# Patient Record
Sex: MALE | Race: WHITE | Employment: OTHER | URBAN - METROPOLITAN AREA
[De-identification: names, ages, dates, MRNs, and addresses within clinical notes are randomized per-mention and may not be internally consistent; named-entity substitution may affect disease eponyms.]

---

## 2018-09-11 ENCOUNTER — HOSPITAL ENCOUNTER (OUTPATIENT)
Dept: CARDIAC CATH/INVASIVE PROCEDURES | Age: 73
Discharge: HOME | End: 2018-09-11
Attending: SPECIALIST | Admitting: SPECIALIST
Payer: MEDICARE

## 2018-09-11 VITALS
HEIGHT: 73 IN | RESPIRATION RATE: 19 BRPM | DIASTOLIC BLOOD PRESSURE: 78 MMHG | BODY MASS INDEX: 28.81 KG/M2 | SYSTOLIC BLOOD PRESSURE: 165 MMHG | WEIGHT: 217.37 LBS | OXYGEN SATURATION: 96 % | TEMPERATURE: 98.6 F | HEART RATE: 85 BPM

## 2018-09-11 LAB
ACT BLD: 142 SECS (ref 79–138)
GLUCOSE BLD STRIP.AUTO-MCNC: 143 MG/DL (ref 65–100)
GLUCOSE BLD STRIP.AUTO-MCNC: 184 MG/DL (ref 65–100)
SERVICE CMNT-IMP: ABNORMAL
SERVICE CMNT-IMP: ABNORMAL

## 2018-09-11 PROCEDURE — C1887 CATHETER, GUIDING: HCPCS

## 2018-09-11 PROCEDURE — 74011250637 HC RX REV CODE- 250/637

## 2018-09-11 PROCEDURE — 77030029065 HC DRSG HEMO QCLOT ZMED -B

## 2018-09-11 PROCEDURE — 85347 COAGULATION TIME ACTIVATED: CPT

## 2018-09-11 PROCEDURE — 77030018836 HC SOL IRR NACL ICUM -A

## 2018-09-11 PROCEDURE — C1769 GUIDE WIRE: HCPCS

## 2018-09-11 PROCEDURE — 74011636320 HC RX REV CODE- 636/320: Performed by: SPECIALIST

## 2018-09-11 PROCEDURE — 82962 GLUCOSE BLOOD TEST: CPT

## 2018-09-11 PROCEDURE — 77030008543 HC TBNG MON PRSS MRTM -A

## 2018-09-11 PROCEDURE — C1894 INTRO/SHEATH, NON-LASER: HCPCS

## 2018-09-11 PROCEDURE — 99153 MOD SED SAME PHYS/QHP EA: CPT

## 2018-09-11 PROCEDURE — 74011250636 HC RX REV CODE- 250/636

## 2018-09-11 PROCEDURE — 74011250636 HC RX REV CODE- 250/636: Performed by: SPECIALIST

## 2018-09-11 PROCEDURE — 77030004532 HC CATH ANGI DX IMP BSC -A

## 2018-09-11 RX ORDER — DIPHENHYDRAMINE HYDROCHLORIDE 50 MG/ML
25 INJECTION, SOLUTION INTRAMUSCULAR; INTRAVENOUS
Status: DISCONTINUED | OUTPATIENT
Start: 2018-09-11 | End: 2018-09-11 | Stop reason: HOSPADM

## 2018-09-11 RX ORDER — HYDROCODONE BITARTRATE AND ACETAMINOPHEN 7.5; 325 MG/1; MG/1
TABLET ORAL AS NEEDED
COMMUNITY

## 2018-09-11 RX ORDER — HEPARIN SODIUM 200 [USP'U]/100ML
500 INJECTION, SOLUTION INTRAVENOUS
Status: CANCELLED | OUTPATIENT
Start: 2018-09-11

## 2018-09-11 RX ORDER — LISINOPRIL AND HYDROCHLOROTHIAZIDE 12.5; 2 MG/1; MG/1
2 TABLET ORAL DAILY
COMMUNITY

## 2018-09-11 RX ORDER — HEPARIN SODIUM 1000 [USP'U]/ML
5000 INJECTION, SOLUTION INTRAVENOUS; SUBCUTANEOUS ONCE
Status: COMPLETED | OUTPATIENT
Start: 2018-09-11 | End: 2018-09-11

## 2018-09-11 RX ORDER — MIDAZOLAM HYDROCHLORIDE 1 MG/ML
.5-1 INJECTION, SOLUTION INTRAMUSCULAR; INTRAVENOUS
Status: DISCONTINUED | OUTPATIENT
Start: 2018-09-11 | End: 2018-09-11 | Stop reason: HOSPADM

## 2018-09-11 RX ORDER — MIDAZOLAM HYDROCHLORIDE 1 MG/ML
.5-1 INJECTION, SOLUTION INTRAMUSCULAR; INTRAVENOUS
Status: CANCELLED | OUTPATIENT
Start: 2018-09-11

## 2018-09-11 RX ORDER — SODIUM CHLORIDE 0.9 % (FLUSH) 0.9 %
5-10 SYRINGE (ML) INJECTION AS NEEDED
Status: DISCONTINUED | OUTPATIENT
Start: 2018-09-11 | End: 2018-09-11 | Stop reason: HOSPADM

## 2018-09-11 RX ORDER — GABAPENTIN 300 MG/1
300 CAPSULE ORAL 3 TIMES DAILY
COMMUNITY

## 2018-09-11 RX ORDER — LIDOCAINE HYDROCHLORIDE 10 MG/ML
50 INJECTION INFILTRATION; PERINEURAL
Status: CANCELLED | OUTPATIENT
Start: 2018-09-11

## 2018-09-11 RX ORDER — ASPIRIN 325 MG
325 TABLET ORAL DAILY
Status: DISCONTINUED | OUTPATIENT
Start: 2018-09-12 | End: 2018-09-11 | Stop reason: HOSPADM

## 2018-09-11 RX ORDER — HEPARIN SODIUM 1000 [USP'U]/ML
INJECTION, SOLUTION INTRAVENOUS; SUBCUTANEOUS
Status: COMPLETED
Start: 2018-09-11 | End: 2018-09-11

## 2018-09-11 RX ORDER — LIDOCAINE HYDROCHLORIDE 10 MG/ML
20 INJECTION INFILTRATION; PERINEURAL
Status: DISCONTINUED | OUTPATIENT
Start: 2018-09-11 | End: 2018-09-11 | Stop reason: HOSPADM

## 2018-09-11 RX ORDER — ADENOSINE 3 MG/ML
140 INJECTION, SOLUTION INTRAVENOUS ONCE
Status: COMPLETED | OUTPATIENT
Start: 2018-09-11 | End: 2018-09-11

## 2018-09-11 RX ORDER — SODIUM CHLORIDE 9 MG/ML
125 INJECTION, SOLUTION INTRAVENOUS CONTINUOUS
Status: DISPENSED | OUTPATIENT
Start: 2018-09-11 | End: 2018-09-11

## 2018-09-11 RX ORDER — HYDROCORTISONE SODIUM SUCCINATE 100 MG/2ML
100 INJECTION, POWDER, FOR SOLUTION INTRAMUSCULAR; INTRAVENOUS
Status: DISCONTINUED | OUTPATIENT
Start: 2018-09-11 | End: 2018-09-11 | Stop reason: HOSPADM

## 2018-09-11 RX ORDER — HEPARIN SODIUM 200 [USP'U]/100ML
500 INJECTION, SOLUTION INTRAVENOUS ONCE
Status: COMPLETED | OUTPATIENT
Start: 2018-09-11 | End: 2018-09-11

## 2018-09-11 RX ORDER — FENTANYL CITRATE 50 UG/ML
12.5-2 INJECTION, SOLUTION INTRAMUSCULAR; INTRAVENOUS
Status: CANCELLED | OUTPATIENT
Start: 2018-09-11

## 2018-09-11 RX ORDER — FENTANYL CITRATE 50 UG/ML
25-200 INJECTION, SOLUTION INTRAMUSCULAR; INTRAVENOUS
Status: DISCONTINUED | OUTPATIENT
Start: 2018-09-11 | End: 2018-09-11 | Stop reason: HOSPADM

## 2018-09-11 RX ORDER — ASPIRIN 325 MG
TABLET ORAL
Status: COMPLETED
Start: 2018-09-11 | End: 2018-09-11

## 2018-09-11 RX ORDER — SODIUM CHLORIDE 0.9 % (FLUSH) 0.9 %
5-10 SYRINGE (ML) INJECTION EVERY 8 HOURS
Status: DISCONTINUED | OUTPATIENT
Start: 2018-09-11 | End: 2018-09-11 | Stop reason: HOSPADM

## 2018-09-11 RX ADMIN — Medication 325 MG: at 12:12

## 2018-09-11 RX ADMIN — MIDAZOLAM 2 MG: 1 INJECTION INTRAMUSCULAR; INTRAVENOUS at 13:34

## 2018-09-11 RX ADMIN — ASPIRIN 325 MG: 325 TABLET, COATED ORAL at 12:12

## 2018-09-11 RX ADMIN — HEPARIN SODIUM 3000 UNITS: 1000 INJECTION, SOLUTION INTRAVENOUS; SUBCUTANEOUS at 13:51

## 2018-09-11 RX ADMIN — MIDAZOLAM 1 MG: 1 INJECTION INTRAMUSCULAR; INTRAVENOUS at 14:04

## 2018-09-11 RX ADMIN — HEPARIN SODIUM 1000 UNITS: 200 INJECTION, SOLUTION INTRAVENOUS at 13:33

## 2018-09-11 RX ADMIN — LIDOCAINE HYDROCHLORIDE 20 ML: 10 INJECTION, SOLUTION INFILTRATION; PERINEURAL at 13:33

## 2018-09-11 RX ADMIN — MIDAZOLAM 1 MG: 1 INJECTION INTRAMUSCULAR; INTRAVENOUS at 13:37

## 2018-09-11 RX ADMIN — FENTANYL CITRATE 50 MCG: 50 INJECTION, SOLUTION INTRAMUSCULAR; INTRAVENOUS at 13:34

## 2018-09-11 RX ADMIN — FENTANYL CITRATE 25 MCG: 50 INJECTION, SOLUTION INTRAMUSCULAR; INTRAVENOUS at 13:38

## 2018-09-11 RX ADMIN — IOPAMIDOL 215 ML: 755 INJECTION, SOLUTION INTRAVENOUS at 14:07

## 2018-09-11 RX ADMIN — ADENOSINE 13.8 MG/MIN: 3 INJECTION, SOLUTION INTRAVENOUS at 13:51

## 2018-09-11 RX ADMIN — FENTANYL CITRATE 25 MCG: 50 INJECTION, SOLUTION INTRAMUSCULAR; INTRAVENOUS at 13:43

## 2018-09-11 RX ADMIN — SODIUM CHLORIDE 75 ML/HR: 900 INJECTION, SOLUTION INTRAVENOUS at 12:08

## 2018-09-11 NOTE — IP AVS SNAPSHOT
[address] 1007 [facility] 
[phone] Patient: Roshan Anaya MRN: [id number] :1945 About your hospitalization You were admitted on:  2018 You last received care in the:  [facility] PACU You were discharged on:  2018 Why you were hospitalized Your primary diagnosis was:  Not on File Follow-up Information Follow up With Details Comments Contact Info Tita Cid MD On 2018 10:20am [address] 1007 [facility] 
[phone] Provider Unknown   Patient not available to ask Discharge Orders None A check bertha indicates which time of day the medication should be taken. My Medications CONTINUE taking these medications Instructions Each Dose to Equal  
 Morning Noon Evening Bedtime  
 aspirin delayed-release 81 mg tablet Your last dose was: Your next dose is: Take 81 mg by mouth daily. 81 mg  
    
   
   
   
  
 cetirizine 10 mg tablet Commonly known as:  ZYRTEC Your last dose was: Your next dose is: Take 10 mg by mouth daily. 10 mg  
    
   
   
   
  
 gabapentin 300 mg capsule Commonly known as:  NEURONTIN Your last dose was: Your next dose is: Take 300 mg by mouth two (2) times a day. One tablet am two pm  
 300 mg  
    
   
   
   
  
 glipiZIDE 5 mg tablet Commonly known as:  Pricila Betters Your last dose was: Your next dose is: Take 5 mg by mouth two (2) times a day. 5 mg HYDROcodone-acetaminophen 7.5-325 mg per tablet Commonly known as:  March Castles Your last dose was: Your next dose is: Take  by mouth. JANUVIA 100 mg tablet Generic drug:  SITagliptin Your last dose was: Your next dose is: Take 100 mg by mouth daily. 100 mg  
    
   
   
   
  
 lisinopril-hydroCHLOROthiazide 20-12.5 mg per tablet Commonly known as:  Floyce Plenty Your last dose was: Your next dose is: Take 2 Tabs by mouth daily. 2 Tab  
    
   
   
   
  
 metFORMIN 1,000 mg tablet Commonly known as:  GLUCOPHAGE Your last dose was: Your next dose is: Take 1,000 mg by mouth two (2) times daily (with meals). 1000 mg  
    
   
   
   
  
 simvastatin 40 mg tablet Commonly known as:  ZOCOR Your last dose was: Your next dose is: Take 20 mg by mouth nightly. 20 mg Opioid Education Prescription Opioids: What You Need to Know: 
 
Prescription opioids can be used to help relieve moderate-to-severe pain and are often prescribed following a surgery or injury, or for certain health conditions. These medications can be an important part of treatment but also come with serious risks. Opioids are strong pain medicines. Examples include hydrocodone, oxycodone, fentanyl, and morphine. Heroin is an example of an illegal opioid. It is important to work with your health care provider to make sure you are getting the safest, most effective care. WHAT ARE THE RISKS AND SIDE EFFECTS OF OPIOID USE? Prescription opioids carry serious risks of addiction and overdose, especially with prolonged use. An opioid overdose, often marked by slow breathing, can cause sudden death. The use of prescription opioids can have a number of side effects as well, even when taken as directed. · Tolerance-meaning you might need to take more of a medication for the same pain relief · Physical dependence-meaning you have symptoms of withdrawal when the medication is stopped. Withdrawal symptoms can include nausea, sweating, chills, diarrhea, stomach cramps, and muscle aches.   Withdrawal can last up to several weeks, depending on which drug you took and how long you took it. · Increased sensitivity to pain · Constipation · Nausea, vomiting, and dry mouth · Sleepiness and dizziness · Confusion · Depression · Low levels of testosterone that can result in lower sex drive, energy, and strength · Itching and sweating RISKS ARE GREATER WITH:      
· History of drug misuse, substance use disorder, or overdose · Mental health conditions (such as depression or anxiety) · Sleep apnea · Older age (72 years or older) · Pregnancy Avoid alcohol while taking prescription opioids. Also, unless specifically advised by your health care provider, medications to avoid include: · Benzodiazepines (such as Xanax or Valium) · Muscle relaxants (such as Soma or Flexeril) · Hypnotics (such as Ambien or Lunesta) · Other prescription opioids KNOW YOUR OPTIONS Talk to your health care provider about ways to manage your pain that don't involve prescription opioids. Some of these options may actually work better and have fewer risks and side effects. Options may include: 
· Pain relievers such as acetaminophen, ibuprofen, and naproxen · Some medications that are also used for depression or seizures · Physical therapy and exercise · Counseling to help patients learn how to cope better with triggers of pain and stress. · Application of heat or cold compress · Massage therapy · Relaxation techniques Be Informed Make sure you know the name of your medication, how much and how often to take it, and its potential risks & side effects. IF YOU ARE PRESCRIBED OPIOIDS FOR PAIN: 
· Never take opioids in greater amounts or more often than prescribed. Remember the goal is not to be pain-free but to manage your pain at a tolerable level. · Follow up with your primary care provider to: · Work together to create a plan on how to manage your pain. · Talk about ways to help manage your pain that don't involve prescription opioids. · Talk about any and all concerns and side effects. · Help prevent misuse and abuse. · Never sell or share prescription opioids · Help prevent misuse and abuse. · Store prescription opioids in a secure place and out of reach of others (this may include visitors, children, friends, and family). · Safely dispose of unused/unwanted prescription opioids: Find your community drug take-back program or your pharmacy mail-back program, or flush them down the toilet, following guidance from the Food and Drug Administration (www.fda.gov/Drugs/ResourcesForYou). · Visit www.cdc.gov/drugoverdose to learn about the risks of opioid abuse and overdose. · If you believe you may be struggling with addiction, tell your health care provider and ask for guidance or call [facility] at 0-307-768-JRKB. Discharge Instructions Discharge Instructions:  
                 Cardiac Catheterization/Angiography Discharge Instructions *Check the puncture site frequently for swelling or bleeding. If you see any bleeding, lie down and apply pressure over the area with a clean town or washcloth. Call 911 immediately and continue to hold pressure until their arrival. Notify your doctor for any redness, swelling, drainage or oozing from the puncture site. Notify your doctor for any fever or chills. *If the leg  with the puncture becomes cold, numb or painful, call Dr Glenn Christensen at  [phone] *Activity should be limited for the next 48 hours. Climb stairs as little as possible and avoid any stooping, bending or strenuous activity for 48 hours. No heavy lifting (anything over 10 pounds) for three days. *Do not drive for 24 hours. *You may resume your usual diet.  Drink more fluids than usual. 
 
 *Have a responsible person drive you home and stay with you for at least 24 hours after your heart catheterization/angiography. *You may remove the bandage from your Right Groin in 24 hours. You may shower in 24 hours. No tub baths, hot tubs or swimming for one week. Do not place any lotions, creams, powders, ointments over the puncture site for one week. You may place a clean band-aid over the puncture site each day for 5 days. Change this daily. Medications:  
 
Do NOT restart metformin (glucophage) until Thursday, September 13th, 2018. Activity: As tolerated except do not lift over 10 pounds for 5 days. Diet:  
 
American Heart Association. Follow-up:  
 
Follow up with Arun Fuentes MD on September 19th, 2018 at 10:20am. 
Snehal ToulonAlpesh Pretty [address] 
[phone] Reminder: 
Do NOT restart metformin (glucophage) until Thursday, September 13th, 2018. If you smoke, STOP! Introducing [facility]! Michelle Ramesh introduces [employer] patient portal. Now you can access parts of your medical record, email your doctor's office, and request medication refills online. 1. In your internet browser, go to [email or web address] 2. Click on the First Time User? Click Here link in the Sign In box. You will see the New Member Sign Up page. 3. Enter your [employer] Access Code exactly as it appears below. You will not need to use this code after youve completed the sign-up process. If you do not sign up before the expiration date, you must request a new code. · [employer] Access Code: [email or web address] Expires: 11/27/2018  3:43 PM 
 
4. Enter the last four digits of your Social Security Number (xxxx) and Date of Birth (mm/dd/yyyy) as indicated and click Submit. You will be taken to the next sign-up page. 5. Create a [employer] ID. This will be your [employer] login ID and cannot be changed, so think of one that is secure and easy to remember. 6. Create a [employer] password. You can change your password at any time. 7. Enter your Password Reset Question and Answer. This can be used at a later time if you forget your password. 8. Enter your e-mail address. You will receive e-mail notification when new information is available in [address] 9. Click Sign Up. You can now view and download portions of your medical record. 10. Click the Download Summary menu link to download a portable copy of your medical information. If you have questions, please visit the Frequently Asked Questions section of the [employer] website. Remember, [employer] is NOT to be used for urgent needs. For medical emergencies, dial 911. Now available from your iPhone and Android! Introducing Faraz Boykin As a [employer] patient, I wanted to make you aware of our electronic visit tool called Faraz Boykin. [employer] 24/7 allows you to connect within minutes with a medical provider 24 hours a day, seven days a week via a mobile device or tablet or logging into a secure website from your computer. You can access Faraz Boykin from anywhere in the [place]. A virtual visit might be right for you when you have a simple condition and feel like you just dont want to get out of bed, or cant get away from work for an appointment, when your regular [employer] provider is not available (evenings, weekends or holidays), or when youre out of town and need minor care. Electronic visits cost only $49 and if the [employer] 24/7 provider determines a prescription is needed to treat your condition, one can be electronically transmitted to a nearby pharmacy*. Please take a moment to enroll today if you have not already done so. The enrollment process is free and takes just a few minutes. To enroll, please download the [employer] 24/7 susana to your tablet or phone, or visit [email or web address] to enroll on your computer. And, as an [address] [place] [address] patient with a [employer] account, the results of your visits will be scanned into your electronic medical record and your primary care provider will be able to view the scanned results. We urge you to continue to see your regular [facility] provider for your ongoing medical care. And while your primary care provider may not be the one available when you seek a Faraz Boykin virtual visit, the peace of mind you get from getting a real diagnosis real time can be priceless. For more information on Faraz Crandallfin, view our Frequently Asked Questions (FAQs) at [email or web address]. Sincerely, 
 
Bryan Cortez MD 
Chief Medical Officer [facility] Cony Swanson *:  certain medications cannot be prescribed via Faraz Haparvinfin Providers Seen During Your Hospitalization Provider Specialty Primary office phone Arun Fuentes MD Cardiology [phone] Your Primary Care Physician (PCP) Primary Care Physician Office Phone Office Fax UNKNOWN, PROVIDER ** None ** ** None ** You are allergic to the following No active allergies Recent Documentation Height Weight BMI Smoking Status 1.854 m 98.6 kg 28.68 kg/m2 Former Smoker Emergency Contacts Name Discharge Info Relation Home Work Mobile Teddy Landon DISCHARGE CAREGIVER [3] Other Relative [6] [phone] Patient Belongings The following personal items are in your possession at time of discharge: 
     Visual Aid: Glasses Please provide this summary of care documentation to your next provider. Signatures-by signing, you are acknowledging that this After Visit Summary has been reviewed with you and you have received a copy. Patient Signature:  ____________________________________________________________ Date:  ____________________________________________________________  
  
Miriam Hero Provider Signature:  ____________________________________________________________ Date:  ____________________________________________________________

## 2018-09-11 NOTE — H&P
Date of Surgery Update:  Davis August was seen and examined. Positive stress echo for ischemia in LAD distribution.     Signed By: Nirmal Gallego MD     September 11, 2018 12:53 PM

## 2018-09-11 NOTE — IP AVS SNAPSHOT
[address] 
[phone] Patient: Mark Officer MRN: [id number] :1945 A check bertha indicates which time of day the medication should be taken. My Medications CONTINUE taking these medications Instructions Each Dose to Equal  
 Morning Noon Evening Bedtime  
 aspirin delayed-release 81 mg tablet Your last dose was: Your next dose is: Take 81 mg by mouth daily. 81 mg  
    
   
   
   
  
 cetirizine 10 mg tablet Commonly known as:  ZYRTEC Your last dose was: Your next dose is: Take 10 mg by mouth daily. 10 mg  
    
   
   
   
  
 gabapentin 300 mg capsule Commonly known as:  NEURONTIN Your last dose was: Your next dose is: Take 300 mg by mouth two (2) times a day. One tablet am two pm  
 300 mg  
    
   
   
   
  
 glipiZIDE 5 mg tablet Commonly known as:  Maxcine Carroll Your last dose was: Your next dose is: Take 5 mg by mouth two (2) times a day. 5 mg HYDROcodone-acetaminophen 7.5-325 mg per tablet Commonly known as:  Katie Parisian Your last dose was: Your next dose is: Take  by mouth. JANUVIA 100 mg tablet Generic drug:  SITagliptin Your last dose was: Your next dose is: Take 100 mg by mouth daily. 100 mg  
    
   
   
   
  
 lisinopril-hydroCHLOROthiazide 20-12.5 mg per tablet Commonly known as:  Grace Schilder Your last dose was: Your next dose is: Take 2 Tabs by mouth daily. 2 Tab  
    
   
   
   
  
 metFORMIN 1,000 mg tablet Commonly known as:  GLUCOPHAGE Your last dose was: Your next dose is: Take 1,000 mg by mouth two (2) times daily (with meals).   
 1000 mg  
    
   
   
   
  
 simvastatin 40 mg tablet Commonly known as:  ZOCOR Your last dose was: Your next dose is: Take 20 mg by mouth nightly.   
 20 mg

## 2018-09-11 NOTE — DISCHARGE INSTRUCTIONS
Discharge Instructions:                    Cardiac Catheterization/Angiography Discharge Instructions    *Check the puncture site frequently for swelling or bleeding. If you see any bleeding, lie down and apply pressure over the area with a clean town or washcloth. Call 911 immediately and continue to hold pressure until their arrival. Notify your doctor for any redness, swelling, drainage or oozing from the puncture site. Notify your doctor for any fever or chills. *If the leg  with the puncture becomes cold, numb or painful, call Dr Abel Villagomez at  [phone]    *Activity should be limited for the next 48 hours. Climb stairs as little as possible and avoid any stooping, bending or strenuous activity for 48 hours. No heavy lifting (anything over 10 pounds) for three days. *Do not drive for 24 hours. *You may resume your usual diet. Drink more fluids than usual.    *Have a responsible person drive you home and stay with you for at least 24 hours after your heart catheterization/angiography. *You may remove the bandage from your Right Groin in 24 hours. You may shower in 24 hours. No tub baths, hot tubs or swimming for one week. Do not place any lotions, creams, powders, ointments over the puncture site for one week. You may place a clean band-aid over the puncture site each day for 5 days. Change this daily. Medications:     Do NOT restart metformin (glucophage) until Thursday, September 13th, 2018. Activity:     As tolerated except do not lift over 10 pounds for 5 days. Diet:     American Heart Association. Follow-up:     Follow up with Vivek Stone MD on September 19th, 2018 at 10:20am.  [address] [place] [address] 33  [phone]    Reminder:  Do NOT restart metformin (glucophage) until Thursday, September 13th, 2018. If you smoke, STOP!

## 2018-09-11 NOTE — PROGRESS NOTES
11:42 AM Received patient from waiting area. Armband and allergies verbally confirmed with patient. Procedure explained and consents signed. 5-B [facility] Dr. Darcie Logan in to see strong radial and ulnar pulse in right wrist  1315 TRANSFER - OUT REPORT:    Verbal report given to Myesha(name) robbie Ferrera  being transferred to cath lab(unit) for routine progression of care       Report consisted of patients Situation, Background, Assessment and   Recommendations(SBAR). Information from the following report(s) Procedure Summary was reviewed with the receiving nurse. Lines:   Peripheral IV 09/11/18 Right Antecubital (Active)   Site Assessment Clean, dry, & intact 9/11/2018 12:00 PM        Opportunity for questions and clarification was provided. Patient transported with:   Registered Nurse      Emanuel 35 REPORT:    Verbal report received from Myesha(name) on Kianna Ferrera  being received from cath lab(unit) for routine progression of care      Report consisted of patients Situation, Background, Assessment and   Recommendations(SBAR). Information from the following report(s) Procedure Summary was reviewed with the receiving nurse. Opportunity for questions and clarification was provided. Assessment completed upon patients arrival to unit and care assumed.        1430 act 142 accucheck 143 right groin sheath site clean dry in tact preparing to pull sheath   1500 hemostasis obtained sand bag to right groin doppler pulses  1520 right groin site soft dry in tact sand bag in place  1550 update given to daughter   1700 sitting up 45 degrees right groin site soft dry in tact, eating lunch  1740 ambulated to bathroom and back to chair reviewed discharge instructions with pt and daughter both verbalized understanding

## 2018-09-11 NOTE — PROCEDURES
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 FFR D1 = 0.85. RI patent. LCx LIs. RCA p30, m40, d30. Normal LVF (EF 60%). No AVG/MR.  RFA manual    Based on these findings, Mr. Catalino Pruitt is a Low Risk patient from a cardiac perspective who will be undergoing a High Risk procedure (lower extremity vascular surgery). I recommend proceeding ahead with the procedure without further cardiac testing at this time. We would be happy to see Mr. Catalino Pruitt during the marj-procedural period if so desired.     F/U with me 9/19 @ 10:20am.

## 2019-07-02 ENCOUNTER — HOSPITAL ENCOUNTER (OUTPATIENT)
Dept: MRI IMAGING | Age: 74
Discharge: HOME | End: 2019-07-02
Attending: PHYSICAL MEDICINE & REHABILITATION
Payer: MEDICARE

## 2019-07-02 ENCOUNTER — HOSPITAL ENCOUNTER (OUTPATIENT)
Dept: GENERAL RADIOLOGY | Age: 74
Discharge: HOME | End: 2019-07-02
Attending: PHYSICAL MEDICINE & REHABILITATION
Payer: MEDICARE

## 2019-07-02 DIAGNOSIS — M51.26 LUMBAR DISC HERNIATION: ICD-10-CM

## 2019-07-02 DIAGNOSIS — T15.90XA FOREIGN BODY IN EYE: ICD-10-CM

## 2019-07-02 PROCEDURE — 72148 MRI LUMBAR SPINE W/O DYE: CPT

## 2019-07-02 PROCEDURE — 70030 X-RAY EYE FOR FOREIGN BODY: CPT

## 2019-10-15 ENCOUNTER — OFFICE VISIT (OUTPATIENT)
Dept: NEUROSURGERY | Age: 74
End: 2019-10-15

## 2019-10-15 VITALS
DIASTOLIC BLOOD PRESSURE: 88 MMHG | BODY MASS INDEX: 29.16 KG/M2 | TEMPERATURE: 98.3 F | OXYGEN SATURATION: 98 % | HEIGHT: 73 IN | RESPIRATION RATE: 17 BRPM | HEART RATE: 88 BPM | WEIGHT: 220 LBS | SYSTOLIC BLOOD PRESSURE: 140 MMHG

## 2019-10-15 DIAGNOSIS — I10 HYPERTENSION, UNSPECIFIED TYPE: ICD-10-CM

## 2019-10-15 DIAGNOSIS — G95.19 NEUROGENIC CLAUDICATION (HCC): ICD-10-CM

## 2019-10-15 DIAGNOSIS — E11.42 TYPE 2 DIABETES MELLITUS WITH DIABETIC POLYNEUROPATHY, WITHOUT LONG-TERM CURRENT USE OF INSULIN (HCC): Primary | ICD-10-CM

## 2019-10-15 RX ORDER — GLIMEPIRIDE 2 MG/1
2 TABLET ORAL 3 TIMES DAILY
Refills: 0 | COMMUNITY
Start: 2019-09-24 | End: 2020-10-12

## 2019-10-15 RX ORDER — CLOPIDOGREL BISULFATE 75 MG/1
75 TABLET ORAL DAILY
COMMUNITY

## 2019-10-15 NOTE — PATIENT INSTRUCTIONS
Your low back pain and bilateral lower extremity pain could be caused by neurogenic claudication from a vascular cause including a rare vascular malformation, dural arteriovenous fistula. Narrowing of your aorta or branch vessels could also be causing your condition. CTA of the chest, abdomen and pelvis and MRI of the thoracic spine were ordered to investigate further. A spinal angiogram may be indicated in the future (procedure). Call 911 or seek emergency medical attention if your legs or arms become weak or if you loose control of bowel or bladder function. These symptoms are an emergency. See Dr. Ferne Fleischer back in 2-4 weeks to go over test results. A Healthy Lifestyle: Care Instructions  Your Care Instructions    A healthy lifestyle can help you feel good, stay at a healthy weight, and have plenty of energy for both work and play. A healthy lifestyle is something you can share with your whole family. A healthy lifestyle also can lower your risk for serious health problems, such as high blood pressure, heart disease, and diabetes. You can follow a few steps listed below to improve your health and the health of your family. Follow-up care is a key part of your treatment and safety. Be sure to make and go to all appointments, and call your doctor if you are having problems. It's also a good idea to know your test results and keep a list of the medicines you take. How can you care for yourself at home? · Do not eat too much sugar, fat, or fast foods. You can still have dessert and treats now and then. The goal is moderation. · Start small to improve your eating habits. Pay attention to portion sizes, drink less juice and soda pop, and eat more fruits and vegetables. ? Eat a healthy amount of food. A 3-ounce serving of meat, for example, is about the size of a deck of cards. Fill the rest of your plate with vegetables and whole grains.   ? Limit the amount of soda and sports drinks you have every day. Drink more water when you are thirsty. ? Eat at least 5 servings of fruits and vegetables every day. It may seem like a lot, but it is not hard to reach this goal. A serving or helping is 1 piece of fruit, 1 cup of vegetables, or 2 cups of leafy, raw vegetables. Have an apple or some carrot sticks as an afternoon snack instead of a candy bar. Try to have fruits and/or vegetables at every meal.  · Make exercise part of your daily routine. You may want to start with simple activities, such as walking, bicycling, or slow swimming. Try to be active 30 to 60 minutes every day. You do not need to do all 30 to 60 minutes all at once. For example, you can exercise 3 times a day for 10 or 20 minutes. Moderate exercise is safe for most people, but it is always a good idea to talk to your doctor before starting an exercise program.  · Keep moving. Janeen Listen the lawn, work in the garden, or [employer]. Take the stairs instead of the elevator at work. · If you smoke, quit. People who smoke have an increased risk for heart attack, stroke, cancer, and other lung illnesses. Quitting is hard, but there are ways to boost your chance of quitting tobacco for good. ? Use nicotine gum, patches, or lozenges. ? Ask your doctor about stop-smoking programs and medicines. ? Keep trying. In addition to reducing your risk of diseases in the future, you will notice some benefits soon after you stop using tobacco. If you have shortness of breath or asthma symptoms, they will likely get better within a few weeks after you quit. · Limit how much alcohol you drink. Moderate amounts of alcohol (up to 2 drinks a day for men, 1 drink a day for women) are okay. But drinking too much can lead to liver problems, high blood pressure, and other health problems. Family health  If you have a family, there are many things you can do together to improve your health. · Eat meals together as a family as often as possible. · Eat healthy foods. This includes fruits, vegetables, lean meats and dairy, and whole grains. · Include your family in your fitness plan. Most people think of activities such as jogging or tennis as the way to fitness, but there are many ways you and your family can be more active. Anything that makes you breathe hard and gets your heart pumping is exercise. Here are some tips:  ? Walk to do errands or to take your child to school or the bus.  ? Go for a family bike ride after dinner instead of watching TV. Where can you learn more? Go to http://sandyMonthlysjenise.info/. Enter T497 in the search box to learn more about \"A Healthy Lifestyle: Care Instructions. \"  Current as of: May 28, 2019  Content Version: 12.2  © 3329-1543 [employer]. Care instructions adapted under license by [employer] (which disclaims liability or warranty for this information). If you have questions about a medical condition or this instruction, always ask your healthcare professional. [address] any warranty or liability for your use of this information.

## 2019-10-17 NOTE — PROGRESS NOTES
Neurointerventional Surgery  Ambulatory Progress Note      Patient: Carrie Wilson MRN: [id number]  SSN: [id number]    YOB: 1945  Age: 69 y.o. Sex: male      History of Present Illness:      Nette Banks is a 68 yo right handed male referred to neurovascular clinic today for \"vascular neurogenic claudication and evaluation of the Artery of [place]\". This patient underwent vascular procedures at [facility] on December 4, 2019. The patient is the source of this information but an outside H&P lists:    10/2018 [facility]: hawk/DCP r pop, pta r sfa, trent anais stent 10 mm and IVUS (r [id number] 10x37 ANAIS stents, DCB/Hawk right pop  12/4/19  Left fem/profunda TEA patchplasty: bovine patch     Diagnostics:   9/25/17:   PVR with exercise 0.89/0/94->0.61/0.61  7/30/2018  SHELLY: 0.92/0.80    MRI and CT myelogram reports (and images) were the only studies provided by the patient today. His current symptoms include progressively worsening, reproducible pain in the central low back, bilateral buttocks and lower extremities that worsen with walking and improve at rest.  This severely limits his mobility. He describes severe pain with multiple stops walking from the parking lot to my office today. He denies bowel or bladder incontinence. He initially noticed these symptoms 3 years ago but there has been significant worsening over the past 12 months. His vascular surgeon said that SHELLY's are \"not normal but OK\" and does not believe current symptoms are arising from PVD (Dr. Aneta Lopez). Patient complains of chronic burning pain in both feet (chronic neuropathy).      PMH reported (see additional list below):  CAD (positive stress echo 9/11/18, negative cath on Nov 1, 2018 ([address] [place] [address]), history of paroxysmal AFIB (short interval warfarin discontinued), PVD (stents visible on myelogram in bilateral common iliac arteries and the patient says that his right popliteal was stented as above), diabetes type 2, diabetic nepropathy, hypertension, hyperlipidemia. PSH:  As above, bladder surgery    SocHx:  Retired, quit smoking 12 years ago (1/2 pack per day before), at least 4 beers per day    Allergies:  Glipizide (photosensitivity)      Patient Active Problem List   Diagnosis Code    GI BLEEDING - UNSPEC RECTAL BLEEDING K62.5    Diverticulum - small intestine     Pyelonephritis, unspecified N12    SIRS (systemic inflammatory response syndrome) ([facility]) R65.10    DM (diabetes mellitus) ([facility] [address] E11.9    Unspecified constipation K59.00    HTN (hypertension) I10    Other and unspecified hyperlipidemia E78.5    UTI (urinary tract infection) N39.0    Neurogenic claudication M48.062        Review of Systems    A comprehensive ROS was performed and was negative except for as per HPI. Objective:     Current Outpatient Medications   Medication Sig Dispense Refill    clopidogrel (PLAVIX) 75 mg tab Take 75 mg by mouth daily.  lisinopril-hydroCHLOROthiazide (PRINZIDE, ZESTORETIC) 20-12.5 mg per tablet Take 2 Tabs by mouth daily.  gabapentin (NEURONTIN) 300 mg capsule Take 300 mg by mouth two (2) times a day. One tablet am two pm      sitaGLIPtin (JANUVIA) 100 mg tablet Take 100 mg by mouth daily.  simvastatin (ZOCOR) 40 mg tablet Take 20 mg by mouth nightly.  metFORMIN (GLUCOPHAGE) 1,000 mg tablet Take 1,000 mg by mouth two (2) times daily (with meals).  aspirin delayed-release 81 mg tablet Take 81 mg by mouth daily.  glimepiride (AMARYL) 2 mg tablet Take 2 mg by mouth three (3) times daily. 0    HYDROcodone-acetaminophen (NORCO) 7.5-325 mg per tablet Take  by mouth.  glipiZIDE (GLUCOTROL) 5 mg tablet Take 5 mg by mouth two (2) times a day.  cetirizine (ZYRTEC) 10 mg tablet Take 10 mg by mouth daily.           Visit Vitals  /88 (BP 1 Location: Left arm)   Pulse 88   Temp 98.3 °F (36.8 °C)   Resp 17   Ht 6' 1\" (1.854 m)   Wt 220 lb (99.8 kg) SpO2 98%   BMI 29.03 kg/m²       No Known Allergies    Current Outpatient Medications   Medication Sig    clopidogrel (PLAVIX) 75 mg tab Take 75 mg by mouth daily.  lisinopril-hydroCHLOROthiazide (PRINZIDE, ZESTORETIC) 20-12.5 mg per tablet Take 2 Tabs by mouth daily.  gabapentin (NEURONTIN) 300 mg capsule Take 300 mg by mouth two (2) times a day. One tablet am two pm    sitaGLIPtin (JANUVIA) 100 mg tablet Take 100 mg by mouth daily.  simvastatin (ZOCOR) 40 mg tablet Take 20 mg by mouth nightly.  metFORMIN (GLUCOPHAGE) 1,000 mg tablet Take 1,000 mg by mouth two (2) times daily (with meals).  aspirin delayed-release 81 mg tablet Take 81 mg by mouth daily.  glimepiride (AMARYL) 2 mg tablet Take 2 mg by mouth three (3) times daily.  HYDROcodone-acetaminophen (NORCO) 7.5-325 mg per tablet Take  by mouth.  glipiZIDE (GLUCOTROL) 5 mg tablet Take 5 mg by mouth two (2) times a day.  cetirizine (ZYRTEC) 10 mg tablet Take 10 mg by mouth daily. No current facility-administered medications for this visit. Physical Exam:  General: NAD  Lungs: clear to auscultation bilaterally  Heart: regular rate and rhythm, S1, S2 normal, no murmur, click, rub or gallop  Extremities: extremities normal, atraumatic, no cyanosis mild ankle edema (puffy), feet warm and pink. Slightly delayed cap refill  Pulses: PT 1- faint but palpable bilaterally, DP not palpable    Neurologic Exam:  Mental Status:  Alert and oriented x 4. Appropriate affect, mood and behavior. Language:    Normal fluency, repetition, comprehension and naming. Cranial Nerves:   Pupils equal, round and reactive to light. Visual fields full to confrontation. Extraocular movements intact. Facial sensation intact V1 - V3. Full facial strength, no asymmetry. Hearing intact bilaterally. No dysarthria. Tongue protrudes to midline, palate elevates symmetrically. Shoulder shrug 5/5 bilaterally.     Motor: No pronator drift. Bulk and tone normal.      5/5 power in all extremities proximally and distally. Toes downgoing. No involuntary movements. Sensation:    Decreased sensation below knees (peripheral neuropathy). Coordination & Gait: Slow wide gait with no assist, tandem severely impaired, FTN normal.       Labs:  Lab Results   Component Value Date/Time    Sodium 132 (L) 08/28/2013 05:57 AM    Potassium 4.1 08/28/2013 05:57 AM    Chloride 98 08/28/2013 05:57 AM    CO2 24 08/28/2013 05:57 AM    Anion gap 10 08/28/2013 05:57 AM    Glucose 139 (H) 08/28/2013 05:57 AM    BUN 12 08/28/2013 05:57 AM    Creatinine 0.93 08/28/2013 05:57 AM    BUN/Creatinine ratio 13 08/28/2013 05:57 AM    GFR est AA >60 08/28/2013 05:57 AM    GFR est non-AA >60 08/28/2013 05:57 AM    Calcium 8.1 (L) 08/28/2013 05:57 AM     Lab Results   Component Value Date/Time    WBC 16.8 (H) 08/28/2013 05:57 AM    HGB 12.3 08/28/2013 05:57 AM    HCT 35.6 (L) 08/28/2013 05:57 AM    PLATELET 285 02/84/2258 05:57 AM    MCV 99.7 (H) 08/28/2013 05:57 AM     Lab Results   Component Value Date/Time    MCH 34.5 (H) 08/28/2013 05:57 AM    MCHC 34.6 08/28/2013 05:57 AM    BASOPHILS 0 08/27/2013 06:00 AM    ABS. LYMPHOCYTES 1.0 08/27/2013 06:00 AM    ABS. MONOCYTES 2.8 (H) 08/27/2013 06:00 AM    ABS. EOSINOPHILS 0.0 08/27/2013 06:00 AM    ABS. BASOPHILS 0.0 08/27/2013 06:00 AM       IMAGING:      I reviewed the MRI lumbar and CT myelogram imaging independently and I agree with the official reports. No cauda equina impingement or severe lumbar stenosis. Mri Lumb Spine Wo Cont    Result Date: 7/2/2019  IMPRESSION: Mild multilevel degenerative change. There is no evidence of central canal stenosis. Minimal/mild foraminal stenoses L4-5 L5-S1. Xr Orbit Bi Foreign Body    Result Date: 7/2/2019  Findings/impression: 2 views of the orbits. No evidence of a metallic foreign body in the orbits. Dental hardware is typically safe for MRI. Assessment/Plan:       ICD-10-CM ICD-9-CM    1. Type 2 diabetes mellitus with diabetic polyneuropathy, without long-term current use of insulin (HCC) E11.42 250.60      357.2    2. Neurogenic claudication M48.062 724.03 CTA CHEST ABD PELV W CONT      MRI [facility] SPINE W WO CONT   3. Hypertension, unspecified type I10 401.9         - ? Lower extremity PVD and associated claudication vs spinal cord vascular compromise (I.e. Faux alajouanine syndrome/AV fistula or aortic disease, possibly ASA or artery of adamkiewics). -  CTA dissection protocol to exclude aortic dissection, stenosis or aneurysm and to evaluate the extent of aortic athero. Assess for suspicious vascular findings on the pial surface of the cord   - Thoracic spinal MRI without and with contrast to assess for AV fistula/cord edema/mass above the level of the conus. -  We discussed the possible need for catheter spinal angiography but I will need results of the above studies to make that determination   -  Followup appointment when studies are completed   -  Patient instructed to seek emergent medical attention for sudden onset lower extremity weakness or bowel/bladder incontinence    Thank you for the referral of this patient. Follow-up Disposition:    I have discussed the diagnosis with the patient and the intended plan as seen in the above orders. Patient is in agreement. The patient has received an after-visit summary and questions were answered concerning future plans. I have discussed medication side effects and warnings with the patient as well.       Charlie Beltran MD

## 2019-10-23 ENCOUNTER — HOSPITAL ENCOUNTER (OUTPATIENT)
Dept: CT IMAGING | Age: 74
Discharge: HOME | End: 2019-10-23
Attending: RADIOLOGY
Payer: MEDICARE

## 2019-10-23 DIAGNOSIS — G95.19 NEUROGENIC CLAUDICATION (HCC): ICD-10-CM

## 2019-10-23 LAB — CREAT BLD-MCNC: 0.8 MG/DL (ref 0.6–1.3)

## 2019-10-23 PROCEDURE — 74174 CTA ABD&PLVS W/CONTRAST: CPT

## 2019-10-23 PROCEDURE — 74011636320 HC RX REV CODE- 636/320: Performed by: RADIOLOGY

## 2019-10-23 PROCEDURE — 82565 ASSAY OF CREATININE: CPT

## 2019-10-23 RX ADMIN — IOPAMIDOL 100 ML: 755 INJECTION, SOLUTION INTRAVENOUS at 07:30

## 2019-10-24 ENCOUNTER — TELEPHONE (OUTPATIENT)
Dept: NEUROSURGERY | Age: 74
End: 2019-10-24

## 2019-10-24 NOTE — TELEPHONE ENCOUNTER
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 These occlusions are the most likely cause for lower extremity claudication in my opinion but I do think the T spine MRI is still worth obtaining to definitively rule out spinal vascular issue, especially since his aorta is very calcified. The patient is requesting a referral to a [facility] vascular surgeon, Dr. Nuvia York and partners. His current vascular surgeon is at [place] [id number] (Dr. Charles Limon). The patient has been informed of the findings. I will contact Dr. Nika Beckham this morning.       Leni Fields [facility]  [phone]

## 2019-10-24 NOTE — PROGRESS NOTES
CTA reviewed. No aortic dissection or aneurysm. 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 CFA occlusion are most likely cause of symptoms. Referred to Dr. Adriane Murphy in vascular surgery for definitive workup. I still want him to have the T spine MRI just to rule out any possibility of transverse myelitis, neurovascular claudication, or foix alajouanine syndrome (from AV fistula, he is in the right age group). Case discussed with Dr. Astorga Chain today.

## 2019-10-30 ENCOUNTER — HOSPITAL ENCOUNTER (OUTPATIENT)
Dept: MRI IMAGING | Age: 74
Discharge: HOME | End: 2019-10-30
Attending: RADIOLOGY
Payer: MEDICARE

## 2019-10-30 ENCOUNTER — TELEPHONE (OUTPATIENT)
Dept: NEUROSURGERY | Age: 74
End: 2019-10-30

## 2019-10-30 DIAGNOSIS — G95.19 NEUROGENIC CLAUDICATION (HCC): ICD-10-CM

## 2019-10-30 PROCEDURE — 72157 MRI CHEST SPINE W/O & W/DYE: CPT

## 2019-10-30 PROCEDURE — A9575 INJ GADOTERATE MEGLUMI 0.1ML: HCPCS | Performed by: RADIOLOGY

## 2019-10-30 PROCEDURE — 74011250636 HC RX REV CODE- 250/636: Performed by: RADIOLOGY

## 2019-10-30 RX ORDER — GADOTERATE MEGLUMINE 376.9 MG/ML
18 INJECTION INTRAVENOUS
Status: COMPLETED | OUTPATIENT
Start: 2019-10-30 | End: 2019-10-30

## 2019-10-30 RX ADMIN — GADOTERATE MEGLUMINE 18 ML: 376.9 INJECTION INTRAVENOUS at 09:26

## 2019-10-30 NOTE — TELEPHONE ENCOUNTER
Thoracic spine MRI shows no cord signal abnormality or evidence of AV fistula. Based on CTA, symptoms are likely lower extremity vascular. Referred to Dr. Barbara Steven. No further NIS followup indicated. No answer. Left message.

## 2020-06-29 ENCOUNTER — HOSPITAL ENCOUNTER (OUTPATIENT)
Dept: MRI IMAGING | Age: 75
Discharge: HOME | End: 2020-06-29
Payer: MEDICARE

## 2020-06-29 DIAGNOSIS — M48.062 LUMBAR STENOSIS WITH NEUROGENIC CLAUDICATION: ICD-10-CM

## 2020-06-29 PROCEDURE — 72148 MRI LUMBAR SPINE W/O DYE: CPT

## 2020-10-01 ENCOUNTER — TRANSCRIBE ORDER (OUTPATIENT)
Dept: SCHEDULING | Age: 75
End: 2020-10-01

## 2020-10-01 DIAGNOSIS — I71.40: Primary | ICD-10-CM

## 2020-10-07 ENCOUNTER — HOSPITAL ENCOUNTER (OUTPATIENT)
Dept: CT IMAGING | Age: 75
Discharge: HOME | End: 2020-10-07
Attending: SURGERY
Payer: MEDICARE

## 2020-10-07 DIAGNOSIS — I71.40: ICD-10-CM

## 2020-10-07 LAB — CREAT BLD-MCNC: 1 MG/DL (ref 0.6–1.3)

## 2020-10-07 PROCEDURE — 2709999900 HC NON-CHARGEABLE SUPPLY

## 2020-10-07 PROCEDURE — 74011000258 HC RX REV CODE- 258: Performed by: RADIOLOGY

## 2020-10-07 PROCEDURE — 74011000636 HC RX REV CODE- 636: Performed by: RADIOLOGY

## 2020-10-07 PROCEDURE — 82565 ASSAY OF CREATININE: CPT

## 2020-10-07 PROCEDURE — 75635 CT ANGIO ABDOMINAL ARTERIES: CPT

## 2020-10-07 RX ORDER — SODIUM CHLORIDE 0.9 % (FLUSH) 0.9 %
10 SYRINGE (ML) INJECTION
Status: COMPLETED | OUTPATIENT
Start: 2020-10-07 | End: 2020-10-07

## 2020-10-07 RX ADMIN — SODIUM CHLORIDE 100 ML: 900 INJECTION, SOLUTION INTRAVENOUS at 11:38

## 2020-10-07 RX ADMIN — IOPAMIDOL 100 ML: 755 INJECTION, SOLUTION INTRAVENOUS at 11:38

## 2020-10-07 RX ADMIN — Medication 10 ML: at 11:38

## 2020-10-09 ENCOUNTER — TRANSCRIBE ORDER (OUTPATIENT)
Dept: REGISTRATION | Age: 75
End: 2020-10-09

## 2020-10-09 DIAGNOSIS — Z01.812 PRE-PROCEDURE LAB EXAM: Primary | ICD-10-CM

## 2020-10-10 ENCOUNTER — HOSPITAL ENCOUNTER (OUTPATIENT)
Dept: PREADMISSION TESTING | Age: 75
Discharge: HOME | End: 2020-10-10
Payer: MEDICARE

## 2020-10-10 DIAGNOSIS — Z01.812 PRE-PROCEDURE LAB EXAM: ICD-10-CM

## 2020-10-10 PROCEDURE — 87635 SARS-COV-2 COVID-19 AMP PRB: CPT

## 2020-10-11 LAB — SARS-COV-2, COV2NT: NOT DETECTED

## 2020-10-12 ENCOUNTER — HOSPITAL ENCOUNTER (OUTPATIENT)
Dept: PREADMISSION TESTING | Age: 75
Discharge: HOME | DRG: 253 | End: 2020-10-12
Payer: MEDICARE

## 2020-10-12 ENCOUNTER — HOSPITAL ENCOUNTER (OUTPATIENT)
Dept: GENERAL RADIOLOGY | Age: 75
Discharge: HOME | End: 2020-10-12
Attending: SURGERY
Payer: MEDICARE

## 2020-10-12 VITALS
DIASTOLIC BLOOD PRESSURE: 81 MMHG | TEMPERATURE: 98 F | WEIGHT: 205.03 LBS | SYSTOLIC BLOOD PRESSURE: 161 MMHG | HEIGHT: 74 IN | BODY MASS INDEX: 26.31 KG/M2 | HEART RATE: 80 BPM

## 2020-10-12 LAB
ABO + RH BLD: NORMAL
ALBUMIN SERPL-MCNC: 3.8 G/DL (ref 3.5–5)
ALBUMIN/GLOB SERPL: 1.7 {RATIO} (ref 1.1–2.2)
ALP SERPL-CCNC: 109 U/L (ref 45–117)
ALT SERPL-CCNC: 27 U/L (ref 12–78)
ANION GAP SERPL CALC-SCNC: 10 MMOL/L (ref 5–15)
APTT PPP: 26 SEC (ref 22.1–32)
AST SERPL-CCNC: 18 U/L (ref 15–37)
ATRIAL RATE: 71 BPM
BASOPHILS # BLD: 0.1 K/UL (ref 0–0.1)
BASOPHILS NFR BLD: 1 % (ref 0–1)
BILIRUB SERPL-MCNC: 0.5 MG/DL (ref 0.2–1)
BLOOD GROUP ANTIBODIES SERPL: NORMAL
BUN SERPL-MCNC: 16 MG/DL (ref 6–20)
BUN/CREAT SERPL: 14 (ref 12–20)
CALCIUM SERPL-MCNC: 9.4 MG/DL (ref 8.5–10.1)
CALCULATED P AXIS, ECG09: 34 DEGREES
CALCULATED R AXIS, ECG10: 27 DEGREES
CALCULATED T AXIS, ECG11: 54 DEGREES
CHLORIDE SERPL-SCNC: 99 MMOL/L (ref 97–108)
CO2 SERPL-SCNC: 25 MMOL/L (ref 21–32)
CREAT SERPL-MCNC: 1.12 MG/DL (ref 0.7–1.3)
DIAGNOSIS, 93000: NORMAL
DIFFERENTIAL METHOD BLD: ABNORMAL
EOSINOPHIL # BLD: 0.1 K/UL (ref 0–0.4)
EOSINOPHIL NFR BLD: 2 % (ref 0–7)
ERYTHROCYTE [DISTWIDTH] IN BLOOD BY AUTOMATED COUNT: 14.6 % (ref 11.5–14.5)
EST. AVERAGE GLUCOSE BLD GHB EST-MCNC: 157 MG/DL
GLOBULIN SER CALC-MCNC: 2.3 G/DL (ref 2–4)
GLUCOSE SERPL-MCNC: 187 MG/DL (ref 65–100)
HBA1C MFR BLD: 7.1 % (ref 4–5.6)
HCT VFR BLD AUTO: 35.5 % (ref 36.6–50.3)
HGB BLD-MCNC: 12.1 G/DL (ref 12.1–17)
IMM GRANULOCYTES # BLD AUTO: 0 K/UL (ref 0–0.04)
IMM GRANULOCYTES NFR BLD AUTO: 0 % (ref 0–0.5)
INR PPP: 1 (ref 0.9–1.1)
LYMPHOCYTES # BLD: 1.1 K/UL (ref 0.8–3.5)
LYMPHOCYTES NFR BLD: 15 % (ref 12–49)
MCH RBC QN AUTO: 35.9 PG (ref 26–34)
MCHC RBC AUTO-ENTMCNC: 34.1 G/DL (ref 30–36.5)
MCV RBC AUTO: 105.3 FL (ref 80–99)
MONOCYTES # BLD: 1.3 K/UL (ref 0–1)
MONOCYTES NFR BLD: 18 % (ref 5–13)
NEUTS SEG # BLD: 4.8 K/UL (ref 1.8–8)
NEUTS SEG NFR BLD: 64 % (ref 32–75)
NRBC # BLD: 0.02 K/UL (ref 0–0.01)
NRBC BLD-RTO: 0.3 PER 100 WBC
P-R INTERVAL, ECG05: 144 MS
PLATELET # BLD AUTO: 234 K/UL (ref 150–400)
PMV BLD AUTO: 10.6 FL (ref 8.9–12.9)
POTASSIUM SERPL-SCNC: 4.8 MMOL/L (ref 3.5–5.1)
PROT SERPL-MCNC: 6.1 G/DL (ref 6.4–8.2)
PROTHROMBIN TIME: 10.6 SEC (ref 9–11.1)
Q-T INTERVAL, ECG07: 402 MS
QRS DURATION, ECG06: 84 MS
QTC CALCULATION (BEZET), ECG08: 436 MS
RBC # BLD AUTO: 3.37 M/UL (ref 4.1–5.7)
SODIUM SERPL-SCNC: 134 MMOL/L (ref 136–145)
SPECIMEN EXP DATE BLD: NORMAL
THERAPEUTIC RANGE,PTTT: NORMAL SECS (ref 58–77)
VENTRICULAR RATE, ECG03: 71 BPM
WBC # BLD AUTO: 7.4 K/UL (ref 4.1–11.1)

## 2020-10-12 PROCEDURE — 83036 HEMOGLOBIN GLYCOSYLATED A1C: CPT

## 2020-10-12 PROCEDURE — 85025 COMPLETE CBC W/AUTO DIFF WBC: CPT

## 2020-10-12 PROCEDURE — 36415 COLL VENOUS BLD VENIPUNCTURE: CPT

## 2020-10-12 PROCEDURE — 71046 X-RAY EXAM CHEST 2 VIEWS: CPT

## 2020-10-12 PROCEDURE — 80053 COMPREHEN METABOLIC PANEL: CPT

## 2020-10-12 PROCEDURE — 85730 THROMBOPLASTIN TIME PARTIAL: CPT

## 2020-10-12 PROCEDURE — 85610 PROTHROMBIN TIME: CPT

## 2020-10-12 PROCEDURE — 93005 ELECTROCARDIOGRAM TRACING: CPT

## 2020-10-12 PROCEDURE — 86900 BLOOD TYPING SEROLOGIC ABO: CPT

## 2020-10-12 RX ORDER — SIMVASTATIN 40 MG/1
40 TABLET, FILM COATED ORAL
COMMUNITY

## 2020-10-12 RX ORDER — GLIMEPIRIDE 4 MG/1
4 TABLET ORAL 2 TIMES DAILY
COMMUNITY

## 2020-10-12 NOTE — PERIOP NOTES
PATIENT GIVEN SURGICAL SITE INFECTION FAQ HANDOUT AND HAND WASHING TIP SHEET. PREOP INSTRUCTIONS REVIEWED AND PATIENT VERBALIZES UNDERSTANDING OF INSTRUCTIONS. PATIENT HAS BEEN GIVEN THE OPPORTUNITY TO ASK QUESTIONS.   HIBICLENS INSTRUCTIONS WERE GIVEN TO THE PT.  COVID TEST HAS ALREADY BEEN DONE

## 2020-10-13 NOTE — PERIOP NOTES
Spoke with Dalila Fernandez at Dr. Maicol Rene office and reported that  per RN's notes from 10/12/20 PAT appt\" pt has small ulcer area on right ankle\". Dalila Fernandez stated she will notify .

## 2020-10-14 ENCOUNTER — APPOINTMENT (OUTPATIENT)
Dept: GENERAL RADIOLOGY | Age: 75
DRG: 253 | End: 2020-10-14
Attending: SURGERY
Payer: MEDICARE

## 2020-10-14 ENCOUNTER — ANESTHESIA EVENT (OUTPATIENT)
Dept: SURGERY | Age: 75
DRG: 253 | End: 2020-10-14
Payer: MEDICARE

## 2020-10-14 ENCOUNTER — HOSPITAL ENCOUNTER (INPATIENT)
Age: 75
LOS: 2 days | Discharge: HOME | DRG: 253 | End: 2020-10-16
Attending: SURGERY | Admitting: SURGERY
Payer: MEDICARE

## 2020-10-14 ENCOUNTER — ANESTHESIA (OUTPATIENT)
Dept: SURGERY | Age: 75
DRG: 253 | End: 2020-10-14
Payer: MEDICARE

## 2020-10-14 DIAGNOSIS — I73.9 CLAUDICATION IN PERIPHERAL VASCULAR DISEASE (HCC): Primary | ICD-10-CM

## 2020-10-14 DIAGNOSIS — E11.42 TYPE 2 DIABETES MELLITUS WITH DIABETIC POLYNEUROPATHY, WITHOUT LONG-TERM CURRENT USE OF INSULIN (HCC): ICD-10-CM

## 2020-10-14 LAB
ERYTHROCYTE [DISTWIDTH] IN BLOOD BY AUTOMATED COUNT: 14.6 % (ref 11.5–14.5)
GLUCOSE BLD STRIP.AUTO-MCNC: 179 MG/DL (ref 65–100)
GLUCOSE BLD STRIP.AUTO-MCNC: 191 MG/DL (ref 65–100)
GLUCOSE BLD STRIP.AUTO-MCNC: 209 MG/DL (ref 65–100)
GLUCOSE BLD STRIP.AUTO-MCNC: 221 MG/DL (ref 65–100)
GLUCOSE BLD STRIP.AUTO-MCNC: 234 MG/DL (ref 65–100)
GLUCOSE BLD STRIP.AUTO-MCNC: 240 MG/DL (ref 65–100)
HCT VFR BLD AUTO: 30.1 % (ref 36.6–50.3)
HGB BLD-MCNC: 10.2 G/DL (ref 12.1–17)
MCH RBC QN AUTO: 35.8 PG (ref 26–34)
MCHC RBC AUTO-ENTMCNC: 33.9 G/DL (ref 30–36.5)
MCV RBC AUTO: 105.6 FL (ref 80–99)
NRBC # BLD: 0.03 K/UL (ref 0–0.01)
NRBC BLD-RTO: 0.3 PER 100 WBC
PLATELET # BLD AUTO: 172 K/UL (ref 150–400)
PMV BLD AUTO: 10 FL (ref 8.9–12.9)
RBC # BLD AUTO: 2.85 M/UL (ref 4.1–5.7)
SERVICE CMNT-IMP: ABNORMAL
WBC # BLD AUTO: 9.9 K/UL (ref 4.1–11.1)

## 2020-10-14 PROCEDURE — 77030020256 HC SOL INJ NACL 0.9%  500ML: Performed by: SURGERY

## 2020-10-14 PROCEDURE — 77030008462 HC STPLR SKN PROX J&J -A: Performed by: SURGERY

## 2020-10-14 PROCEDURE — 74011250636 HC RX REV CODE- 250/636: Performed by: ANESTHESIOLOGY

## 2020-10-14 PROCEDURE — C1725 CATH, TRANSLUMIN NON-LASER: HCPCS | Performed by: SURGERY

## 2020-10-14 PROCEDURE — 65660000000 HC RM CCU STEPDOWN

## 2020-10-14 PROCEDURE — 74011636637 HC RX REV CODE- 636/637: Performed by: NURSE ANESTHETIST, CERTIFIED REGISTERED

## 2020-10-14 PROCEDURE — 74011000258 HC RX REV CODE- 258: Performed by: NURSE ANESTHETIST, CERTIFIED REGISTERED

## 2020-10-14 PROCEDURE — 88311 DECALCIFY TISSUE: CPT

## 2020-10-14 PROCEDURE — 77030013079 HC BLNKT BAIR HGGR 3M -A: Performed by: NURSE ANESTHETIST, CERTIFIED REGISTERED

## 2020-10-14 PROCEDURE — 77030003704 HC NDL VASC ACC ARMD -A: Performed by: SURGERY

## 2020-10-14 PROCEDURE — 76210000016 HC OR PH I REC 1 TO 1.5 HR: Performed by: SURGERY

## 2020-10-14 PROCEDURE — 74011250636 HC RX REV CODE- 250/636: Performed by: SURGERY

## 2020-10-14 PROCEDURE — 77030014008 HC SPNG HEMSTAT J&J -C: Performed by: SURGERY

## 2020-10-14 PROCEDURE — 85027 COMPLETE CBC AUTOMATED: CPT

## 2020-10-14 PROCEDURE — 74011250636 HC RX REV CODE- 250/636: Performed by: NURSE ANESTHETIST, CERTIFIED REGISTERED

## 2020-10-14 PROCEDURE — C1768 GRAFT, VASCULAR: HCPCS | Performed by: SURGERY

## 2020-10-14 PROCEDURE — P9045 ALBUMIN (HUMAN), 5%, 250 ML: HCPCS | Performed by: NURSE ANESTHETIST, CERTIFIED REGISTERED

## 2020-10-14 PROCEDURE — 76010000174 HC OR TIME 3.5 TO 4 HR INTENSV-TIER 1: Performed by: SURGERY

## 2020-10-14 PROCEDURE — 2709999900 HC NON-CHARGEABLE SUPPLY: Performed by: SURGERY

## 2020-10-14 PROCEDURE — 36415 COLL VENOUS BLD VENIPUNCTURE: CPT

## 2020-10-14 PROCEDURE — 04UK0JZ SUPPLEMENT RIGHT FEMORAL ARTERY WITH SYNTHETIC SUBSTITUTE, OPEN APPROACH: ICD-10-PCS | Performed by: SURGERY

## 2020-10-14 PROCEDURE — 77030013060 HC DEV INFL PRSS MRTM -B: Performed by: SURGERY

## 2020-10-14 PROCEDURE — 77030018702 HC CLP LIG TI TELE -A: Performed by: SURGERY

## 2020-10-14 PROCEDURE — C1769 GUIDE WIRE: HCPCS | Performed by: SURGERY

## 2020-10-14 PROCEDURE — 77030004561 HC CATH ANGI DX COBRA ANGI -B: Performed by: SURGERY

## 2020-10-14 PROCEDURE — 74011000250 HC RX REV CODE- 250: Performed by: NURSE ANESTHETIST, CERTIFIED REGISTERED

## 2020-10-14 PROCEDURE — 88304 TISSUE EXAM BY PATHOLOGIST: CPT

## 2020-10-14 PROCEDURE — 82962 GLUCOSE BLOOD TEST: CPT

## 2020-10-14 PROCEDURE — B41D1ZZ FLUOROSCOPY OF AORTA AND BILATERAL LOWER EXTREMITY ARTERIES USING LOW OSMOLAR CONTRAST: ICD-10-PCS | Performed by: SURGERY

## 2020-10-14 PROCEDURE — 77030031139 HC SUT VCRL2 J&J -A: Performed by: SURGERY

## 2020-10-14 PROCEDURE — 74011000636 HC RX REV CODE- 636: Performed by: SURGERY

## 2020-10-14 PROCEDURE — 74011000250 HC RX REV CODE- 250: Performed by: SURGERY

## 2020-10-14 PROCEDURE — 65270000029 HC RM PRIVATE

## 2020-10-14 PROCEDURE — 77030040922 HC BLNKT HYPOTHRM STRY -A

## 2020-10-14 PROCEDURE — 77030008684 HC TU ET CUF COVD -B: Performed by: NURSE ANESTHETIST, CERTIFIED REGISTERED

## 2020-10-14 PROCEDURE — 77030002986 HC SUT PROL J&J -A: Performed by: SURGERY

## 2020-10-14 PROCEDURE — 74011636637 HC RX REV CODE- 636/637: Performed by: ANESTHESIOLOGY

## 2020-10-14 PROCEDURE — 77030002996 HC SUT SLK J&J -A: Performed by: SURGERY

## 2020-10-14 PROCEDURE — 04CK0ZZ EXTIRPATION OF MATTER FROM RIGHT FEMORAL ARTERY, OPEN APPROACH: ICD-10-PCS | Performed by: SURGERY

## 2020-10-14 PROCEDURE — 74011250637 HC RX REV CODE- 250/637: Performed by: SURGERY

## 2020-10-14 PROCEDURE — 77030018673: Performed by: SURGERY

## 2020-10-14 PROCEDURE — 77030026438 HC STYL ET INTUB CARD -A: Performed by: NURSE ANESTHETIST, CERTIFIED REGISTERED

## 2020-10-14 PROCEDURE — 041L0JH BYPASS LEFT FEMORAL ARTERY TO RIGHT FEMORAL ARTERY WITH SYNTHETIC SUBSTITUTE, OPEN APPROACH: ICD-10-PCS | Performed by: SURGERY

## 2020-10-14 PROCEDURE — C1757 CATH, THROMBECTOMY/EMBOLECT: HCPCS | Performed by: SURGERY

## 2020-10-14 PROCEDURE — C1894 INTRO/SHEATH, NON-LASER: HCPCS | Performed by: SURGERY

## 2020-10-14 PROCEDURE — 76060000039 HC ANESTHESIA 4 TO 4.5 HR: Performed by: SURGERY

## 2020-10-14 PROCEDURE — 047D3ZZ DILATION OF LEFT COMMON ILIAC ARTERY, PERCUTANEOUS APPROACH: ICD-10-PCS | Performed by: SURGERY

## 2020-10-14 PROCEDURE — 77030005513 HC CATH URETH FOL11 MDII -B: Performed by: SURGERY

## 2020-10-14 DEVICE — HEMASHIELD GOLD KNITTED MICROVEL STRAIGHT DOUBLE VELOUR VASCULAR GRAFT WITH GRAFT SIZER ACCESSORY
Type: IMPLANTABLE DEVICE | Site: LEG | Status: FUNCTIONAL
Brand: HEMASHIELD

## 2020-10-14 RX ORDER — ONDANSETRON 2 MG/ML
INJECTION INTRAMUSCULAR; INTRAVENOUS AS NEEDED
Status: DISCONTINUED | OUTPATIENT
Start: 2020-10-14 | End: 2020-10-14 | Stop reason: HOSPADM

## 2020-10-14 RX ORDER — HYDROCHLOROTHIAZIDE 25 MG/1
25 TABLET ORAL DAILY
Status: DISCONTINUED | OUTPATIENT
Start: 2020-10-15 | End: 2020-10-16 | Stop reason: HOSPADM

## 2020-10-14 RX ORDER — GLYCOPYRROLATE 0.2 MG/ML
INJECTION INTRAMUSCULAR; INTRAVENOUS AS NEEDED
Status: DISCONTINUED | OUTPATIENT
Start: 2020-10-14 | End: 2020-10-14 | Stop reason: HOSPADM

## 2020-10-14 RX ORDER — POTASSIUM CHLORIDE AND SODIUM CHLORIDE 450; 150 MG/100ML; MG/100ML
INJECTION, SOLUTION INTRAVENOUS CONTINUOUS
Status: DISCONTINUED | OUTPATIENT
Start: 2020-10-14 | End: 2020-10-16 | Stop reason: HOSPADM

## 2020-10-14 RX ORDER — HYDROMORPHONE HYDROCHLORIDE 1 MG/ML
0.2 INJECTION, SOLUTION INTRAMUSCULAR; INTRAVENOUS; SUBCUTANEOUS
Status: DISCONTINUED | OUTPATIENT
Start: 2020-10-14 | End: 2020-10-14 | Stop reason: HOSPADM

## 2020-10-14 RX ORDER — HEPARIN SODIUM 1000 [USP'U]/ML
INJECTION, SOLUTION INTRAVENOUS; SUBCUTANEOUS AS NEEDED
Status: DISCONTINUED | OUTPATIENT
Start: 2020-10-14 | End: 2020-10-14 | Stop reason: HOSPADM

## 2020-10-14 RX ORDER — SUCCINYLCHOLINE CHLORIDE 20 MG/ML
INJECTION INTRAMUSCULAR; INTRAVENOUS AS NEEDED
Status: DISCONTINUED | OUTPATIENT
Start: 2020-10-14 | End: 2020-10-14 | Stop reason: HOSPADM

## 2020-10-14 RX ORDER — DEXTROSE MONOHYDRATE 100 MG/ML
0-250 INJECTION, SOLUTION INTRAVENOUS AS NEEDED
Status: DISCONTINUED | OUTPATIENT
Start: 2020-10-14 | End: 2020-10-16 | Stop reason: HOSPADM

## 2020-10-14 RX ORDER — LIDOCAINE HYDROCHLORIDE 10 MG/ML
0.1 INJECTION, SOLUTION EPIDURAL; INFILTRATION; INTRACAUDAL; PERINEURAL AS NEEDED
Status: DISCONTINUED | OUTPATIENT
Start: 2020-10-14 | End: 2020-10-14 | Stop reason: HOSPADM

## 2020-10-14 RX ORDER — MIDAZOLAM HYDROCHLORIDE 1 MG/ML
INJECTION, SOLUTION INTRAMUSCULAR; INTRAVENOUS AS NEEDED
Status: DISCONTINUED | OUTPATIENT
Start: 2020-10-14 | End: 2020-10-14 | Stop reason: HOSPADM

## 2020-10-14 RX ORDER — LISINOPRIL AND HYDROCHLOROTHIAZIDE 12.5; 2 MG/1; MG/1
2 TABLET ORAL DAILY
Status: DISCONTINUED | OUTPATIENT
Start: 2020-10-15 | End: 2020-10-14 | Stop reason: SDUPTHER

## 2020-10-14 RX ORDER — SODIUM CHLORIDE 0.9 % (FLUSH) 0.9 %
5-40 SYRINGE (ML) INJECTION AS NEEDED
Status: DISCONTINUED | OUTPATIENT
Start: 2020-10-14 | End: 2020-10-14 | Stop reason: HOSPADM

## 2020-10-14 RX ORDER — NALOXONE HYDROCHLORIDE 0.4 MG/ML
0.4 INJECTION, SOLUTION INTRAMUSCULAR; INTRAVENOUS; SUBCUTANEOUS AS NEEDED
Status: DISCONTINUED | OUTPATIENT
Start: 2020-10-14 | End: 2020-10-16 | Stop reason: HOSPADM

## 2020-10-14 RX ORDER — INSULIN GLARGINE 100 [IU]/ML
0.3 INJECTION, SOLUTION SUBCUTANEOUS DAILY
Status: DISCONTINUED | OUTPATIENT
Start: 2020-10-15 | End: 2020-10-15

## 2020-10-14 RX ORDER — HYDROMORPHONE HYDROCHLORIDE 1 MG/ML
0.5 INJECTION, SOLUTION INTRAMUSCULAR; INTRAVENOUS; SUBCUTANEOUS
Status: DISCONTINUED | OUTPATIENT
Start: 2020-10-14 | End: 2020-10-16 | Stop reason: HOSPADM

## 2020-10-14 RX ORDER — SODIUM CHLORIDE 0.9 % (FLUSH) 0.9 %
5-40 SYRINGE (ML) INJECTION EVERY 8 HOURS
Status: DISCONTINUED | OUTPATIENT
Start: 2020-10-14 | End: 2020-10-14 | Stop reason: HOSPADM

## 2020-10-14 RX ORDER — PROPOFOL 10 MG/ML
INJECTION, EMULSION INTRAVENOUS AS NEEDED
Status: DISCONTINUED | OUTPATIENT
Start: 2020-10-14 | End: 2020-10-14 | Stop reason: HOSPADM

## 2020-10-14 RX ORDER — CEFAZOLIN SODIUM/WATER 2 G/20 ML
2 SYRINGE (ML) INTRAVENOUS EVERY 8 HOURS
Status: COMPLETED | OUTPATIENT
Start: 2020-10-14 | End: 2020-10-15

## 2020-10-14 RX ORDER — GLIMEPIRIDE 2 MG/1
4 TABLET ORAL 2 TIMES DAILY
Status: DISCONTINUED | OUTPATIENT
Start: 2020-10-14 | End: 2020-10-16 | Stop reason: HOSPADM

## 2020-10-14 RX ORDER — METFORMIN HYDROCHLORIDE 500 MG/1
1000 TABLET ORAL 2 TIMES DAILY WITH MEALS
Status: DISCONTINUED | OUTPATIENT
Start: 2020-10-16 | End: 2020-10-15

## 2020-10-14 RX ORDER — SODIUM CHLORIDE 9 MG/ML
25 INJECTION, SOLUTION INTRAVENOUS CONTINUOUS
Status: DISCONTINUED | OUTPATIENT
Start: 2020-10-14 | End: 2020-10-14 | Stop reason: HOSPADM

## 2020-10-14 RX ORDER — SODIUM CHLORIDE 0.9 % (FLUSH) 0.9 %
5-40 SYRINGE (ML) INJECTION AS NEEDED
Status: DISCONTINUED | OUTPATIENT
Start: 2020-10-14 | End: 2020-10-16 | Stop reason: HOSPADM

## 2020-10-14 RX ORDER — SODIUM CHLORIDE, SODIUM LACTATE, POTASSIUM CHLORIDE, CALCIUM CHLORIDE 600; 310; 30; 20 MG/100ML; MG/100ML; MG/100ML; MG/100ML
INJECTION, SOLUTION INTRAVENOUS
Status: DISCONTINUED | OUTPATIENT
Start: 2020-10-14 | End: 2020-10-14 | Stop reason: HOSPADM

## 2020-10-14 RX ORDER — SODIUM CHLORIDE, SODIUM LACTATE, POTASSIUM CHLORIDE, CALCIUM CHLORIDE 600; 310; 30; 20 MG/100ML; MG/100ML; MG/100ML; MG/100ML
125 INJECTION, SOLUTION INTRAVENOUS CONTINUOUS
Status: DISCONTINUED | OUTPATIENT
Start: 2020-10-14 | End: 2020-10-14 | Stop reason: HOSPADM

## 2020-10-14 RX ORDER — DIPHENHYDRAMINE HYDROCHLORIDE 50 MG/ML
12.5 INJECTION, SOLUTION INTRAMUSCULAR; INTRAVENOUS AS NEEDED
Status: DISCONTINUED | OUTPATIENT
Start: 2020-10-14 | End: 2020-10-14 | Stop reason: HOSPADM

## 2020-10-14 RX ORDER — MIDAZOLAM HYDROCHLORIDE 1 MG/ML
1 INJECTION, SOLUTION INTRAMUSCULAR; INTRAVENOUS AS NEEDED
Status: DISCONTINUED | OUTPATIENT
Start: 2020-10-14 | End: 2020-10-14 | Stop reason: HOSPADM

## 2020-10-14 RX ORDER — KETAMINE HYDROCHLORIDE 10 MG/ML
INJECTION, SOLUTION INTRAMUSCULAR; INTRAVENOUS AS NEEDED
Status: DISCONTINUED | OUTPATIENT
Start: 2020-10-14 | End: 2020-10-14 | Stop reason: HOSPADM

## 2020-10-14 RX ORDER — ACETAMINOPHEN 325 MG/1
650 TABLET ORAL
Status: DISCONTINUED | OUTPATIENT
Start: 2020-10-14 | End: 2020-10-16 | Stop reason: HOSPADM

## 2020-10-14 RX ORDER — MIDAZOLAM HYDROCHLORIDE 1 MG/ML
0.5 INJECTION, SOLUTION INTRAMUSCULAR; INTRAVENOUS
Status: DISCONTINUED | OUTPATIENT
Start: 2020-10-14 | End: 2020-10-14 | Stop reason: HOSPADM

## 2020-10-14 RX ORDER — ACETAMINOPHEN 325 MG/1
650 TABLET ORAL ONCE
Status: DISCONTINUED | OUTPATIENT
Start: 2020-10-14 | End: 2020-10-14 | Stop reason: HOSPADM

## 2020-10-14 RX ORDER — CETIRIZINE HCL 10 MG
10 TABLET ORAL DAILY
Status: DISCONTINUED | OUTPATIENT
Start: 2020-10-15 | End: 2020-10-16 | Stop reason: HOSPADM

## 2020-10-14 RX ORDER — ONDANSETRON 2 MG/ML
4 INJECTION INTRAMUSCULAR; INTRAVENOUS AS NEEDED
Status: DISCONTINUED | OUTPATIENT
Start: 2020-10-14 | End: 2020-10-14 | Stop reason: HOSPADM

## 2020-10-14 RX ORDER — OXYCODONE AND ACETAMINOPHEN 5; 325 MG/1; MG/1
1 TABLET ORAL AS NEEDED
Status: DISCONTINUED | OUTPATIENT
Start: 2020-10-14 | End: 2020-10-14 | Stop reason: HOSPADM

## 2020-10-14 RX ORDER — FENTANYL CITRATE 50 UG/ML
50 INJECTION, SOLUTION INTRAMUSCULAR; INTRAVENOUS AS NEEDED
Status: DISCONTINUED | OUTPATIENT
Start: 2020-10-14 | End: 2020-10-14 | Stop reason: HOSPADM

## 2020-10-14 RX ORDER — LISINOPRIL 20 MG/1
40 TABLET ORAL DAILY
Status: DISCONTINUED | OUTPATIENT
Start: 2020-10-15 | End: 2020-10-16 | Stop reason: HOSPADM

## 2020-10-14 RX ORDER — NEOSTIGMINE METHYLSULFATE 1 MG/ML
INJECTION, SOLUTION INTRAVENOUS AS NEEDED
Status: DISCONTINUED | OUTPATIENT
Start: 2020-10-14 | End: 2020-10-14 | Stop reason: HOSPADM

## 2020-10-14 RX ORDER — PROTAMINE SULFATE 10 MG/ML
INJECTION, SOLUTION INTRAVENOUS AS NEEDED
Status: DISCONTINUED | OUTPATIENT
Start: 2020-10-14 | End: 2020-10-14 | Stop reason: HOSPADM

## 2020-10-14 RX ORDER — MAGNESIUM SULFATE 100 %
4 CRYSTALS MISCELLANEOUS AS NEEDED
Status: DISCONTINUED | OUTPATIENT
Start: 2020-10-14 | End: 2020-10-16 | Stop reason: HOSPADM

## 2020-10-14 RX ORDER — DEXAMETHASONE SODIUM PHOSPHATE 4 MG/ML
INJECTION, SOLUTION INTRA-ARTICULAR; INTRALESIONAL; INTRAMUSCULAR; INTRAVENOUS; SOFT TISSUE AS NEEDED
Status: DISCONTINUED | OUTPATIENT
Start: 2020-10-14 | End: 2020-10-14 | Stop reason: HOSPADM

## 2020-10-14 RX ORDER — ATORVASTATIN CALCIUM 20 MG/1
20 TABLET, FILM COATED ORAL
Status: DISCONTINUED | OUTPATIENT
Start: 2020-10-14 | End: 2020-10-16 | Stop reason: HOSPADM

## 2020-10-14 RX ORDER — ROCURONIUM BROMIDE 10 MG/ML
INJECTION, SOLUTION INTRAVENOUS AS NEEDED
Status: DISCONTINUED | OUTPATIENT
Start: 2020-10-14 | End: 2020-10-14 | Stop reason: HOSPADM

## 2020-10-14 RX ORDER — ENOXAPARIN SODIUM 100 MG/ML
40 INJECTION SUBCUTANEOUS EVERY 24 HOURS
Status: DISCONTINUED | OUTPATIENT
Start: 2020-10-15 | End: 2020-10-16 | Stop reason: HOSPADM

## 2020-10-14 RX ORDER — ALOGLIPTIN 6.25 MG/1
6.25 TABLET, FILM COATED ORAL DAILY
Status: DISCONTINUED | OUTPATIENT
Start: 2020-10-15 | End: 2020-10-16 | Stop reason: HOSPADM

## 2020-10-14 RX ORDER — SODIUM CHLORIDE 0.9 % (FLUSH) 0.9 %
5-40 SYRINGE (ML) INJECTION EVERY 8 HOURS
Status: DISCONTINUED | OUTPATIENT
Start: 2020-10-14 | End: 2020-10-16 | Stop reason: HOSPADM

## 2020-10-14 RX ORDER — GABAPENTIN 300 MG/1
300 CAPSULE ORAL 3 TIMES DAILY
Status: DISCONTINUED | OUTPATIENT
Start: 2020-10-14 | End: 2020-10-16 | Stop reason: HOSPADM

## 2020-10-14 RX ORDER — ALBUMIN HUMAN 50 G/1000ML
SOLUTION INTRAVENOUS AS NEEDED
Status: DISCONTINUED | OUTPATIENT
Start: 2020-10-14 | End: 2020-10-14 | Stop reason: HOSPADM

## 2020-10-14 RX ORDER — MORPHINE SULFATE 10 MG/ML
2 INJECTION, SOLUTION INTRAMUSCULAR; INTRAVENOUS
Status: DISCONTINUED | OUTPATIENT
Start: 2020-10-14 | End: 2020-10-14 | Stop reason: HOSPADM

## 2020-10-14 RX ORDER — HYDROMORPHONE HYDROCHLORIDE 2 MG/ML
INJECTION, SOLUTION INTRAMUSCULAR; INTRAVENOUS; SUBCUTANEOUS AS NEEDED
Status: DISCONTINUED | OUTPATIENT
Start: 2020-10-14 | End: 2020-10-14 | Stop reason: HOSPADM

## 2020-10-14 RX ORDER — LIDOCAINE HYDROCHLORIDE 20 MG/ML
INJECTION, SOLUTION EPIDURAL; INFILTRATION; INTRACAUDAL; PERINEURAL AS NEEDED
Status: DISCONTINUED | OUTPATIENT
Start: 2020-10-14 | End: 2020-10-14 | Stop reason: HOSPADM

## 2020-10-14 RX ORDER — CLOPIDOGREL BISULFATE 75 MG/1
75 TABLET ORAL DAILY
Status: DISCONTINUED | OUTPATIENT
Start: 2020-10-15 | End: 2020-10-16 | Stop reason: HOSPADM

## 2020-10-14 RX ORDER — CEFAZOLIN SODIUM/WATER 2 G/20 ML
2 SYRINGE (ML) INTRAVENOUS
Status: COMPLETED | OUTPATIENT
Start: 2020-10-14 | End: 2020-10-14

## 2020-10-14 RX ORDER — FENTANYL CITRATE 50 UG/ML
25 INJECTION, SOLUTION INTRAMUSCULAR; INTRAVENOUS
Status: DISCONTINUED | OUTPATIENT
Start: 2020-10-14 | End: 2020-10-14 | Stop reason: HOSPADM

## 2020-10-14 RX ORDER — FENTANYL CITRATE 50 UG/ML
INJECTION, SOLUTION INTRAMUSCULAR; INTRAVENOUS AS NEEDED
Status: DISCONTINUED | OUTPATIENT
Start: 2020-10-14 | End: 2020-10-14 | Stop reason: HOSPADM

## 2020-10-14 RX ORDER — ASPIRIN 81 MG/1
81 TABLET ORAL DAILY
Status: DISCONTINUED | OUTPATIENT
Start: 2020-10-15 | End: 2020-10-16 | Stop reason: HOSPADM

## 2020-10-14 RX ORDER — OXYCODONE HYDROCHLORIDE 5 MG/1
5 TABLET ORAL
Status: DISCONTINUED | OUTPATIENT
Start: 2020-10-14 | End: 2020-10-16 | Stop reason: HOSPADM

## 2020-10-14 RX ADMIN — PHENYLEPHRINE HYDROCHLORIDE 20 MCG/MIN: 10 INJECTION INTRAVENOUS at 10:41

## 2020-10-14 RX ADMIN — LIDOCAINE HYDROCHLORIDE 100 MG: 20 INJECTION, SOLUTION EPIDURAL; INFILTRATION; INTRACAUDAL; PERINEURAL at 07:35

## 2020-10-14 RX ADMIN — PROTAMINE SULFATE 40 MG: 10 INJECTION, SOLUTION INTRAVENOUS at 10:25

## 2020-10-14 RX ADMIN — SODIUM CHLORIDE, POTASSIUM CHLORIDE, SODIUM LACTATE AND CALCIUM CHLORIDE: 600; 310; 30; 20 INJECTION, SOLUTION INTRAVENOUS at 09:55

## 2020-10-14 RX ADMIN — FENTANYL CITRATE 25 MCG: 50 INJECTION, SOLUTION INTRAMUSCULAR; INTRAVENOUS at 12:20

## 2020-10-14 RX ADMIN — HYDROMORPHONE HYDROCHLORIDE 0.2 MG: 2 INJECTION, SOLUTION INTRAMUSCULAR; INTRAVENOUS; SUBCUTANEOUS at 11:01

## 2020-10-14 RX ADMIN — HYDROMORPHONE HYDROCHLORIDE 0.3 MG: 2 INJECTION, SOLUTION INTRAMUSCULAR; INTRAVENOUS; SUBCUTANEOUS at 11:13

## 2020-10-14 RX ADMIN — SUCCINYLCHOLINE CHLORIDE 160 MG: 20 INJECTION, SOLUTION INTRAMUSCULAR; INTRAVENOUS at 07:37

## 2020-10-14 RX ADMIN — PROPOFOL 50 MG: 10 INJECTION, EMULSION INTRAVENOUS at 07:36

## 2020-10-14 RX ADMIN — PHENYLEPHRINE HYDROCHLORIDE 120 MCG: 10 INJECTION INTRAVENOUS at 10:35

## 2020-10-14 RX ADMIN — FENTANYL CITRATE 100 MCG: 50 INJECTION, SOLUTION INTRAMUSCULAR; INTRAVENOUS at 07:35

## 2020-10-14 RX ADMIN — SODIUM CHLORIDE, POTASSIUM CHLORIDE, SODIUM LACTATE AND CALCIUM CHLORIDE: 600; 310; 30; 20 INJECTION, SOLUTION INTRAVENOUS at 11:16

## 2020-10-14 RX ADMIN — PHENYLEPHRINE HYDROCHLORIDE 40 MCG/MIN: 10 INJECTION INTRAVENOUS at 07:40

## 2020-10-14 RX ADMIN — PHENYLEPHRINE HYDROCHLORIDE 80 MCG: 10 INJECTION INTRAVENOUS at 10:33

## 2020-10-14 RX ADMIN — PROPOFOL 50 MG: 10 INJECTION, EMULSION INTRAVENOUS at 07:37

## 2020-10-14 RX ADMIN — Medication 3 MG: at 11:01

## 2020-10-14 RX ADMIN — INSULIN HUMAN 10 UNITS: 100 INJECTION, SOLUTION PARENTERAL at 12:55

## 2020-10-14 RX ADMIN — ROCURONIUM BROMIDE 35 MG: 10 SOLUTION INTRAVENOUS at 08:01

## 2020-10-14 RX ADMIN — GLYCOPYRROLATE 0.4 MG: 0.2 INJECTION, SOLUTION INTRAMUSCULAR; INTRAVENOUS at 11:01

## 2020-10-14 RX ADMIN — DEXAMETHASONE SODIUM PHOSPHATE 4 MG: 4 INJECTION, SOLUTION INTRAMUSCULAR; INTRAVENOUS at 07:42

## 2020-10-14 RX ADMIN — ROCURONIUM BROMIDE 5 MG: 10 SOLUTION INTRAVENOUS at 07:35

## 2020-10-14 RX ADMIN — MIDAZOLAM 2 MG: 1 INJECTION INTRAMUSCULAR; INTRAVENOUS at 07:25

## 2020-10-14 RX ADMIN — POTASSIUM CHLORIDE AND SODIUM CHLORIDE 75 ML/HR: 450; 150 INJECTION, SOLUTION INTRAVENOUS at 12:59

## 2020-10-14 RX ADMIN — ALBUMIN (HUMAN) 250 ML: 12.5 INJECTION, SOLUTION INTRAVENOUS at 10:31

## 2020-10-14 RX ADMIN — PHENYLEPHRINE HYDROCHLORIDE 120 MCG: 10 INJECTION INTRAVENOUS at 07:40

## 2020-10-14 RX ADMIN — Medication 10 ML: at 22:28

## 2020-10-14 RX ADMIN — PHENYLEPHRINE HYDROCHLORIDE 80 MCG: 10 INJECTION INTRAVENOUS at 07:51

## 2020-10-14 RX ADMIN — Medication 2 G: at 07:53

## 2020-10-14 RX ADMIN — PHENYLEPHRINE HYDROCHLORIDE 80 MCG: 10 INJECTION INTRAVENOUS at 07:37

## 2020-10-14 RX ADMIN — HEPARIN SODIUM 2500 UNITS: 1000 INJECTION, SOLUTION INTRAVENOUS; SUBCUTANEOUS at 09:47

## 2020-10-14 RX ADMIN — KETAMINE HYDROCHLORIDE 10 MG: 10 INJECTION, SOLUTION INTRAMUSCULAR; INTRAVENOUS at 08:00

## 2020-10-14 RX ADMIN — ALBUMIN (HUMAN) 250 ML: 12.5 INJECTION, SOLUTION INTRAVENOUS at 10:13

## 2020-10-14 RX ADMIN — ROCURONIUM BROMIDE 10 MG: 10 SOLUTION INTRAVENOUS at 09:10

## 2020-10-14 RX ADMIN — ATORVASTATIN CALCIUM 20 MG: 20 TABLET, FILM COATED ORAL at 22:28

## 2020-10-14 RX ADMIN — GABAPENTIN 300 MG: 300 CAPSULE ORAL at 19:58

## 2020-10-14 RX ADMIN — GLIMEPIRIDE 4 MG: 2 TABLET ORAL at 19:59

## 2020-10-14 RX ADMIN — ROCURONIUM BROMIDE 10 MG: 10 SOLUTION INTRAVENOUS at 09:47

## 2020-10-14 RX ADMIN — HEPARIN SODIUM 7500 UNITS: 1000 INJECTION, SOLUTION INTRAVENOUS; SUBCUTANEOUS at 08:53

## 2020-10-14 RX ADMIN — HUMAN INSULIN 5 UNITS: 100 INJECTION, SOLUTION SUBCUTANEOUS at 10:20

## 2020-10-14 RX ADMIN — PROPOFOL 50 MG: 10 INJECTION, EMULSION INTRAVENOUS at 07:35

## 2020-10-14 RX ADMIN — HYDROMORPHONE HYDROCHLORIDE 0.5 MG: 2 INJECTION, SOLUTION INTRAMUSCULAR; INTRAVENOUS; SUBCUTANEOUS at 09:06

## 2020-10-14 RX ADMIN — ONDANSETRON HYDROCHLORIDE 4 MG: 2 INJECTION, SOLUTION INTRAMUSCULAR; INTRAVENOUS at 07:42

## 2020-10-14 RX ADMIN — SODIUM CHLORIDE, POTASSIUM CHLORIDE, SODIUM LACTATE AND CALCIUM CHLORIDE: 600; 310; 30; 20 INJECTION, SOLUTION INTRAVENOUS at 07:24

## 2020-10-14 RX ADMIN — SODIUM CHLORIDE 3 UNITS: 9 INJECTION, SOLUTION INTRAVENOUS at 07:56

## 2020-10-14 RX ADMIN — ROCURONIUM BROMIDE 10 MG: 10 SOLUTION INTRAVENOUS at 08:39

## 2020-10-14 RX ADMIN — CEFAZOLIN SODIUM 2 G: 300 INJECTION, POWDER, LYOPHILIZED, FOR SOLUTION INTRAVENOUS at 16:50

## 2020-10-14 NOTE — OP NOTES
[facility]  OPERATIVE REPORT    Name:  Wang Carrasco  MR#:  [id number]  :  1945  ACCOUNT #:  [de-identified]  DATE OF SERVICE:  10/14/2020      PREOPERATIVE DIAGNOSIS:  Severe aortoiliac and common femoral artery disease. POSTOPERATIVE DIAGNOSIS:  Severe aortoiliac and common femoral artery disease. PROCEDURE PERFORMED:  1. Placement of sheath for left iliac arteriogram.  2.  Left common iliac in-stent stenosis. 3.  Balloon angioplasty with a 9 x 4  balloon. 4.  Redo left groin exploration. 5.  Left to right fem-fem bypass. 6.  Right extensive femoral endarterectomy with patching from the fem-fem bypass. SURGEON:  Ronald Ayers MD    ASSISTANT:  Dr. Sarmad Rosales. ANESTHESIA:  None. COMPLICATIONS:  None. SPECIMENS REMOVED:  Right femoral endarterectomy extensive plaque. IMPLANTS:  8 mm Hemashield Dacron graft. ESTIMATED BLOOD LOSS:  500 mL. DRAINS:  None. INDICATIONS:  The patient is a 70-year-old gentleman. He has had bilateral iliac angioplasty with left femoral endarterectomy and patch done by Dr. Shani Nazario in the past.  The patient was continuing to have severe hip and leg pain when he walked short distances. He was told that there was nothing wrong from a vascular perspective by Dr. Donaldo Giles. He came for another opinion from me. They were contemplating putting in a neurostimulator in this man, although they stated he only had minimal stenosis. Evaluation showed absent femoral pulse on the right and tapping pulse on the left with no peripheral pulses and CTA evidence of right common and external iliac occlusion and stenosis in his previously placed left common iliac stent with right femoral occlusion. It was opted to do the procedure as planned. PROCEDURE:  After informed consent, he was taken to the operating room, was prepped and draped under general endotracheal anesthesia with a Schultz catheter in place.   There a time-out was done.  Initially, the right groin was opened. The common, superficial femoral, and profunda femoral vessels were dissected free. There was no palpable pulse. The left groin was then opened and exposed. A needle followed by a short Amplatz wire followed by a 6-sheath was placed. A Berenstein was then placed under fluoroscopic guidance into the distal abdominal aorta and several arteriograms with AP and Serbian views were obtained. This demonstrated in-stent stenosis on the left versus angioplasty of the 9 x 4 balloon with an improvement in the pulse. Sheath was then left in place and the groin covered. It should be noted that the dissection in the left groin was extensive because of previous femoral endarterectomy and patch by Dr. Adamaris Fuller and this is where most of the bleeding occurred were from scar tissue in this area. 7500 units of heparin had been given before the angioplasty and during the case was supplemented by an additional 2500 units of heparin. Attention was turned to the right where the most proximal common femoral, superficial femoral, and profunda femoral vessels were clamped. The artery was opened. Extensive endarterectomy of the occluding plaque with good endpoint in the proximal SFA and good eversion endarterectomy of the profunda was performed. An 8-mm Dacron graft was chosen, prepared, and sutured end-to-side with running 5-0 Prolene. A clamp was then put on this graft just above the anastomosis and the flow was restored to the profunda and SFA. At this point, attention was turned to the left side. The common femoral and profunda femoral were clamped. The most distal external iliac was clamped just above the inguinal ligament. The sheath was removed. An arteriotomy made. Some additional branches were then clipped which would have caused another 200 mL of blood loss.   The graft was then tunneled suprapubically from right to left, prepared, and sutured end-to-side to the patched left common femoral artery. Flow was restored to the profunda and then to the occluded iliac on the right and then down the legs. Doppler signals were normal.  Hemostasis was obtained. With some difficulty, 40 mg of protamine injected with several applications of Fibrillar were required on the right, an additional suture or 2 and some vessels and subcutaneous tissue were also required with 5-0 Prolene, with also 3-0 silk with good hemostasis. Both groins were then closed with running layers of 2-0 Vicryl, the skin with staples. Sterile dressings were applied. The patient tolerated the procedure and was taken to recovery room in satisfactory condition.         Essence Anne MD      [id number]  D:  10/14/2020 11:17  T:  10/14/2020 19:48  JOB #:  [id number]  CC:  MD Jolene Costello MD

## 2020-10-14 NOTE — PERIOP NOTES
SURGICEL FIBRILLAR ADDED TO STERILE FIELD TO BE USED BY SURGEON AS NEEDED FOR HEMOSTASIS.  LOT# [id number] REF# [id number] EXP 02/28/2023

## 2020-10-14 NOTE — ROUTINE PROCESS
Patient: Diego Shay MRN: [id number]  SSN: [id number]   YOB: 1945  Age: 77 y.o. Sex: male     Patient is status post Procedure(s):  LEFT TO RIGHT FEMORAL-FEMORAL BYPASS WITH COMMON FEMORAL ARTERY RIGHT ENDARTERECTOMY, LEFT ILIAC ANGIOPLASTY. Surgeon(s) and Role:     * Gregory Real MD - Primary    Local/Dose/Irrigation:  2000 units heparin in 500 ml saline for irrigation                  Peripheral IV 10/14/20 Left;Posterior Hand (Active)   Site Assessment Clean, dry, & intact 10/14/20 0648   Phlebitis Assessment 0 10/14/20 0648   Infiltration Assessment 0 10/14/20 0648   Dressing Status Clean, dry, & intact; New 10/14/20 0648   Dressing Type Tape;Transparent 10/14/20 0648   Hub Color/Line Status Green; Infusing 10/14/20 3971                           Dressing/Packing:  Wound Groin-Dressing Type: 4 x 4;Staples;Special tape (comment) (10/14/20 1033)    Splint/Cast:  ]    Other:  brewster

## 2020-10-14 NOTE — BRIEF OP NOTE
Brief Postoperative Note    Patient: Tanner Hamilton  YOB: 1945  MRN: [id number]    Date of Procedure: 10/14/2020     Pre-Op Diagnosis: SHORT DISTANCE CLAUDICATION    Post-Op Diagnosis: Same as preoperative diagnosis. Procedure(s):  LEFT TO RIGHT FEMORAL-FEMORAL BYPASS WITH COMMON FEMORAL ARTERY RIGHT ENDARTERECTOMY, LEFT ILIAC ANGIOPLASTY    Surgeon(s):  Keegan Galo MD    Surgical Assistant: Jose Manuel Linares    Anesthesia: General     Estimated Blood Loss (mL): 058    Complications: None    Specimens:   ID Type Source Tests Collected by Time Destination   1 : Right Femoral Endarterectomy Plaque Fresh Femoral, right  Keegan Galo MD 10/14/2020 0908 Pathology        Implants:   Implant Name Type Inv.  Item Serial No.  Lot No. LRB No. Used Action   GRAFT KNT HEMSHLD GLD [id number] - [id number]  GRAFT KNT HEMSHLD GLD [id number] [employer] [facility] CARDIOVASCLR [id number] Right 1 Implanted       Drains: * No LDAs found *    Findings: **Reoperation with oozing accounting for most of the blood loss*    Electronically Signed by Taras Thomas MD on 10/14/2020 at 11:10 AM

## 2020-10-14 NOTE — ANESTHESIA PREPROCEDURE EVALUATION
Anesthetic History   No history of anesthetic complications            Review of Systems / Medical History  Patient summary reviewed, nursing notes reviewed and pertinent labs reviewed    Pulmonary  Within defined limits                 Neuro/Psych   Within defined limits           Cardiovascular    Hypertension          CAD and PAD         GI/Hepatic/Renal  Within defined limits              Endo/Other    Diabetes         Other Findings              Physical Exam    Airway  Mallampati: II  TM Distance: 4 - 6 cm  Neck ROM: normal range of motion   Mouth opening: Normal     Cardiovascular    Rhythm: regular  Rate: normal         Dental  No notable dental hx       Pulmonary  Breath sounds clear to auscultation               Abdominal         Other Findings            Anesthetic Plan    ASA: 3  Anesthesia type: general            Anesthetic plan and risks discussed with: Patient

## 2020-10-14 NOTE — ANESTHESIA POSTPROCEDURE EVALUATION
Post-Anesthesia Evaluation and Assessment    Patient: Raiza Murphy MRN: [id number]  SSN: [id number]    YOB: 1945  Age: 77 y.o. Sex: male      I have evaluated the patient and they are stable and ready for discharge from the PACU. Cardiovascular Function/Vital Signs  Visit Vitals  /62   Pulse 90   Temp 37 °C (98.6 °F)   Resp 18   Wt 93 kg (205 lb 0.4 oz)   SpO2 97%   BMI 26.32 kg/m²       Patient is status post General anesthesia for Procedure(s):  LEFT TO RIGHT FEMORAL-FEMORAL BYPASS WITH COMMON FEMORAL ARTERY RIGHT ENDARTERECTOMY, LEFT ILIAC ANGIOPLASTY. Nausea/Vomiting: None    Postoperative hydration reviewed and adequate. Pain:  Pain Scale 1: (P) Numeric (0 - 10) (10/14/20 1129)  Pain Intensity 1: (P) 0 (10/14/20 1125)   Managed    Neurological Status:   Neuro (WDL): Within Defined Limits (10/14/20 0634)   At baseline    Mental Status, Level of Consciousness: Alert and  oriented to person, place, and time    Pulmonary Status:   O2 Device: Nasal cannula (10/14/20 7646)   Adequate oxygenation and airway patent    Complications related to anesthesia: None    Post-anesthesia assessment completed. No concerns    Signed By: Hansel Hodge MD     October 14, 2020              Procedure(s):  LEFT TO RIGHT FEMORAL-FEMORAL BYPASS WITH COMMON FEMORAL ARTERY RIGHT ENDARTERECTOMY, LEFT ILIAC ANGIOPLASTY. general    <BSHSIANPOST>    INITIAL Post-op Vital signs:   Vitals Value Taken Time   /55 10/14/2020 11:45 AM   Temp 37 °C (98.6 °F) 10/14/2020 11:26 AM   Pulse 88 10/14/2020 11:55 AM   Resp 17 10/14/2020 11:55 AM   SpO2 99 % 10/14/2020 11:55 AM   Vitals shown include unvalidated device data.

## 2020-10-15 LAB
ANION GAP SERPL CALC-SCNC: 7 MMOL/L (ref 5–15)
BUN SERPL-MCNC: 11 MG/DL (ref 6–20)
BUN/CREAT SERPL: 10 (ref 12–20)
CALCIUM SERPL-MCNC: 8.4 MG/DL (ref 8.5–10.1)
CHLORIDE SERPL-SCNC: 103 MMOL/L (ref 97–108)
CO2 SERPL-SCNC: 28 MMOL/L (ref 21–32)
CREAT SERPL-MCNC: 1.12 MG/DL (ref 0.7–1.3)
ERYTHROCYTE [DISTWIDTH] IN BLOOD BY AUTOMATED COUNT: 14.6 % (ref 11.5–14.5)
GLUCOSE BLD STRIP.AUTO-MCNC: 225 MG/DL (ref 65–100)
GLUCOSE BLD STRIP.AUTO-MCNC: 259 MG/DL (ref 65–100)
GLUCOSE BLD STRIP.AUTO-MCNC: 263 MG/DL (ref 65–100)
GLUCOSE SERPL-MCNC: 189 MG/DL (ref 65–100)
HCT VFR BLD AUTO: 26.9 % (ref 36.6–50.3)
HGB BLD-MCNC: 9.1 G/DL (ref 12.1–17)
MCH RBC QN AUTO: 35.8 PG (ref 26–34)
MCHC RBC AUTO-ENTMCNC: 33.8 G/DL (ref 30–36.5)
MCV RBC AUTO: 105.9 FL (ref 80–99)
NRBC # BLD: 0.04 K/UL (ref 0–0.01)
NRBC BLD-RTO: 0.3 PER 100 WBC
PLATELET # BLD AUTO: 176 K/UL (ref 150–400)
PMV BLD AUTO: 10 FL (ref 8.9–12.9)
POTASSIUM SERPL-SCNC: 4.4 MMOL/L (ref 3.5–5.1)
RBC # BLD AUTO: 2.54 M/UL (ref 4.1–5.7)
SERVICE CMNT-IMP: ABNORMAL
SODIUM SERPL-SCNC: 138 MMOL/L (ref 136–145)
WBC # BLD AUTO: 11.7 K/UL (ref 4.1–11.1)

## 2020-10-15 PROCEDURE — 99221 1ST HOSP IP/OBS SF/LOW 40: CPT | Performed by: CLINICAL NURSE SPECIALIST

## 2020-10-15 PROCEDURE — 74011000250 HC RX REV CODE- 250: Performed by: SURGERY

## 2020-10-15 PROCEDURE — 74011250637 HC RX REV CODE- 250/637: Performed by: SURGERY

## 2020-10-15 PROCEDURE — 82962 GLUCOSE BLOOD TEST: CPT

## 2020-10-15 PROCEDURE — 80048 BASIC METABOLIC PNL TOTAL CA: CPT

## 2020-10-15 PROCEDURE — 97116 GAIT TRAINING THERAPY: CPT

## 2020-10-15 PROCEDURE — 65270000029 HC RM PRIVATE

## 2020-10-15 PROCEDURE — 85027 COMPLETE CBC AUTOMATED: CPT

## 2020-10-15 PROCEDURE — 36415 COLL VENOUS BLD VENIPUNCTURE: CPT

## 2020-10-15 PROCEDURE — 97161 PT EVAL LOW COMPLEX 20 MIN: CPT

## 2020-10-15 PROCEDURE — 74011250636 HC RX REV CODE- 250/636: Performed by: SURGERY

## 2020-10-15 RX ORDER — METFORMIN HYDROCHLORIDE 500 MG/1
1000 TABLET ORAL 2 TIMES DAILY WITH MEALS
Status: DISCONTINUED | OUTPATIENT
Start: 2020-10-16 | End: 2020-10-16 | Stop reason: HOSPADM

## 2020-10-15 RX ADMIN — GABAPENTIN 300 MG: 300 CAPSULE ORAL at 19:01

## 2020-10-15 RX ADMIN — GABAPENTIN 300 MG: 300 CAPSULE ORAL at 06:28

## 2020-10-15 RX ADMIN — Medication 10 ML: at 06:00

## 2020-10-15 RX ADMIN — GLIMEPIRIDE 4 MG: 2 TABLET ORAL at 09:54

## 2020-10-15 RX ADMIN — POTASSIUM CHLORIDE AND SODIUM CHLORIDE: 450; 150 INJECTION, SOLUTION INTRAVENOUS at 16:13

## 2020-10-15 RX ADMIN — GLIMEPIRIDE 4 MG: 2 TABLET ORAL at 19:01

## 2020-10-15 RX ADMIN — OXYCODONE 5 MG: 5 TABLET ORAL at 22:24

## 2020-10-15 RX ADMIN — HYDROCHLOROTHIAZIDE 25 MG: 25 TABLET ORAL at 09:53

## 2020-10-15 RX ADMIN — CETIRIZINE HYDROCHLORIDE 10 MG: 10 TABLET, FILM COATED ORAL at 09:53

## 2020-10-15 RX ADMIN — OXYCODONE 5 MG: 5 TABLET ORAL at 07:58

## 2020-10-15 RX ADMIN — POTASSIUM CHLORIDE AND SODIUM CHLORIDE: 450; 150 INJECTION, SOLUTION INTRAVENOUS at 02:30

## 2020-10-15 RX ADMIN — ATORVASTATIN CALCIUM 20 MG: 20 TABLET, FILM COATED ORAL at 22:24

## 2020-10-15 RX ADMIN — CEFAZOLIN SODIUM 2 G: 300 INJECTION, POWDER, LYOPHILIZED, FOR SOLUTION INTRAVENOUS at 00:29

## 2020-10-15 RX ADMIN — ENOXAPARIN SODIUM 40 MG: 40 INJECTION SUBCUTANEOUS at 09:54

## 2020-10-15 RX ADMIN — LISINOPRIL 40 MG: 20 TABLET ORAL at 09:53

## 2020-10-15 RX ADMIN — CLOPIDOGREL BISULFATE 75 MG: 75 TABLET ORAL at 09:54

## 2020-10-15 RX ADMIN — Medication 10 ML: at 22:00

## 2020-10-15 RX ADMIN — OXYCODONE 5 MG: 5 TABLET ORAL at 00:29

## 2020-10-15 RX ADMIN — GABAPENTIN 300 MG: 300 CAPSULE ORAL at 13:45

## 2020-10-15 RX ADMIN — OXYCODONE 5 MG: 5 TABLET ORAL at 12:09

## 2020-10-15 RX ADMIN — ALOGLIPTIN 6.25 MG: 6.25 TABLET, FILM COATED ORAL at 10:33

## 2020-10-15 RX ADMIN — ASPIRIN 81 MG: 81 TABLET, COATED ORAL at 09:54

## 2020-10-15 RX ADMIN — OXYCODONE 5 MG: 5 TABLET ORAL at 16:13

## 2020-10-15 NOTE — PROGRESS NOTES
TRANSFER - IN REPORT:    Verbal report received from Evon(name) on Raiza Murphy  being received from PACU(unit) for routine post - op      Report consisted of patients Situation, Background, Assessment and   Recommendations(SBAR). Information from the following report(s) SBAR and OR Summary was reviewed with the receiving nurse. Opportunity for questions and clarification was provided. Assessment completed upon patients arrival to unit and care assumed.

## 2020-10-15 NOTE — PROGRESS NOTES
Bedside and Verbal shift change report given to [employer], RN (oncoming nurse) by Mary Cooper RN (offgoing nurse). Report included the following information SBAR, Kardex, Intake/Output and MAR.

## 2020-10-15 NOTE — PROGRESS NOTES
Vascular  VSS  POD#1  Good urine output  Feet hot  Normal BMP, hgb down to 9.1 from acute blood loss anemia  Wounds clean and dressed  Plan: d/c brewster, ambulate,change dressings  Home in am if all is well

## 2020-10-15 NOTE — PROGRESS NOTES
9:20 AM: Patient's brewster removed at this time per MD order. Patient aware of needing to void in 6 hours. Currently up ambulating with PT.     9:43 AM: Per Diabetes Management, holding morning dose of lantus. Changed patients right and left groin dressing per MD order. 2:40 PM: Patient has voided about 600ml of clear yellow urine into urinal. Currently up in chair for the second time today and has ambulated around unit for a second time. Pain well controlled with PRN roxicodone. 7:19 PM: Patient has been up in chair twice today for a few hours at a time. Pain well controlled on PRN roxicodone. Left groin dressing showing minimal shadowing, RN marked area to monitor. Patient voiding adequately, clear yellow urine.

## 2020-10-15 NOTE — CONSULTS
[facility]  CLINICAL NURSE SPECIALIST CONSULT  PROGRAM FOR DIABETES HEALTH    INITIAL NOTE    Presentation   Greg Calero is a 77 y.o. male who presented to the outpatient vascular office for follow up for PAD. He had been experiencing severe exertional leg pain and restless leg pain. HX: HTN, CAD, PAD s/p bilateral iliac angioplasty and right CFA atherectomy, Diabetes, chronic back pain, hypercholesteremia     DX: Short distance claudication     TX: Left to right femoral-femoral bypass with common femoral artery right endartectomy, left iliac angioplasty    Current clinical course has been uncomplicated. Diabetes: Patient has known Type two diabetes, treated with Metformin, Glimepiride and Januvia PTA. A1C 7.1% (up from 6.5% at last PCP visit)     Consulted by Provider for advanced diabetes nursing assessment and care, specifically related to   [x] Inpatient management strategy    Diabetes-related medical history  Acute complications: None  Neurological complications  Peripheral neuropathy  Microvascular disease: None  Macrovascular disease  Peripheral vascular disease and Foot wounds      Diabetes medication history  Drug class Currently in use Discontinued Never used   Biguanide Metformin 1000 mg daily     DDP-4 inhibitor  Januvia 100 mg daily     Sulfonylurea Glimepiride 2 mg TID      Thiazolidinedione      GLP-1 RA      SGLT-2 inhibitors      Basal insulin      Fixed Dose  Combinations      Bolus insulin        Subjective   My numbers have been going up over the last few months. I have been in so much pain.     Lives with ex-wife  Diabetes managed by PCP  Conscious of carbohydrate intake: limits to no more than 150 CHO/day  Patient reports the following home diabetes self-care practices:  Eating pattern  [x] Breakfast Two scrambled eggs, sausage, toast  [x] Lunch  [place] or salad  [x] Dinner  Chicken, pasta, fish sandwich   [x] Bedtime None  [x] Beverages Unsweet tea, water, coffee, \"a few beers\" at night  Physical activity pattern  [x] Aerobic exercise Enjoys golf but activity has dropped off this year with knee replacement (Late January), Back pain and leg pain  Monitoring pattern  [x] Breakfast Once daily. Last three months fasting glucose 150-160  Taking medications pattern  [x] Consistent administration  [x] Affordable        Objective   Physical exam  General Alert, oriented and in no acute distress/ill-appearing. Conversant and cooperative. Vital Signs   Visit Vitals  BP (!) 157/73 (BP 1 Location: Right arm, BP Patient Position: At rest)   Pulse 74   Temp 98.4 °F (36.9 °C)   Resp 16   Wt 93 kg (205 lb 0.4 oz)   SpO2 97%   BMI 26.32 kg/m²     Skin  Warm and dry. Acanthosis noted along neckline. Heart   Regular rate and rhythm. No murmurs, rubs or gallops  Lungs  Clear to auscultation without rales or rhonchi  Extremities No foot wounds    Diabetic foot exam:    Left Foot     Visual Exam: normal    Pulse DP: 1+ (weak)   Filament test: reduced sensation    Vibratory sensation: diminished  Right Foot   Visual Exam: ulcer- Small healing ulcer on outer ankle. Started January 2020   Pulse DP: 1+ (weak)   Filament test: reduced sensation    Vibratory sensation: diminished DP & PT pulses +2.      Laboratory  Lab Results   Component Value Date/Time    Hemoglobin A1c 7.1 (H) 10/12/2020 12:15 PM     No results found for: LDL, LDLC, DLDLP  Lab Results   Component Value Date/Time    Creatinine (POC) 1.0 10/07/2020 11:12 AM    Creatinine 1.12 10/15/2020 02:33 AM     Lab Results   Component Value Date/Time    Sodium 138 10/15/2020 02:33 AM    Potassium 4.4 10/15/2020 02:33 AM    Chloride 103 10/15/2020 02:33 AM    CO2 28 10/15/2020 02:33 AM    Anion gap 7 10/15/2020 02:33 AM    Glucose 189 (H) 10/15/2020 02:33 AM    BUN 11 10/15/2020 02:33 AM    Creatinine 1.12 10/15/2020 02:33 AM    BUN/Creatinine ratio 10 (L) 10/15/2020 02:33 AM    GFR est AA >60 10/15/2020 02:33 AM    GFR est non-AA >60 10/15/2020 02:33 AM    Calcium 8.4 (L) 10/15/2020 02:33 AM    Bilirubin, total 0.5 10/12/2020 12:15 PM    Alk. phosphatase 109 10/12/2020 12:15 PM    Protein, total 6.1 (L) 10/12/2020 12:15 PM    Albumin 3.8 10/12/2020 12:15 PM    Globulin 2.3 10/12/2020 12:15 PM    A-G Ratio 1.7 10/12/2020 12:15 PM    ALT (SGPT) 27 10/12/2020 12:15 PM     Lab Results   Component Value Date/Time    ALT (SGPT) 27 10/12/2020 12:15 PM       Factors affecting BG pattern  Factor Dose Comments   Nutrition:  Carb-controlled meals   60 grams/meal    Drugs:  Steroids   Dexamethasone 4 mg X1 on OR      Pain Oxycodone PRN      Blood glucose pattern        Assessment and Plan   Nursing Diagnosis Risk for unstable blood glucose pattern   Nursing Intervention Domain 5258 Decision-making Support   Nursing Interventions Examined current inpatient diabetes control   Explored factors facilitating and impeding inpatient management  Identified self-management practices impeding diabetes control  Explored corrective strategies with patient and responsible inpatient provider   Informed patient of rational for insulin strategy while hospitalized     Evaluation   Mal Araiza is a pleasant 77year old gentleman with controlled diabetes on metformin, Januvia and Glimepiride admitted for surgical management of PAD, now s/p left to right femoral-femoral bypass with common femoral artery right endartectomy, left iliac angioplasty. He did have transient elevation in glucose yesterday afternoon after dexamethasone given once in the OR. His diet has been advanced, tolerating PO very well and fasting glucose 189. His antihyperglycemic agents have been started and I expect that this is a reasonable strategy given his low A1C 7.1%. Inpatient glucose goal 100-180. Recommendations   Recommend:  1. Agree with Alogliptin 6.25 mg daily, Glimepiride 4 mg daily    2. Can restart metformin: 1000 mg BID    3. D/C Lantus    4.  If glucose sustained over 200, can add correctional insulin at normal sensitivity (start at a glucose of 200)    5. POC glucose ACHS    Billing Code(s)   I personally reviewed chart, notes, data and current medications in the medical record, and examined the patient at bedside before making care recommendations. Thank you for including us in their care. I spent 55 minutes in direct patient care today for this patient.   Time includes chart review, face to face with patient and collaboration with interdisciplinary care team.       Venice Holstein, [facility]  Program for Diabetes Health  Access via [employer]

## 2020-10-15 NOTE — PROGRESS NOTES
Problem: Mobility Impaired (Adult and Pediatric)  Goal: *Acute Goals and Plan of Care (Insert Text)  Description: FUNCTIONAL STATUS PRIOR TO ADMISSION: Patient was independent and active without use of DME. Used cane for balance due to pain. HOME SUPPORT PRIOR TO ADMISSION: The patient lived with family and did not need assist with mobility or ADLs. Physical Therapy Goals  Initiated 10/15/2020  1. Patient will transfer from bed to chair and chair to bed with modified independence using the least restrictive device within 7 day(s). 2.  Patient will perform sit to stand with modified independence within 7 day(s). 3.  Patient will ambulate with modified independence for 200 feet with the least restrictive device within 7 day(s). 4.  Patient will ascend/descend 8 stairs with 1 handrail(s) with modified independence within 7 day(s). Outcome: Progressing Towards Goal   PHYSICAL THERAPY EVALUATION  Patient: Tammy Molina (72 y.o. male)  Date: 10/15/2020  Primary Diagnosis: Claudication in peripheral vascular disease ([facility] [address] [I73.9]  Procedure(s) (LRB):  LEFT TO RIGHT FEMORAL-FEMORAL BYPASS WITH COMMON FEMORAL ARTERY RIGHT ENDARTERECTOMY, LEFT ILIAC ANGIOPLASTY (Bilateral) 1 Day Post-Op   Precautions:   Fall      ASSESSMENT  Based on the objective data described below, the patient presents with decreased mobility compared to baseline after bilateral revascularization. He was able to ambulate with a RW for added support and good overall pain control. He did have considerable increase in pain initially upon sitting, but this improved with standing. Expect that he will continue to progress well with his mobility and be able to return home with no follow up PT needs once medically ready. We will continue to follow to ensure continued progression of activity. Recommend nursing assist with mobility and ambulation as he is able to tolerate, as well.     Current Level of Function Impacting Discharge (mobility/balance): standby assist for ambulation with RW    Functional Outcome Measure: The patient scored Total: 80/100 on the Barthel Index which is indicative of minimally impaired ability to care for basic self needs/dependency on others. Other factors to consider for discharge: none     Patient will benefit from skilled therapy intervention to address the above noted impairments. PLAN :  Recommendations and Planned Interventions: gait training and patient and family training/education      Frequency/Duration: Patient will be followed by physical therapy:  3 times a week to address goals. Recommendation for discharge: (in order for the patient to meet his/her long term goals)  No skilled physical therapy/ follow up rehabilitation needs identified at this time. This discharge recommendation:  Has not yet been discussed the attending provider and/or case management    IF patient discharges home will need the following DME: none         SUBJECTIVE:   Patient stated It feels good to be out of bed and off my butt.     OBJECTIVE DATA SUMMARY:   HISTORY:    Past Medical History:   Diagnosis Date    Arthritis     CAD (coronary artery disease)     Cancer ([facility] [address]     hx bladder ca, skin CA    Chronic pain     Diabetes ([facility] [address]     Diverticulum - small intestine 10/4/2010    GI BLEEDING - UNSPEC RECTAL BLEEDING 10/4/2010    Hx of bladder cancer     Hypertension     Joint pain     Leg swelling     Memory disorder     Muscle pain     Muscle weakness     Neuropathy     Snoring      Past Surgical History:   Procedure Laterality Date    HX GI      COLONOSCOPY    HX HEART CATHETERIZATION  2018    HX KNEE REPLACEMENT Right 01/2020    HX UROLOGICAL      laser treatment bladder CA    VASCULAR SURGERY PROCEDURE UNLIST  2018    REPLACED VEIN L LEG C STENTS    VASCULAR SURGERY PROCEDURE UNLIST  2019    DR. TAVARES STENTS       Personal factors and/or comorbidities impacting plan of care: revascularization bilateral LEs and otherwise as noted in PMHx         EXAMINATION/PRESENTATION/DECISION MAKING:   Critical Behavior:  Neurologic State: Alert           Hearing:     Skin:  dressings in place per nursing  Edema: none noted  Range Of Motion:  AROM: Within functional limits                       Strength:    Strength: Generally decreased, functional                    Tone & Sensation:   Tone: Normal              Sensation: Intact               Coordination:  Coordination: Within functional limits  Vision:      Functional Mobility:  Bed Mobility:     Supine to Sit: Modified independent; Additional time  Sit to Supine: (up in chair after)     Transfers:  Sit to Stand: Stand-by assistance; Additional time; Adaptive equipment  Stand to Sit: Stand-by assistance; Adaptive equipment; Additional time        Bed to Chair: Stand-by assistance; Adaptive equipment; Additional time              Balance:   Sitting: Intact; Without support  Standing: Intact; With support  Ambulation/Gait Training:  Distance (ft): 200 Feet (ft)  Assistive Device: Gait belt;Walker, rolling  Ambulation - Level of Assistance: Stand-by assistance; Additional time; Adaptive equipment        Gait Abnormalities: Decreased step clearance              Speed/Shell: Pace decreased (<100 feet/min)  Step Length: Left shortened;Right shortened        Interventions: Safety awareness training; Tactile cues; Verbal cues; Visual/Demos            Stairs: Therapeutic Exercises:       Functional Measure:  Barthel Index:    Bathin  Bladder: 10  Bowels: 10  Groomin  Dressin  Feeding: 10  Mobility: 10  Stairs: 5(inferred)  Toilet Use: 10  Transfer (Bed to Chair and Back): 10  Total: 80/100       The Barthel ADL Index: Guidelines  1. The index should be used as a record of what a patient does, not as a record of what a patient could do. 2. The main aim is to establish degree of independence from any help, physical or verbal, however minor and for whatever reason.   3. The need for supervision renders the patient not independent. 4. A patient's performance should be established using the best available evidence. Asking the patient, friends/relatives and nurses are the usual sources, but direct observation and common sense are also important. However direct testing is not needed. 5. Usually the patient's performance over the preceding 24-48 hours is important, but occasionally longer periods will be relevant. 6. Middle categories imply that the patient supplies over 50 per cent of the effort. 7. Use of aids to be independent is allowed. Milagros Hammond., Barthel, D.W. (7757). Functional evaluation: the Barthel Index. [address] [facility] (14)2. Jace Gallego brittany YAAKOV Godfrey, Emily Myles., Guido Kenny, Michelle, [address] (1999). Measuring the change indisability after inpatient rehabilitation; comparison of the responsiveness of the Barthel Index and Functional [place] Measure. Journal of Neurology, Neurosurgery, and Psychiatry, 66(4), 124-356. Jose F Bedolla, N.J.A, TERE Ponce, & Trey Briggs MDWAYNE. (2004.) Assessment of post-stroke quality of life in cost-effectiveness studies: The usefulness of the Barthel Index and the EuroQoL-5D.  Quality of Life Research, 15, 298-19        Physical Therapy Evaluation Charge Determination   History Examination Presentation Decision-Making   MEDIUM  Complexity : 1-2 comorbidities / personal factors will impact the outcome/ POC  MEDIUM Complexity : 3 Standardized tests and measures addressing body structure, function, activity limitation and / or participation in recreation  LOW Complexity : Stable, uncomplicated  LOW Complexity : FOTO score of       Based on the above components, the patient evaluation is determined to be of the following complexity level: LOW     Pain Rating:  Some pain with sitting, but improved with standing and with reclining in the chair to decrease hip flexion against the incision    Activity Tolerance: Good  Please refer to the flowsheet for vital signs taken during this treatment. After treatment patient left in no apparent distress:   Sitting in chair and Call bell within reach    COMMUNICATION/EDUCATION:   The patients plan of care was discussed with: Registered nurse. Fall prevention education was provided and the patient/caregiver indicated understanding., Patient/family have participated as able in goal setting and plan of care. , and Patient/family agree to work toward stated goals and plan of care.     Thank you for this referral.  Mili Yoon, PT   Time Calculation: 25 mins

## 2020-10-15 NOTE — PROGRESS NOTES
Bedside and Verbal shift change report given to Tenisha Ahn RN (oncoming nurse) by CODI Zazueta RN (offgoing nurse). Report included the following information SBAR, Kardex, Intake/Output, MAR and Recent Results.

## 2020-10-15 NOTE — PROGRESS NOTES
CM attempted to meet with patient to complete initial evaluation, but he was asleep. Will follow up.     Heather Lewis, [address]  Social Work Student

## 2020-10-15 NOTE — PROGRESS NOTES
Transition of Care Plan   RUR- 10% Low Risks    DISPOSITION: Patient presents from home and disposition plan is TBD pending care recommendations and likely expected to be going home with family assistance once medically stable   Transport: Family, brother Logan Miller [phone], is expected to transport at discharge. [address] Follow up: PCP/Specialist(s)     Reason for Admission:  Claudication in peripheral vascular disease                   RUR Score:   10%                  Plan for utilizing home health:  TBD pending care recommendations        PCP: First and Last name: Nery Deleon MD    Name of Practice: [facility]    Are you a current patient: Yes/No: Yes   Approximate date of last visit: 8/4/2020   Can you participate in a virtual visit with your PCP: Yes                    Current Advanced Directive/Advance Care Plan: Patient does not have ACP documents and declined services to complete documents. Transition of Care Plan: The disposition plan is TBD pending care recommendations. Reviewed chart for transitions of care,and discussed in rounds. CM met with patient at bedside to explain role and offer support. Patient is alert and oriented x4, and confirmed demographics. Patient lives in a one story home with his ex-wife. The home has 3 steps to get inside. He is independent and used a cane and walker following his knee replacements. Patient stated that he would be using his cane and walker following discharge because of the discomfort from surgery. His preferred pharmacy is [facility] [address]. Patient's brother, Logan Miller, is expected to transport patient if discharged home. Care Management Interventions  PCP Verified by CM: Jim Hameed MD)  Last Visit to PCP: 08/04/20  Palliative Care Criteria Met (RRAT>21 & CHF Dx)?: No  Mode of Transport at Discharge:  Other (see comment)(MarcieerTeddy)  Transition of Care Consult (CM Consult): Discharge Planning  MyChart Signup: No  Discharge Durable Medical Equipment: No  Health Maintenance Reviewed: Yes  Physical Therapy Consult: No  Occupational Therapy Consult: No  Speech Therapy Consult: No  Current Support Network: Own Home, Other(Lives with ex-wife)  Confirm Follow Up Transport: Family(Brother, Neeta Peterson)  The Patient and/or Patient Representative was Provided with a Choice of Provider and Agrees with the Discharge Plan?: Yes  Freedom of Choice List was Provided with Basic Dialogue that Supports the Patient's Individualized Plan of Care/Goals, Treatment Preferences and Shares the Quality Data Associated with the Providers?: Yes  The [employer] Information Provided?: No  Discharge Location  Discharge Placement: [address], [address] Work Student

## 2020-10-16 VITALS
DIASTOLIC BLOOD PRESSURE: 66 MMHG | TEMPERATURE: 97.8 F | OXYGEN SATURATION: 96 % | RESPIRATION RATE: 16 BRPM | WEIGHT: 205.03 LBS | SYSTOLIC BLOOD PRESSURE: 142 MMHG | HEART RATE: 97 BPM | BODY MASS INDEX: 26.32 KG/M2

## 2020-10-16 LAB
GLUCOSE BLD STRIP.AUTO-MCNC: 197 MG/DL (ref 65–100)
SERVICE CMNT-IMP: ABNORMAL

## 2020-10-16 PROCEDURE — 82962 GLUCOSE BLOOD TEST: CPT

## 2020-10-16 PROCEDURE — 99231 SBSQ HOSP IP/OBS SF/LOW 25: CPT | Performed by: CLINICAL NURSE SPECIALIST

## 2020-10-16 PROCEDURE — 74011250637 HC RX REV CODE- 250/637: Performed by: SURGERY

## 2020-10-16 PROCEDURE — 74011250636 HC RX REV CODE- 250/636: Performed by: SURGERY

## 2020-10-16 RX ORDER — OXYCODONE HYDROCHLORIDE 5 MG/1
5 TABLET ORAL
Qty: 20 TAB | Refills: 0 | Status: SHIPPED | OUTPATIENT
Start: 2020-10-16 | End: 2020-10-21

## 2020-10-16 RX ADMIN — HYDROCHLOROTHIAZIDE 25 MG: 25 TABLET ORAL at 08:39

## 2020-10-16 RX ADMIN — ALOGLIPTIN 6.25 MG: 6.25 TABLET, FILM COATED ORAL at 08:39

## 2020-10-16 RX ADMIN — POTASSIUM CHLORIDE AND SODIUM CHLORIDE: 450; 150 INJECTION, SOLUTION INTRAVENOUS at 04:54

## 2020-10-16 RX ADMIN — CLOPIDOGREL BISULFATE 75 MG: 75 TABLET ORAL at 08:39

## 2020-10-16 RX ADMIN — ASPIRIN 81 MG: 81 TABLET, COATED ORAL at 08:39

## 2020-10-16 RX ADMIN — LISINOPRIL 40 MG: 20 TABLET ORAL at 08:40

## 2020-10-16 RX ADMIN — OXYCODONE 5 MG: 5 TABLET ORAL at 08:39

## 2020-10-16 RX ADMIN — ENOXAPARIN SODIUM 40 MG: 40 INJECTION SUBCUTANEOUS at 08:40

## 2020-10-16 RX ADMIN — CETIRIZINE HYDROCHLORIDE 10 MG: 10 TABLET, FILM COATED ORAL at 08:39

## 2020-10-16 RX ADMIN — GLIMEPIRIDE 4 MG: 2 TABLET ORAL at 08:39

## 2020-10-16 RX ADMIN — OXYCODONE 5 MG: 5 TABLET ORAL at 04:32

## 2020-10-16 RX ADMIN — GABAPENTIN 300 MG: 300 CAPSULE ORAL at 06:53

## 2020-10-16 NOTE — PROGRESS NOTES
Bedside shift change report given to [employer] RN (oncoming nurse) by Sally Bravo RN (offgoing nurse). Report included the following information SBAR, Kardex, Intake/Output, MAR and Recent Results.

## 2020-10-16 NOTE — PROGRESS NOTES
Vascular  VSS  Afebrile  Hot feet  Wounds clean  hgb stable at 9.1  Home with instructions and oxycodone  Return to office in 10 days  May shower and get wounds wet in 3 days

## 2020-10-16 NOTE — PROGRESS NOTES
Met with Patient in room. Patient is dressed and ready for home discharge. Not using a device for mobility at this time. Patient does not have any questions or concerns regarding mobility at home at this time. Family will be with him. No further PT needs identified at this time. Thanks!     Fatoumata Elizabeth PT, DPT  Geriatric Clinical Specialist

## 2020-10-16 NOTE — DISCHARGE INSTRUCTIONS
Patient Education        Femoral Endarterectomy: What to Expect at [address] will have some pain from the cut (incision) the doctor made. This usually gets better after a couple of days. Your doctor will give you pain medicine for this. Your leg may be swollen at first. This may last 2 to 3 months. You will have stitches or staples in the incision. If you have stitches, they may dissolve on their own. Or your doctor may take them out 7 to 14 days after your surgery. After surgery, blood may flow better throughout your leg, which can decrease leg pain, numbness, and cramping. You may be able to walk longer distances without leg pain. This care sheet gives you a general idea about how long it will take for you to recover. But each person recovers at a different pace. Follow the steps below to get better as quickly as possible. How can you care for yourself at home? Activity    · Rest when you feel tired. Getting enough sleep will help you recover.     · Try to walk every day or as often as your doctor tells you. Start by walking a little more than you did the day before. Bit by bit, increase the amount you walk. Walking boosts blood flow and helps prevent pneumonia and constipation.     · Avoid strenuous activities, such as bicycle riding, jogging, weight lifting, or aerobic exercise, until your doctor says it is okay.     · Ask your doctor when you can drive again.     · You will probably need to take off 1 to 4 weeks from work. It depends on the type of work you do and how you feel.     · You may shower as usual. Do not take a bath for the first 2 weeks, or until your doctor tells you it is okay. Diet    · You can eat your normal diet. If your stomach is upset, try bland, low-fat foods like plain rice, broiled chicken, toast, and yogurt.     · Drink plenty of fluids (unless your doctor tells you not to).     · You may notice that your bowel movements are not regular right after your surgery. This is common. You may want to take a fiber supplement every day. If you have not had a bowel movement after a couple of days, ask your doctor about taking a mild laxative. Medicines    · Your doctor will tell you if and when you can restart your medicines. He or she will also give you instructions about taking any new medicines.     · If you take aspirin or some other blood thinner, ask your doctor if and when to start taking it again. Make sure that you understand exactly what your doctor wants you to do.     · Be safe with medicines. Take your medicines exactly as prescribed. Call your doctor if you think you are having a problem with your medicine.     · Take pain medicines exactly as directed. ? If the doctor gave you a prescription medicine for pain, take it as prescribed. ? If you are not taking a prescription pain medicine, ask your doctor if you can take an over-the-counter medicine.     · If you think your pain medicine is making you sick to your stomach:  ? Take your medicine after meals (unless your doctor has told you not to). ? Ask your doctor for a different pain medicine.     · If your doctor prescribed antibiotics, take them as directed. Do not stop taking them just because you feel better. You need to take the full course of antibiotics.     · Your doctor may prescribe a blood thinner when you go home. This helps prevent blood clots. Be sure you get instructions about how to take your medicine safely. Blood thinners can cause serious bleeding problems. Incision care    · If you have strips of tape on the cut (incision) the doctor made, leave the tape on for a week or until it falls off. Or follow your doctor's instructions for removing the tape.     · Wash the area daily with warm, soapy water, and pat it dry. Don't use hydrogen peroxide or alcohol, which can slow healing. You may cover the area with a gauze bandage if it weeps or rubs against clothing.  Change the bandage every day.     · Keep the area clean and dry. Follow-up care is a key part of your treatment and safety. Be sure to make and go to all appointments, and call your doctor if you are having problems. It's also a good idea to know your test results and keep a list of the medicines you take. When should you call for help? Call 911 anytime you think you may need emergency care. For example, call if:    · You passed out (lost consciousness).     · You have trouble breathing. Call your doctor now or seek immediate medical care if:    · You have severe pain in your leg, or it becomes cold, pale, blue, tingly, or numb.     · You have pain that does not get better after you take pain medicine.     · You have loose stitches, or your incision comes open.     · You are bleeding a lot from the incision.     · You have signs of infection, such as:  ? Increased pain, swelling, warmth, or redness. ? Red streaks leading from the incision. ? Pus draining from the incision. ? A fever.     · You are sick to your stomach or cannot keep fluids down. Watch closely for any changes in your health, and be sure to contact your doctor if:    · You do not get better as expected. Where can you learn more? Go to http://www.gray.com/  Enter P886 in the search box to learn more about \"Femoral Endarterectomy: What to Expect at Home. \"  Current as of: December 16, 2019               Content Version: 12.6  © 9894-0477 [employer]. Care instructions adapted under license by [employer] (which disclaims liability or warranty for this information). If you have questions about a medical condition or this instruction, always ask your healthcare professional. [place] [address] any warranty or liability for your use of this information.

## 2020-10-16 NOTE — ROUTINE PROCESS
Hospital follow-up PCP transitional care appointment has been scheduled with Dr. Pancho Chi for Oct 21 @ 11:15AM.  Pending patient discharge.   Matt Sands, Care Management Specialist.

## 2020-10-16 NOTE — DIABETES MGMT
MADYSON LEAL  CLINICAL NURSE SPECIALIST   DISCHARGE RECOMMENDATIONS    Presentation   Raiza Murphy is a 77 y.o. male who presented to the outpatient vascular office for follow up for PAD. He had been experiencing severe exertional leg pain and restless leg pain. He was diagnosed with short distance claudication and is now s/p left to right femoral-femoral bypass with common femoral artery right endartectomy, left iliac angioplasty. Preparing for discharge. Subjective   I am going to take it easy this weekend.   Fasting glucose 197. Glucose in the last 24 hours 197-263  On alogliptin and glimepiride  Metformin held x48 hrs after contrast   Objective   Physical exam    General Alert, oriented and in no acute distress/ill-appearing. Conversant and cooperative. Vital Signs   Visit Vitals  BP (!) 142/66 (BP 1 Location: Right arm, BP Patient Position: Supine; At rest)   Pulse 97   Temp 97.8 °F (36.6 °C)   Resp 16   Wt 93 kg (205 lb 0.4 oz)   SpO2 96%   BMI 26.32 kg/m²     Skin  Warm and dry  Heart   Regular rate and rhythm. No murmurs, rubs or gallops  Lungs  Clear to auscultation without rales or rhonchi  Extremities No foot wounds    Blood glucose pattern      Assessment and Plan   Nursing Diagnosis Readiness for enhanced self-care   Readiness for enhanced health management   Nursing Intervention Domain Self-care  Health Management   Nursing Interventions Discussed diabetes self-management practices that are improving and not improving long-term diabetes control  Identified specific measures that the patient will commit to address discharge until seen by primary care provider  Offered group diabetes education through the [facility] - Phone [phone] to schedule appointment     Evaluation   Patient is prepared for discharge. Discharge Recommendations   Until seen by primary care provider,    1.  Return to pre-hospitalization diabetes medication regimen, specifically Metformin 1000 mg BID (first dose tonight), Januvia 100 mg daily (first dose tomorrow), Glimepiride 4 mg BID (first dose tonight)    2. Continue glucose monitoring:  Patient to obtain a blood glucose reading four times daily. First thing in the morning prior to eating and drinking anything then before lunch, dinner and bedtime. Create a log and present to PCP for interpretation.       Billing Code(s)     [x] [id number] IP subsequent hospital care - 15 minutes    MAGED Verduzco  Access through [facility]

## 2020-10-16 NOTE — PROGRESS NOTES
9:39 AM: Patient complaining of some pain, gave PRN pain medication. Currently resting quietly in bed awaiting discharge papers. 10:20 AM  I have reviewed discharge instructions with the patient. The patient verbalized understanding. Discharge medications reviewed with patient and appropriate educational materials and side effects teaching were provided. Opportunities for question given. Patient dressed and ready to go, awaiting brothers arrival for transport home.

## 2020-10-16 NOTE — DISCHARGE SUMMARY
[facility] [address] SUMMARY    Name:  Liz Morales  MR#:  [id number]  :  1945  ACCOUNT #:  [de-identified]  ADMIT DATE:  10/14/2020  DISCHARGE DATE:    DATE OF DISCHARGE:  10/16/2020    ADMISSION DIAGNOSIS:  Severe aortoiliac disease and common femoral artery disease with very short distance claudication. PROCEDURES:  1. Left iliac angioplasty with fluoroscopy. 2.  Extensive right femoral endarterectomy. 3.  Left-to-right femorofemoral bypass with 8 mm Dacron. HOSPITAL COURSE:  A 50-year-old gentleman who has had significant pain in his pelvis, hips and thighs, cannot walk any distance whatsoever and is being considered for a neurostimulator. It was opted to do some noninvasive assessments and CTA which showed multiple obstructing lesions. He was taken to the operating room on the day of admission and underwent femoral sheath placement after exposure of his common femoral arteries bilaterally, left common iliac in-stent stenosis angioplasty with fluoroscopy and then left to right fem-fem bypass with extensive femoral endarterectomy. Postoperatively, the patient had warm feet. His wounds were clean. His Schultz was discontinued the first morning. His hemoglobin had dropped down to 9.1 from acute blood loss anemia. He was ambulated with physical therapy. He was able to void once his Schultz was discontinued. The following day on the , he was feeling wonderful and ready to go home. He was given instructions regarding activity and hygiene. He will restart his medications, and we gave him oxycodone 5 mg #20 one every 4 hours as needed for pain. We will see him in the office in 10 days.       Briseida Ledesma MD      [id number]  D:  10/16/2020 9:08  T:  10/16/2020 10:14  JOB #:  [id number]  CC:  MD Alonso Hernandez MD

## 2020-10-19 ENCOUNTER — PATIENT OUTREACH (OUTPATIENT)
Dept: CASE MANAGEMENT | Age: 75
End: 2020-10-19

## 2020-10-19 NOTE — PROGRESS NOTES
Patient was admitted to [facility] on 10/14 and discharged on 10/16 for PVD. Patient was contacted within 1 business days of discharge. Top Discharge Challenges to be reviewed by the provider   Additional needs identified to be addressed with provider yes    ADMISSION DIAGNOSIS:  Severe aortoiliac disease and common femoral artery disease with very short distance claudication. PROCEDURES:  1. Left iliac angioplasty with fluoroscopy. 2.  Extensive right femoral endarterectomy. 3.  Left-to-right femorofemoral bypass with 8 mm Dacron. Current Advanced Directive/Advance Care Plan: Patient does not have ACP documents and declined services to complete documents      Component  Value  Flag  Ref Range  Units  Status    WBC  11.7  High    4.1 - 11.1  K/uL  Final    RBC  2.54  Low    4.10 - 5.70  M/uL  Final    HGB  9.1  Low    12.1 - 17.0  g/dL  Final    HCT  26.9  Low    36.6 - 50.3  %  Final    MCV  105.9  High    80.0 - 99.0  FL  Final    MCH  35.8  High    26.0 - 34.0  PG  Final      Discussed COVID-19 related testing which was available at this time. Test results were negative. Patient informed of results, if available? yes , performed preoperatively on 10/9. Method of communication with provider : chart routing       Advance Care Planning:   Does patient have an Advance Directive:  currently not on file; patient declined education    Inpatient Readmission Risk score: 24  Was this a readmission? no     Patients top risk factors for readmission: medical condition  Interventions to address risk factors: ensured patient aware of follow up appt and will attend, education and review of discharge instructions    Care Transition Nurse (CTN) contacted the patient by telephone to perform post hospital discharge assessment. Verified name and  with patient as identifiers. Provided introduction to self, and explanation of the CTN role.      CTN reviewed discharge instructions, medical action plan and red flags with patient who verbalized understanding. Patient given an opportunity to ask questions and does not have any further questions or concerns at this time. The patient agrees to contact the PCP office for questions related to their healthcare. Medication reconciliation was performed with patient, who verbalizes understanding of administration of home medications. Advised obtaining a 90-day supply of all daily and as-needed medications. Referral to Pharm D needed: no     Home Health/Outpatient orders at discharge: none  Covid Risk Education    Patient has following risk factors of: diabetes. Education provided regarding infection prevention, and signs and symptoms of COVID-19 and when to seek medical attention with patient who verbalized understanding. Discussed exposure protocols and quarantine From CDC: Are you at higher risk for severe illness?  and given an opportunity for questions and concerns. The patient agrees to contact the COVID-19 hotline [phone] or PCP office for questions related to COVID-19. For more information on steps you can take to protect yourself, see CDC's How to Protect Yourself     Patient/family/caregiver given information for [employer] and agrees to enroll no  Patient's preferred e-mail: declines  Patient's preferred phone number: declines    Discussed follow-up appointments. If no appointment was previously scheduled, appointment scheduling offered: yes  [facility] follow up appointment(s): No future appointments. [facility] follow up appointment(s): Dr Shira Jennings 10/21 at 11:15, Dr eRnetta Neumann, pending---pt will call  Plan for follow-up call in 7-10 days based on severity of symptoms and risk factors. CTN provided contact information for future needs. Goals Addressed                 This Visit's Progress     Prevent complications post hospitalization.         10/19/20  CTN spoke to patient today to review discharge instructions/red flags to prevent readmission. Appts:    PCP Dr Neris Wells is aware of appt on 10/21 at 11:15 and will attend    Surgeon Dr Brittany Coyle was not aware that he needed to make follow up in 10 days. CTN offered to do this for patient however he declined, stating that he will call as soon as he hung up the phone. ---Patient reports he is doing great since discharge. Denies having any unusual neuro changes to extremity, states his feet are warm, no color abnormalities. Patient reports staples intact, incision areas have no s/sx infection or drainage. Patient reports he has been ambulating without difficulty in the home. Patient reports he took 2 of his pain pills over the weekend but has not had to take any more. ---CTN reviewed discharge instructions given to patient, he has no questions at this time and verbalizes understanding.     CTN will follow up with patient in 7-10 days---jose

## 2020-11-11 ENCOUNTER — PATIENT OUTREACH (OUTPATIENT)
Dept: CASE MANAGEMENT | Age: 75
End: 2020-11-11

## 2020-11-11 NOTE — PROGRESS NOTES
Goals      Prevent complications post hospitalization. 10/19/20  CTN spoke to patient today to review discharge instructions/red flags to prevent readmission. Appts:    PCP Dr Alden Zavala is aware of appt on 10/21 at 11:15 and will attend    Surgeon Dr Corinne Raider was not aware that he needed to make follow up in 10 days. CTN offered to do this for patient however he declined, stating that he will call as soon as he hung up the phone. ---Patient reports he is doing great since discharge. Denies having any unusual neuro changes to extremity, states his feet are warm, no color abnormalities. Patient reports staples intact, incision areas have no s/sx infection or drainage. Patient reports he has been ambulating without difficulty in the home. Patient reports he took 2 of his pain pills over the weekend but has not had to take any more. ---CTN reviewed discharge instructions given to patient, he has no questions at this time and verbalizes understanding. CTN will follow up with patient in 7-10 days---jose    11/11/20  CTN unable to reach patient on phone, LM on  requesting return call.  CTN will follow up with patient next week if no return call received---jose

## 2022-03-19 PROBLEM — R29.818 NEUROGENIC CLAUDICATION: Status: ACTIVE | Noted: 2019-10-15

## 2022-03-19 PROBLEM — I73.9 CLAUDICATION IN PERIPHERAL VASCULAR DISEASE (HCC): Status: ACTIVE | Noted: 2020-10-14

## 2022-09-06 ENCOUNTER — ANESTHESIA (OUTPATIENT)
Dept: ENDOSCOPY | Age: 77
End: 2022-09-06
Payer: MEDICARE

## 2022-09-06 ENCOUNTER — HOSPITAL ENCOUNTER (OUTPATIENT)
Age: 77
Setting detail: OUTPATIENT SURGERY
Discharge: HOME | End: 2022-09-06
Attending: SPECIALIST | Admitting: SPECIALIST
Payer: MEDICARE

## 2022-09-06 ENCOUNTER — ANESTHESIA EVENT (OUTPATIENT)
Dept: ENDOSCOPY | Age: 77
End: 2022-09-06
Payer: MEDICARE

## 2022-09-06 VITALS
SYSTOLIC BLOOD PRESSURE: 106 MMHG | BODY MASS INDEX: 27.08 KG/M2 | TEMPERATURE: 97.6 F | HEIGHT: 74 IN | RESPIRATION RATE: 17 BRPM | OXYGEN SATURATION: 98 % | DIASTOLIC BLOOD PRESSURE: 81 MMHG | WEIGHT: 210.98 LBS | HEART RATE: 65 BPM

## 2022-09-06 LAB
GLUCOSE BLD STRIP.AUTO-MCNC: 111 MG/DL (ref 65–117)
SERVICE CMNT-IMP: NORMAL

## 2022-09-06 PROCEDURE — 76060000031 HC ANESTHESIA FIRST 0.5 HR: Performed by: SPECIALIST

## 2022-09-06 PROCEDURE — 74011250636 HC RX REV CODE- 250/636: Performed by: NURSE ANESTHETIST, CERTIFIED REGISTERED

## 2022-09-06 PROCEDURE — 74011250637 HC RX REV CODE- 250/637: Performed by: SPECIALIST

## 2022-09-06 PROCEDURE — 82962 GLUCOSE BLOOD TEST: CPT

## 2022-09-06 PROCEDURE — 76040000019: Performed by: SPECIALIST

## 2022-09-06 PROCEDURE — 2709999900 HC NON-CHARGEABLE SUPPLY: Performed by: SPECIALIST

## 2022-09-06 RX ORDER — PROPOFOL 10 MG/ML
INJECTION, EMULSION INTRAVENOUS AS NEEDED
Status: DISCONTINUED | OUTPATIENT
Start: 2022-09-06 | End: 2022-09-06 | Stop reason: HOSPADM

## 2022-09-06 RX ORDER — MIDAZOLAM HYDROCHLORIDE 1 MG/ML
.25-5 INJECTION, SOLUTION INTRAMUSCULAR; INTRAVENOUS AS NEEDED
Status: DISCONTINUED | OUTPATIENT
Start: 2022-09-06 | End: 2022-09-06 | Stop reason: HOSPADM

## 2022-09-06 RX ORDER — FLUMAZENIL 0.1 MG/ML
0.2 INJECTION INTRAVENOUS
Status: DISCONTINUED | OUTPATIENT
Start: 2022-09-06 | End: 2022-09-06 | Stop reason: HOSPADM

## 2022-09-06 RX ORDER — SODIUM CHLORIDE 9 MG/ML
INJECTION, SOLUTION INTRAVENOUS
Status: DISCONTINUED | OUTPATIENT
Start: 2022-09-06 | End: 2022-09-06 | Stop reason: HOSPADM

## 2022-09-06 RX ORDER — NALOXONE HYDROCHLORIDE 0.4 MG/ML
0.4 INJECTION, SOLUTION INTRAMUSCULAR; INTRAVENOUS; SUBCUTANEOUS
Status: DISCONTINUED | OUTPATIENT
Start: 2022-09-06 | End: 2022-09-06 | Stop reason: HOSPADM

## 2022-09-06 RX ORDER — SODIUM CHLORIDE 9 MG/ML
50 INJECTION, SOLUTION INTRAVENOUS CONTINUOUS
Status: DISCONTINUED | OUTPATIENT
Start: 2022-09-06 | End: 2022-09-06 | Stop reason: HOSPADM

## 2022-09-06 RX ORDER — FENTANYL CITRATE 50 UG/ML
25 INJECTION, SOLUTION INTRAMUSCULAR; INTRAVENOUS AS NEEDED
Status: DISCONTINUED | OUTPATIENT
Start: 2022-09-06 | End: 2022-09-06 | Stop reason: HOSPADM

## 2022-09-06 RX ORDER — DEXTROMETHORPHAN/PSEUDOEPHED 2.5-7.5/.8
1.2 DROPS ORAL
Status: DISCONTINUED | OUTPATIENT
Start: 2022-09-06 | End: 2022-09-06 | Stop reason: HOSPADM

## 2022-09-06 RX ADMIN — PROPOFOL 20 MG: 10 INJECTION, EMULSION INTRAVENOUS at 10:23

## 2022-09-06 RX ADMIN — SODIUM CHLORIDE: 900 INJECTION, SOLUTION INTRAVENOUS at 10:09

## 2022-09-06 RX ADMIN — SIMETHICONE 80 MG: 20 SUSPENSION/ DROPS ORAL at 10:23

## 2022-09-06 RX ADMIN — PROPOFOL 90 MG: 10 INJECTION, EMULSION INTRAVENOUS at 10:16

## 2022-09-06 RX ADMIN — PROPOFOL 20 MG: 10 INJECTION, EMULSION INTRAVENOUS at 10:18

## 2022-09-06 RX ADMIN — PROPOFOL 20 MG: 10 INJECTION, EMULSION INTRAVENOUS at 10:25

## 2022-09-06 RX ADMIN — PROPOFOL 20 MG: 10 INJECTION, EMULSION INTRAVENOUS at 10:27

## 2022-09-06 RX ADMIN — PROPOFOL 20 MG: 10 INJECTION, EMULSION INTRAVENOUS at 10:20

## 2022-09-06 NOTE — ANESTHESIA PREPROCEDURE EVALUATION
Relevant Problems   CARDIOVASCULAR   (+) HTN (hypertension)      ENDOCRINE   (+) DM (diabetes mellitus) (HCC)       Anesthetic History   No history of anesthetic complications            Review of Systems / Medical History  Patient summary reviewed and pertinent labs reviewed    Pulmonary  Within defined limits                 Neuro/Psych   Within defined limits           Cardiovascular    Hypertension          PAD and hyperlipidemia    Exercise tolerance: >4 METS     GI/Hepatic/Renal  Within defined limits              Endo/Other    Diabetes    Arthritis and cancer (h/o bladder cancer)     Other Findings              Physical Exam    Airway  Mallampati: II  TM Distance: 4 - 6 cm  Neck ROM: normal range of motion   Mouth opening: Normal     Cardiovascular    Rhythm: regular  Rate: normal         Dental    Dentition: Full upper dentures and Full lower dentures     Pulmonary  Breath sounds clear to auscultation               Abdominal         Other Findings            Anesthetic Plan    ASA: 3  Anesthesia type: MAC          Induction: Intravenous  Anesthetic plan and risks discussed with: Patient

## 2022-09-06 NOTE — PROCEDURES
[facility] Hospital[facility] Ned Larose MD  [phone]      2022    Colonoscopy Procedure Note  Asher Barkley  :  1945  [facility] Medical Record Number: [id number]    Indications:     Personal history of colon polyps (screening only)  PCP:  Meek Mondragon MD  Anesthesia/Sedation: Conscious Sedation/Moderate Sedation/GETA, see notes  Endoscopist:  Dr. Kemar Chicas  Complications:  None  Estimated Blood Loss:  None    Permit:  The indications, risks, benefits and alternatives were reviewed with the patient or their decision maker who was provided an opportunity to ask questions and all questions were answered. The specific risks of colonoscopy with conscious sedation were reviewed, including but not limited to anesthetic complication, bleeding, adverse drug reaction, missed lesion, infection, IV site reactions, and intestinal perforation which would lead to the need for surgical repair. Alternatives to colonoscopy including radiographic imaging, observation without testing, or laboratory testing were reviewed including the limitations of those alternatives. After considering the options and having all their questions answered, the patient or their decision maker provided both verbal and written consent to proceed. Procedure in Detail:  After obtaining informed consent, positioning of the patient in the left lateral decubitus position, and conduction of a pre-procedure pause or \"time out\" the endoscope was introduced into the anus and advanced to the cecum, which was identified by the ileocecal valve and appendiceal orifice. The quality of the colonic preparation was adequate. A careful inspection was made as the colonoscope was withdrawn, findings and interventions are described below. Findings:    There is diverticulosis in the sigmoid colon ascending and transverse without complications such as bleeding, inflammatory change, or luminal narrowing. Specimens:    none    Complications:   None; patient tolerated the procedure well. Impression:  Otherwise normal colonoscopy to the cecum    Recommendations:     - Follow up with primary care physician. Thank you for entrusting me with this patient's care. Please do not hesitate to contact me with any questions or if I can be of assistance with any of your other patients' GI needs. Signed By: Deb Leblanc MD                        September 6, 2022      Surgical assistant none. Implants none unless specified.

## 2022-09-06 NOTE — DISCHARGE INSTRUCTIONS
[facility] EVE Cortez MD  [phone]      September 6, 2022    Dede Breath  YOB: 1945    COLONOSCOPY DISCHARGE INSTRUCTIONS    If there is redness at IV site you should apply warm compress to area. If redness or soreness persist contact Dr. Elin Cortez' or your primary care doctor. There may be a slight amount of blood passed from the rectum. Gaseous discomfort may develop, but walking, belching will help relieve this. You may not operate a vehicle for 12 hours  You may not operate machinery or dangerous appliances for rest of today  You may not drink alcoholic beverages for 12 hours  Avoid making any critical decisions for 24 hours    DIET:  You may resume your normal diet, but some patients find that heavy or large meals may lead to indigestion or vomiting. I suggest a light meal as first food intake. MEDICATIONS:  The use of some over-the-counter pain medication may lead to bleeding after colon biopsies or polyp removal.  Tylenol (also called acetaminophen) is safe to take even if you have had colonoscopy with polyp removal.  Based on the procedure you had today you may safely take aspirin or aspirin-like products for the next ten (10) days. Remember that Tylenol (also called acetaminophen) is safe to take after colonoscopy even if you have had biopsies or polyps removed. ACTIVITY:  You may resume your normal household activities, but it is recommended that you spend the remainder of the day resting -  avoid any strenuous activity. CALL DR. Loretta Lopez' OFFICE IF:  Increasing pain, nausea, vomiting  Abdominal distension (swelling)  Significant new or increased bleeding (oral or rectal)  Fever/Chills  Chest pain/shortness of breath                       Additional instructions:   Restart aspirin and plavix today. We found no polyps and no colon cancer. Great news.   You would only need to do colonoscopy again if you were to develop problems such as bleeding or pain. It was an honor to be your doctor today. Please let me or my office staff know if you have any feedback about today's procedure. Nishant Cruz MD    Colonoscopy saves lives, and can prevent colon cancer. Everyone aged 48 or older needs colonoscopy.   Tell your family and friends: get the test!

## 2022-09-06 NOTE — PROGRESS NOTES
Endoscopy discharge instructions have been reviewed and given to patient. The patient verbalized understanding and acceptance of instructions. Dr. Elysia Shultz discussed with brother procedure findings and next steps.

## 2022-09-06 NOTE — H&P
Telemedicine Visit  Patient Name: Joseph Swanson  Account #: [id number]  Gender: Male   (age): 1945 (77)  Provider:    Miguel Erickson PA-C     Staff:    Zayra Baca MA     Date: 2022 11:00 AM  Audio and/or Visual:  Audio only Technology used for visit  Unable to use video due to technical issue  Referring Physician:    Albina Armenta. Lynette Felix, [address],  [address] [facility], [address] [place] [address]  [phone] (phone)  [phone] (fax)    Adal Ruggiero MD  [address], [facility], [address]  [phone] (phone)  [phone] (fax)    Almaz Miranda MD  [address] [address]  [phone] (phone)  [phone] (fax)     Chief Complaint:    colon consult, pt on blood thinner     History of Present Illness:  Patient arranged for f/u OV, he is overdue for repeat surveillance colonoscopy given personal hx of colon polyps. He is taking Plavix. He has some issues with peripheral vascular disease. May have had a bypass in his leg? Dr. Fito Dejesus is his cardiologist.  No heart issues. He also reports that he had a stent placed in GI vasculature. He had a stent placed 3 months ago. He has claudication of BLE. BM are normal, appetite is good. No N/V. No melena or hematochezia. No reflux sx or dysphagia. Colonoscopy by Dr. Isac Hatfield 16:  A few scattered diverticula are noted, small internal hemorrhoids. No polyps, and no colon cancer. 5 year recall    Colonoscopy 2010: tubular adenoma     No issues with anesthesia, heart or lung issues. No family hx of colon, stomach or esophageal cancer. No other family hx of colon polyps.         Past Medical History  Medical Conditions:   Diabetes Mellitus  Surgical Procedures:   No Prior Procedures  Dx Studies:   Colonoscopy, 2016  Medications:   Adult Low Dose Aspirin 81 mg Take 1 tablet by mouth once a day  clopidogrel 75 mg Take 1 tablet by mouth once a day  gabapentin 300 mg Take 1 capsule by mouth once a day  Jardiance 25 mg Take 1 tablet by mouth once a day  lisinopril-hydrochlorothiazide 20-12.5 mg Take 1 tablet by mouth once a day  metformin 500 mg Take 1 tablet by mouth once a day  Novolin 70-30 FlexPen U-100 100 unit/mL (70-30) Inject 40 unit subcutaneously once a day  simvastatin 80 mg Take 1/2 tablet by mouth every night  Allergies:   Patient has no known allergies or drug allergies  Immunizations:   COVID Vaccine, 2/1/2022  Influenza vaccination (refused), 11/1/2021  pneumococcal polysaccharide PPV23, 2020  Social History  Alcohol:   Alcohol consumption: Daily. Tobacco:   Former smoker  Drugs:   None  Exercise:   Exercise 3 or more times a week. Caffeine:   Daily. Marital Status:    Single     Family History   No history of Colon Cancer, Colon Polyps, Esophogeal Cancer, GI Cancers, IBD (Crohn's or UC), Liver disease  Review of Systems:  Cardiovascular: Denies chest pain, irregular heart beat, palpitations, peripheral edema, syncope, Sweats. Constitutional: Denies fatigue, fever, loss of appetite, weight gain, weight loss. ENMT: Denies nose bleeds, sore throat, hearing loss. Endocrine: Denies excessive thirst, heat intolerance. Eyes: Denies loss of vision. Gastrointestinal: Denies abdominal pain, abdominal swelling, change in bowel habits, constipation, diarrhea, Bloating/gas, heartburn, jaundice, nausea, rectal bleeding, stomach cramps, vomiting, dysphagia, rectal pain, Stool incontinence, hematemesis. Genitourinary: Denies dark urine, dysuria, frequent urination, hematuria, incontinence. Hematologic/Lymphatic: Denies easy bruising, prolonged bleeding. Integumentary: Denies itching, rashes, sun sensitivity. Musculoskeletal: Denies arthritis, back pain, gout, joint pain, muscle weakness, stiffness. Neurological: Denies dizziness, fainting, frequent headaches, memory loss.   Psychiatric: Denies anxiety, depression, difficulty sleeping, hallucinations, nervousness, panic attacks, paranoia. Respiratory: Denies cough, dyspnea, wheezing. Vital Signs: reported by patient  Weight (lbs/oz) Height (ft/in) BMI  215 / 6 / 2 27.6  Physical Exam:  Constitutional:  Appearance: No distress, appears comfortable. Communication: Understands/receives spoken information. Lab Results:   No Electronic Results  Impressions:   Long term (current) use of antithrombotics/antiplatelets  Personal history of colonic polyps  Peripheral vascular disease  Assessment:   Patient in need of repeat colonoscopy given hx of polyps in '10, normal colonoscopy in '15 and now over due as he had family issues. He is on plavix and had maybe some mesenteric artery intervention? vascular stenting about 3 months ago. Will check with vascular surgery, Dr. Kerri Vann to see if he is alright with holding plavix and if not how long does it need to be continued uninterrupted. He also needs to see cardiology as he has not been back in 2 years prior to them clearing for procedure. WIll hold off on scheduling until we hear from above physicians. Plan:   Education handout on BMI Healthy Weight  Colonoscopy- request permission to hold Plavix from Dr. Kerri Vann, pt needs f/u with Dr. Sara Casiano for cardiac clearance as well. do not scheduling until we get clearance   Colonoscopy/Biopsy/Polypectomy: options, risks, benefits and complications of bleeding, tear, surgical repair (1:1000) & 1:100 post Polypectomy, and reaction of medication, aspiration, Pneumonia explained to patient, 5% chance of missing colon cancer and other lesions explained. All questions answered. COVID Procedure Disclaimer The patient was counseled at length about the risks of mounika Covid-19 in the marj-operative and post-operative states including the recovery window of their procedure.  The patient was made aware that mounika Covid-19 after a surgical procedure may worsen their prognosis for recovering from the virus and lend to a higher morbidity and or mortality risk. The patient was given the options of postponing their procedure. All of the risks, benefits, and alternatives were discussed. The patient does wish to proceed with the procedure. Risk & Medical Necessity:    Total time 30 mins personally spent by the physician and/or other qualified health care professional assessing and managing the patient on the date of the encounter doing the followin min on phone, 14 min trouble shooting technology, 5 min chart review. Duration of Video Call:  11 min  Notes:    Dr. Candis Hines  was available for consultation during this visit.        Mallika Villanueva PA-C    Electronically signed on 2022 11:43:51 AM by Mallika Villanueva PA-C  Sharyn Alcantar, MRN [id number],  1945 Telemedicine New Patient, Tuesday, May 24, 2022

## 2022-09-06 NOTE — INTERVAL H&P NOTE
Pre-Endoscopy H&P Update  Chief complaint/HPI/ROS:  The indication for the procedure, the patient's history and the patient's current medications are reviewed prior to the procedure and that data is reported on the H&P to which this document is attached. Any significant complaints with regard to organ systems will be noted, and if not mentioned then a review of systems is not contributory. Past Medical History:   Diagnosis Date    Arthritis     CAD (coronary artery disease)     Cancer ([facility] [address]     hx bladder ca, skin CA    Chronic pain     Diabetes ([facility] [address]     Diverticulum - small intestine 10/4/2010    GI BLEEDING - UNSPEC RECTAL BLEEDING 10/4/2010    Hx of bladder cancer     Hypertension     Joint pain     Leg swelling     Memory disorder     Muscle pain     Muscle weakness     Neuropathy     Snoring       Past Surgical History:   Procedure Laterality Date    HX GI      COLONOSCOPY    HX HEART CATHETERIZATION  2018    HX KNEE REPLACEMENT Right 2020    HX UROLOGICAL      laser treatment bladder CA    VASCULAR SURGERY PROCEDURE UNLIST  2018    REPLACED VEIN L LEG C STENTS    VASCULAR SURGERY PROCEDURE UNLIST  2019    DR. TAVARES STENTS     Social   Social History     Tobacco Use    Smoking status: Former     Packs/day: 0.50     Types: Cigarettes     Quit date:      Years since quitting: 15.6    Smokeless tobacco: Never   Substance Use Topics    Alcohol use: Yes     Alcohol/week: 35.0 standard drinks     Types: 35 Cans of beer per week     Comment: OCC. WINE  5 BEERS A DAY      Family History   Problem Relation Age of Onset    Cancer Mother         BONE    Cancer Sister         PANCREAS    Neuropathy Other     Other Father         BLACK LUNG    Cancer Brother     Other Brother          AA    Cancer Brother     No Known Problems Brother       Allergies   Allergen Reactions    Glipizide Other (comments)     SKIN BURNING      Prior to Admission Medications   Prescriptions Last Dose Informant Patient Reported? Taking? HYDROcodone-acetaminophen (NORCO) 7.5-325 mg per tablet 8/6/2022  Yes Yes   Sig: Take  by mouth as needed. SITagliptin (JANUVIA) 100 mg tablet Not Taking Self Yes No   Sig: Take 100 mg by mouth daily. Patient not taking: Reported on 9/6/2022   aspirin delayed-release 81 mg tablet 8/30/2022 Self Yes Yes   Sig: Take 81 mg by mouth daily. cetirizine (ZYRTEC) 10 mg tablet 9/5/2022 Self Yes Yes   Sig: Take 10 mg by mouth daily. clopidogrel (PLAVIX) 75 mg tab 8/30/2022  Yes Yes   Sig: Take 75 mg by mouth daily. empagliflozin (JARDIANCE PO) 9/5/2022  Yes Yes   Sig: Take  by mouth daily. gabapentin (NEURONTIN) 300 mg capsule 9/6/2022  Yes Yes   Sig: Take 300 mg by mouth three (3) times daily. One tablet am two pm    glimepiride (AMARYL) 4 mg tablet Not Taking  Yes No   Sig: Take 4 mg by mouth two (2) times a day. Patient not taking: Reported on 9/6/2022   lisinopril-hydroCHLOROthiazide (PRINZIDE, ZESTORETIC) 20-12.5 mg per tablet 9/6/2022  Yes Yes   Sig: Take 2 Tabs by mouth daily. metFORMIN (GLUCOPHAGE) 1,000 mg tablet 9/6/2022 Self Yes Yes   Sig: Take 1,000 mg by mouth two (2) times daily (with meals). oxyCODONE IR (ROXICODONE) 5 mg immediate release tablet   No No   Sig: Take 1 Tab by mouth every four (4) hours as needed for Pain for up to 5 days. Max Daily Amount: 30 mg. Indications: pain   simvastatin (ZOCOR) 40 mg tablet 9/5/2022  Yes Yes   Sig: Take 40 mg by mouth nightly. Facility-Administered Medications: None       PHYSICAL EXAM:  The patient is examined immediately prior to the procedure. Visit Vitals  BP (!) 148/57   Pulse 62   Temp 98.5 °F (36.9 °C)   Resp 20   Ht 6' 2\" (1.88 m)   Wt 95.7 kg (210 lb 15.7 oz)   SpO2 97%   BMI 27.09 kg/m²     Gen: Appears comfortable, no distress. Pulm: comfortable respirations with no abnormal audible breath sounds  HEART: well perfused, no abnormal audible heart sounds  GI: abdomen flat.     PLAN:  Informed consent discussion held, patient afforded an opportunity to ask questions and all questions answered. After being advised of the risks, benefits, and alternatives, the patient requested that we proceed and indicated so on a written consent form. Will proceed with procedure as planned.   Betsy Merino MD

## 2022-09-06 NOTE — PERIOP NOTES
1015  Timeout performed. Anesthesia staff at patient's bedside administering anesthesia and monitoring patients vital signs throughout procedure. See anesthesia note. Post procedure, report received from Lisseth CAMPOS. 1028  Endoscope was pre-cleaned at bedside immediately following procedure by endo Israel parker [facility], [address] [place] [address]  Patient tolerated procedure. Abdomen soft and patient arousable and voices no complaints. Patient transported to endoscopy recovery area. Report given to post procedure RNAscencion.

## 2022-09-07 NOTE — ANESTHESIA POSTPROCEDURE EVALUATION
Procedure(s):  COLONOSCOPY. MAC    Anesthesia Post Evaluation      Multimodal analgesia: multimodal analgesia used between 6 hours prior to anesthesia start to PACU discharge  Patient location during evaluation: bedside  Patient participation: complete - patient participated  Level of consciousness: awake  Pain management: adequate  Airway patency: patent  Anesthetic complications: no  Cardiovascular status: acceptable  Respiratory status: acceptable  Hydration status: acceptable        INITIAL Post-op Vital signs:   Vitals Value Taken Time   /81 09/06/22 1104   Temp 36.4 °C (97.6 °F) 09/06/22 1039   Pulse 64 09/06/22 1105   Resp 18 09/06/22 1105   SpO2 97 % 09/06/22 1106   Vitals shown include unvalidated device data.

## 2023-11-02 ENCOUNTER — HOSPITAL ENCOUNTER (OUTPATIENT)
Facility: HOSPITAL | Age: 78
Discharge: HOME | End: 2023-11-05

## 2023-11-02 VITALS
HEART RATE: 88 BPM | WEIGHT: 212 LBS | BODY MASS INDEX: 28.71 KG/M2 | RESPIRATION RATE: 16 BRPM | TEMPERATURE: 97.4 F | HEIGHT: 72 IN | DIASTOLIC BLOOD PRESSURE: 84 MMHG | SYSTOLIC BLOOD PRESSURE: 133 MMHG

## 2023-11-02 DIAGNOSIS — C61 PROSTATE CANCER (HCC): ICD-10-CM

## 2023-11-02 PROBLEM — C67.2 MALIGNANT NEOPLASM OF LATERAL WALL OF URINARY BLADDER (HCC): Status: ACTIVE | Noted: 2023-11-02

## 2023-11-02 ASSESSMENT — PAIN SCALES - GENERAL: PAINLEVEL_OUTOF10: 0

## 2023-11-02 NOTE — PROGRESS NOTES
appropriately. Imaging   Imaging reports were reviewed as detailed in his history above    Assessment & Plan   Mr. Star Chavis is a 69 y.o. 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 Mr. Janes Murray pertinent history, treatment details and imaging findings were discussed with him today. I reviewed the treatment paradigm for regional risky metastatic prostate cancer which includes ADT/2nd generation androgen blockade + external beam radiation therapy. I explained that radiation would entail ~5.5 weeks of daily, Monday through Friday radiation treatments directed at the pelvic nodes, prostate, and seminal vesicles with gross anahy boost. I will coordinate ADT with Dr. Stella Bello and have the patient return for CT simulation to begin the radiation planning process. Given his other health issues and low burden of anahy disease, I think it's reasonable to hold off on ARPI and use ADT alone. I discussed the logistics, benefits, and risks (side effect profile) of radiation therapy to the prostate/male pelvis which include but are not limited to fatigue, inflammation to the bladder/urethra causing urinary frequency, urgency, and/or burning, inflammation to the rectum/bowel causing diarrhea/loose stools, abdominal cramping/bloating, nausea/vomiting, anorexia, skin reactions (erythema, itching, dryness, possible desquamation), hair loss, urinary retention, reductions in blood count. Long-term effects include persistent skin changes, urinary symptoms, changes in sperm count, sexual dysfunction, persistent hair loss, delayed wound healing, rectal/bladder bleeding, scarring to the urinary/GI tract, lymphedema, fistula, and secondary malignancy. Based on this conversation the patient would like to move forward with radiation.  We will arrange for CT simulation at next

## 2023-11-10 ENCOUNTER — HOSPITAL ENCOUNTER (OUTPATIENT)
Facility: HOSPITAL | Age: 78
Discharge: HOME | End: 2023-11-13
Attending: STUDENT IN AN ORGANIZED HEALTH CARE EDUCATION/TRAINING PROGRAM

## 2023-11-28 ENCOUNTER — HOSPITAL ENCOUNTER (OUTPATIENT)
Facility: HOSPITAL | Age: 78
Discharge: HOME | End: 2023-12-01
Attending: STUDENT IN AN ORGANIZED HEALTH CARE EDUCATION/TRAINING PROGRAM

## 2023-11-28 LAB
RAD ONC ARIA COURSE FIRST TREATMENT DATE: NORMAL
RAD ONC ARIA COURSE ID: NORMAL
RAD ONC ARIA COURSE INTENT: NORMAL
RAD ONC ARIA COURSE LAST TREATMENT DATE: NORMAL
RAD ONC ARIA COURSE SESSION NUMBER: 1
RAD ONC ARIA COURSE START DATE: NORMAL
RAD ONC ARIA COURSE TREATMENT ELAPSED DAYS: 0
RAD ONC ARIA PLAN FRACTIONS TREATED TO DATE: 1
RAD ONC ARIA PLAN ID: NORMAL
RAD ONC ARIA PLAN PRESCRIBED DOSE PER FRACTION: 2.5 GY
RAD ONC ARIA PLAN PRIMARY REFERENCE POINT: NORMAL
RAD ONC ARIA PLAN TOTAL FRACTIONS PRESCRIBED: 28
RAD ONC ARIA PLAN TOTAL PRESCRIBED DOSE: 7000 CGY
RAD ONC ARIA REFERENCE POINT DOSAGE GIVEN TO DATE: 1.8 GY
RAD ONC ARIA REFERENCE POINT DOSAGE GIVEN TO DATE: 2.3 GY
RAD ONC ARIA REFERENCE POINT DOSAGE GIVEN TO DATE: 2.5 GY
RAD ONC ARIA REFERENCE POINT DOSAGE GIVEN TO DATE: 2.56 GY
RAD ONC ARIA REFERENCE POINT ID: NORMAL
RAD ONC ARIA REFERENCE POINT SESSION DOSAGE GIVEN: 1.8 GY
RAD ONC ARIA REFERENCE POINT SESSION DOSAGE GIVEN: 2.3 GY
RAD ONC ARIA REFERENCE POINT SESSION DOSAGE GIVEN: 2.5 GY
RAD ONC ARIA REFERENCE POINT SESSION DOSAGE GIVEN: 2.56 GY

## 2023-11-29 ENCOUNTER — HOSPITAL ENCOUNTER (OUTPATIENT)
Facility: HOSPITAL | Age: 78
Discharge: HOME | End: 2023-12-02
Attending: STUDENT IN AN ORGANIZED HEALTH CARE EDUCATION/TRAINING PROGRAM

## 2023-11-29 DIAGNOSIS — C61 PROSTATE CANCER (HCC): Primary | ICD-10-CM

## 2023-11-29 LAB
RAD ONC ARIA COURSE FIRST TREATMENT DATE: NORMAL
RAD ONC ARIA COURSE ID: NORMAL
RAD ONC ARIA COURSE INTENT: NORMAL
RAD ONC ARIA COURSE LAST TREATMENT DATE: NORMAL
RAD ONC ARIA COURSE SESSION NUMBER: 2
RAD ONC ARIA COURSE START DATE: NORMAL
RAD ONC ARIA COURSE TREATMENT ELAPSED DAYS: 1
RAD ONC ARIA PLAN FRACTIONS TREATED TO DATE: 2
RAD ONC ARIA PLAN ID: NORMAL
RAD ONC ARIA PLAN PRESCRIBED DOSE PER FRACTION: 2.5 GY
RAD ONC ARIA PLAN PRIMARY REFERENCE POINT: NORMAL
RAD ONC ARIA PLAN TOTAL FRACTIONS PRESCRIBED: 28
RAD ONC ARIA PLAN TOTAL PRESCRIBED DOSE: 7000 CGY
RAD ONC ARIA REFERENCE POINT DOSAGE GIVEN TO DATE: 3.6 GY
RAD ONC ARIA REFERENCE POINT DOSAGE GIVEN TO DATE: 4.6 GY
RAD ONC ARIA REFERENCE POINT DOSAGE GIVEN TO DATE: 5 GY
RAD ONC ARIA REFERENCE POINT DOSAGE GIVEN TO DATE: 5.12 GY
RAD ONC ARIA REFERENCE POINT ID: NORMAL
RAD ONC ARIA REFERENCE POINT SESSION DOSAGE GIVEN: 1.8 GY
RAD ONC ARIA REFERENCE POINT SESSION DOSAGE GIVEN: 2.3 GY
RAD ONC ARIA REFERENCE POINT SESSION DOSAGE GIVEN: 2.5 GY
RAD ONC ARIA REFERENCE POINT SESSION DOSAGE GIVEN: 2.56 GY

## 2023-11-29 ASSESSMENT — PATIENT HEALTH QUESTIONNAIRE - PHQ9
SUM OF ALL RESPONSES TO PHQ9 QUESTIONS 1 & 2: 0
SUM OF ALL RESPONSES TO PHQ QUESTIONS 1-9: 0
SUM OF ALL RESPONSES TO PHQ QUESTIONS 1-9: 0
2. FEELING DOWN, DEPRESSED OR HOPELESS: 0
SUM OF ALL RESPONSES TO PHQ QUESTIONS 1-9: 0
SUM OF ALL RESPONSES TO PHQ QUESTIONS 1-9: 0
1. LITTLE INTEREST OR PLEASURE IN DOING THINGS: 0

## 2023-11-29 ASSESSMENT — PAIN SCALES - GENERAL: PAINLEVEL_OUTOF10: 0

## 2023-11-29 NOTE — PROGRESS NOTES
clopidogrel (PLAVIX) 75 mg, Oral, DAILY    gabapentin (NEURONTIN) 300 mg, Oral, 3 TIMES DAILY    glimepiride (AMARYL) 4 mg, Oral, 2 TIMES DAILY    HYDROcodone-acetaminophen (NORCO) 7.5-325 MG per tablet PRN    lisinopril-hydroCHLOROthiazide (PRINZIDE;ZESTORETIC) 20-12.5 MG per tablet 2 tablets, Oral, DAILY    metFORMIN (GLUCOPHAGE) 1,000 mg, Oral, 2 TIMES DAILY WITH MEALS    oxyCODONE (ROXICODONE) 5 mg, EVERY 4 HOURS PRN    simvastatin (ZOCOR) 40 mg, Oral, NIGHTLY    SITagliptin (JANUVIA) 100 mg, Oral, DAILY        Physical Exam:   Vitals: There were no vitals taken for this visit. Pain Level: 0  Performance Status: ECOG 0, fully active, able to carry on all predisease activities without restrictions  Constitutional: Pleasant, sitting comfortably in no acute distress. Respiratory: Non-labored breathing  Neurologic: Alert and oriented. Psychiatric: Cooperative to commands and responds to questions appropriately. Assessment & Plan   - Continue radiation treatment as planned  - Treatment setup and plan reviewed. Port images/CBCT images reviewed. Appropriate laboratory work was reviewed. - Treatment side effects and toxicities reviewed with the patient, and appropriate management was advised as detailed above.      Willie Dennis MD  [facility]  [facility]  [address] [facility]  P: [address]  P: [phone]  PATIENT’S [address], [address]  P: [phone]

## 2023-11-30 ENCOUNTER — HOSPITAL ENCOUNTER (OUTPATIENT)
Facility: HOSPITAL | Age: 78
Discharge: HOME | End: 2023-12-03
Attending: STUDENT IN AN ORGANIZED HEALTH CARE EDUCATION/TRAINING PROGRAM

## 2023-11-30 LAB
RAD ONC ARIA COURSE FIRST TREATMENT DATE: NORMAL
RAD ONC ARIA COURSE ID: NORMAL
RAD ONC ARIA COURSE INTENT: NORMAL
RAD ONC ARIA COURSE LAST TREATMENT DATE: NORMAL
RAD ONC ARIA COURSE SESSION NUMBER: 3
RAD ONC ARIA COURSE START DATE: NORMAL
RAD ONC ARIA COURSE TREATMENT ELAPSED DAYS: 2
RAD ONC ARIA PLAN FRACTIONS TREATED TO DATE: 3
RAD ONC ARIA PLAN ID: NORMAL
RAD ONC ARIA PLAN PRESCRIBED DOSE PER FRACTION: 2.5 GY
RAD ONC ARIA PLAN PRIMARY REFERENCE POINT: NORMAL
RAD ONC ARIA PLAN TOTAL FRACTIONS PRESCRIBED: 28
RAD ONC ARIA PLAN TOTAL PRESCRIBED DOSE: 7000 CGY
RAD ONC ARIA REFERENCE POINT DOSAGE GIVEN TO DATE: 5.4 GY
RAD ONC ARIA REFERENCE POINT DOSAGE GIVEN TO DATE: 6.9 GY
RAD ONC ARIA REFERENCE POINT DOSAGE GIVEN TO DATE: 7.5 GY
RAD ONC ARIA REFERENCE POINT DOSAGE GIVEN TO DATE: 7.68 GY
RAD ONC ARIA REFERENCE POINT ID: NORMAL
RAD ONC ARIA REFERENCE POINT SESSION DOSAGE GIVEN: 1.8 GY
RAD ONC ARIA REFERENCE POINT SESSION DOSAGE GIVEN: 2.3 GY
RAD ONC ARIA REFERENCE POINT SESSION DOSAGE GIVEN: 2.5 GY
RAD ONC ARIA REFERENCE POINT SESSION DOSAGE GIVEN: 2.56 GY

## 2023-12-01 ENCOUNTER — HOSPITAL ENCOUNTER (OUTPATIENT)
Facility: HOSPITAL | Age: 78
Discharge: HOME | End: 2023-12-04
Attending: STUDENT IN AN ORGANIZED HEALTH CARE EDUCATION/TRAINING PROGRAM

## 2023-12-01 LAB
RAD ONC ARIA COURSE FIRST TREATMENT DATE: NORMAL
RAD ONC ARIA COURSE ID: NORMAL
RAD ONC ARIA COURSE INTENT: NORMAL
RAD ONC ARIA COURSE LAST TREATMENT DATE: NORMAL
RAD ONC ARIA COURSE SESSION NUMBER: 4
RAD ONC ARIA COURSE START DATE: NORMAL
RAD ONC ARIA COURSE TREATMENT ELAPSED DAYS: 3
RAD ONC ARIA PLAN FRACTIONS TREATED TO DATE: 4
RAD ONC ARIA PLAN ID: NORMAL
RAD ONC ARIA PLAN PRESCRIBED DOSE PER FRACTION: 2.5 GY
RAD ONC ARIA PLAN PRIMARY REFERENCE POINT: NORMAL
RAD ONC ARIA PLAN TOTAL FRACTIONS PRESCRIBED: 28
RAD ONC ARIA PLAN TOTAL PRESCRIBED DOSE: 7000 CGY
RAD ONC ARIA REFERENCE POINT DOSAGE GIVEN TO DATE: 10 GY
RAD ONC ARIA REFERENCE POINT DOSAGE GIVEN TO DATE: 10.24 GY
RAD ONC ARIA REFERENCE POINT DOSAGE GIVEN TO DATE: 7.2 GY
RAD ONC ARIA REFERENCE POINT DOSAGE GIVEN TO DATE: 9.2 GY
RAD ONC ARIA REFERENCE POINT ID: NORMAL
RAD ONC ARIA REFERENCE POINT SESSION DOSAGE GIVEN: 1.8 GY
RAD ONC ARIA REFERENCE POINT SESSION DOSAGE GIVEN: 2.3 GY
RAD ONC ARIA REFERENCE POINT SESSION DOSAGE GIVEN: 2.5 GY
RAD ONC ARIA REFERENCE POINT SESSION DOSAGE GIVEN: 2.56 GY

## 2023-12-04 ENCOUNTER — HOSPITAL ENCOUNTER (OUTPATIENT)
Facility: HOSPITAL | Age: 78
Discharge: HOME | End: 2023-12-07
Attending: STUDENT IN AN ORGANIZED HEALTH CARE EDUCATION/TRAINING PROGRAM

## 2023-12-04 LAB
RAD ONC ARIA COURSE FIRST TREATMENT DATE: NORMAL
RAD ONC ARIA COURSE ID: NORMAL
RAD ONC ARIA COURSE INTENT: NORMAL
RAD ONC ARIA COURSE LAST TREATMENT DATE: NORMAL
RAD ONC ARIA COURSE SESSION NUMBER: 5
RAD ONC ARIA COURSE START DATE: NORMAL
RAD ONC ARIA COURSE TREATMENT ELAPSED DAYS: 6
RAD ONC ARIA PLAN FRACTIONS TREATED TO DATE: 5
RAD ONC ARIA PLAN ID: NORMAL
RAD ONC ARIA PLAN PRESCRIBED DOSE PER FRACTION: 2.5 GY
RAD ONC ARIA PLAN PRIMARY REFERENCE POINT: NORMAL
RAD ONC ARIA PLAN TOTAL FRACTIONS PRESCRIBED: 28
RAD ONC ARIA PLAN TOTAL PRESCRIBED DOSE: 7000 CGY
RAD ONC ARIA REFERENCE POINT DOSAGE GIVEN TO DATE: 11.5 GY
RAD ONC ARIA REFERENCE POINT DOSAGE GIVEN TO DATE: 12.5 GY
RAD ONC ARIA REFERENCE POINT DOSAGE GIVEN TO DATE: 12.81 GY
RAD ONC ARIA REFERENCE POINT DOSAGE GIVEN TO DATE: 9 GY
RAD ONC ARIA REFERENCE POINT ID: NORMAL
RAD ONC ARIA REFERENCE POINT SESSION DOSAGE GIVEN: 1.8 GY
RAD ONC ARIA REFERENCE POINT SESSION DOSAGE GIVEN: 2.3 GY
RAD ONC ARIA REFERENCE POINT SESSION DOSAGE GIVEN: 2.5 GY
RAD ONC ARIA REFERENCE POINT SESSION DOSAGE GIVEN: 2.56 GY

## 2023-12-05 ENCOUNTER — HOSPITAL ENCOUNTER (OUTPATIENT)
Facility: HOSPITAL | Age: 78
Discharge: HOME | End: 2023-12-08
Attending: STUDENT IN AN ORGANIZED HEALTH CARE EDUCATION/TRAINING PROGRAM

## 2023-12-05 LAB
RAD ONC ARIA COURSE FIRST TREATMENT DATE: NORMAL
RAD ONC ARIA COURSE ID: NORMAL
RAD ONC ARIA COURSE INTENT: NORMAL
RAD ONC ARIA COURSE LAST TREATMENT DATE: NORMAL
RAD ONC ARIA COURSE SESSION NUMBER: 6
RAD ONC ARIA COURSE START DATE: NORMAL
RAD ONC ARIA COURSE TREATMENT ELAPSED DAYS: 7
RAD ONC ARIA PLAN FRACTIONS TREATED TO DATE: 6
RAD ONC ARIA PLAN ID: NORMAL
RAD ONC ARIA PLAN PRESCRIBED DOSE PER FRACTION: 2.5 GY
RAD ONC ARIA PLAN PRIMARY REFERENCE POINT: NORMAL
RAD ONC ARIA PLAN TOTAL FRACTIONS PRESCRIBED: 28
RAD ONC ARIA PLAN TOTAL PRESCRIBED DOSE: 7000 CGY
RAD ONC ARIA REFERENCE POINT DOSAGE GIVEN TO DATE: 10.8 GY
RAD ONC ARIA REFERENCE POINT DOSAGE GIVEN TO DATE: 13.8 GY
RAD ONC ARIA REFERENCE POINT DOSAGE GIVEN TO DATE: 15 GY
RAD ONC ARIA REFERENCE POINT DOSAGE GIVEN TO DATE: 15.37 GY
RAD ONC ARIA REFERENCE POINT ID: NORMAL
RAD ONC ARIA REFERENCE POINT SESSION DOSAGE GIVEN: 1.8 GY
RAD ONC ARIA REFERENCE POINT SESSION DOSAGE GIVEN: 2.3 GY
RAD ONC ARIA REFERENCE POINT SESSION DOSAGE GIVEN: 2.5 GY
RAD ONC ARIA REFERENCE POINT SESSION DOSAGE GIVEN: 2.56 GY

## 2023-12-06 ENCOUNTER — HOSPITAL ENCOUNTER (OUTPATIENT)
Facility: HOSPITAL | Age: 78
Discharge: HOME | End: 2023-12-09
Attending: STUDENT IN AN ORGANIZED HEALTH CARE EDUCATION/TRAINING PROGRAM

## 2023-12-06 DIAGNOSIS — C61 PROSTATE CANCER (HCC): Primary | ICD-10-CM

## 2023-12-06 LAB
RAD ONC ARIA COURSE FIRST TREATMENT DATE: NORMAL
RAD ONC ARIA COURSE ID: NORMAL
RAD ONC ARIA COURSE INTENT: NORMAL
RAD ONC ARIA COURSE LAST TREATMENT DATE: NORMAL
RAD ONC ARIA COURSE SESSION NUMBER: 7
RAD ONC ARIA COURSE START DATE: NORMAL
RAD ONC ARIA COURSE TREATMENT ELAPSED DAYS: 8
RAD ONC ARIA PLAN FRACTIONS TREATED TO DATE: 7
RAD ONC ARIA PLAN ID: NORMAL
RAD ONC ARIA PLAN PRESCRIBED DOSE PER FRACTION: 2.5 GY
RAD ONC ARIA PLAN PRIMARY REFERENCE POINT: NORMAL
RAD ONC ARIA PLAN TOTAL FRACTIONS PRESCRIBED: 28
RAD ONC ARIA PLAN TOTAL PRESCRIBED DOSE: 7000 CGY
RAD ONC ARIA REFERENCE POINT DOSAGE GIVEN TO DATE: 12.6 GY
RAD ONC ARIA REFERENCE POINT DOSAGE GIVEN TO DATE: 16.1 GY
RAD ONC ARIA REFERENCE POINT DOSAGE GIVEN TO DATE: 17.5 GY
RAD ONC ARIA REFERENCE POINT DOSAGE GIVEN TO DATE: 17.93 GY
RAD ONC ARIA REFERENCE POINT ID: NORMAL
RAD ONC ARIA REFERENCE POINT SESSION DOSAGE GIVEN: 1.8 GY
RAD ONC ARIA REFERENCE POINT SESSION DOSAGE GIVEN: 2.3 GY
RAD ONC ARIA REFERENCE POINT SESSION DOSAGE GIVEN: 2.5 GY
RAD ONC ARIA REFERENCE POINT SESSION DOSAGE GIVEN: 2.56 GY

## 2023-12-06 ASSESSMENT — PAIN SCALES - GENERAL: PAINLEVEL_OUTOF10: 0

## 2023-12-06 NOTE — PROGRESS NOTES
Cancer [place] at [facility]  [facility]    Radiation Oncology Weekly Progress Note  Encounter Date: 12/06/23     Meryl Escamilla  [id number]  1945     Diagnosis   1. 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 Oncologic History   11/01/2017: PSA 1.1  06/29/2023: PSA 7.9  07/11/2023: PSA 6.5  09/06/2023: PSA 9.1  10/03/2023: TRUS biopsy showed a 25.2cc gland with GS 5+4=9 disease in 4 cores, GS 4+5=9 disease in 4 cores, GS 3+4=7 disease in 1 cores, total 9/12 positive cores with up to 100% core involvement. 10/18/2023: PSMA PET showed focal PyL binding in the prostate at the right lateral PZ from base to apex as well as in a 3mm right perirectal lymph node. No PyL binding in the seminal vesicles, bones, or viscera. He was recommended a course of RT to the prostate, SV, and pelvic nodes with gross anahy boost and concurrent ADT (administered 11/9/23, planned 2y)    Interval History   Mr. Jeanne Abarca is a 69 y.o. male seen today for his weekly on-treatment evaluation    11/29/2023: at fraction 2, first OTV, doing well on treatment so far. Not having any issue with ADT. Reviewed treatment expectations and logistics. 12/06/2023: at fraction 6 (seen pre-tx, filling bladder). Noting urinary urgency and loose stools. Recommended azo for relief of urinary symptoms.     Treatment Details:   Treatment Site: Prostate, SV, and pelvic nodes with gross anahy boost  Treatment Details: IMRT/VMAT  Treatment Site Dose/Fx (cGy) #Fx Delivered Dose (cGy) Total Planned Dose (cGy) Start Date End Date   Prostate, SV  Pelvic Nodes 250  230  180 6/28 1500  7824  1564 7902  6458  1558 11/28/23 12/06/23     Concurrent Therapy: Concurrent ADT:  Response to treatment: N/A    Allergies and Medications     Allergies   Allergen Reactions    Glipizide Other (See

## 2023-12-07 ENCOUNTER — HOSPITAL ENCOUNTER (OUTPATIENT)
Facility: HOSPITAL | Age: 78
Discharge: HOME | End: 2023-12-10
Attending: STUDENT IN AN ORGANIZED HEALTH CARE EDUCATION/TRAINING PROGRAM

## 2023-12-07 LAB
RAD ONC ARIA COURSE FIRST TREATMENT DATE: NORMAL
RAD ONC ARIA COURSE ID: NORMAL
RAD ONC ARIA COURSE INTENT: NORMAL
RAD ONC ARIA COURSE LAST TREATMENT DATE: NORMAL
RAD ONC ARIA COURSE SESSION NUMBER: 8
RAD ONC ARIA COURSE START DATE: NORMAL
RAD ONC ARIA COURSE TREATMENT ELAPSED DAYS: 9
RAD ONC ARIA PLAN FRACTIONS TREATED TO DATE: 8
RAD ONC ARIA PLAN ID: NORMAL
RAD ONC ARIA PLAN PRESCRIBED DOSE PER FRACTION: 2.5 GY
RAD ONC ARIA PLAN PRIMARY REFERENCE POINT: NORMAL
RAD ONC ARIA PLAN TOTAL FRACTIONS PRESCRIBED: 28
RAD ONC ARIA PLAN TOTAL PRESCRIBED DOSE: 7000 CGY
RAD ONC ARIA REFERENCE POINT DOSAGE GIVEN TO DATE: 14.4 GY
RAD ONC ARIA REFERENCE POINT DOSAGE GIVEN TO DATE: 18.4 GY
RAD ONC ARIA REFERENCE POINT DOSAGE GIVEN TO DATE: 20 GY
RAD ONC ARIA REFERENCE POINT DOSAGE GIVEN TO DATE: 20.49 GY
RAD ONC ARIA REFERENCE POINT ID: NORMAL
RAD ONC ARIA REFERENCE POINT SESSION DOSAGE GIVEN: 1.8 GY
RAD ONC ARIA REFERENCE POINT SESSION DOSAGE GIVEN: 2.3 GY
RAD ONC ARIA REFERENCE POINT SESSION DOSAGE GIVEN: 2.5 GY
RAD ONC ARIA REFERENCE POINT SESSION DOSAGE GIVEN: 2.56 GY

## 2023-12-08 ENCOUNTER — HOSPITAL ENCOUNTER (OUTPATIENT)
Facility: HOSPITAL | Age: 78
Discharge: HOME | End: 2023-12-11
Attending: STUDENT IN AN ORGANIZED HEALTH CARE EDUCATION/TRAINING PROGRAM

## 2023-12-08 LAB
RAD ONC ARIA COURSE FIRST TREATMENT DATE: NORMAL
RAD ONC ARIA COURSE ID: NORMAL
RAD ONC ARIA COURSE INTENT: NORMAL
RAD ONC ARIA COURSE LAST TREATMENT DATE: NORMAL
RAD ONC ARIA COURSE SESSION NUMBER: 9
RAD ONC ARIA COURSE START DATE: NORMAL
RAD ONC ARIA COURSE TREATMENT ELAPSED DAYS: 10
RAD ONC ARIA PLAN FRACTIONS TREATED TO DATE: 9
RAD ONC ARIA PLAN ID: NORMAL
RAD ONC ARIA PLAN PRESCRIBED DOSE PER FRACTION: 2.5 GY
RAD ONC ARIA PLAN PRIMARY REFERENCE POINT: NORMAL
RAD ONC ARIA PLAN TOTAL FRACTIONS PRESCRIBED: 28
RAD ONC ARIA PLAN TOTAL PRESCRIBED DOSE: 7000 CGY
RAD ONC ARIA REFERENCE POINT DOSAGE GIVEN TO DATE: 16.2 GY
RAD ONC ARIA REFERENCE POINT DOSAGE GIVEN TO DATE: 20.7 GY
RAD ONC ARIA REFERENCE POINT DOSAGE GIVEN TO DATE: 22.5 GY
RAD ONC ARIA REFERENCE POINT DOSAGE GIVEN TO DATE: 23.05 GY
RAD ONC ARIA REFERENCE POINT ID: NORMAL
RAD ONC ARIA REFERENCE POINT SESSION DOSAGE GIVEN: 1.8 GY
RAD ONC ARIA REFERENCE POINT SESSION DOSAGE GIVEN: 2.3 GY
RAD ONC ARIA REFERENCE POINT SESSION DOSAGE GIVEN: 2.5 GY
RAD ONC ARIA REFERENCE POINT SESSION DOSAGE GIVEN: 2.56 GY

## 2023-12-11 ENCOUNTER — HOSPITAL ENCOUNTER (OUTPATIENT)
Facility: HOSPITAL | Age: 78
Discharge: HOME | End: 2023-12-14
Attending: STUDENT IN AN ORGANIZED HEALTH CARE EDUCATION/TRAINING PROGRAM

## 2023-12-11 LAB
RAD ONC ARIA COURSE FIRST TREATMENT DATE: NORMAL
RAD ONC ARIA COURSE ID: NORMAL
RAD ONC ARIA COURSE INTENT: NORMAL
RAD ONC ARIA COURSE LAST TREATMENT DATE: NORMAL
RAD ONC ARIA COURSE SESSION NUMBER: 10
RAD ONC ARIA COURSE START DATE: NORMAL
RAD ONC ARIA COURSE TREATMENT ELAPSED DAYS: 13
RAD ONC ARIA PLAN FRACTIONS TREATED TO DATE: 10
RAD ONC ARIA PLAN ID: NORMAL
RAD ONC ARIA PLAN PRESCRIBED DOSE PER FRACTION: 2.5 GY
RAD ONC ARIA PLAN PRIMARY REFERENCE POINT: NORMAL
RAD ONC ARIA PLAN TOTAL FRACTIONS PRESCRIBED: 28
RAD ONC ARIA PLAN TOTAL PRESCRIBED DOSE: 7000 CGY
RAD ONC ARIA REFERENCE POINT DOSAGE GIVEN TO DATE: 18 GY
RAD ONC ARIA REFERENCE POINT DOSAGE GIVEN TO DATE: 23 GY
RAD ONC ARIA REFERENCE POINT DOSAGE GIVEN TO DATE: 25 GY
RAD ONC ARIA REFERENCE POINT DOSAGE GIVEN TO DATE: 25.61 GY
RAD ONC ARIA REFERENCE POINT ID: NORMAL
RAD ONC ARIA REFERENCE POINT SESSION DOSAGE GIVEN: 1.8 GY
RAD ONC ARIA REFERENCE POINT SESSION DOSAGE GIVEN: 2.3 GY
RAD ONC ARIA REFERENCE POINT SESSION DOSAGE GIVEN: 2.5 GY
RAD ONC ARIA REFERENCE POINT SESSION DOSAGE GIVEN: 2.56 GY

## 2023-12-12 ENCOUNTER — HOSPITAL ENCOUNTER (OUTPATIENT)
Facility: HOSPITAL | Age: 78
Discharge: HOME | End: 2023-12-15
Attending: STUDENT IN AN ORGANIZED HEALTH CARE EDUCATION/TRAINING PROGRAM

## 2023-12-12 LAB
RAD ONC ARIA COURSE FIRST TREATMENT DATE: NORMAL
RAD ONC ARIA COURSE ID: NORMAL
RAD ONC ARIA COURSE INTENT: NORMAL
RAD ONC ARIA COURSE LAST TREATMENT DATE: NORMAL
RAD ONC ARIA COURSE SESSION NUMBER: 11
RAD ONC ARIA COURSE START DATE: NORMAL
RAD ONC ARIA COURSE TREATMENT ELAPSED DAYS: 14
RAD ONC ARIA PLAN FRACTIONS TREATED TO DATE: 1
RAD ONC ARIA PLAN ID: NORMAL
RAD ONC ARIA PLAN PRESCRIBED DOSE PER FRACTION: 2.5 GY
RAD ONC ARIA PLAN PRIMARY REFERENCE POINT: NORMAL
RAD ONC ARIA PLAN TOTAL FRACTIONS PRESCRIBED: 18
RAD ONC ARIA PLAN TOTAL PRESCRIBED DOSE: 4500 CGY
RAD ONC ARIA REFERENCE POINT DOSAGE GIVEN TO DATE: 1.8 GY
RAD ONC ARIA REFERENCE POINT DOSAGE GIVEN TO DATE: 2.3 GY
RAD ONC ARIA REFERENCE POINT DOSAGE GIVEN TO DATE: 2.5 GY
RAD ONC ARIA REFERENCE POINT DOSAGE GIVEN TO DATE: 2.56 GY
RAD ONC ARIA REFERENCE POINT ID: NORMAL
RAD ONC ARIA REFERENCE POINT SESSION DOSAGE GIVEN: 1.8 GY
RAD ONC ARIA REFERENCE POINT SESSION DOSAGE GIVEN: 2.3 GY
RAD ONC ARIA REFERENCE POINT SESSION DOSAGE GIVEN: 2.5 GY
RAD ONC ARIA REFERENCE POINT SESSION DOSAGE GIVEN: 2.56 GY

## 2023-12-13 ENCOUNTER — HOSPITAL ENCOUNTER (OUTPATIENT)
Facility: HOSPITAL | Age: 78
Discharge: HOME | End: 2023-12-16
Attending: STUDENT IN AN ORGANIZED HEALTH CARE EDUCATION/TRAINING PROGRAM

## 2023-12-13 DIAGNOSIS — C61 PROSTATE CANCER (HCC): Primary | ICD-10-CM

## 2023-12-13 LAB
RAD ONC ARIA COURSE FIRST TREATMENT DATE: NORMAL
RAD ONC ARIA COURSE ID: NORMAL
RAD ONC ARIA COURSE INTENT: NORMAL
RAD ONC ARIA COURSE LAST TREATMENT DATE: NORMAL
RAD ONC ARIA COURSE SESSION NUMBER: 12
RAD ONC ARIA COURSE START DATE: NORMAL
RAD ONC ARIA COURSE TREATMENT ELAPSED DAYS: 15
RAD ONC ARIA PLAN FRACTIONS TREATED TO DATE: 2
RAD ONC ARIA PLAN ID: NORMAL
RAD ONC ARIA PLAN PRESCRIBED DOSE PER FRACTION: 2.5 GY
RAD ONC ARIA PLAN PRIMARY REFERENCE POINT: NORMAL
RAD ONC ARIA PLAN TOTAL FRACTIONS PRESCRIBED: 18
RAD ONC ARIA PLAN TOTAL PRESCRIBED DOSE: 4500 CGY
RAD ONC ARIA REFERENCE POINT DOSAGE GIVEN TO DATE: 3.6 GY
RAD ONC ARIA REFERENCE POINT DOSAGE GIVEN TO DATE: 4.6 GY
RAD ONC ARIA REFERENCE POINT DOSAGE GIVEN TO DATE: 5 GY
RAD ONC ARIA REFERENCE POINT DOSAGE GIVEN TO DATE: 5.12 GY
RAD ONC ARIA REFERENCE POINT ID: NORMAL
RAD ONC ARIA REFERENCE POINT SESSION DOSAGE GIVEN: 1.8 GY
RAD ONC ARIA REFERENCE POINT SESSION DOSAGE GIVEN: 2.3 GY
RAD ONC ARIA REFERENCE POINT SESSION DOSAGE GIVEN: 2.5 GY
RAD ONC ARIA REFERENCE POINT SESSION DOSAGE GIVEN: 2.56 GY

## 2023-12-13 ASSESSMENT — PAIN SCALES - GENERAL: PAINLEVEL_OUTOF10: 0

## 2023-12-13 NOTE — PROGRESS NOTES
Cancer [place] at [facility]  [facility]    Radiation Oncology Weekly Progress Note  Encounter Date: 12/13/23     Tyrel Crawford  [id number]  1945     Diagnosis   1. C61, C77.5: [id number], iPSA 9.1, GG5 (+9/12) regional risk prostate cancer   2. 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 Oncologic History   11/01/2017: PSA 1.1  06/29/2023: PSA 7.9  07/11/2023: PSA 6.5  09/06/2023: PSA 9.1  10/03/2023: TRUS biopsy showed a 25.2cc gland with GS 5+4=9 disease in 4 cores, GS 4+5=9 disease in 4 cores, GS 3+4=7 disease in 1 cores, total 9/12 positive cores with up to 100% core involvement. 10/18/2023: PSMA PET showed focal PyL binding in the prostate at the right lateral PZ from base to apex as well as in a 3mm right perirectal lymph node. No PyL binding in the seminal vesicles, bones, or viscera. He was recommended a course of RT to the prostate, SV, and pelvic nodes with gross anahy boost and concurrent ADT (administered 11/9/23, planned 2y)    Interval History   Mr. Sumeet Hedrick is a 69 y.o. male seen today for his weekly on-treatment evaluation    11/29/2023: at fraction 2, first OTV, doing well on treatment so far. Not having any issue with ADT. Reviewed treatment expectations and logistics. 12/06/2023: at fraction 6 (seen pre-tx, filling bladder). Noting urinary urgency and loose stools. Recommended azo for relief of urinary symptoms. 12/13/2023: at fraction 12, required re-plan due to set up issues, complains of urinary urgency and looser stools. Hasn't started azo yet, recommended he try it. Will hold off on imodium for now.     Treatment Details:   Treatment Site: Prostate, SV, and pelvic nodes with gross anahy boost  Treatment Details: IMRT/VMAT  Treatment Site Dose/Fx (cGy) #Fx Delivered Dose (cGy) Total Planned Dose (cGy) Start Date End Date   Prostate, SV  Pelvic Nodes 250  230  180 12/28

## 2023-12-14 LAB
RAD ONC ARIA COURSE FIRST TREATMENT DATE: NORMAL
RAD ONC ARIA COURSE ID: NORMAL
RAD ONC ARIA COURSE INTENT: NORMAL
RAD ONC ARIA COURSE LAST TREATMENT DATE: NORMAL
RAD ONC ARIA COURSE SESSION NUMBER: 13
RAD ONC ARIA COURSE START DATE: NORMAL
RAD ONC ARIA COURSE TREATMENT ELAPSED DAYS: 16
RAD ONC ARIA PLAN FRACTIONS TREATED TO DATE: 3
RAD ONC ARIA PLAN ID: NORMAL
RAD ONC ARIA PLAN PRESCRIBED DOSE PER FRACTION: 2.5 GY
RAD ONC ARIA PLAN PRIMARY REFERENCE POINT: NORMAL
RAD ONC ARIA PLAN TOTAL FRACTIONS PRESCRIBED: 18
RAD ONC ARIA PLAN TOTAL PRESCRIBED DOSE: 4500 CGY
RAD ONC ARIA REFERENCE POINT DOSAGE GIVEN TO DATE: 5.4 GY
RAD ONC ARIA REFERENCE POINT DOSAGE GIVEN TO DATE: 6.9 GY
RAD ONC ARIA REFERENCE POINT DOSAGE GIVEN TO DATE: 7.5 GY
RAD ONC ARIA REFERENCE POINT DOSAGE GIVEN TO DATE: 7.68 GY
RAD ONC ARIA REFERENCE POINT ID: NORMAL
RAD ONC ARIA REFERENCE POINT SESSION DOSAGE GIVEN: 1.8 GY
RAD ONC ARIA REFERENCE POINT SESSION DOSAGE GIVEN: 2.3 GY
RAD ONC ARIA REFERENCE POINT SESSION DOSAGE GIVEN: 2.5 GY
RAD ONC ARIA REFERENCE POINT SESSION DOSAGE GIVEN: 2.56 GY

## 2023-12-15 LAB
RAD ONC ARIA COURSE FIRST TREATMENT DATE: NORMAL
RAD ONC ARIA COURSE ID: NORMAL
RAD ONC ARIA COURSE INTENT: NORMAL
RAD ONC ARIA COURSE LAST TREATMENT DATE: NORMAL
RAD ONC ARIA COURSE SESSION NUMBER: 14
RAD ONC ARIA COURSE START DATE: NORMAL
RAD ONC ARIA COURSE TREATMENT ELAPSED DAYS: 17
RAD ONC ARIA PLAN FRACTIONS TREATED TO DATE: 4
RAD ONC ARIA PLAN ID: NORMAL
RAD ONC ARIA PLAN PRESCRIBED DOSE PER FRACTION: 2.5 GY
RAD ONC ARIA PLAN PRIMARY REFERENCE POINT: NORMAL
RAD ONC ARIA PLAN TOTAL FRACTIONS PRESCRIBED: 18
RAD ONC ARIA PLAN TOTAL PRESCRIBED DOSE: 4500 CGY
RAD ONC ARIA REFERENCE POINT DOSAGE GIVEN TO DATE: 10 GY
RAD ONC ARIA REFERENCE POINT DOSAGE GIVEN TO DATE: 10.24 GY
RAD ONC ARIA REFERENCE POINT DOSAGE GIVEN TO DATE: 7.2 GY
RAD ONC ARIA REFERENCE POINT DOSAGE GIVEN TO DATE: 9.2 GY
RAD ONC ARIA REFERENCE POINT ID: NORMAL
RAD ONC ARIA REFERENCE POINT SESSION DOSAGE GIVEN: 1.8 GY
RAD ONC ARIA REFERENCE POINT SESSION DOSAGE GIVEN: 2.3 GY
RAD ONC ARIA REFERENCE POINT SESSION DOSAGE GIVEN: 2.5 GY
RAD ONC ARIA REFERENCE POINT SESSION DOSAGE GIVEN: 2.56 GY

## 2023-12-21 ENCOUNTER — HOSPITAL ENCOUNTER (OUTPATIENT)
Facility: HOSPITAL | Age: 78
Discharge: HOME | End: 2023-12-24
Attending: STUDENT IN AN ORGANIZED HEALTH CARE EDUCATION/TRAINING PROGRAM

## 2023-12-22 ENCOUNTER — HOSPITAL ENCOUNTER (OUTPATIENT)
Facility: HOSPITAL | Age: 78
Discharge: HOME | End: 2023-12-25
Attending: STUDENT IN AN ORGANIZED HEALTH CARE EDUCATION/TRAINING PROGRAM

## 2023-12-26 ENCOUNTER — HOSPITAL ENCOUNTER (OUTPATIENT)
Facility: HOSPITAL | Age: 78
Discharge: HOME | End: 2023-12-29
Attending: STUDENT IN AN ORGANIZED HEALTH CARE EDUCATION/TRAINING PROGRAM

## 2023-12-26 DIAGNOSIS — C61 PROSTATE CANCER (HCC): Primary | ICD-10-CM

## 2023-12-26 LAB
RAD ONC ARIA COURSE FIRST TREATMENT DATE: NORMAL
RAD ONC ARIA COURSE ID: NORMAL
RAD ONC ARIA COURSE INTENT: NORMAL
RAD ONC ARIA COURSE LAST TREATMENT DATE: NORMAL
RAD ONC ARIA COURSE SESSION NUMBER: 19
RAD ONC ARIA COURSE START DATE: NORMAL
RAD ONC ARIA COURSE TREATMENT ELAPSED DAYS: 28
RAD ONC ARIA PLAN FRACTIONS TREATED TO DATE: 9
RAD ONC ARIA PLAN ID: NORMAL
RAD ONC ARIA PLAN PRESCRIBED DOSE PER FRACTION: 2.5 GY
RAD ONC ARIA PLAN PRIMARY REFERENCE POINT: NORMAL
RAD ONC ARIA PLAN TOTAL FRACTIONS PRESCRIBED: 18
RAD ONC ARIA PLAN TOTAL PRESCRIBED DOSE: 4500 CGY
RAD ONC ARIA REFERENCE POINT DOSAGE GIVEN TO DATE: 16.2 GY
RAD ONC ARIA REFERENCE POINT DOSAGE GIVEN TO DATE: 20.7 GY
RAD ONC ARIA REFERENCE POINT DOSAGE GIVEN TO DATE: 22.5 GY
RAD ONC ARIA REFERENCE POINT DOSAGE GIVEN TO DATE: 23.05 GY
RAD ONC ARIA REFERENCE POINT ID: NORMAL
RAD ONC ARIA REFERENCE POINT SESSION DOSAGE GIVEN: 1.8 GY
RAD ONC ARIA REFERENCE POINT SESSION DOSAGE GIVEN: 2.3 GY
RAD ONC ARIA REFERENCE POINT SESSION DOSAGE GIVEN: 2.5 GY
RAD ONC ARIA REFERENCE POINT SESSION DOSAGE GIVEN: 2.56 GY

## 2023-12-26 NOTE — PROGRESS NOTES
Cancer [place] at [facility]  [facility]    Radiation Oncology Weekly Progress Note  Encounter Date: 12/26/23     Chelo Syvlester  [id number]  1945     Diagnosis   1. C61, C77.5: [id number], iPSA 9.1, GG5 (+9/12) regional risk prostate cancer   2. 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 Oncologic History   11/01/2017: PSA 1.1  06/29/2023: PSA 7.9  07/11/2023: PSA 6.5  09/06/2023: PSA 9.1  10/03/2023: TRUS biopsy showed a 25.2cc gland with GS 5+4=9 disease in 4 cores, GS 4+5=9 disease in 4 cores, GS 3+4=7 disease in 1 cores, total 9/12 positive cores with up to 100% core involvement. 10/18/2023: PSMA PET showed focal PyL binding in the prostate at the right lateral PZ from base to apex as well as in a 3mm right perirectal lymph node. No PyL binding in the seminal vesicles, bones, or viscera. He was recommended a course of RT to the prostate, SV, and pelvic nodes with gross anahy boost and concurrent ADT (administered 11/9/23, planned 2y)    Interval History   Mr. Katerin Townsend is a 69 y.o. male seen today for his weekly on-treatment evaluation    11/29/2023: at fraction 2, first OTV, doing well on treatment so far. Not having any issue with ADT. Reviewed treatment expectations and logistics. 12/06/2023: at fraction 6 (seen pre-tx, filling bladder). Noting urinary urgency and loose stools. Recommended azo for relief of urinary symptoms. 12/13/2023: at fraction 12, required re-plan due to set up issues, complains of urinary urgency and looser stools. Hasn't started azo yet, recommended he try it. Will hold off on imodium for now. 12/26/2023: at fraction 19, reports loose stools for which he's taken imodium with good effect, hasn't required it in about 3-4 days. He otherwise has ongoing urinary urgency but hasn't required azo (has on hand).  His main complaint today is feeling \"off\", states he's slightly

## 2023-12-27 ENCOUNTER — HOSPITAL ENCOUNTER (OUTPATIENT)
Facility: HOSPITAL | Age: 78
Discharge: HOME | End: 2023-12-30
Attending: STUDENT IN AN ORGANIZED HEALTH CARE EDUCATION/TRAINING PROGRAM

## 2023-12-27 LAB
RAD ONC ARIA COURSE FIRST TREATMENT DATE: NORMAL
RAD ONC ARIA COURSE ID: NORMAL
RAD ONC ARIA COURSE INTENT: NORMAL
RAD ONC ARIA COURSE LAST TREATMENT DATE: NORMAL
RAD ONC ARIA COURSE SESSION NUMBER: 20
RAD ONC ARIA COURSE START DATE: NORMAL
RAD ONC ARIA COURSE TREATMENT ELAPSED DAYS: 29
RAD ONC ARIA PLAN FRACTIONS TREATED TO DATE: 10
RAD ONC ARIA PLAN ID: NORMAL
RAD ONC ARIA PLAN PRESCRIBED DOSE PER FRACTION: 2.5 GY
RAD ONC ARIA PLAN PRIMARY REFERENCE POINT: NORMAL
RAD ONC ARIA PLAN TOTAL FRACTIONS PRESCRIBED: 18
RAD ONC ARIA PLAN TOTAL PRESCRIBED DOSE: 4500 CGY
RAD ONC ARIA REFERENCE POINT DOSAGE GIVEN TO DATE: 18 GY
RAD ONC ARIA REFERENCE POINT DOSAGE GIVEN TO DATE: 23 GY
RAD ONC ARIA REFERENCE POINT DOSAGE GIVEN TO DATE: 25 GY
RAD ONC ARIA REFERENCE POINT DOSAGE GIVEN TO DATE: 25.61 GY
RAD ONC ARIA REFERENCE POINT ID: NORMAL
RAD ONC ARIA REFERENCE POINT SESSION DOSAGE GIVEN: 1.8 GY
RAD ONC ARIA REFERENCE POINT SESSION DOSAGE GIVEN: 2.3 GY
RAD ONC ARIA REFERENCE POINT SESSION DOSAGE GIVEN: 2.5 GY
RAD ONC ARIA REFERENCE POINT SESSION DOSAGE GIVEN: 2.56 GY

## 2023-12-28 ENCOUNTER — HOSPITAL ENCOUNTER (OUTPATIENT)
Facility: HOSPITAL | Age: 78
Discharge: HOME | End: 2023-12-31
Attending: STUDENT IN AN ORGANIZED HEALTH CARE EDUCATION/TRAINING PROGRAM

## 2023-12-28 LAB
RAD ONC ARIA COURSE FIRST TREATMENT DATE: NORMAL
RAD ONC ARIA COURSE ID: NORMAL
RAD ONC ARIA COURSE INTENT: NORMAL
RAD ONC ARIA COURSE LAST TREATMENT DATE: NORMAL
RAD ONC ARIA COURSE SESSION NUMBER: 21
RAD ONC ARIA COURSE START DATE: NORMAL
RAD ONC ARIA COURSE TREATMENT ELAPSED DAYS: 30
RAD ONC ARIA PLAN FRACTIONS TREATED TO DATE: 11
RAD ONC ARIA PLAN ID: NORMAL
RAD ONC ARIA PLAN PRESCRIBED DOSE PER FRACTION: 2.5 GY
RAD ONC ARIA PLAN PRIMARY REFERENCE POINT: NORMAL
RAD ONC ARIA PLAN TOTAL FRACTIONS PRESCRIBED: 18
RAD ONC ARIA PLAN TOTAL PRESCRIBED DOSE: 4500 CGY
RAD ONC ARIA REFERENCE POINT DOSAGE GIVEN TO DATE: 19.8 GY
RAD ONC ARIA REFERENCE POINT DOSAGE GIVEN TO DATE: 25.3 GY
RAD ONC ARIA REFERENCE POINT DOSAGE GIVEN TO DATE: 27.5 GY
RAD ONC ARIA REFERENCE POINT DOSAGE GIVEN TO DATE: 28.17 GY
RAD ONC ARIA REFERENCE POINT ID: NORMAL
RAD ONC ARIA REFERENCE POINT SESSION DOSAGE GIVEN: 1.8 GY
RAD ONC ARIA REFERENCE POINT SESSION DOSAGE GIVEN: 2.3 GY
RAD ONC ARIA REFERENCE POINT SESSION DOSAGE GIVEN: 2.5 GY
RAD ONC ARIA REFERENCE POINT SESSION DOSAGE GIVEN: 2.56 GY

## 2023-12-29 ENCOUNTER — HOSPITAL ENCOUNTER (OUTPATIENT)
Facility: HOSPITAL | Age: 78
Discharge: HOME | End: 2024-01-01
Attending: STUDENT IN AN ORGANIZED HEALTH CARE EDUCATION/TRAINING PROGRAM

## 2023-12-29 LAB
RAD ONC ARIA COURSE FIRST TREATMENT DATE: NORMAL
RAD ONC ARIA COURSE ID: NORMAL
RAD ONC ARIA COURSE INTENT: NORMAL
RAD ONC ARIA COURSE LAST TREATMENT DATE: NORMAL
RAD ONC ARIA COURSE SESSION NUMBER: 22
RAD ONC ARIA COURSE START DATE: NORMAL
RAD ONC ARIA COURSE TREATMENT ELAPSED DAYS: 31
RAD ONC ARIA PLAN FRACTIONS TREATED TO DATE: 12
RAD ONC ARIA PLAN ID: NORMAL
RAD ONC ARIA PLAN PRESCRIBED DOSE PER FRACTION: 2.5 GY
RAD ONC ARIA PLAN PRIMARY REFERENCE POINT: NORMAL
RAD ONC ARIA PLAN TOTAL FRACTIONS PRESCRIBED: 18
RAD ONC ARIA PLAN TOTAL PRESCRIBED DOSE: 4500 CGY
RAD ONC ARIA REFERENCE POINT DOSAGE GIVEN TO DATE: 21.6 GY
RAD ONC ARIA REFERENCE POINT DOSAGE GIVEN TO DATE: 27.6 GY
RAD ONC ARIA REFERENCE POINT DOSAGE GIVEN TO DATE: 30 GY
RAD ONC ARIA REFERENCE POINT DOSAGE GIVEN TO DATE: 30.73 GY
RAD ONC ARIA REFERENCE POINT ID: NORMAL
RAD ONC ARIA REFERENCE POINT SESSION DOSAGE GIVEN: 1.8 GY
RAD ONC ARIA REFERENCE POINT SESSION DOSAGE GIVEN: 2.3 GY
RAD ONC ARIA REFERENCE POINT SESSION DOSAGE GIVEN: 2.5 GY
RAD ONC ARIA REFERENCE POINT SESSION DOSAGE GIVEN: 2.56 GY

## 2024-01-02 ENCOUNTER — HOSPITAL ENCOUNTER (OUTPATIENT)
Facility: HOSPITAL | Age: 79
Discharge: HOME | End: 2024-01-05
Attending: STUDENT IN AN ORGANIZED HEALTH CARE EDUCATION/TRAINING PROGRAM

## 2024-01-02 LAB
RAD ONC ARIA COURSE FIRST TREATMENT DATE: NORMAL
RAD ONC ARIA COURSE ID: NORMAL
RAD ONC ARIA COURSE INTENT: NORMAL
RAD ONC ARIA COURSE LAST TREATMENT DATE: NORMAL
RAD ONC ARIA COURSE SESSION NUMBER: 23
RAD ONC ARIA COURSE START DATE: NORMAL
RAD ONC ARIA COURSE TREATMENT ELAPSED DAYS: 35
RAD ONC ARIA PLAN FRACTIONS TREATED TO DATE: 13
RAD ONC ARIA PLAN ID: NORMAL
RAD ONC ARIA PLAN PRESCRIBED DOSE PER FRACTION: 2.5 GY
RAD ONC ARIA PLAN PRIMARY REFERENCE POINT: NORMAL
RAD ONC ARIA PLAN TOTAL FRACTIONS PRESCRIBED: 18
RAD ONC ARIA PLAN TOTAL PRESCRIBED DOSE: 4500 CGY
RAD ONC ARIA REFERENCE POINT DOSAGE GIVEN TO DATE: 23.4 GY
RAD ONC ARIA REFERENCE POINT DOSAGE GIVEN TO DATE: 29.9 GY
RAD ONC ARIA REFERENCE POINT DOSAGE GIVEN TO DATE: 32.5 GY
RAD ONC ARIA REFERENCE POINT DOSAGE GIVEN TO DATE: 33.29 GY
RAD ONC ARIA REFERENCE POINT ID: NORMAL
RAD ONC ARIA REFERENCE POINT SESSION DOSAGE GIVEN: 1.8 GY
RAD ONC ARIA REFERENCE POINT SESSION DOSAGE GIVEN: 2.3 GY
RAD ONC ARIA REFERENCE POINT SESSION DOSAGE GIVEN: 2.5 GY
RAD ONC ARIA REFERENCE POINT SESSION DOSAGE GIVEN: 2.56 GY

## 2024-01-03 ENCOUNTER — HOSPITAL ENCOUNTER (OUTPATIENT)
Facility: HOSPITAL | Age: 79
Discharge: HOME | End: 2024-01-06
Attending: STUDENT IN AN ORGANIZED HEALTH CARE EDUCATION/TRAINING PROGRAM

## 2024-01-03 DIAGNOSIS — C61 PROSTATE CANCER (HCC): Primary | ICD-10-CM

## 2024-01-03 LAB
RAD ONC ARIA COURSE FIRST TREATMENT DATE: NORMAL
RAD ONC ARIA COURSE ID: NORMAL
RAD ONC ARIA COURSE INTENT: NORMAL
RAD ONC ARIA COURSE LAST TREATMENT DATE: NORMAL
RAD ONC ARIA COURSE SESSION NUMBER: 24
RAD ONC ARIA COURSE START DATE: NORMAL
RAD ONC ARIA COURSE TREATMENT ELAPSED DAYS: 36
RAD ONC ARIA PLAN FRACTIONS TREATED TO DATE: 14
RAD ONC ARIA PLAN ID: NORMAL
RAD ONC ARIA PLAN PRESCRIBED DOSE PER FRACTION: 2.5 GY
RAD ONC ARIA PLAN PRIMARY REFERENCE POINT: NORMAL
RAD ONC ARIA PLAN TOTAL FRACTIONS PRESCRIBED: 18
RAD ONC ARIA PLAN TOTAL PRESCRIBED DOSE: 4500 CGY
RAD ONC ARIA REFERENCE POINT DOSAGE GIVEN TO DATE: 25.2 GY
RAD ONC ARIA REFERENCE POINT DOSAGE GIVEN TO DATE: 32.2 GY
RAD ONC ARIA REFERENCE POINT DOSAGE GIVEN TO DATE: 35 GY
RAD ONC ARIA REFERENCE POINT DOSAGE GIVEN TO DATE: 35.85 GY
RAD ONC ARIA REFERENCE POINT ID: NORMAL
RAD ONC ARIA REFERENCE POINT SESSION DOSAGE GIVEN: 1.8 GY
RAD ONC ARIA REFERENCE POINT SESSION DOSAGE GIVEN: 2.3 GY
RAD ONC ARIA REFERENCE POINT SESSION DOSAGE GIVEN: 2.5 GY
RAD ONC ARIA REFERENCE POINT SESSION DOSAGE GIVEN: 2.56 GY

## 2024-01-03 ASSESSMENT — PAIN SCALES - GENERAL: PAINLEVEL_OUTOF10: 0

## 2024-01-03 NOTE — PROGRESS NOTES
dizzy, only started this morning. He has a history of blood loss anemia of unknown source but hasn't noted melena or hematochezia. Will monitor for now.   01/03/2024: at fraction 24, doing well today, stable GI/ symptoms, no longer having the 'off' feeling. Due to see Dr. Franco in 02/2024, will plan to see him 05/2024 with PSA/T.    Treatment Details:   Treatment Site: Prostate, SV, and pelvic nodes with gross anahy boost  Treatment Details: IMRT/VMAT  Treatment Site Dose/Fx (cGy) #Fx Delivered Dose (cGy) Total Planned Dose (cGy) Start Date End Date   Prostate, SV  Pelvic Nodes 250  230  180 24/28 6000  5520  4320 7000  6440  5040 11/28/23 01/03/24     Concurrent Therapy: Concurrent ADT:  Response to treatment: N/A    Allergies and Medications     Allergies   Allergen Reactions    Glipizide Other (See Comments)     SKIN BURNING       Current Outpatient Medications   Medication Instructions    aspirin 81 mg, Oral, DAILY    cetirizine (ZYRTEC) 10 mg, Oral, DAILY    clopidogrel (PLAVIX) 75 mg, Oral, DAILY    gabapentin (NEURONTIN) 300 mg, Oral, 3 TIMES DAILY    glimepiride (AMARYL) 4 mg, Oral, 2 TIMES DAILY    HYDROcodone-acetaminophen (NORCO) 7.5-325 MG per tablet PRN    lisinopril-hydroCHLOROthiazide (PRINZIDE;ZESTORETIC) 20-12.5 MG per tablet 2 tablets, Oral, DAILY    metFORMIN (GLUCOPHAGE) 1,000 mg, Oral, 2 TIMES DAILY WITH MEALS    oxyCODONE (ROXICODONE) 5 mg, EVERY 4 HOURS PRN    simvastatin (ZOCOR) 40 mg, Oral, NIGHTLY    SITagliptin (JANUVIA) 100 mg, Oral, DAILY        Physical Exam:   Vitals: There were no vitals taken for this visit. Pain Level: 0  Performance Status: ECOG 0, fully active, able to carry on all predisease activities without restrictions  Constitutional: Pleasant, sitting comfortably in no acute distress.  Respiratory: Non-labored breathing  Neurologic: Alert and oriented.  Psychiatric: Cooperative to commands and responds to questions appropriately.    Assessment & Plan   - Continue  No

## 2024-01-04 ENCOUNTER — HOSPITAL ENCOUNTER (OUTPATIENT)
Facility: HOSPITAL | Age: 79
Discharge: HOME | End: 2024-01-07
Attending: STUDENT IN AN ORGANIZED HEALTH CARE EDUCATION/TRAINING PROGRAM

## 2024-01-04 LAB
RAD ONC ARIA COURSE FIRST TREATMENT DATE: NORMAL
RAD ONC ARIA COURSE ID: NORMAL
RAD ONC ARIA COURSE INTENT: NORMAL
RAD ONC ARIA COURSE LAST TREATMENT DATE: NORMAL
RAD ONC ARIA COURSE SESSION NUMBER: 25
RAD ONC ARIA COURSE START DATE: NORMAL
RAD ONC ARIA COURSE TREATMENT ELAPSED DAYS: 37
RAD ONC ARIA PLAN FRACTIONS TREATED TO DATE: 15
RAD ONC ARIA PLAN ID: NORMAL
RAD ONC ARIA PLAN PRESCRIBED DOSE PER FRACTION: 2.5 GY
RAD ONC ARIA PLAN PRIMARY REFERENCE POINT: NORMAL
RAD ONC ARIA PLAN TOTAL FRACTIONS PRESCRIBED: 18
RAD ONC ARIA PLAN TOTAL PRESCRIBED DOSE: 4500 CGY
RAD ONC ARIA REFERENCE POINT DOSAGE GIVEN TO DATE: 27 GY
RAD ONC ARIA REFERENCE POINT DOSAGE GIVEN TO DATE: 34.5 GY
RAD ONC ARIA REFERENCE POINT DOSAGE GIVEN TO DATE: 37.5 GY
RAD ONC ARIA REFERENCE POINT DOSAGE GIVEN TO DATE: 38.42 GY
RAD ONC ARIA REFERENCE POINT ID: NORMAL
RAD ONC ARIA REFERENCE POINT SESSION DOSAGE GIVEN: 1.8 GY
RAD ONC ARIA REFERENCE POINT SESSION DOSAGE GIVEN: 2.3 GY
RAD ONC ARIA REFERENCE POINT SESSION DOSAGE GIVEN: 2.5 GY
RAD ONC ARIA REFERENCE POINT SESSION DOSAGE GIVEN: 2.56 GY

## 2024-01-05 ENCOUNTER — HOSPITAL ENCOUNTER (OUTPATIENT)
Facility: HOSPITAL | Age: 79
Discharge: HOME | End: 2024-01-08
Attending: STUDENT IN AN ORGANIZED HEALTH CARE EDUCATION/TRAINING PROGRAM

## 2024-01-05 LAB
RAD ONC ARIA COURSE FIRST TREATMENT DATE: NORMAL
RAD ONC ARIA COURSE ID: NORMAL
RAD ONC ARIA COURSE INTENT: NORMAL
RAD ONC ARIA COURSE LAST TREATMENT DATE: NORMAL
RAD ONC ARIA COURSE SESSION NUMBER: 26
RAD ONC ARIA COURSE START DATE: NORMAL
RAD ONC ARIA COURSE TREATMENT ELAPSED DAYS: 38
RAD ONC ARIA PLAN FRACTIONS TREATED TO DATE: 16
RAD ONC ARIA PLAN ID: NORMAL
RAD ONC ARIA PLAN PRESCRIBED DOSE PER FRACTION: 2.5 GY
RAD ONC ARIA PLAN PRIMARY REFERENCE POINT: NORMAL
RAD ONC ARIA PLAN TOTAL FRACTIONS PRESCRIBED: 18
RAD ONC ARIA PLAN TOTAL PRESCRIBED DOSE: 4500 CGY
RAD ONC ARIA REFERENCE POINT DOSAGE GIVEN TO DATE: 28.8 GY
RAD ONC ARIA REFERENCE POINT DOSAGE GIVEN TO DATE: 36.8 GY
RAD ONC ARIA REFERENCE POINT DOSAGE GIVEN TO DATE: 40 GY
RAD ONC ARIA REFERENCE POINT DOSAGE GIVEN TO DATE: 40.98 GY
RAD ONC ARIA REFERENCE POINT ID: NORMAL
RAD ONC ARIA REFERENCE POINT SESSION DOSAGE GIVEN: 1.8 GY
RAD ONC ARIA REFERENCE POINT SESSION DOSAGE GIVEN: 2.3 GY
RAD ONC ARIA REFERENCE POINT SESSION DOSAGE GIVEN: 2.5 GY
RAD ONC ARIA REFERENCE POINT SESSION DOSAGE GIVEN: 2.56 GY

## 2024-01-08 ENCOUNTER — HOSPITAL ENCOUNTER (OUTPATIENT)
Facility: HOSPITAL | Age: 79
Discharge: HOME | End: 2024-01-11
Attending: STUDENT IN AN ORGANIZED HEALTH CARE EDUCATION/TRAINING PROGRAM

## 2024-01-08 DIAGNOSIS — C61 PROSTATE CANCER (HCC): Primary | ICD-10-CM

## 2024-01-08 LAB
RAD ONC ARIA COURSE FIRST TREATMENT DATE: NORMAL
RAD ONC ARIA COURSE ID: NORMAL
RAD ONC ARIA COURSE INTENT: NORMAL
RAD ONC ARIA COURSE LAST TREATMENT DATE: NORMAL
RAD ONC ARIA COURSE SESSION NUMBER: 27
RAD ONC ARIA COURSE START DATE: NORMAL
RAD ONC ARIA COURSE TREATMENT ELAPSED DAYS: 41
RAD ONC ARIA PLAN FRACTIONS TREATED TO DATE: 17
RAD ONC ARIA PLAN ID: NORMAL
RAD ONC ARIA PLAN PRESCRIBED DOSE PER FRACTION: 2.5 GY
RAD ONC ARIA PLAN PRIMARY REFERENCE POINT: NORMAL
RAD ONC ARIA PLAN TOTAL FRACTIONS PRESCRIBED: 18
RAD ONC ARIA PLAN TOTAL PRESCRIBED DOSE: 4500 CGY
RAD ONC ARIA REFERENCE POINT DOSAGE GIVEN TO DATE: 30.6 GY
RAD ONC ARIA REFERENCE POINT DOSAGE GIVEN TO DATE: 39.1 GY
RAD ONC ARIA REFERENCE POINT DOSAGE GIVEN TO DATE: 42.5 GY
RAD ONC ARIA REFERENCE POINT DOSAGE GIVEN TO DATE: 43.54 GY
RAD ONC ARIA REFERENCE POINT ID: NORMAL
RAD ONC ARIA REFERENCE POINT SESSION DOSAGE GIVEN: 1.8 GY
RAD ONC ARIA REFERENCE POINT SESSION DOSAGE GIVEN: 2.3 GY
RAD ONC ARIA REFERENCE POINT SESSION DOSAGE GIVEN: 2.5 GY
RAD ONC ARIA REFERENCE POINT SESSION DOSAGE GIVEN: 2.56 GY

## 2024-01-08 ASSESSMENT — PAIN SCALES - GENERAL: PAINLEVEL_OUTOF10: 0

## 2024-01-09 ENCOUNTER — CLINICAL DOCUMENTATION (OUTPATIENT)
Facility: HOSPITAL | Age: 79
End: 2024-01-09

## 2024-01-09 ENCOUNTER — HOSPITAL ENCOUNTER (OUTPATIENT)
Facility: HOSPITAL | Age: 79
Discharge: HOME | End: 2024-01-12
Attending: STUDENT IN AN ORGANIZED HEALTH CARE EDUCATION/TRAINING PROGRAM

## 2024-01-09 LAB
RAD ONC ARIA COURSE FIRST TREATMENT DATE: NORMAL
RAD ONC ARIA COURSE ID: NORMAL
RAD ONC ARIA COURSE INTENT: NORMAL
RAD ONC ARIA COURSE LAST TREATMENT DATE: NORMAL
RAD ONC ARIA COURSE SESSION NUMBER: 28
RAD ONC ARIA COURSE START DATE: NORMAL
RAD ONC ARIA COURSE TREATMENT ELAPSED DAYS: 42
RAD ONC ARIA PLAN FRACTIONS TREATED TO DATE: 18
RAD ONC ARIA PLAN ID: NORMAL
RAD ONC ARIA PLAN PRESCRIBED DOSE PER FRACTION: 2.5 GY
RAD ONC ARIA PLAN PRIMARY REFERENCE POINT: NORMAL
RAD ONC ARIA PLAN TOTAL FRACTIONS PRESCRIBED: 18
RAD ONC ARIA PLAN TOTAL PRESCRIBED DOSE: 4500 CGY
RAD ONC ARIA REFERENCE POINT DOSAGE GIVEN TO DATE: 32.4 GY
RAD ONC ARIA REFERENCE POINT DOSAGE GIVEN TO DATE: 41.4 GY
RAD ONC ARIA REFERENCE POINT DOSAGE GIVEN TO DATE: 45 GY
RAD ONC ARIA REFERENCE POINT DOSAGE GIVEN TO DATE: 46.1 GY
RAD ONC ARIA REFERENCE POINT ID: NORMAL
RAD ONC ARIA REFERENCE POINT SESSION DOSAGE GIVEN: 1.8 GY
RAD ONC ARIA REFERENCE POINT SESSION DOSAGE GIVEN: 2.3 GY
RAD ONC ARIA REFERENCE POINT SESSION DOSAGE GIVEN: 2.5 GY
RAD ONC ARIA REFERENCE POINT SESSION DOSAGE GIVEN: 2.56 GY

## 2024-01-10 NOTE — PROGRESS NOTES
[facility] [place] at [facility]  [facility]    Radiation Oncology End of Treatment Summary    Shade Birmingham  [id number]  1945     Diagnosis   1. C61, C77.5: [id number], iPSA 9.1, GG5 (+9/12) regional risk prostate cancer   2. 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    Oncologic History   Mr. Birmingham is a 78 y.o. male was initially seen in consultation to assess the overall management and role of radiation for his above diagnosis.    11/01/2017: PSA 1.1  06/29/2023: PSA 7.9  07/11/2023: PSA 6.5  09/06/2023: PSA 9.1  10/03/2023: TRUS biopsy showed a 25.2cc gland with GS 5+4=9 disease in 4 cores, GS 4+5=9 disease in 4 cores, GS 3+4=7 disease in 1 cores, total 9/12 positive cores with up to 100% core involvement.  10/18/2023: PSMA PET showed focal PyL binding in the prostate at the right lateral PZ from base to apex as well as in a 3mm right perirectal lymph node. No PyL binding in the seminal vesicles, bones, or viscera.    He was recommended a course of RT to the prostate, SV, and pelvic nodes with gross anahy boost and concurrent ADT (administered 11/9/23, planned 2y)    Treatment Details:   Treatment Site: Prostate, SV, and pelvic nodes with gross anahy boost  Treatment Details: IMRT/VMAT  Treatment Site Dose/Fx (cGy) #Fx Delivered Dose (cGy) Start Date End Date Elapsed Days   Prostate, SV  Pelvic Nodes 250  230  944 37 9243  6440  5040 11/28/23 01/09/24 42      Concurrent Therapy: Concurrent ADT:  Response to treatment: N/A    Under-treatment Course Summary   He tolerated treatment well, had developed expected /GI symptoms that were medically managed.    Follow Up Plan   - Follow up May 2024 with PSA/testosterone    Thank you for the opportunity to participate in Mr. Birmingham's care.    Frank Guerrero MD  [facility]  [facility]  [address] [place], [place]

## 2024-05-30 ENCOUNTER — HOSPITAL ENCOUNTER (OUTPATIENT)
Facility: HOSPITAL | Age: 79
Discharge: HOME | End: 2024-06-02
Attending: STUDENT IN AN ORGANIZED HEALTH CARE EDUCATION/TRAINING PROGRAM

## 2024-05-30 VITALS
HEIGHT: 74 IN | SYSTOLIC BLOOD PRESSURE: 140 MMHG | DIASTOLIC BLOOD PRESSURE: 72 MMHG | WEIGHT: 209.8 LBS | RESPIRATION RATE: 18 BRPM | BODY MASS INDEX: 26.92 KG/M2 | HEART RATE: 74 BPM

## 2024-05-30 DIAGNOSIS — C61 PROSTATE CANCER (HCC): Primary | ICD-10-CM

## 2024-05-30 RX ORDER — KETOCONAZOLE 20 MG/ML
SHAMPOO TOPICAL DAILY PRN
COMMUNITY

## 2024-05-30 RX ORDER — DIPHENOXYLATE HYDROCHLORIDE AND ATROPINE SULFATE 2.5; .025 MG/1; MG/1
1 TABLET ORAL 4 TIMES DAILY PRN
COMMUNITY

## 2024-05-30 ASSESSMENT — PAIN SCALES - GENERAL: PAINLEVEL_OUTOF10: 0

## 2024-05-30 NOTE — PROGRESS NOTES
to Completion 0   Total 2     Review of Systems:  A complete review of systems was obtained and negative except as described above.    Interval Imaging/Labs   02/16/2024: PSA: 0.277, Testosterone 3.41  05/24/2024: PSA: 0.247, Testosterone <2.50    Above imaging/lab reports were reviewed.  Allergies and Medications     Allergies   Allergen Reactions    Glipizide Other (See Comments)     SKIN BURNING       Current Outpatient Medications   Medication Instructions    aspirin 81 mg, Oral, DAILY    cetirizine (ZYRTEC) 10 mg, Oral, DAILY    clopidogrel (PLAVIX) 75 mg, Oral, DAILY    diphenoxylate-atropine (LOMOTIL) 2.5-0.025 MG per tablet 1 tablet, Oral, 4 TIMES DAILY PRN    empagliflozin (JARDIANCE) 25 mg, Oral, DAILY    gabapentin (NEURONTIN) 300 mg, Oral, 3 TIMES DAILY    glimepiride (AMARYL) 4 mg, Oral, 2 TIMES DAILY    HYDROcodone-acetaminophen (NORCO) 7.5-325 MG per tablet PRN    ketoconazole (NIZORAL) 2 % shampoo Topical, DAILY PRN, Apply topically daily as needed.    lisinopril-hydroCHLOROthiazide (PRINZIDE;ZESTORETIC) 20-12.5 MG per tablet 2 tablets, Oral, DAILY    metFORMIN (GLUCOPHAGE) 1,000 mg, Oral, 2 TIMES DAILY WITH MEALS    oxyCODONE (ROXICODONE) 5 mg, EVERY 4 HOURS PRN    simvastatin (ZOCOR) 40 mg, Oral, NIGHTLY    SITagliptin (JANUVIA) 100 mg, DAILY        Physical Exam   Vitals: Blood pressure (!) 140/72, pulse 74, resp. rate 18, height 1.88 m (6' 2\"), weight 95.2 kg (209 lb 12.8 oz). Pain Level: 0  Performance Status: ECOG 0, fully active, able to carry on all predisease activities without restrictions  Constitutional: Pleasant, sitting comfortably in no acute distress.   HEENT: Moist mucous membranes.  Cardiac: Good peripheral perfusion, no upper or lower extremity edema bilaterally.  Pulmonary: No increased work of breathing. Symmetric chest rise  Skin: Warm, dry, no rashes noted.  Neurologic: Alert and oriented.  Psychiatric: Cooperative to commands and responds to questions

## 2024-06-10 ENCOUNTER — HOSPITAL ENCOUNTER (OUTPATIENT)
Facility: HOSPITAL | Age: 79
Discharge: HOME | End: 2024-06-13
Payer: MEDICARE

## 2024-06-10 DIAGNOSIS — M54.16 LUMBAR RADICULITIS: ICD-10-CM

## 2024-06-10 DIAGNOSIS — M47.816 LUMBAR SPONDYLOSIS: ICD-10-CM

## 2024-06-10 DIAGNOSIS — M51.36 DDD (DEGENERATIVE DISC DISEASE), LUMBAR: ICD-10-CM

## 2024-06-10 PROCEDURE — 72148 MRI LUMBAR SPINE W/O DYE: CPT

## 2024-06-25 ENCOUNTER — HOSPITAL ENCOUNTER (INPATIENT)
Facility: HOSPITAL | Age: 79
LOS: 7 days | Discharge: HOME | DRG: 286 | End: 2024-07-02
Attending: EMERGENCY MEDICINE | Admitting: INTERNAL MEDICINE
Payer: MEDICARE

## 2024-06-25 ENCOUNTER — APPOINTMENT (OUTPATIENT)
Facility: HOSPITAL | Age: 79
DRG: 286 | End: 2024-06-25
Payer: MEDICARE

## 2024-06-25 DIAGNOSIS — I48.0 PAROXYSMAL A-FIB (HCC): ICD-10-CM

## 2024-06-25 DIAGNOSIS — R09.02 HYPOXIC: ICD-10-CM

## 2024-06-25 DIAGNOSIS — N30.00 ACUTE CYSTITIS WITHOUT HEMATURIA: ICD-10-CM

## 2024-06-25 DIAGNOSIS — I25.10 CORONARY ARTERY DISEASE, UNSPECIFIED VESSEL OR LESION TYPE, UNSPECIFIED WHETHER ANGINA PRESENT, UNSPECIFIED WHETHER NATIVE OR TRANSPLANTED HEART: ICD-10-CM

## 2024-06-25 DIAGNOSIS — R09.02 HYPOXIA: Primary | ICD-10-CM

## 2024-06-25 DIAGNOSIS — J81.0 ACUTE PULMONARY EDEMA (HCC): ICD-10-CM

## 2024-06-25 DIAGNOSIS — I25.10 CORONARY ARTERY DISEASE INVOLVING NATIVE CORONARY ARTERY OF NATIVE HEART WITHOUT ANGINA PECTORIS: ICD-10-CM

## 2024-06-25 DIAGNOSIS — R94.39 ABNORMAL STRESS TEST: ICD-10-CM

## 2024-06-25 PROBLEM — E87.1 HYPONATREMIA: Status: ACTIVE | Noted: 2024-06-25

## 2024-06-25 PROBLEM — N17.9 AKI (ACUTE KIDNEY INJURY) (HCC): Status: ACTIVE | Noted: 2024-06-25

## 2024-06-25 LAB
ALBUMIN SERPL-MCNC: 3.2 G/DL (ref 3.5–5)
ALBUMIN/GLOB SERPL: 0.7 (ref 1.1–2.2)
ALP SERPL-CCNC: 98 U/L (ref 45–117)
ALT SERPL-CCNC: 19 U/L (ref 12–78)
ANION GAP SERPL CALC-SCNC: 5 MMOL/L (ref 5–15)
APPEARANCE UR: CLEAR
AST SERPL-CCNC: 18 U/L (ref 15–37)
BACTERIA URNS QL MICRO: ABNORMAL /HPF
BASOPHILS # BLD: 0.2 K/UL (ref 0–0.1)
BASOPHILS NFR BLD: 1 % (ref 0–1)
BILIRUB SERPL-MCNC: 1.2 MG/DL (ref 0.2–1)
BILIRUB UR QL: NEGATIVE
BUN SERPL-MCNC: 24 MG/DL (ref 6–20)
BUN/CREAT SERPL: 18 (ref 12–20)
CALCIUM SERPL-MCNC: 9.2 MG/DL (ref 8.5–10.1)
CHLORIDE SERPL-SCNC: 101 MMOL/L (ref 97–108)
CO2 SERPL-SCNC: 27 MMOL/L (ref 21–32)
COLOR UR: ABNORMAL
CREAT SERPL-MCNC: 1.32 MG/DL (ref 0.7–1.3)
DIFFERENTIAL METHOD BLD: ABNORMAL
EOSINOPHIL # BLD: 0.2 K/UL (ref 0–0.4)
EOSINOPHIL NFR BLD: 1 % (ref 0–7)
EPITH CASTS URNS QL MICRO: ABNORMAL /LPF
ERYTHROCYTE [DISTWIDTH] IN BLOOD BY AUTOMATED COUNT: 19.6 % (ref 11.5–14.5)
GLOBULIN SER CALC-MCNC: 4.8 G/DL (ref 2–4)
GLUCOSE SERPL-MCNC: 134 MG/DL (ref 65–100)
GLUCOSE UR STRIP.AUTO-MCNC: >1000 MG/DL
HCT VFR BLD AUTO: 36.6 % (ref 36.6–50.3)
HGB BLD-MCNC: 12.4 G/DL (ref 12.1–17)
HGB UR QL STRIP: ABNORMAL
HYALINE CASTS URNS QL MICRO: ABNORMAL /LPF (ref 0–2)
IMM GRANULOCYTES # BLD AUTO: 0.2 K/UL (ref 0–0.04)
IMM GRANULOCYTES NFR BLD AUTO: 1 % (ref 0–0.5)
KETONES UR QL STRIP.AUTO: NEGATIVE MG/DL
LEUKOCYTE ESTERASE UR QL STRIP.AUTO: ABNORMAL
LYMPHOCYTES # BLD: 0.3 K/UL (ref 0.8–3.5)
LYMPHOCYTES NFR BLD: 2 % (ref 12–49)
MCH RBC QN AUTO: 34.3 PG (ref 26–34)
MCHC RBC AUTO-ENTMCNC: 33.9 G/DL (ref 30–36.5)
MCV RBC AUTO: 101.4 FL (ref 80–99)
MONOCYTES # BLD: 1.7 K/UL (ref 0–1)
MONOCYTES NFR BLD: 11 % (ref 5–13)
NEUTS SEG # BLD: 12.4 K/UL (ref 1.8–8)
NEUTS SEG NFR BLD: 84 % (ref 32–75)
NITRITE UR QL STRIP.AUTO: NEGATIVE
NRBC # BLD: 0.04 K/UL (ref 0–0.01)
NRBC BLD-RTO: 0.3 PER 100 WBC
PH UR STRIP: 6 (ref 5–8)
PLATELET # BLD AUTO: 208 K/UL (ref 150–400)
PMV BLD AUTO: 10.3 FL (ref 8.9–12.9)
POTASSIUM SERPL-SCNC: 4.2 MMOL/L (ref 3.5–5.1)
PROT SERPL-MCNC: 8 G/DL (ref 6.4–8.2)
PROT UR STRIP-MCNC: 30 MG/DL
RBC # BLD AUTO: 3.61 M/UL (ref 4.1–5.7)
RBC #/AREA URNS HPF: ABNORMAL /HPF (ref 0–5)
RBC MORPH BLD: ABNORMAL
SODIUM SERPL-SCNC: 133 MMOL/L (ref 136–145)
SP GR UR REFRACTOMETRY: <1.005 (ref 1–1.03)
SPECIMEN HOLD: NORMAL
UROBILINOGEN UR QL STRIP.AUTO: 1 EU/DL (ref 0.2–1)
WBC # BLD AUTO: 15 K/UL (ref 4.1–11.1)
WBC URNS QL MICRO: ABNORMAL /HPF (ref 0–4)

## 2024-06-25 PROCEDURE — 85025 COMPLETE CBC W/AUTO DIFF WBC: CPT

## 2024-06-25 PROCEDURE — 71045 X-RAY EXAM CHEST 1 VIEW: CPT

## 2024-06-25 PROCEDURE — 36415 COLL VENOUS BLD VENIPUNCTURE: CPT

## 2024-06-25 PROCEDURE — 99285 EMERGENCY DEPT VISIT HI MDM: CPT

## 2024-06-25 PROCEDURE — 83036 HEMOGLOBIN GLYCOSYLATED A1C: CPT

## 2024-06-25 PROCEDURE — 83880 ASSAY OF NATRIURETIC PEPTIDE: CPT

## 2024-06-25 PROCEDURE — 81001 URINALYSIS AUTO W/SCOPE: CPT

## 2024-06-25 PROCEDURE — 87088 URINE BACTERIA CULTURE: CPT

## 2024-06-25 PROCEDURE — 74177 CT ABD & PELVIS W/CONTRAST: CPT

## 2024-06-25 PROCEDURE — 2580000003 HC RX 258: Performed by: PHYSICIAN ASSISTANT

## 2024-06-25 PROCEDURE — 80053 COMPREHEN METABOLIC PANEL: CPT

## 2024-06-25 PROCEDURE — 6360000004 HC RX CONTRAST MEDICATION: Performed by: PHYSICIAN ASSISTANT

## 2024-06-25 PROCEDURE — 87186 SC STD MICRODIL/AGAR DIL: CPT

## 2024-06-25 PROCEDURE — 87086 URINE CULTURE/COLONY COUNT: CPT

## 2024-06-25 PROCEDURE — 6370000000 HC RX 637 (ALT 250 FOR IP): Performed by: PHYSICIAN ASSISTANT

## 2024-06-25 PROCEDURE — 1100000000 HC RM PRIVATE

## 2024-06-25 RX ORDER — INSULIN LISPRO 100 [IU]/ML
0-4 INJECTION, SOLUTION INTRAVENOUS; SUBCUTANEOUS NIGHTLY
Status: DISCONTINUED | OUTPATIENT
Start: 2024-06-25 | End: 2024-07-02 | Stop reason: HOSPADM

## 2024-06-25 RX ORDER — ACETAMINOPHEN 500 MG
1000 TABLET ORAL
Status: COMPLETED | OUTPATIENT
Start: 2024-06-25 | End: 2024-06-25

## 2024-06-25 RX ORDER — 0.9 % SODIUM CHLORIDE 0.9 %
500 INTRAVENOUS SOLUTION INTRAVENOUS ONCE
Status: COMPLETED | OUTPATIENT
Start: 2024-06-25 | End: 2024-06-25

## 2024-06-25 RX ORDER — ACETAMINOPHEN 325 MG/1
650 TABLET ORAL EVERY 6 HOURS PRN
Status: DISCONTINUED | OUTPATIENT
Start: 2024-06-25 | End: 2024-07-02 | Stop reason: HOSPADM

## 2024-06-25 RX ORDER — SODIUM CHLORIDE 0.9 % (FLUSH) 0.9 %
5-40 SYRINGE (ML) INJECTION PRN
Status: DISCONTINUED | OUTPATIENT
Start: 2024-06-25 | End: 2024-07-02 | Stop reason: HOSPADM

## 2024-06-25 RX ORDER — INSULIN LISPRO 100 [IU]/ML
0-8 INJECTION, SOLUTION INTRAVENOUS; SUBCUTANEOUS
Status: DISCONTINUED | OUTPATIENT
Start: 2024-06-26 | End: 2024-07-02 | Stop reason: HOSPADM

## 2024-06-25 RX ORDER — ALBUTEROL SULFATE 2.5 MG/3ML
2.5 SOLUTION RESPIRATORY (INHALATION)
Status: DISCONTINUED | OUTPATIENT
Start: 2024-06-25 | End: 2024-06-26

## 2024-06-25 RX ORDER — DEXTROSE MONOHYDRATE 100 MG/ML
INJECTION, SOLUTION INTRAVENOUS CONTINUOUS PRN
Status: DISCONTINUED | OUTPATIENT
Start: 2024-06-25 | End: 2024-07-02 | Stop reason: HOSPADM

## 2024-06-25 RX ORDER — ENOXAPARIN SODIUM 100 MG/ML
40 INJECTION SUBCUTANEOUS DAILY
Status: DISCONTINUED | OUTPATIENT
Start: 2024-06-26 | End: 2024-06-28

## 2024-06-25 RX ORDER — BUDESONIDE 0.5 MG/2ML
0.5 INHALANT ORAL
Status: DISCONTINUED | OUTPATIENT
Start: 2024-06-25 | End: 2024-06-26

## 2024-06-25 RX ORDER — CLOPIDOGREL BISULFATE 75 MG/1
75 TABLET ORAL DAILY
Status: DISCONTINUED | OUTPATIENT
Start: 2024-06-26 | End: 2024-07-02

## 2024-06-25 RX ORDER — IPRATROPIUM BROMIDE AND ALBUTEROL SULFATE 2.5; .5 MG/3ML; MG/3ML
1 SOLUTION RESPIRATORY (INHALATION)
Status: DISCONTINUED | OUTPATIENT
Start: 2024-06-26 | End: 2024-06-26

## 2024-06-25 RX ORDER — SODIUM CHLORIDE 9 MG/ML
INJECTION, SOLUTION INTRAVENOUS PRN
Status: DISCONTINUED | OUTPATIENT
Start: 2024-06-25 | End: 2024-07-02 | Stop reason: HOSPADM

## 2024-06-25 RX ORDER — ONDANSETRON 2 MG/ML
4 INJECTION INTRAMUSCULAR; INTRAVENOUS EVERY 6 HOURS PRN
Status: DISCONTINUED | OUTPATIENT
Start: 2024-06-25 | End: 2024-07-02 | Stop reason: HOSPADM

## 2024-06-25 RX ORDER — SODIUM CHLORIDE 0.9 % (FLUSH) 0.9 %
5-40 SYRINGE (ML) INJECTION EVERY 12 HOURS SCHEDULED
Status: DISCONTINUED | OUTPATIENT
Start: 2024-06-25 | End: 2024-07-02 | Stop reason: HOSPADM

## 2024-06-25 RX ORDER — ONDANSETRON 4 MG/1
4 TABLET, ORALLY DISINTEGRATING ORAL EVERY 8 HOURS PRN
Status: DISCONTINUED | OUTPATIENT
Start: 2024-06-25 | End: 2024-07-02 | Stop reason: HOSPADM

## 2024-06-25 RX ORDER — ACETAMINOPHEN 650 MG/1
650 SUPPOSITORY RECTAL EVERY 6 HOURS PRN
Status: DISCONTINUED | OUTPATIENT
Start: 2024-06-25 | End: 2024-07-02 | Stop reason: HOSPADM

## 2024-06-25 RX ORDER — POLYETHYLENE GLYCOL 3350 17 G/17G
17 POWDER, FOR SOLUTION ORAL DAILY PRN
Status: DISCONTINUED | OUTPATIENT
Start: 2024-06-25 | End: 2024-07-02 | Stop reason: HOSPADM

## 2024-06-25 RX ORDER — ARFORMOTEROL TARTRATE 15 UG/2ML
15 SOLUTION RESPIRATORY (INHALATION)
Status: DISCONTINUED | OUTPATIENT
Start: 2024-06-25 | End: 2024-06-26

## 2024-06-25 RX ADMIN — IOPAMIDOL 100 ML: 755 INJECTION, SOLUTION INTRAVENOUS at 18:55

## 2024-06-25 RX ADMIN — SODIUM CHLORIDE 500 ML: 9 INJECTION, SOLUTION INTRAVENOUS at 18:25

## 2024-06-25 RX ADMIN — ACETAMINOPHEN 1000 MG: 500 TABLET ORAL at 18:25

## 2024-06-25 ASSESSMENT — LIFESTYLE VARIABLES
HOW OFTEN DO YOU HAVE A DRINK CONTAINING ALCOHOL: NEVER
HOW MANY STANDARD DRINKS CONTAINING ALCOHOL DO YOU HAVE ON A TYPICAL DAY: PATIENT DOES NOT DRINK

## 2024-06-25 ASSESSMENT — PAIN SCALES - GENERAL
PAINLEVEL_OUTOF10: 5
PAINLEVEL_OUTOF10: 5

## 2024-06-25 ASSESSMENT — PAIN - FUNCTIONAL ASSESSMENT: PAIN_FUNCTIONAL_ASSESSMENT: 0-10

## 2024-06-25 NOTE — ED NOTES
Pt oxygen levels reading mid 80's on RA. Patient placed on 2 L NC reading high 80's low 90's. Patient placed on 4 L NC now reading 95-96%

## 2024-06-25 NOTE — ED PROVIDER NOTES
by the emergency physician with the below findings:        Interpretation per the Radiologist below, if available at the time of this note:    XR CHEST PORTABLE   Final Result   Pulmonary edema. Emphysema.      Electronically signed by ADDIE BRYAN      CT ABDOMEN PELVIS W IV CONTRAST Additional Contrast? None   Final Result      1. Urinary bladder cystitis. No abscess. No hydronephrosis.   2. Incidental findings are described above.      Electronically signed by Red Murguia           LABS:  Labs Reviewed   CBC WITH AUTO DIFFERENTIAL - Abnormal; Notable for the following components:       Result Value    WBC 15.0 (*)     RBC 3.61 (*)     .4 (*)     MCH 34.3 (*)     RDW 19.6 (*)     Nucleated RBCs 0.3 (*)     nRBC 0.04 (*)     Neutrophils % 84 (*)     Lymphocytes % 2 (*)     Immature Granulocytes % 1 (*)     Neutrophils Absolute 12.4 (*)     Lymphocytes Absolute 0.3 (*)     Monocytes Absolute 1.7 (*)     Basophils Absolute 0.2 (*)     Immature Granulocytes Absolute 0.2 (*)     All other components within normal limits   COMPREHENSIVE METABOLIC PANEL - Abnormal; Notable for the following components:    Sodium 133 (*)     Glucose 134 (*)     BUN 24 (*)     Creatinine 1.32 (*)     Est, Glom Filt Rate 56 (*)     Total Bilirubin 1.2 (*)     Albumin 3.2 (*)     Globulin 4.8 (*)     Albumin/Globulin Ratio 0.7 (*)     All other components within normal limits   URINALYSIS WITH MICROSCOPIC - Abnormal; Notable for the following components:    Protein, UA 30 (*)     Glucose, Ur >1000 (*)     Blood, Urine TRACE (*)     Leukocyte Esterase, Urine SMALL (*)     BACTERIA, URINE 3+ (*)     All other components within normal limits   BRAIN NATRIURETIC PEPTIDE - Abnormal; Notable for the following components:    NT Pro-BNP 1,959 (*)     All other components within normal limits   URINE CULTURE HOLD SAMPLE   BASIC METABOLIC PANEL   CBC WITH AUTO DIFFERENTIAL   MAGNESIUM   HEMOGLOBIN A1C   POCT GLUCOSE   POCT GLUCOSE       All 
5

## 2024-06-25 NOTE — ED TRIAGE NOTES
Patient to ER for complaint of dysuria since 06/19.     Denies chest pain and SOB.     Patient reports taking Azo with little improvement.     Hx of prostate cancer.     Provider in triage to assess patient.

## 2024-06-26 ENCOUNTER — APPOINTMENT (OUTPATIENT)
Facility: HOSPITAL | Age: 79
DRG: 286 | End: 2024-06-26
Attending: INTERNAL MEDICINE
Payer: MEDICARE

## 2024-06-26 ENCOUNTER — APPOINTMENT (OUTPATIENT)
Facility: HOSPITAL | Age: 79
DRG: 286 | End: 2024-06-26
Payer: MEDICARE

## 2024-06-26 LAB
ANION GAP SERPL CALC-SCNC: 7 MMOL/L (ref 5–15)
BASOPHILS # BLD: 0 K/UL (ref 0–0.1)
BASOPHILS NFR BLD: 0 % (ref 0–1)
BUN SERPL-MCNC: 23 MG/DL (ref 6–20)
BUN/CREAT SERPL: 18 (ref 12–20)
CALCIUM SERPL-MCNC: 8.7 MG/DL (ref 8.5–10.1)
CHLORIDE SERPL-SCNC: 103 MMOL/L (ref 97–108)
CO2 SERPL-SCNC: 24 MMOL/L (ref 21–32)
CREAT SERPL-MCNC: 1.29 MG/DL (ref 0.7–1.3)
DIFFERENTIAL METHOD BLD: ABNORMAL
ECHO AO ARCH DIAM: 1.7 CM
ECHO AO ASC DIAM: 2.4 CM
ECHO AO ASCENDING AORTA INDEX: 1.09 CM/M2
ECHO AO ROOT DIAM: 3.1 CM
ECHO AO ROOT INDEX: 1.4 CM/M2
ECHO AV AREA PEAK VELOCITY: 2.2 CM2
ECHO AV AREA VTI: 2.4 CM2
ECHO AV AREA/BSA PEAK VELOCITY: 1 CM2/M2
ECHO AV AREA/BSA VTI: 1.1 CM2/M2
ECHO AV MEAN GRADIENT: 2 MMHG
ECHO AV MEAN VELOCITY: 0.7 M/S
ECHO AV PEAK GRADIENT: 4 MMHG
ECHO AV PEAK VELOCITY: 0.9 M/S
ECHO AV VELOCITY RATIO: 0.78
ECHO AV VTI: 22.2 CM
ECHO BSA: 2.22 M2
ECHO LA DIAMETER INDEX: 1.58 CM/M2
ECHO LA DIAMETER: 3.5 CM
ECHO LA TO AORTIC ROOT RATIO: 1.13
ECHO LA VOL A-L A2C: 97 ML (ref 18–58)
ECHO LA VOL A-L A4C: 51 ML (ref 18–58)
ECHO LA VOL BP: 61 ML (ref 18–58)
ECHO LA VOL MOD A2C: 94 ML (ref 18–58)
ECHO LA VOL MOD A4C: 41 ML (ref 18–58)
ECHO LA VOL/BSA BIPLANE: 28 ML/M2 (ref 16–34)
ECHO LA VOLUME AREA LENGTH: 71 ML
ECHO LA VOLUME INDEX A-L A2C: 44 ML/M2 (ref 16–34)
ECHO LA VOLUME INDEX A-L A4C: 23 ML/M2 (ref 16–34)
ECHO LA VOLUME INDEX AREA LENGTH: 32 ML/M2 (ref 16–34)
ECHO LA VOLUME INDEX MOD A2C: 43 ML/M2 (ref 16–34)
ECHO LA VOLUME INDEX MOD A4C: 19 ML/M2 (ref 16–34)
ECHO LV E' LATERAL VELOCITY: 7 CM/S
ECHO LV E' SEPTAL VELOCITY: 5 CM/S
ECHO LV EDV A2C: 128 ML
ECHO LV EDV A4C: 140 ML
ECHO LV EDV BP: 140 ML (ref 67–155)
ECHO LV EDV INDEX A4C: 63 ML/M2
ECHO LV EDV INDEX BP: 63 ML/M2
ECHO LV EDV NDEX A2C: 58 ML/M2
ECHO LV EJECTION FRACTION A2C: 42 %
ECHO LV EJECTION FRACTION A4C: 28 %
ECHO LV EJECTION FRACTION BIPLANE: 32 % (ref 55–100)
ECHO LV ESV A2C: 74 ML
ECHO LV ESV A4C: 100 ML
ECHO LV ESV BP: 96 ML (ref 22–58)
ECHO LV ESV INDEX A2C: 33 ML/M2
ECHO LV ESV INDEX A4C: 45 ML/M2
ECHO LV ESV INDEX BP: 43 ML/M2
ECHO LV FRACTIONAL SHORTENING: 9 % (ref 28–44)
ECHO LV INTERNAL DIMENSION DIASTOLE INDEX: 1.58 CM/M2
ECHO LV INTERNAL DIMENSION DIASTOLIC: 3.5 CM (ref 4.2–5.9)
ECHO LV INTERNAL DIMENSION SYSTOLIC INDEX: 1.45 CM/M2
ECHO LV INTERNAL DIMENSION SYSTOLIC: 3.2 CM
ECHO LV IVSD: 1.6 CM (ref 0.6–1)
ECHO LV MASS 2D: 163.2 G (ref 88–224)
ECHO LV MASS INDEX 2D: 73.9 G/M2 (ref 49–115)
ECHO LV POSTERIOR WALL DIASTOLIC: 1.1 CM (ref 0.6–1)
ECHO LV RELATIVE WALL THICKNESS RATIO: 0.63
ECHO LVOT AREA: 3.1 CM2
ECHO LVOT AV VTI INDEX: 0.78
ECHO LVOT DIAM: 2 CM
ECHO LVOT MEAN GRADIENT: 1 MMHG
ECHO LVOT PEAK GRADIENT: 2 MMHG
ECHO LVOT PEAK VELOCITY: 0.7 M/S
ECHO LVOT STROKE VOLUME INDEX: 24.7 ML/M2
ECHO LVOT SV: 54.6 ML
ECHO LVOT VTI: 17.4 CM
ECHO MV A VELOCITY: 0.64 M/S
ECHO MV AREA VTI: 2 CM2
ECHO MV E DECELERATION TIME (DT): 243.2 MS
ECHO MV E VELOCITY: 1.06 M/S
ECHO MV E/A RATIO: 1.66
ECHO MV E/E' LATERAL: 15.14
ECHO MV E/E' RATIO (AVERAGED): 18.17
ECHO MV E/E' SEPTAL: 21.2
ECHO MV LVOT VTI INDEX: 1.55
ECHO MV MAX VELOCITY: 1.1 M/S
ECHO MV MEAN GRADIENT: 2 MMHG
ECHO MV MEAN VELOCITY: 0.7 M/S
ECHO MV PEAK GRADIENT: 4 MMHG
ECHO MV VTI: 26.9 CM
ECHO PV MAX VELOCITY: 0.8 M/S
ECHO PV PEAK GRADIENT: 3 MMHG
ECHO RV INTERNAL DIMENSION: 3.9 CM
ECHO RV TAPSE: 1.8 CM (ref 1.7–?)
ECHO TV REGURGITANT MAX VELOCITY: 1.49 M/S
ECHO TV REGURGITANT PEAK GRADIENT: 10 MMHG
EOSINOPHIL # BLD: 0.3 K/UL (ref 0–0.4)
EOSINOPHIL NFR BLD: 3 % (ref 0–7)
ERYTHROCYTE [DISTWIDTH] IN BLOOD BY AUTOMATED COUNT: 19.9 % (ref 11.5–14.5)
EST. AVERAGE GLUCOSE BLD GHB EST-MCNC: 143 MG/DL
GLUCOSE BLD STRIP.AUTO-MCNC: 173 MG/DL (ref 65–117)
GLUCOSE BLD STRIP.AUTO-MCNC: 180 MG/DL (ref 65–117)
GLUCOSE BLD STRIP.AUTO-MCNC: 191 MG/DL (ref 65–117)
GLUCOSE BLD STRIP.AUTO-MCNC: 218 MG/DL (ref 65–117)
GLUCOSE BLD STRIP.AUTO-MCNC: 258 MG/DL (ref 65–117)
GLUCOSE SERPL-MCNC: 177 MG/DL (ref 65–100)
HBA1C MFR BLD: 6.6 % (ref 4–5.6)
HCT VFR BLD AUTO: 34.1 % (ref 36.6–50.3)
HGB BLD-MCNC: 11.6 G/DL (ref 12.1–17)
IMM GRANULOCYTES # BLD AUTO: 0.1 K/UL (ref 0–0.04)
IMM GRANULOCYTES NFR BLD AUTO: 1 % (ref 0–0.5)
LYMPHOCYTES # BLD: 0.3 K/UL (ref 0.8–3.5)
LYMPHOCYTES NFR BLD: 4 % (ref 12–49)
MAGNESIUM SERPL-MCNC: 2 MG/DL (ref 1.6–2.4)
MCH RBC QN AUTO: 34.4 PG (ref 26–34)
MCHC RBC AUTO-ENTMCNC: 34 G/DL (ref 30–36.5)
MCV RBC AUTO: 101.2 FL (ref 80–99)
MONOCYTES # BLD: 1.2 K/UL (ref 0–1)
MONOCYTES NFR BLD: 14 % (ref 5–13)
NEUTS SEG # BLD: 6.6 K/UL (ref 1.8–8)
NEUTS SEG NFR BLD: 78 % (ref 32–75)
NRBC # BLD: 0.03 K/UL (ref 0–0.01)
NRBC BLD-RTO: 0.4 PER 100 WBC
NT PRO BNP: 1959 PG/ML
PLATELET # BLD AUTO: 200 K/UL (ref 150–400)
PMV BLD AUTO: 10.3 FL (ref 8.9–12.9)
POTASSIUM SERPL-SCNC: 3.7 MMOL/L (ref 3.5–5.1)
RBC # BLD AUTO: 3.37 M/UL (ref 4.1–5.7)
RBC MORPH BLD: ABNORMAL
SARS-COV-2 RNA RESP QL NAA+PROBE: NOT DETECTED
SERVICE CMNT-IMP: ABNORMAL
SODIUM SERPL-SCNC: 134 MMOL/L (ref 136–145)
SOURCE: NORMAL
TROPONIN I SERPL HS-MCNC: 17 NG/L (ref 0–76)
WBC # BLD AUTO: 8.5 K/UL (ref 4.1–11.1)

## 2024-06-26 PROCEDURE — 6360000002 HC RX W HCPCS: Performed by: PHYSICIAN ASSISTANT

## 2024-06-26 PROCEDURE — 6370000000 HC RX 637 (ALT 250 FOR IP): Performed by: INTERNAL MEDICINE

## 2024-06-26 PROCEDURE — 6370000000 HC RX 637 (ALT 250 FOR IP): Performed by: NURSE PRACTITIONER

## 2024-06-26 PROCEDURE — 93306 TTE W/DOPPLER COMPLETE: CPT

## 2024-06-26 PROCEDURE — 83735 ASSAY OF MAGNESIUM: CPT

## 2024-06-26 PROCEDURE — 6360000004 HC RX CONTRAST MEDICATION: Performed by: INTERNAL MEDICINE

## 2024-06-26 PROCEDURE — 36415 COLL VENOUS BLD VENIPUNCTURE: CPT

## 2024-06-26 PROCEDURE — 80048 BASIC METABOLIC PNL TOTAL CA: CPT

## 2024-06-26 PROCEDURE — 82962 GLUCOSE BLOOD TEST: CPT

## 2024-06-26 PROCEDURE — 2700000000 HC OXYGEN THERAPY PER DAY

## 2024-06-26 PROCEDURE — 2580000003 HC RX 258: Performed by: INTERNAL MEDICINE

## 2024-06-26 PROCEDURE — 93306 TTE W/DOPPLER COMPLETE: CPT | Performed by: STUDENT IN AN ORGANIZED HEALTH CARE EDUCATION/TRAINING PROGRAM

## 2024-06-26 PROCEDURE — 85025 COMPLETE CBC W/AUTO DIFF WBC: CPT

## 2024-06-26 PROCEDURE — 97162 PT EVAL MOD COMPLEX 30 MIN: CPT

## 2024-06-26 PROCEDURE — 94761 N-INVAS EAR/PLS OXIMETRY MLT: CPT

## 2024-06-26 PROCEDURE — 84484 ASSAY OF TROPONIN QUANT: CPT

## 2024-06-26 PROCEDURE — 1100000000 HC RM PRIVATE

## 2024-06-26 PROCEDURE — 87635 SARS-COV-2 COVID-19 AMP PRB: CPT

## 2024-06-26 PROCEDURE — 2580000003 HC RX 258: Performed by: PHYSICIAN ASSISTANT

## 2024-06-26 PROCEDURE — 97116 GAIT TRAINING THERAPY: CPT

## 2024-06-26 PROCEDURE — 71275 CT ANGIOGRAPHY CHEST: CPT

## 2024-06-26 PROCEDURE — 6360000002 HC RX W HCPCS: Performed by: INTERNAL MEDICINE

## 2024-06-26 PROCEDURE — 51798 US URINE CAPACITY MEASURE: CPT

## 2024-06-26 RX ORDER — BENZONATATE 100 MG/1
100 CAPSULE ORAL 3 TIMES DAILY PRN
Status: DISCONTINUED | OUTPATIENT
Start: 2024-06-26 | End: 2024-07-02 | Stop reason: HOSPADM

## 2024-06-26 RX ORDER — PHENAZOPYRIDINE HYDROCHLORIDE 100 MG/1
200 TABLET, FILM COATED ORAL
Status: DISCONTINUED | OUTPATIENT
Start: 2024-06-26 | End: 2024-07-02 | Stop reason: HOSPADM

## 2024-06-26 RX ORDER — OXYCODONE HYDROCHLORIDE 5 MG/1
5 TABLET ORAL EVERY 4 HOURS PRN
Status: DISCONTINUED | OUTPATIENT
Start: 2024-06-26 | End: 2024-07-02 | Stop reason: HOSPADM

## 2024-06-26 RX ORDER — GABAPENTIN 300 MG/1
600 CAPSULE ORAL 3 TIMES DAILY
Status: DISCONTINUED | OUTPATIENT
Start: 2024-06-26 | End: 2024-07-02 | Stop reason: HOSPADM

## 2024-06-26 RX ORDER — BUMETANIDE 0.25 MG/ML
0.5 INJECTION INTRAMUSCULAR; INTRAVENOUS 2 TIMES DAILY
Status: DISCONTINUED | OUTPATIENT
Start: 2024-06-26 | End: 2024-06-26

## 2024-06-26 RX ORDER — BUMETANIDE 0.25 MG/ML
1 INJECTION INTRAMUSCULAR; INTRAVENOUS ONCE
Status: DISCONTINUED | OUTPATIENT
Start: 2024-06-26 | End: 2024-07-02 | Stop reason: HOSPADM

## 2024-06-26 RX ORDER — GABAPENTIN 300 MG/1
600 CAPSULE ORAL 3 TIMES DAILY
Status: DISCONTINUED | OUTPATIENT
Start: 2024-06-26 | End: 2024-06-26

## 2024-06-26 RX ORDER — IPRATROPIUM BROMIDE AND ALBUTEROL SULFATE 2.5; .5 MG/3ML; MG/3ML
1 SOLUTION RESPIRATORY (INHALATION) EVERY 4 HOURS PRN
Status: DISCONTINUED | OUTPATIENT
Start: 2024-06-26 | End: 2024-07-02 | Stop reason: HOSPADM

## 2024-06-26 RX ORDER — GUAIFENESIN 600 MG/1
600 TABLET, EXTENDED RELEASE ORAL 2 TIMES DAILY
Status: DISCONTINUED | OUTPATIENT
Start: 2024-06-26 | End: 2024-07-02 | Stop reason: HOSPADM

## 2024-06-26 RX ADMIN — GABAPENTIN 600 MG: 300 CAPSULE ORAL at 12:37

## 2024-06-26 RX ADMIN — IOPAMIDOL 100 ML: 755 INJECTION, SOLUTION INTRAVENOUS at 13:45

## 2024-06-26 RX ADMIN — SODIUM CHLORIDE, PRESERVATIVE FREE 10 ML: 5 INJECTION INTRAVENOUS at 07:41

## 2024-06-26 RX ADMIN — WATER 1000 MG: 1 INJECTION INTRAMUSCULAR; INTRAVENOUS; SUBCUTANEOUS at 00:05

## 2024-06-26 RX ADMIN — SODIUM CHLORIDE, PRESERVATIVE FREE 10 ML: 5 INJECTION INTRAVENOUS at 00:09

## 2024-06-26 RX ADMIN — GABAPENTIN 600 MG: 300 CAPSULE ORAL at 21:25

## 2024-06-26 RX ADMIN — CLOPIDOGREL BISULFATE 75 MG: 75 TABLET ORAL at 07:40

## 2024-06-26 RX ADMIN — GABAPENTIN 600 MG: 300 CAPSULE ORAL at 01:56

## 2024-06-26 RX ADMIN — GUAIFENESIN 600 MG: 600 TABLET ORAL at 21:24

## 2024-06-26 RX ADMIN — GABAPENTIN 600 MG: 300 CAPSULE ORAL at 07:40

## 2024-06-26 RX ADMIN — WATER 1000 MG: 1 INJECTION INTRAMUSCULAR; INTRAVENOUS; SUBCUTANEOUS at 21:30

## 2024-06-26 RX ADMIN — PHENAZOPYRIDINE 200 MG: 100 TABLET ORAL at 17:30

## 2024-06-26 RX ADMIN — GUAIFENESIN 600 MG: 600 TABLET ORAL at 07:40

## 2024-06-26 RX ADMIN — INSULIN LISPRO 2 UNITS: 100 INJECTION, SOLUTION INTRAVENOUS; SUBCUTANEOUS at 12:37

## 2024-06-26 RX ADMIN — PHENAZOPYRIDINE 200 MG: 100 TABLET ORAL at 12:37

## 2024-06-26 RX ADMIN — ENOXAPARIN SODIUM 40 MG: 100 INJECTION SUBCUTANEOUS at 07:40

## 2024-06-26 RX ADMIN — BUMETANIDE 0.5 MG: 0.25 INJECTION INTRAMUSCULAR; INTRAVENOUS at 07:39

## 2024-06-26 RX ADMIN — SODIUM CHLORIDE, PRESERVATIVE FREE 10 ML: 5 INJECTION INTRAVENOUS at 21:25

## 2024-06-26 ASSESSMENT — PAIN SCALES - GENERAL
PAINLEVEL_OUTOF10: 0
PAINLEVEL_OUTOF10: 0

## 2024-06-26 NOTE — ED NOTES
Pt ambulated down the ramos with 4 L NC. Pt reading 80-85% while walking. Pt reading 94-98% while at rest with 4 L NC.

## 2024-06-26 NOTE — H&P
Mariusz Gudino [facility]  [address] [place]  [address] [phone]    Admission History and Physical      NAME:  Shade Birmingham   :   1945   MRN:  [id number]     PCP:  Lucas Buckner MD     Date/Time:  2024        Subjective:     CHIEF COMPLAINT: \"Today it just hit me\"     HISTORY OF PRESENT ILLNESS:     Mr. Birmingham is a 78 y.o.  male with PMH of CAD, DM, HTN, prostate CA, neuropathy admitted for UTI and hypoxia. Per pt, noted last week he was having some urinary symptoms but had a trip to [facility] that he decided to take. When he returned he noted his symptoms had worsened and he was having chills today prompting him to come to the ED. He denies CP, SOB but was noted to be hypoxic in the ED. No cough. Denies edema but was noted to have some mild bilateral LE edema.     Past Medical History:   Diagnosis Date    Arthritis     CAD (coronary artery disease)     Cancer (HCC)     hx bladder ca, skin CA    Chronic pain     Diabetes (HCC)     Elevated PSA     Hemorrhage of rectum and anus 10/4/2010    Hx of bladder cancer     Hypertension     Joint pain     Leg swelling     Memory disorder     Muscle pain     Muscle weakness     Neuropathy     Prostate cancer (HCC)     Snoring         Past Surgical History:   Procedure Laterality Date    CARDIAC CATHETERIZATION  2018    COLONOSCOPY N/A 2022    COLONOSCOPY performed by Kike Canela MD at [facility] ENDOSCOPY    GI      COLONOSCOPY    TOTAL KNEE ARTHROPLASTY Right 2020    UROLOGICAL SURGERY      laser treatment bladder CA    VASCULAR SURGERY  2019     STENTS    VASCULAR SURGERY  2018    REPLACED VEIN L LEG C STENTS       Social History     Tobacco Use    Smoking status: Former     Current packs/day: 0.00     Types: Cigarettes     Quit date: 2007     Years since quittin.4    Smokeless tobacco: Never   Substance Use Topics    Alcohol use: Not Currently     Alcohol/week: 35.0 standard

## 2024-06-26 NOTE — PLAN OF CARE
Problem: Safety - Adult  Goal: Free from fall injury  6/26/2024 1343 by Erlin Marvin, RN  Outcome: Progressing  6/26/2024 0818 by Marta Plascencia RN  Outcome: Progressing     Problem: Discharge Planning  Goal: Discharge to home or other facility with appropriate resources  Outcome: Progressing     Problem: ABCDS Injury Assessment  Goal: Absence of physical injury  Outcome: Progressing     Problem: Chronic Conditions and Co-morbidities  Goal: Patient's chronic conditions and co-morbidity symptoms are monitored and maintained or improved  Outcome: Progressing

## 2024-06-26 NOTE — ED NOTES
Bedside and Verbal shift change report given to Marta (oncoming nurse) by Sabina (offgoing nurse). Report included the following information Nurse Handoff Report, ED Encounter Summary, ED SBAR, Intake/Output, MAR, Cardiac Rhythm NSR pvc's, and Neuro Assessment.

## 2024-06-27 LAB
ANION GAP SERPL CALC-SCNC: 5 MMOL/L (ref 5–15)
BUN SERPL-MCNC: 22 MG/DL (ref 6–20)
BUN/CREAT SERPL: 18 (ref 12–20)
CALCIUM SERPL-MCNC: 8.6 MG/DL (ref 8.5–10.1)
CHLORIDE SERPL-SCNC: 102 MMOL/L (ref 97–108)
CO2 SERPL-SCNC: 26 MMOL/L (ref 21–32)
CREAT SERPL-MCNC: 1.24 MG/DL (ref 0.7–1.3)
CRP SERPL-MCNC: 4.53 MG/DL (ref 0–0.3)
EKG ATRIAL RATE: 75 BPM
EKG DIAGNOSIS: NORMAL
EKG P AXIS: 75 DEGREES
EKG P-R INTERVAL: 178 MS
EKG Q-T INTERVAL: 422 MS
EKG QRS DURATION: 92 MS
EKG QTC CALCULATION (BAZETT): 471 MS
EKG R AXIS: -5 DEGREES
EKG T AXIS: 89 DEGREES
EKG VENTRICULAR RATE: 75 BPM
ERYTHROCYTE [DISTWIDTH] IN BLOOD BY AUTOMATED COUNT: 19.5 % (ref 11.5–14.5)
GLUCOSE BLD STRIP.AUTO-MCNC: 207 MG/DL (ref 65–117)
GLUCOSE BLD STRIP.AUTO-MCNC: 228 MG/DL (ref 65–117)
GLUCOSE BLD STRIP.AUTO-MCNC: 246 MG/DL (ref 65–117)
GLUCOSE BLD STRIP.AUTO-MCNC: 286 MG/DL (ref 65–117)
GLUCOSE SERPL-MCNC: 187 MG/DL (ref 65–100)
HCT VFR BLD AUTO: 34.7 % (ref 36.6–50.3)
HGB BLD-MCNC: 11.7 G/DL (ref 12.1–17)
MAGNESIUM SERPL-MCNC: 2 MG/DL (ref 1.6–2.4)
MCH RBC QN AUTO: 34.5 PG (ref 26–34)
MCHC RBC AUTO-ENTMCNC: 33.7 G/DL (ref 30–36.5)
MCV RBC AUTO: 102.4 FL (ref 80–99)
NRBC # BLD: 0.02 K/UL (ref 0–0.01)
NRBC BLD-RTO: 0.3 PER 100 WBC
NT PRO BNP: 2542 PG/ML
PHOSPHATE SERPL-MCNC: 3.8 MG/DL (ref 2.6–4.7)
PLATELET # BLD AUTO: 208 K/UL (ref 150–400)
PMV BLD AUTO: 10.7 FL (ref 8.9–12.9)
POTASSIUM SERPL-SCNC: 3.8 MMOL/L (ref 3.5–5.1)
RBC # BLD AUTO: 3.39 M/UL (ref 4.1–5.7)
SERVICE CMNT-IMP: ABNORMAL
SODIUM SERPL-SCNC: 133 MMOL/L (ref 136–145)
WBC # BLD AUTO: 6.8 K/UL (ref 4.1–11.1)

## 2024-06-27 PROCEDURE — 80048 BASIC METABOLIC PNL TOTAL CA: CPT

## 2024-06-27 PROCEDURE — 99222 1ST HOSP IP/OBS MODERATE 55: CPT | Performed by: SPECIALIST

## 2024-06-27 PROCEDURE — 6370000000 HC RX 637 (ALT 250 FOR IP): Performed by: NURSE PRACTITIONER

## 2024-06-27 PROCEDURE — 82962 GLUCOSE BLOOD TEST: CPT

## 2024-06-27 PROCEDURE — 6370000000 HC RX 637 (ALT 250 FOR IP): Performed by: INTERNAL MEDICINE

## 2024-06-27 PROCEDURE — 93010 ELECTROCARDIOGRAM REPORT: CPT | Performed by: SPECIALIST

## 2024-06-27 PROCEDURE — 83735 ASSAY OF MAGNESIUM: CPT

## 2024-06-27 PROCEDURE — 84100 ASSAY OF PHOSPHORUS: CPT

## 2024-06-27 PROCEDURE — 83880 ASSAY OF NATRIURETIC PEPTIDE: CPT

## 2024-06-27 PROCEDURE — 6370000000 HC RX 637 (ALT 250 FOR IP)

## 2024-06-27 PROCEDURE — 93005 ELECTROCARDIOGRAM TRACING: CPT

## 2024-06-27 PROCEDURE — 36415 COLL VENOUS BLD VENIPUNCTURE: CPT

## 2024-06-27 PROCEDURE — 86140 C-REACTIVE PROTEIN: CPT

## 2024-06-27 PROCEDURE — 1100000000 HC RM PRIVATE

## 2024-06-27 PROCEDURE — 2700000000 HC OXYGEN THERAPY PER DAY

## 2024-06-27 PROCEDURE — 2580000003 HC RX 258: Performed by: INTERNAL MEDICINE

## 2024-06-27 PROCEDURE — 85027 COMPLETE CBC AUTOMATED: CPT

## 2024-06-27 PROCEDURE — 6360000002 HC RX W HCPCS: Performed by: INTERNAL MEDICINE

## 2024-06-27 PROCEDURE — 94761 N-INVAS EAR/PLS OXIMETRY MLT: CPT

## 2024-06-27 RX ORDER — SPIRONOLACTONE 25 MG/1
25 TABLET ORAL DAILY
Status: DISCONTINUED | OUTPATIENT
Start: 2024-06-27 | End: 2024-07-02 | Stop reason: HOSPADM

## 2024-06-27 RX ORDER — BUMETANIDE 0.25 MG/ML
1 INJECTION INTRAMUSCULAR; INTRAVENOUS EVERY 12 HOURS
Status: DISCONTINUED | OUTPATIENT
Start: 2024-06-27 | End: 2024-06-28

## 2024-06-27 RX ORDER — CLOPIDOGREL BISULFATE 75 MG/1
75 TABLET ORAL DAILY
Status: DISCONTINUED | OUTPATIENT
Start: 2024-06-27 | End: 2024-06-27

## 2024-06-27 RX ADMIN — SODIUM CHLORIDE, PRESERVATIVE FREE 10 ML: 5 INJECTION INTRAVENOUS at 20:26

## 2024-06-27 RX ADMIN — INSULIN LISPRO 2 UNITS: 100 INJECTION, SOLUTION INTRAVENOUS; SUBCUTANEOUS at 11:27

## 2024-06-27 RX ADMIN — SODIUM CHLORIDE, PRESERVATIVE FREE 10 ML: 5 INJECTION INTRAVENOUS at 08:19

## 2024-06-27 RX ADMIN — GUAIFENESIN 600 MG: 600 TABLET ORAL at 08:19

## 2024-06-27 RX ADMIN — ENOXAPARIN SODIUM 40 MG: 100 INJECTION SUBCUTANEOUS at 08:16

## 2024-06-27 RX ADMIN — INSULIN LISPRO 2 UNITS: 100 INJECTION, SOLUTION INTRAVENOUS; SUBCUTANEOUS at 08:17

## 2024-06-27 RX ADMIN — PHENAZOPYRIDINE 200 MG: 100 TABLET ORAL at 16:49

## 2024-06-27 RX ADMIN — BUMETANIDE 1 MG: 0.25 INJECTION INTRAMUSCULAR; INTRAVENOUS at 20:25

## 2024-06-27 RX ADMIN — GUAIFENESIN 600 MG: 600 TABLET ORAL at 20:25

## 2024-06-27 RX ADMIN — GABAPENTIN 600 MG: 300 CAPSULE ORAL at 20:24

## 2024-06-27 RX ADMIN — SACUBITRIL AND VALSARTAN 1 TABLET: 24; 26 TABLET, FILM COATED ORAL at 20:24

## 2024-06-27 RX ADMIN — SPIRONOLACTONE 25 MG: 25 TABLET ORAL at 09:16

## 2024-06-27 RX ADMIN — BUMETANIDE 1 MG: 0.25 INJECTION INTRAMUSCULAR; INTRAVENOUS at 08:35

## 2024-06-27 RX ADMIN — INSULIN LISPRO 2 UNITS: 100 INJECTION, SOLUTION INTRAVENOUS; SUBCUTANEOUS at 16:49

## 2024-06-27 RX ADMIN — PIPERACILLIN AND TAZOBACTAM 3375 MG: 3; .375 INJECTION, POWDER, LYOPHILIZED, FOR SOLUTION INTRAVENOUS at 22:00

## 2024-06-27 RX ADMIN — CLOPIDOGREL BISULFATE 75 MG: 75 TABLET ORAL at 08:19

## 2024-06-27 RX ADMIN — GABAPENTIN 600 MG: 300 CAPSULE ORAL at 08:17

## 2024-06-27 RX ADMIN — PHENAZOPYRIDINE 200 MG: 100 TABLET ORAL at 08:15

## 2024-06-27 RX ADMIN — GABAPENTIN 600 MG: 300 CAPSULE ORAL at 14:46

## 2024-06-27 RX ADMIN — PHENAZOPYRIDINE 200 MG: 100 TABLET ORAL at 11:31

## 2024-06-27 RX ADMIN — PIPERACILLIN AND TAZOBACTAM 4500 MG: 4; .5 INJECTION, POWDER, FOR SOLUTION INTRAVENOUS at 16:56

## 2024-06-27 ASSESSMENT — PAIN SCALES - GENERAL: PAINLEVEL_OUTOF10: 5

## 2024-06-28 ENCOUNTER — APPOINTMENT (OUTPATIENT)
Facility: HOSPITAL | Age: 79
DRG: 286 | End: 2024-06-28
Payer: MEDICARE

## 2024-06-28 PROBLEM — I42.9 CARDIOMYOPATHY (HCC): Status: ACTIVE | Noted: 2024-06-28

## 2024-06-28 PROBLEM — I25.10 CORONARY ARTERY DISEASE: Status: ACTIVE | Noted: 2024-06-28

## 2024-06-28 LAB
ANION GAP SERPL CALC-SCNC: 7 MMOL/L (ref 5–15)
APTT PPP: 25.6 SEC (ref 22.1–31)
BACTERIA SPEC CULT: ABNORMAL
BUN SERPL-MCNC: 26 MG/DL (ref 6–20)
BUN/CREAT SERPL: 18 (ref 12–20)
CALCIUM SERPL-MCNC: 8.8 MG/DL (ref 8.5–10.1)
CC UR VC: ABNORMAL
CHLORIDE SERPL-SCNC: 100 MMOL/L (ref 97–108)
CO2 SERPL-SCNC: 25 MMOL/L (ref 21–32)
CREAT SERPL-MCNC: 1.42 MG/DL (ref 0.7–1.3)
CRP SERPL-MCNC: 2.2 MG/DL (ref 0–0.3)
ECHO BSA: 2.22 M2
GLUCOSE BLD STRIP.AUTO-MCNC: 203 MG/DL (ref 65–117)
GLUCOSE BLD STRIP.AUTO-MCNC: 248 MG/DL (ref 65–117)
GLUCOSE BLD STRIP.AUTO-MCNC: 253 MG/DL (ref 65–117)
GLUCOSE BLD STRIP.AUTO-MCNC: 297 MG/DL (ref 65–117)
GLUCOSE SERPL-MCNC: 256 MG/DL (ref 65–100)
INR PPP: 1.1 (ref 0.9–1.1)
MAGNESIUM SERPL-MCNC: 2 MG/DL (ref 1.6–2.4)
NT PRO BNP: 2078 PG/ML
NUC STRESS EJECTION FRACTION: 47 %
PHOSPHATE SERPL-MCNC: 5.1 MG/DL (ref 2.6–4.7)
POTASSIUM SERPL-SCNC: 3.6 MMOL/L (ref 3.5–5.1)
PROTHROMBIN TIME: 11 SEC (ref 9–11.1)
SERVICE CMNT-IMP: ABNORMAL
SODIUM SERPL-SCNC: 132 MMOL/L (ref 136–145)
STRESS BASELINE DIAS BP: 70 MMHG
STRESS BASELINE HR: 73 BPM
STRESS BASELINE SYS BP: 128 MMHG
STRESS ESTIMATED WORKLOAD: 1 METS
STRESS PEAK DIAS BP: 68 MMHG
STRESS PEAK SYS BP: 132 MMHG
STRESS PERCENT HR ACHIEVED: 79 %
STRESS POST PEAK HR: 113 BPM
STRESS RATE PRESSURE PRODUCT: NORMAL BPM*MMHG
STRESS TARGET HR: 143 BPM
THERAPEUTIC RANGE: NORMAL SECS (ref 58–77)
UFH PPP CHRO-ACNC: 0.22 IU/ML

## 2024-06-28 PROCEDURE — 83880 ASSAY OF NATRIURETIC PEPTIDE: CPT

## 2024-06-28 PROCEDURE — 6370000000 HC RX 637 (ALT 250 FOR IP): Performed by: NURSE PRACTITIONER

## 2024-06-28 PROCEDURE — 84100 ASSAY OF PHOSPHORUS: CPT

## 2024-06-28 PROCEDURE — 85730 THROMBOPLASTIN TIME PARTIAL: CPT

## 2024-06-28 PROCEDURE — 82962 GLUCOSE BLOOD TEST: CPT

## 2024-06-28 PROCEDURE — 2580000003 HC RX 258: Performed by: INTERNAL MEDICINE

## 2024-06-28 PROCEDURE — 86140 C-REACTIVE PROTEIN: CPT

## 2024-06-28 PROCEDURE — 93017 CV STRESS TEST TRACING ONLY: CPT

## 2024-06-28 PROCEDURE — 93016 CV STRESS TEST SUPVJ ONLY: CPT | Performed by: STUDENT IN AN ORGANIZED HEALTH CARE EDUCATION/TRAINING PROGRAM

## 2024-06-28 PROCEDURE — 2700000000 HC OXYGEN THERAPY PER DAY

## 2024-06-28 PROCEDURE — 6370000000 HC RX 637 (ALT 250 FOR IP): Performed by: INTERNAL MEDICINE

## 2024-06-28 PROCEDURE — 85520 HEPARIN ASSAY: CPT

## 2024-06-28 PROCEDURE — 6360000002 HC RX W HCPCS: Performed by: INTERNAL MEDICINE

## 2024-06-28 PROCEDURE — 78452 HT MUSCLE IMAGE SPECT MULT: CPT | Performed by: STUDENT IN AN ORGANIZED HEALTH CARE EDUCATION/TRAINING PROGRAM

## 2024-06-28 PROCEDURE — 36415 COLL VENOUS BLD VENIPUNCTURE: CPT

## 2024-06-28 PROCEDURE — 85610 PROTHROMBIN TIME: CPT

## 2024-06-28 PROCEDURE — 94618 PULMONARY STRESS TESTING: CPT

## 2024-06-28 PROCEDURE — 83735 ASSAY OF MAGNESIUM: CPT

## 2024-06-28 PROCEDURE — 3430000000 HC RX DIAGNOSTIC RADIOPHARMACEUTICAL

## 2024-06-28 PROCEDURE — 1100000000 HC RM PRIVATE

## 2024-06-28 PROCEDURE — 78452 HT MUSCLE IMAGE SPECT MULT: CPT

## 2024-06-28 PROCEDURE — A9500 TC99M SESTAMIBI: HCPCS

## 2024-06-28 PROCEDURE — 99232 SBSQ HOSP IP/OBS MODERATE 35: CPT | Performed by: SPECIALIST

## 2024-06-28 PROCEDURE — 6360000002 HC RX W HCPCS

## 2024-06-28 PROCEDURE — 94761 N-INVAS EAR/PLS OXIMETRY MLT: CPT

## 2024-06-28 PROCEDURE — 80048 BASIC METABOLIC PNL TOTAL CA: CPT

## 2024-06-28 PROCEDURE — 6370000000 HC RX 637 (ALT 250 FOR IP)

## 2024-06-28 PROCEDURE — 93018 CV STRESS TEST I&R ONLY: CPT | Performed by: STUDENT IN AN ORGANIZED HEALTH CARE EDUCATION/TRAINING PROGRAM

## 2024-06-28 RX ORDER — TETRAKIS(2-METHOXYISOBUTYLISOCYANIDE)COPPER(I) TETRAFLUOROBORATE 1 MG/ML
32 INJECTION, POWDER, LYOPHILIZED, FOR SOLUTION INTRAVENOUS ONCE
Status: COMPLETED | OUTPATIENT
Start: 2024-06-28 | End: 2024-06-28

## 2024-06-28 RX ORDER — TETRAKIS(2-METHOXYISOBUTYLISOCYANIDE)COPPER(I) TETRAFLUOROBORATE 1 MG/ML
11 INJECTION, POWDER, LYOPHILIZED, FOR SOLUTION INTRAVENOUS ONCE
Status: COMPLETED | OUTPATIENT
Start: 2024-06-28 | End: 2024-06-28

## 2024-06-28 RX ORDER — REGADENOSON 0.08 MG/ML
INJECTION, SOLUTION INTRAVENOUS
Status: COMPLETED
Start: 2024-06-28 | End: 2024-06-28

## 2024-06-28 RX ORDER — NITROGLYCERIN 0.4 MG/1
0.4 TABLET SUBLINGUAL EVERY 5 MIN PRN
Status: DISCONTINUED | OUTPATIENT
Start: 2024-06-28 | End: 2024-07-02 | Stop reason: HOSPADM

## 2024-06-28 RX ORDER — HEPARIN SODIUM 1000 [USP'U]/ML
2000 INJECTION, SOLUTION INTRAVENOUS; SUBCUTANEOUS PRN
Status: DISCONTINUED | OUTPATIENT
Start: 2024-06-28 | End: 2024-06-30

## 2024-06-28 RX ORDER — REGADENOSON 0.08 MG/ML
0.4 INJECTION, SOLUTION INTRAVENOUS
Status: COMPLETED | OUTPATIENT
Start: 2024-06-28 | End: 2024-06-28

## 2024-06-28 RX ORDER — LANOLIN ALCOHOL/MO/W.PET/CERES
3 CREAM (GRAM) TOPICAL NIGHTLY PRN
Status: DISCONTINUED | OUTPATIENT
Start: 2024-06-28 | End: 2024-07-02 | Stop reason: HOSPADM

## 2024-06-28 RX ORDER — HEPARIN SODIUM 1000 [USP'U]/ML
4000 INJECTION, SOLUTION INTRAVENOUS; SUBCUTANEOUS ONCE
Status: COMPLETED | OUTPATIENT
Start: 2024-06-28 | End: 2024-06-28

## 2024-06-28 RX ORDER — CIPROFLOXACIN 500 MG/1
250 TABLET, FILM COATED ORAL EVERY 12 HOURS SCHEDULED
Status: DISCONTINUED | OUTPATIENT
Start: 2024-06-28 | End: 2024-07-02 | Stop reason: HOSPADM

## 2024-06-28 RX ORDER — ASPIRIN 81 MG/1
81 TABLET, CHEWABLE ORAL DAILY
Status: DISCONTINUED | OUTPATIENT
Start: 2024-06-28 | End: 2024-07-02 | Stop reason: HOSPADM

## 2024-06-28 RX ORDER — HEPARIN SODIUM 10000 [USP'U]/100ML
5-30 INJECTION, SOLUTION INTRAVENOUS CONTINUOUS
Status: DISCONTINUED | OUTPATIENT
Start: 2024-06-28 | End: 2024-06-30

## 2024-06-28 RX ORDER — HEPARIN SODIUM 1000 [USP'U]/ML
4000 INJECTION, SOLUTION INTRAVENOUS; SUBCUTANEOUS PRN
Status: DISCONTINUED | OUTPATIENT
Start: 2024-06-28 | End: 2024-06-30

## 2024-06-28 RX ORDER — REGADENOSON 0.08 MG/ML
0.4 INJECTION, SOLUTION INTRAVENOUS ONCE
Status: DISCONTINUED | OUTPATIENT
Start: 2024-06-28 | End: 2024-06-28 | Stop reason: SDUPTHER

## 2024-06-28 RX ORDER — BUMETANIDE 1 MG/1
1 TABLET ORAL DAILY
Status: DISCONTINUED | OUTPATIENT
Start: 2024-06-28 | End: 2024-06-30

## 2024-06-28 RX ADMIN — HEPARIN SODIUM AND DEXTROSE 10 UNITS/KG/HR: 10000; 5 INJECTION INTRAVENOUS at 17:31

## 2024-06-28 RX ADMIN — REGADENOSON 0.4 MG: 0.08 INJECTION, SOLUTION INTRAVENOUS at 09:36

## 2024-06-28 RX ADMIN — GABAPENTIN 600 MG: 300 CAPSULE ORAL at 14:40

## 2024-06-28 RX ADMIN — INSULIN LISPRO 4 UNITS: 100 INJECTION, SOLUTION INTRAVENOUS; SUBCUTANEOUS at 12:04

## 2024-06-28 RX ADMIN — CIPROFLOXACIN HYDROCHLORIDE 250 MG: 500 TABLET, FILM COATED ORAL at 20:37

## 2024-06-28 RX ADMIN — GABAPENTIN 600 MG: 300 CAPSULE ORAL at 08:32

## 2024-06-28 RX ADMIN — TETRAKIS(2-METHOXYISOBUTYLISOCYANIDE)COPPER(I) TETRAFLUOROBORATE 32 MILLICURIE: 1 INJECTION, POWDER, LYOPHILIZED, FOR SOLUTION INTRAVENOUS at 09:47

## 2024-06-28 RX ADMIN — TETRAKIS(2-METHOXYISOBUTYLISOCYANIDE)COPPER(I) TETRAFLUOROBORATE 11 MILLICURIE: 1 INJECTION, POWDER, LYOPHILIZED, FOR SOLUTION INTRAVENOUS at 08:20

## 2024-06-28 RX ADMIN — BUMETANIDE 1 MG: 1 TABLET ORAL at 12:03

## 2024-06-28 RX ADMIN — PHENAZOPYRIDINE 200 MG: 100 TABLET ORAL at 08:32

## 2024-06-28 RX ADMIN — PHENAZOPYRIDINE 200 MG: 100 TABLET ORAL at 12:03

## 2024-06-28 RX ADMIN — SPIRONOLACTONE 25 MG: 25 TABLET ORAL at 08:32

## 2024-06-28 RX ADMIN — CLOPIDOGREL BISULFATE 75 MG: 75 TABLET ORAL at 08:32

## 2024-06-28 RX ADMIN — PIPERACILLIN AND TAZOBACTAM 3375 MG: 3; .375 INJECTION, POWDER, LYOPHILIZED, FOR SOLUTION INTRAVENOUS at 05:31

## 2024-06-28 RX ADMIN — INSULIN LISPRO 2 UNITS: 100 INJECTION, SOLUTION INTRAVENOUS; SUBCUTANEOUS at 08:33

## 2024-06-28 RX ADMIN — CIPROFLOXACIN HYDROCHLORIDE 250 MG: 500 TABLET, FILM COATED ORAL at 12:03

## 2024-06-28 RX ADMIN — SACUBITRIL AND VALSARTAN 1 TABLET: 24; 26 TABLET, FILM COATED ORAL at 08:32

## 2024-06-28 RX ADMIN — SODIUM CHLORIDE, PRESERVATIVE FREE 10 ML: 5 INJECTION INTRAVENOUS at 08:33

## 2024-06-28 RX ADMIN — GUAIFENESIN 600 MG: 600 TABLET ORAL at 20:37

## 2024-06-28 RX ADMIN — ASPIRIN 81 MG: 81 TABLET, CHEWABLE ORAL at 12:03

## 2024-06-28 RX ADMIN — ENOXAPARIN SODIUM 40 MG: 100 INJECTION SUBCUTANEOUS at 08:32

## 2024-06-28 RX ADMIN — INSULIN LISPRO 2 UNITS: 100 INJECTION, SOLUTION INTRAVENOUS; SUBCUTANEOUS at 17:15

## 2024-06-28 RX ADMIN — HEPARIN SODIUM 4000 UNITS: 1000 INJECTION INTRAVENOUS; SUBCUTANEOUS at 17:29

## 2024-06-28 RX ADMIN — GABAPENTIN 600 MG: 300 CAPSULE ORAL at 20:37

## 2024-06-28 RX ADMIN — SACUBITRIL AND VALSARTAN 2 TABLET: 24; 26 TABLET, FILM COATED ORAL at 20:36

## 2024-06-28 RX ADMIN — GUAIFENESIN 600 MG: 600 TABLET ORAL at 08:32

## 2024-06-28 RX ADMIN — PHENAZOPYRIDINE 200 MG: 100 TABLET ORAL at 17:15

## 2024-06-28 NOTE — PLAN OF CARE
Problem: Safety - Adult  Goal: Free from fall injury  6/27/2024 2347 by Susie Riley RN  Outcome: Progressing  6/27/2024 0951 by Erlin Marvin RN  Outcome: Progressing     Problem: Discharge Planning  Goal: Discharge to home or other facility with appropriate resources  6/27/2024 2347 by Susie Riley RN  Outcome: Progressing  Flowsheets (Taken 6/27/2024 1935)  Discharge to home or other facility with appropriate resources: Identify barriers to discharge with patient and caregiver  6/27/2024 0951 by Erlin Marvin RN  Outcome: Progressing     Problem: ABCDS Injury Assessment  Goal: Absence of physical injury  6/27/2024 2347 by Susie Riley RN  Outcome: Progressing  6/27/2024 0951 by Erlin Marvin RN  Outcome: Progressing     Problem: Chronic Conditions and Co-morbidities  Goal: Patient's chronic conditions and co-morbidity symptoms are monitored and maintained or improved  6/27/2024 2347 by Susie Riley RN  Outcome: Progressing  Flowsheets (Taken 6/27/2024 1935)  Care Plan - Patient's Chronic Conditions and Co-Morbidity Symptoms are Monitored and Maintained or Improved: Monitor and assess patient's chronic conditions and comorbid symptoms for stability, deterioration, or improvement  6/27/2024 0951 by Erlin Marvin RN  Outcome: Progressing

## 2024-06-28 NOTE — CONSULTS
[facility]     Name: Shade Birmingham MRN: [id number]   : 1945 Hospital: [facility]   Date: 2024        Impression Plan   Acute respiratory failure  Hypoxia  Emphysematous changes on CT chest  UTI/cystitis  Hx of tobacco abuse               May have some underlying COPD changes and atelectasis along with CHF  Wean O2 to keep sats above 90%- weaned to 1 liter while I was in the room  Pulmonary follow up and PFTs as outpatient  Home O2 assesment once off of O2             Radiology  ( personally reviewed) CT abdomen/pelvis: scattered emphysematous changes, bibasilar atelectasis   ABG Invalid input(s): \"PHI\", \"PO2I\", \"PCO2I\"       Subjective     Cc: hypoxia    79 yo with PMHx of bladder cancer presenting with severe pain with urination. Found to be hypoxic on admission. Pt denies SOB/cough/wheezing. Pt states he smoked 1/2 ppd for 36 years. Quit in . Pt is not particularly interesting in working up his hypoxia.     Overnight events:  Echo with EF of 20-25%. Diuretics given  Down to 2 liters and feels better with less urinary sxs. Was never short of breath.     Past Medical History:   Diagnosis Date    Arthritis     CAD (coronary artery disease)     Cancer (HCC)     hx bladder ca, skin CA    Chronic pain     Diabetes (HCC)     Elevated PSA     Hemorrhage of rectum and anus 10/4/2010    Hx of bladder cancer     Hypertension     Joint pain     Leg swelling     Memory disorder     Muscle pain     Muscle weakness     Neuropathy     Prostate cancer (HCC)     Snoring       Past Surgical History:   Procedure Laterality Date    CARDIAC CATHETERIZATION  2018    COLONOSCOPY N/A 2022    COLONOSCOPY performed by Kike Canela MD at [facility] ENDOSCOPY    GI      COLONOSCOPY    TOTAL KNEE ARTHROPLASTY Right 2020    UROLOGICAL SURGERY      laser treatment bladder CA    VASCULAR SURGERY  2019     STENTS    VASCULAR SURGERY  2018    REPLACED VEIN L LEG C STENTS 
2018    REPLACED VEIN L LEG C STENTS      Prior to Admission medications    Medication Sig Start Date End Date Taking? Authorizing Provider   ketoconazole (NIZORAL) 2 % shampoo Apply topically daily as needed for Itching Apply topically daily as needed.    ProviderDana MD   diphenoxylate-atropine (LOMOTIL) 2.5-0.025 MG per tablet Take 1 tablet by mouth 4 times daily as needed for Diarrhea. Max Daily Amount: 4 tablets  Patient not taking: Reported on 6/26/2024    ProviderDana MD   empagliflozin (JARDIANCE) 25 MG tablet Take 1 tablet by mouth daily    ProviderDana MD   aspirin 81 MG EC tablet Take 1 tablet by mouth daily    Automatic Reconciliation, Ar   cetirizine (ZYRTEC) 10 MG tablet Take 1 tablet by mouth daily    Automatic Reconciliation, Ar   clopidogrel (PLAVIX) 75 MG tablet Take 1 tablet by mouth daily    Automatic Reconciliation, Ar   gabapentin (NEURONTIN) 300 MG capsule Take 1 capsule by mouth 3 times daily.    Automatic Reconciliation, Ar   glimepiride (AMARYL) 4 MG tablet Take 4 mg by mouth 2 times daily  Patient not taking: Reported on 6/26/2024    Automatic Reconciliation, Ar   HYDROcodone-acetaminophen (NORCO) 7.5-325 MG per tablet Take by mouth as needed.  Patient not taking: Reported on 11/2/2023    Automatic Reconciliation, Ar   lisinopril-hydroCHLOROthiazide (PRINZIDE;ZESTORETIC) 20-12.5 MG per tablet Take 2 tablets by mouth daily    Automatic Reconciliation, Ar   metFORMIN (GLUCOPHAGE) 1000 MG tablet Take 1 tablet by mouth 2 times daily (with meals)    Automatic Reconciliation, Ar   oxyCODONE (ROXICODONE) 5 MG immediate release tablet Take 5 mg by mouth every 4 hours as needed.  Patient not taking: Reported on 11/2/2023 10/16/20   Automatic Reconciliation, Ar   simvastatin (ZOCOR) 40 MG tablet Take 1 tablet by mouth nightly    Automatic Reconciliation, Ar   SITagliptin (JANUVIA) 100 MG tablet Take 100 mg by mouth daily  Patient not taking: Reported on 5/30/2024    
    Types: Cigarettes     Quit date: 2007     Years since quittin.4    Smokeless tobacco: Never   Substance Use Topics    Alcohol use: Not Currently     Alcohol/week: 35.0 standard drinks of alcohol      Family History   Problem Relation Age of Onset    Cancer Brother     No Known Problems Brother     Cancer Mother         BONE    Cancer Sister         PANCREAS    Neuropathy Other     Other Father         BLACK LUNG    Cancer Brother     Other Brother          AA          Laboratory: I have personally reviewed the critical care flowsheet and labs.     Recent Labs     24  1720 24  0602   WBC 15.0* 8.5   HGB 12.4 11.6*   HCT 36.6 34.1*    200     Recent Labs     24  1720 24  0602   * 134*   K 4.2 3.7    103   CO2 27 24   BUN 24* 23*   MG  --  2.0   ALT 19  --        Objective:     Mode Rate Tidal Volume Pressure FiO2 PEEP                    Vital Signs:     TMAX(24)      Intake/Output:   Last shift:         Last 3 shifts:  0701 -  1900  In: -   Out: 400 [Urine:400]RRIOLAST3  Intake/Output Summary (Last 24 hours) at 2024 1150  Last data filed at 2024 0814  Gross per 24 hour   Intake --   Output 1400 ml   Net -1400 ml     EXAM:   GENERAL: awake, alert, on NC HEENT:  PERRL, EOMI, no alar flaring or epistaxis, oral mucosa moist without cyanosis, NECK:  no jugular vein distention, no retractions, no thyromegaly or masses, LUNGS: CTA, no w/r/r HEART:  Regular rate and rhythm with no MGR; no edema is present, ABDOMEN:  soft with no tenderness, bowel sounds present, EXTREMITIES:  warm with no cyanosis, SKIN:  no jaundice or ecchymosis, and NEUROLOGIC:  alert and oriented, grossly non-focal    Tammy Callahan MD  [facility]        
ENDOSCOPY    GI      COLONOSCOPY    TOTAL KNEE ARTHROPLASTY Right 01/2020    UROLOGICAL SURGERY      laser treatment bladder CA    VASCULAR SURGERY  2019     STENTS    VASCULAR SURGERY  2018    REPLACED VEIN L LEG C STENTS       Medication:  Current Facility-Administered Medications   Medication Dose Route Frequency Provider Last Rate Last Admin    ipratropium 0.5 mg-albuterol 2.5 mg (DUONEB) nebulizer solution 1 Dose  1 Dose Inhalation Q4H PRN Nelly Thompson MD        gabapentin (NEURONTIN) capsule 600 mg  600 mg Oral TID Nelly Thompson MD   600 mg at 06/26/24 0740    HYDROmorphone (DILAUDID) injection 0.5 mg  0.5 mg IntraVENous Q4H PRN John Arroyo MD        oxyCODONE (ROXICODONE) immediate release tablet 5 mg  5 mg Oral Q4H PRN John Arroyo MD        benzonatate (TESSALON) capsule 100 mg  100 mg Oral TID PRN John Arroyo MD        guaiFENesin (MUCINEX) extended release tablet 600 mg  600 mg Oral BID John Arroyo MD   600 mg at 06/26/24 0740    cefTRIAXone (ROCEPHIN) 1,000 mg in sterile water 10 mL IV syringe  1,000 mg IntraVENous Q24H John Arroyo MD        bumetanide (BUMEX) injection 1 mg  1 mg IntraVENous Once John Arroyo MD        sodium chloride flush 0.9 % injection 5-40 mL  5-40 mL IntraVENous 2 times per day Nelly Thompson MD   10 mL at 06/26/24 0741    sodium chloride flush 0.9 % injection 5-40 mL  5-40 mL IntraVENous PRN Nelly Thompson MD        0.9 % sodium chloride infusion   IntraVENous PRN Nelly Thompson MD        enoxaparin (LOVENOX) injection 40 mg  40 mg SubCUTAneous Daily Nelly Thompson MD   40 mg at 06/26/24 0740    ondansetron (ZOFRAN-ODT) disintegrating tablet 4 mg  4 mg Oral Q8H PRN Nelly Thompson MD        Or    ondansetron (ZOFRAN) injection 4 mg  4 mg IntraVENous Q6H PRN Nelly Thompson MD        polyethylene glycol (GLYCOLAX) packet 17 g  17 g Oral Daily PRN Nelly Thompson MD        acetaminophen (TYLENOL) tablet 650 
06/27/24 0816    ondansetron (ZOFRAN-ODT) disintegrating tablet 4 mg, 4 mg, Oral, Q8H PRN **OR** ondansetron (ZOFRAN) injection 4 mg, 4 mg, IntraVENous, Q6H PRN, Nelly Thompson MD    polyethylene glycol (GLYCOLAX) packet 17 g, 17 g, Oral, Daily PRN, Nelly Thompson MD    acetaminophen (TYLENOL) tablet 650 mg, 650 mg, Oral, Q6H PRN **OR** acetaminophen (TYLENOL) suppository 650 mg, 650 mg, Rectal, Q6H PRN, Nelly Thompson MD    clopidogrel (PLAVIX) tablet 75 mg, 75 mg, Oral, Daily, Nelly Thompson MD, 75 mg at 06/27/24 0819    glucose chewable tablet 16 g, 4 tablet, Oral, PRN, Nelly Thompson MD    dextrose bolus 10% 125 mL, 125 mL, IntraVENous, PRN **OR** dextrose bolus 10% 250 mL, 250 mL, IntraVENous, PRN, Nelly Thompson MD    glucagon injection 1 mg, 1 mg, SubCUTAneous, PRN, Nelly Thompson MD    dextrose 10 % infusion, , IntraVENous, Continuous PRN, Nelly Thompson MD    insulin lispro (HUMALOG,ADMELOG) injection vial 0-8 Units, 0-8 Units, SubCUTAneous, TID WC, Nelly Thompson MD, 2 Units at 06/27/24 0817    insulin lispro (HUMALOG,ADMELOG) injection vial 0-4 Units, 0-4 Units, SubCUTAneous, Nightly, Nelly Thompson MD Kathleen Taylor, APRN - CNP    [facility]  Call center: (P) [phone]  (F) [phone]      CC:Lucas Buckner MD

## 2024-06-28 NOTE — PLAN OF CARE
Problem: Safety - Adult  Goal: Free from fall injury  6/28/2024 0958 by Bri Ochoa RN  Outcome: /[facility] Progressing  6/27/2024 2347 by Susie Riley RN  Outcome: Progressing     Problem: Discharge Planning  Goal: Discharge to home or other facility with appropriate resources  6/28/2024 0958 by Bri Ochoa RN  Outcome: /[facility] Progressing  6/27/2024 2347 by Susie Riley RN  Outcome: Progressing  Flowsheets (Taken 6/27/2024 1935)  Discharge to home or other facility with appropriate resources: Identify barriers to discharge with patient and caregiver     Problem: ABCDS Injury Assessment  Goal: Absence of physical injury  6/28/2024 0958 by Bri Ochoa RN  Outcome: /[facility] Progressing  6/27/2024 2347 by Susie Riley RN  Outcome: Progressing     Problem: Chronic Conditions and Co-morbidities  Goal: Patient's chronic conditions and co-morbidity symptoms are monitored and maintained or improved  6/28/2024 0958 by Bri Ochoa RN  Outcome: /[facility] Progressing  6/27/2024 2347 by Susie Riley RN  Outcome: Progressing  Flowsheets (Taken 6/27/2024 1935)  Care Plan - Patient's Chronic Conditions and Co-Morbidity Symptoms are Monitored and Maintained or Improved: Monitor and assess patient's chronic conditions and comorbid symptoms for stability, deterioration, or improvement

## 2024-06-29 LAB
ANION GAP SERPL CALC-SCNC: 8 MMOL/L (ref 5–15)
BUN SERPL-MCNC: 29 MG/DL (ref 6–20)
BUN/CREAT SERPL: 20 (ref 12–20)
CALCIUM SERPL-MCNC: 9 MG/DL (ref 8.5–10.1)
CHLORIDE SERPL-SCNC: 97 MMOL/L (ref 97–108)
CO2 SERPL-SCNC: 27 MMOL/L (ref 21–32)
CREAT SERPL-MCNC: 1.43 MG/DL (ref 0.7–1.3)
CRP SERPL-MCNC: 1.49 MG/DL (ref 0–0.3)
ERYTHROCYTE [DISTWIDTH] IN BLOOD BY AUTOMATED COUNT: 19.2 % (ref 11.5–14.5)
GLUCOSE BLD STRIP.AUTO-MCNC: 234 MG/DL (ref 65–117)
GLUCOSE BLD STRIP.AUTO-MCNC: 256 MG/DL (ref 65–117)
GLUCOSE BLD STRIP.AUTO-MCNC: 277 MG/DL (ref 65–117)
GLUCOSE BLD STRIP.AUTO-MCNC: 368 MG/DL (ref 65–117)
GLUCOSE SERPL-MCNC: 249 MG/DL (ref 65–100)
HCT VFR BLD AUTO: 37.8 % (ref 36.6–50.3)
HGB BLD-MCNC: 13.1 G/DL (ref 12.1–17)
MAGNESIUM SERPL-MCNC: 2 MG/DL (ref 1.6–2.4)
MCH RBC QN AUTO: 34.3 PG (ref 26–34)
MCHC RBC AUTO-ENTMCNC: 34.7 G/DL (ref 30–36.5)
MCV RBC AUTO: 99 FL (ref 80–99)
NRBC # BLD: 0.02 K/UL (ref 0–0.01)
NRBC BLD-RTO: 0.3 PER 100 WBC
NT PRO BNP: 925 PG/ML
PHOSPHATE SERPL-MCNC: 5 MG/DL (ref 2.6–4.7)
PLATELET # BLD AUTO: 219 K/UL (ref 150–400)
PMV BLD AUTO: 9.9 FL (ref 8.9–12.9)
POTASSIUM SERPL-SCNC: 3.4 MMOL/L (ref 3.5–5.1)
RBC # BLD AUTO: 3.82 M/UL (ref 4.1–5.7)
SERVICE CMNT-IMP: ABNORMAL
SODIUM SERPL-SCNC: 132 MMOL/L (ref 136–145)
UFH PPP CHRO-ACNC: 0.3 IU/ML
UFH PPP CHRO-ACNC: 0.36 IU/ML
WBC # BLD AUTO: 6.2 K/UL (ref 4.1–11.1)

## 2024-06-29 PROCEDURE — 86140 C-REACTIVE PROTEIN: CPT

## 2024-06-29 PROCEDURE — 6370000000 HC RX 637 (ALT 250 FOR IP): Performed by: INTERNAL MEDICINE

## 2024-06-29 PROCEDURE — 83880 ASSAY OF NATRIURETIC PEPTIDE: CPT

## 2024-06-29 PROCEDURE — 84100 ASSAY OF PHOSPHORUS: CPT

## 2024-06-29 PROCEDURE — 82962 GLUCOSE BLOOD TEST: CPT

## 2024-06-29 PROCEDURE — 6370000000 HC RX 637 (ALT 250 FOR IP): Performed by: NURSE PRACTITIONER

## 2024-06-29 PROCEDURE — 6370000000 HC RX 637 (ALT 250 FOR IP)

## 2024-06-29 PROCEDURE — 6370000000 HC RX 637 (ALT 250 FOR IP): Performed by: FAMILY MEDICINE

## 2024-06-29 PROCEDURE — 83735 ASSAY OF MAGNESIUM: CPT

## 2024-06-29 PROCEDURE — 1100000000 HC RM PRIVATE

## 2024-06-29 PROCEDURE — 85520 HEPARIN ASSAY: CPT

## 2024-06-29 PROCEDURE — 99233 SBSQ HOSP IP/OBS HIGH 50: CPT | Performed by: INTERNAL MEDICINE

## 2024-06-29 PROCEDURE — 2580000003 HC RX 258: Performed by: INTERNAL MEDICINE

## 2024-06-29 PROCEDURE — 85027 COMPLETE CBC AUTOMATED: CPT

## 2024-06-29 PROCEDURE — 36415 COLL VENOUS BLD VENIPUNCTURE: CPT

## 2024-06-29 PROCEDURE — 94761 N-INVAS EAR/PLS OXIMETRY MLT: CPT

## 2024-06-29 PROCEDURE — 80048 BASIC METABOLIC PNL TOTAL CA: CPT

## 2024-06-29 PROCEDURE — 6360000002 HC RX W HCPCS

## 2024-06-29 RX ORDER — INSULIN LISPRO 100 [IU]/ML
2 INJECTION, SOLUTION INTRAVENOUS; SUBCUTANEOUS ONCE
Status: COMPLETED | OUTPATIENT
Start: 2024-06-29 | End: 2024-06-29

## 2024-06-29 RX ADMIN — CIPROFLOXACIN HYDROCHLORIDE 250 MG: 500 TABLET, FILM COATED ORAL at 20:32

## 2024-06-29 RX ADMIN — ASPIRIN 81 MG: 81 TABLET, CHEWABLE ORAL at 09:11

## 2024-06-29 RX ADMIN — INSULIN LISPRO 2 UNITS: 100 INJECTION, SOLUTION INTRAVENOUS; SUBCUTANEOUS at 12:17

## 2024-06-29 RX ADMIN — GUAIFENESIN 600 MG: 600 TABLET ORAL at 20:32

## 2024-06-29 RX ADMIN — HEPARIN SODIUM AND DEXTROSE 10 UNITS/KG/HR: 10000; 5 INJECTION INTRAVENOUS at 17:35

## 2024-06-29 RX ADMIN — SODIUM CHLORIDE, PRESERVATIVE FREE 10 ML: 5 INJECTION INTRAVENOUS at 09:12

## 2024-06-29 RX ADMIN — SODIUM CHLORIDE, PRESERVATIVE FREE 10 ML: 5 INJECTION INTRAVENOUS at 20:33

## 2024-06-29 RX ADMIN — CIPROFLOXACIN HYDROCHLORIDE 250 MG: 500 TABLET, FILM COATED ORAL at 09:11

## 2024-06-29 RX ADMIN — GABAPENTIN 600 MG: 300 CAPSULE ORAL at 09:11

## 2024-06-29 RX ADMIN — ACETAMINOPHEN 650 MG: 325 TABLET ORAL at 06:40

## 2024-06-29 RX ADMIN — INSULIN LISPRO 2 UNITS: 100 INJECTION, SOLUTION INTRAVENOUS; SUBCUTANEOUS at 16:33

## 2024-06-29 RX ADMIN — PHENAZOPYRIDINE 200 MG: 100 TABLET ORAL at 16:33

## 2024-06-29 RX ADMIN — PHENAZOPYRIDINE 200 MG: 100 TABLET ORAL at 12:17

## 2024-06-29 RX ADMIN — PHENAZOPYRIDINE 200 MG: 100 TABLET ORAL at 09:11

## 2024-06-29 RX ADMIN — GABAPENTIN 600 MG: 300 CAPSULE ORAL at 20:32

## 2024-06-29 RX ADMIN — GABAPENTIN 600 MG: 300 CAPSULE ORAL at 14:00

## 2024-06-29 RX ADMIN — BUMETANIDE 1 MG: 1 TABLET ORAL at 09:11

## 2024-06-29 RX ADMIN — SACUBITRIL AND VALSARTAN 2 TABLET: 24; 26 TABLET, FILM COATED ORAL at 20:31

## 2024-06-29 RX ADMIN — INSULIN LISPRO 8 UNITS: 100 INJECTION, SOLUTION INTRAVENOUS; SUBCUTANEOUS at 12:17

## 2024-06-29 RX ADMIN — CLOPIDOGREL BISULFATE 75 MG: 75 TABLET ORAL at 09:11

## 2024-06-29 RX ADMIN — INSULIN LISPRO 4 UNITS: 100 INJECTION, SOLUTION INTRAVENOUS; SUBCUTANEOUS at 09:11

## 2024-06-29 RX ADMIN — SPIRONOLACTONE 25 MG: 25 TABLET ORAL at 09:11

## 2024-06-29 RX ADMIN — SACUBITRIL AND VALSARTAN 2 TABLET: 24; 26 TABLET, FILM COATED ORAL at 09:11

## 2024-06-29 RX ADMIN — GUAIFENESIN 600 MG: 600 TABLET ORAL at 09:11

## 2024-06-29 ASSESSMENT — PAIN DESCRIPTION - LOCATION: LOCATION: BACK

## 2024-06-29 ASSESSMENT — PAIN SCALES - GENERAL: PAINLEVEL_OUTOF10: 3

## 2024-06-29 ASSESSMENT — PAIN DESCRIPTION - DESCRIPTORS: DESCRIPTORS: ACHING

## 2024-06-29 NOTE — PLAN OF CARE
Problem: Safety - Adult  Goal: Free from fall injury  6/29/2024 1014 by Bri Ochoa RN  Outcome: /[facility] Progressing  6/28/2024 2204 by Susie Riley RN  Outcome: Progressing     Problem: Discharge Planning  Goal: Discharge to home or other facility with appropriate resources  6/29/2024 1014 by Bri Ochoa RN  Outcome: /[facility] Progressing  6/28/2024 2204 by Susie Riley RN  Outcome: Progressing  Flowsheets (Taken 6/28/2024 1930)  Discharge to home or other facility with appropriate resources: Identify barriers to discharge with patient and caregiver     Problem: ABCDS Injury Assessment  Goal: Absence of physical injury  6/29/2024 1014 by Bri Ochoa RN  Outcome: /[facility] Progressing  6/28/2024 2204 by Susie Riley RN  Outcome: Progressing     Problem: Chronic Conditions and Co-morbidities  Goal: Patient's chronic conditions and co-morbidity symptoms are monitored and maintained or improved  6/29/2024 1014 by Bri Ochoa RN  Outcome: /[facility] Progressing  6/28/2024 2204 by Susie Riley RN  Outcome: Progressing  Flowsheets (Taken 6/28/2024 1930)  Care Plan - Patient's Chronic Conditions and Co-Morbidity Symptoms are Monitored and Maintained or Improved: Monitor and assess patient's chronic conditions and comorbid symptoms for stability, deterioration, or improvement

## 2024-06-29 NOTE — PLAN OF CARE
Problem: Safety - Adult  Goal: Free from fall injury  6/28/2024 2204 by Susie Riley RN  Outcome: Progressing  6/28/2024 0958 by Bri Ochoa RN  Outcome: HH/[facility] Progressing     Problem: Discharge Planning  Goal: Discharge to home or other facility with appropriate resources  6/28/2024 2204 by Susie Riley RN  Outcome: Progressing  6/28/2024 0958 by Bri Ochoa RN  Outcome: HH/[facility] Progressing     Problem: ABCDS Injury Assessment  Goal: Absence of physical injury  6/28/2024 2204 by Susie Riley RN  Outcome: Progressing  6/28/2024 0958 by Bri Ochoa RN  Outcome: HH/[facility] Progressing     Problem: Chronic Conditions and Co-morbidities  Goal: Patient's chronic conditions and co-morbidity symptoms are monitored and maintained or improved  6/28/2024 2204 by Susie Riley RN  Outcome: Progressing  6/28/2024 0958 by Bri Ochoa RN  Outcome: HH/[facility] Progressing

## 2024-06-30 LAB
ANION GAP SERPL CALC-SCNC: 9 MMOL/L (ref 5–15)
BUN SERPL-MCNC: 43 MG/DL (ref 6–20)
BUN/CREAT SERPL: 26 (ref 12–20)
CALCIUM SERPL-MCNC: 8.5 MG/DL (ref 8.5–10.1)
CHLORIDE SERPL-SCNC: 99 MMOL/L (ref 97–108)
CO2 SERPL-SCNC: 23 MMOL/L (ref 21–32)
CREAT SERPL-MCNC: 1.63 MG/DL (ref 0.7–1.3)
ERYTHROCYTE [DISTWIDTH] IN BLOOD BY AUTOMATED COUNT: 19 % (ref 11.5–14.5)
GLUCOSE BLD STRIP.AUTO-MCNC: 267 MG/DL (ref 65–117)
GLUCOSE BLD STRIP.AUTO-MCNC: 273 MG/DL (ref 65–117)
GLUCOSE BLD STRIP.AUTO-MCNC: 299 MG/DL (ref 65–117)
GLUCOSE BLD STRIP.AUTO-MCNC: 363 MG/DL (ref 65–117)
GLUCOSE SERPL-MCNC: 284 MG/DL (ref 65–100)
HCT VFR BLD AUTO: 35.1 % (ref 36.6–50.3)
HGB BLD-MCNC: 11.9 G/DL (ref 12.1–17)
MCH RBC QN AUTO: 33.7 PG (ref 26–34)
MCHC RBC AUTO-ENTMCNC: 33.9 G/DL (ref 30–36.5)
MCV RBC AUTO: 99.4 FL (ref 80–99)
NRBC # BLD: 0 K/UL (ref 0–0.01)
NRBC BLD-RTO: 0 PER 100 WBC
PLATELET # BLD AUTO: 215 K/UL (ref 150–400)
PMV BLD AUTO: 10.3 FL (ref 8.9–12.9)
POTASSIUM SERPL-SCNC: 3.5 MMOL/L (ref 3.5–5.1)
RBC # BLD AUTO: 3.53 M/UL (ref 4.1–5.7)
SERVICE CMNT-IMP: ABNORMAL
SODIUM SERPL-SCNC: 131 MMOL/L (ref 136–145)
UFH PPP CHRO-ACNC: 0.21 IU/ML
WBC # BLD AUTO: 4.9 K/UL (ref 4.1–11.1)

## 2024-06-30 PROCEDURE — 82962 GLUCOSE BLOOD TEST: CPT

## 2024-06-30 PROCEDURE — 6370000000 HC RX 637 (ALT 250 FOR IP): Performed by: INTERNAL MEDICINE

## 2024-06-30 PROCEDURE — 85027 COMPLETE CBC AUTOMATED: CPT

## 2024-06-30 PROCEDURE — 6370000000 HC RX 637 (ALT 250 FOR IP): Performed by: NURSE PRACTITIONER

## 2024-06-30 PROCEDURE — 99233 SBSQ HOSP IP/OBS HIGH 50: CPT | Performed by: INTERNAL MEDICINE

## 2024-06-30 PROCEDURE — 36415 COLL VENOUS BLD VENIPUNCTURE: CPT

## 2024-06-30 PROCEDURE — 2580000003 HC RX 258: Performed by: INTERNAL MEDICINE

## 2024-06-30 PROCEDURE — 80048 BASIC METABOLIC PNL TOTAL CA: CPT

## 2024-06-30 PROCEDURE — 1100000000 HC RM PRIVATE

## 2024-06-30 PROCEDURE — 85520 HEPARIN ASSAY: CPT

## 2024-06-30 PROCEDURE — 6370000000 HC RX 637 (ALT 250 FOR IP)

## 2024-06-30 PROCEDURE — 94761 N-INVAS EAR/PLS OXIMETRY MLT: CPT

## 2024-06-30 RX ADMIN — PHENAZOPYRIDINE 200 MG: 100 TABLET ORAL at 08:48

## 2024-06-30 RX ADMIN — CLOPIDOGREL BISULFATE 75 MG: 75 TABLET ORAL at 08:49

## 2024-06-30 RX ADMIN — GUAIFENESIN 600 MG: 600 TABLET ORAL at 21:33

## 2024-06-30 RX ADMIN — GABAPENTIN 600 MG: 300 CAPSULE ORAL at 21:30

## 2024-06-30 RX ADMIN — INSULIN LISPRO 4 UNITS: 100 INJECTION, SOLUTION INTRAVENOUS; SUBCUTANEOUS at 08:48

## 2024-06-30 RX ADMIN — PHENAZOPYRIDINE 200 MG: 100 TABLET ORAL at 17:12

## 2024-06-30 RX ADMIN — SODIUM CHLORIDE, PRESERVATIVE FREE 10 ML: 5 INJECTION INTRAVENOUS at 08:49

## 2024-06-30 RX ADMIN — CIPROFLOXACIN HYDROCHLORIDE 250 MG: 500 TABLET, FILM COATED ORAL at 21:30

## 2024-06-30 RX ADMIN — SPIRONOLACTONE 25 MG: 25 TABLET ORAL at 08:49

## 2024-06-30 RX ADMIN — GABAPENTIN 600 MG: 300 CAPSULE ORAL at 13:29

## 2024-06-30 RX ADMIN — INSULIN LISPRO 4 UNITS: 100 INJECTION, SOLUTION INTRAVENOUS; SUBCUTANEOUS at 17:14

## 2024-06-30 RX ADMIN — CIPROFLOXACIN HYDROCHLORIDE 250 MG: 500 TABLET, FILM COATED ORAL at 08:49

## 2024-06-30 RX ADMIN — ASPIRIN 81 MG: 81 TABLET, CHEWABLE ORAL at 08:49

## 2024-06-30 RX ADMIN — SACUBITRIL AND VALSARTAN 2 TABLET: 24; 26 TABLET, FILM COATED ORAL at 21:33

## 2024-06-30 RX ADMIN — SODIUM CHLORIDE, PRESERVATIVE FREE 10 ML: 5 INJECTION INTRAVENOUS at 21:34

## 2024-06-30 RX ADMIN — GABAPENTIN 600 MG: 300 CAPSULE ORAL at 08:47

## 2024-06-30 RX ADMIN — INSULIN LISPRO 11 UNITS: 100 INJECTION, SOLUTION INTRAVENOUS; SUBCUTANEOUS at 13:28

## 2024-06-30 RX ADMIN — GUAIFENESIN 600 MG: 600 TABLET ORAL at 08:49

## 2024-06-30 RX ADMIN — SACUBITRIL AND VALSARTAN 2 TABLET: 24; 26 TABLET, FILM COATED ORAL at 08:47

## 2024-06-30 RX ADMIN — PHENAZOPYRIDINE 200 MG: 100 TABLET ORAL at 13:29

## 2024-07-01 LAB
ANION GAP SERPL CALC-SCNC: 8 MMOL/L (ref 5–15)
BUN SERPL-MCNC: 51 MG/DL (ref 6–20)
BUN/CREAT SERPL: 31 (ref 12–20)
CALCIUM SERPL-MCNC: 8.3 MG/DL (ref 8.5–10.1)
CHLORIDE SERPL-SCNC: 99 MMOL/L (ref 97–108)
CO2 SERPL-SCNC: 23 MMOL/L (ref 21–32)
CREAT SERPL-MCNC: 1.67 MG/DL (ref 0.7–1.3)
GLUCOSE BLD STRIP.AUTO-MCNC: 236 MG/DL (ref 65–117)
GLUCOSE BLD STRIP.AUTO-MCNC: 237 MG/DL (ref 65–117)
GLUCOSE BLD STRIP.AUTO-MCNC: 288 MG/DL (ref 65–117)
GLUCOSE SERPL-MCNC: 294 MG/DL (ref 65–100)
POTASSIUM SERPL-SCNC: 3.9 MMOL/L (ref 3.5–5.1)
SERVICE CMNT-IMP: ABNORMAL
SODIUM SERPL-SCNC: 130 MMOL/L (ref 136–145)

## 2024-07-01 PROCEDURE — C1894 INTRO/SHEATH, NON-LASER: HCPCS | Performed by: INTERNAL MEDICINE

## 2024-07-01 PROCEDURE — 99153 MOD SED SAME PHYS/QHP EA: CPT | Performed by: INTERNAL MEDICINE

## 2024-07-01 PROCEDURE — 6360000002 HC RX W HCPCS: Performed by: INTERNAL MEDICINE

## 2024-07-01 PROCEDURE — 99152 MOD SED SAME PHYS/QHP 5/>YRS: CPT | Performed by: INTERNAL MEDICINE

## 2024-07-01 PROCEDURE — 6370000000 HC RX 637 (ALT 250 FOR IP): Performed by: NURSE PRACTITIONER

## 2024-07-01 PROCEDURE — 76937 US GUIDE VASCULAR ACCESS: CPT | Performed by: INTERNAL MEDICINE

## 2024-07-01 PROCEDURE — 80048 BASIC METABOLIC PNL TOTAL CA: CPT

## 2024-07-01 PROCEDURE — 2500000003 HC RX 250 WO HCPCS: Performed by: INTERNAL MEDICINE

## 2024-07-01 PROCEDURE — 6370000000 HC RX 637 (ALT 250 FOR IP): Performed by: INTERNAL MEDICINE

## 2024-07-01 PROCEDURE — 2580000003 HC RX 258: Performed by: INTERNAL MEDICINE

## 2024-07-01 PROCEDURE — B2111ZZ FLUOROSCOPY OF MULTIPLE CORONARY ARTERIES USING LOW OSMOLAR CONTRAST: ICD-10-PCS | Performed by: INTERNAL MEDICINE

## 2024-07-01 PROCEDURE — 1100000000 HC RM PRIVATE

## 2024-07-01 PROCEDURE — 36415 COLL VENOUS BLD VENIPUNCTURE: CPT

## 2024-07-01 PROCEDURE — 94761 N-INVAS EAR/PLS OXIMETRY MLT: CPT

## 2024-07-01 PROCEDURE — APPSS30 APP SPLIT SHARED TIME 16-30 MINUTES: Performed by: NURSE PRACTITIONER

## 2024-07-01 PROCEDURE — 6370000000 HC RX 637 (ALT 250 FOR IP)

## 2024-07-01 PROCEDURE — 82962 GLUCOSE BLOOD TEST: CPT

## 2024-07-01 PROCEDURE — 2709999900 HC NON-CHARGEABLE SUPPLY: Performed by: INTERNAL MEDICINE

## 2024-07-01 PROCEDURE — 99232 SBSQ HOSP IP/OBS MODERATE 35: CPT | Performed by: INTERNAL MEDICINE

## 2024-07-01 PROCEDURE — C1769 GUIDE WIRE: HCPCS | Performed by: INTERNAL MEDICINE

## 2024-07-01 PROCEDURE — 4A023N6 MEASUREMENT OF CARDIAC SAMPLING AND PRESSURE, RIGHT HEART, PERCUTANEOUS APPROACH: ICD-10-PCS | Performed by: INTERNAL MEDICINE

## 2024-07-01 PROCEDURE — 93451 RIGHT HEART CATH: CPT | Performed by: INTERNAL MEDICINE

## 2024-07-01 PROCEDURE — 93454 CORONARY ARTERY ANGIO S&I: CPT | Performed by: INTERNAL MEDICINE

## 2024-07-01 RX ORDER — FENTANYL CITRATE 50 UG/ML
INJECTION, SOLUTION INTRAMUSCULAR; INTRAVENOUS PRN
Status: DISCONTINUED | OUTPATIENT
Start: 2024-07-01 | End: 2024-07-01 | Stop reason: HOSPADM

## 2024-07-01 RX ORDER — HEPARIN SODIUM 1000 [USP'U]/ML
INJECTION, SOLUTION INTRAVENOUS; SUBCUTANEOUS PRN
Status: DISCONTINUED | OUTPATIENT
Start: 2024-07-01 | End: 2024-07-01 | Stop reason: HOSPADM

## 2024-07-01 RX ORDER — VERAPAMIL HYDROCHLORIDE 2.5 MG/ML
INJECTION, SOLUTION INTRAVENOUS PRN
Status: DISCONTINUED | OUTPATIENT
Start: 2024-07-01 | End: 2024-07-01 | Stop reason: HOSPADM

## 2024-07-01 RX ORDER — SODIUM CHLORIDE 9 MG/ML
INJECTION, SOLUTION INTRAVENOUS CONTINUOUS
Status: CANCELLED | OUTPATIENT
Start: 2024-07-01 | End: 2024-07-11

## 2024-07-01 RX ORDER — LIDOCAINE HYDROCHLORIDE 10 MG/ML
INJECTION, SOLUTION INFILTRATION; PERINEURAL PRN
Status: DISCONTINUED | OUTPATIENT
Start: 2024-07-01 | End: 2024-07-01 | Stop reason: HOSPADM

## 2024-07-01 RX ORDER — HEPARIN SODIUM 200 [USP'U]/100ML
INJECTION, SOLUTION INTRAVENOUS PRN
Status: DISCONTINUED | OUTPATIENT
Start: 2024-07-01 | End: 2024-07-01 | Stop reason: HOSPADM

## 2024-07-01 RX ORDER — MIDAZOLAM HYDROCHLORIDE 1 MG/ML
INJECTION INTRAMUSCULAR; INTRAVENOUS PRN
Status: DISCONTINUED | OUTPATIENT
Start: 2024-07-01 | End: 2024-07-01 | Stop reason: HOSPADM

## 2024-07-01 RX ADMIN — INSULIN LISPRO 4 UNITS: 100 INJECTION, SOLUTION INTRAVENOUS; SUBCUTANEOUS at 08:41

## 2024-07-01 RX ADMIN — SODIUM CHLORIDE, PRESERVATIVE FREE 10 ML: 5 INJECTION INTRAVENOUS at 08:41

## 2024-07-01 RX ADMIN — SACUBITRIL AND VALSARTAN 2 TABLET: 24; 26 TABLET, FILM COATED ORAL at 21:31

## 2024-07-01 RX ADMIN — GUAIFENESIN 600 MG: 600 TABLET ORAL at 08:40

## 2024-07-01 RX ADMIN — INSULIN LISPRO 2 UNITS: 100 INJECTION, SOLUTION INTRAVENOUS; SUBCUTANEOUS at 13:41

## 2024-07-01 RX ADMIN — CIPROFLOXACIN HYDROCHLORIDE 250 MG: 500 TABLET, FILM COATED ORAL at 08:40

## 2024-07-01 RX ADMIN — PHENAZOPYRIDINE 200 MG: 100 TABLET ORAL at 08:40

## 2024-07-01 RX ADMIN — GABAPENTIN 600 MG: 300 CAPSULE ORAL at 21:00

## 2024-07-01 RX ADMIN — GABAPENTIN 600 MG: 300 CAPSULE ORAL at 08:40

## 2024-07-01 RX ADMIN — SPIRONOLACTONE 25 MG: 25 TABLET ORAL at 08:40

## 2024-07-01 RX ADMIN — GUAIFENESIN 600 MG: 600 TABLET ORAL at 21:32

## 2024-07-01 RX ADMIN — SACUBITRIL AND VALSARTAN 2 TABLET: 24; 26 TABLET, FILM COATED ORAL at 08:40

## 2024-07-01 RX ADMIN — SODIUM CHLORIDE, PRESERVATIVE FREE 10 ML: 5 INJECTION INTRAVENOUS at 21:31

## 2024-07-01 RX ADMIN — CIPROFLOXACIN HYDROCHLORIDE 250 MG: 500 TABLET, FILM COATED ORAL at 21:00

## 2024-07-01 RX ADMIN — CLOPIDOGREL BISULFATE 75 MG: 75 TABLET ORAL at 08:40

## 2024-07-01 RX ADMIN — ASPIRIN 81 MG: 81 TABLET, CHEWABLE ORAL at 08:40

## 2024-07-01 RX ADMIN — PHENAZOPYRIDINE 200 MG: 100 TABLET ORAL at 13:41

## 2024-07-01 RX ADMIN — GABAPENTIN 600 MG: 300 CAPSULE ORAL at 13:41

## 2024-07-01 NOTE — PLAN OF CARE
Problem: Safety - Adult  Goal: Free from fall injury  Outcome: HH/[facility] Progressing     Problem: Discharge Planning  Goal: Discharge to home or other facility with appropriate resources  Outcome: HH/[facility] Progressing     Problem: ABCDS Injury Assessment  Goal: Absence of physical injury  Outcome: HH/[facility] Progressing     Problem: Chronic Conditions and Co-morbidities  Goal: Patient's chronic conditions and co-morbidity symptoms are monitored and maintained or improved  Outcome: HH/[facility] Progressing

## 2024-07-01 NOTE — PROCEDURES
BRIEF PROCEDURE NOTE    Date of Procedure: 7/1/2024   Preoperative Diagnosis: Cardiomyopathy  Postoperative Diagnosis: Sig 2 V dz  /RI dz; Lcflex mod dz  Procedure: Left heart cath, LV angiography, coronary angiography  Interventional Cardiologist: Victor M Royal MD  Assistant : none  Anesthesia: local + IV sedation  I administered moderate sedation throughout this procedure. An independent trained observer pushed medications at my direction, and monitored the patient’s level of consciousness and physiological status throughout.  Estimated Blood Loss: Minimal    Access:   R FV- Ultrasound/Fluoroscopic guided micropuncture access - 7 F sheath    R CFA - Ultrasound/Fluoroscopic guided micropuncture access - unable to wire vessel   L CFA -Ultrasound/Fluoroscopic guided micropuncture access - unable to insert sheath - tried using stiff micropuncture dilator - \"kinked\"   Hemostasis with manual pressure    ( Pt states he has had vascular surgery both LE by vascular surgery)    Switched to  R Radial Access - 6 F sheath      Catheters: RCA : JR4                    LCA : JL4    Findings:     Calcified cors    L Main: Med; MLI    LAD:  Med; Prox/Mid 80% ( at S1) ; Mid ( at small D2 - 80%; Distal vessel very small - diffuse severe dz;  L to R collaterals present    RI - Small to med ; ostial 70-80%    LCflex: Med; Mid 50%; OM1 - med- Prox 50%    RCA: Large; Dominant; Severely calcified Mid RCA with tandem 90 -99% lesions; Distal - 60%; Alicja 2 flow distally; Small PDA and PLB ( prob chronic subtotally occluded vessel)     LVEDP: Valve not crossed    LVEF: Not assessed      PCI: none        RHC findings:    RAPm=1       mmHg  RVSP= 23    mmHg  PAPm= 15       mmHg  PCWPm= 3   mmHg  CO= 4.71         l/min  CI=2.13    Specimens Removed : None    Devices implanted : None    Complications: None    Closure Device: R Radial - TR band; R FV - manual    Rec: CTS consult         See full cath note.    Complications: none    Victor M

## 2024-07-02 VITALS
DIASTOLIC BLOOD PRESSURE: 49 MMHG | TEMPERATURE: 97.4 F | WEIGHT: 208 LBS | HEIGHT: 74 IN | BODY MASS INDEX: 26.69 KG/M2 | SYSTOLIC BLOOD PRESSURE: 132 MMHG | OXYGEN SATURATION: 98 % | HEART RATE: 69 BPM | RESPIRATION RATE: 16 BRPM

## 2024-07-02 PROBLEM — I50.21 ACUTE SYSTOLIC CHF (CONGESTIVE HEART FAILURE) (HCC): Status: ACTIVE | Noted: 2024-07-02

## 2024-07-02 LAB
ALBUMIN SERPL-MCNC: 2.9 G/DL (ref 3.5–5)
ALBUMIN/GLOB SERPL: 0.7 (ref 1.1–2.2)
ALP SERPL-CCNC: 62 U/L (ref 45–117)
ALT SERPL-CCNC: 20 U/L (ref 12–78)
ANION GAP SERPL CALC-SCNC: 9 MMOL/L (ref 5–15)
AST SERPL-CCNC: 17 U/L (ref 15–37)
BILIRUB SERPL-MCNC: 0.8 MG/DL (ref 0.2–1)
BUN SERPL-MCNC: 47 MG/DL (ref 6–20)
BUN/CREAT SERPL: 34 (ref 12–20)
CALCIUM SERPL-MCNC: 8.7 MG/DL (ref 8.5–10.1)
CHLORIDE SERPL-SCNC: 103 MMOL/L (ref 97–108)
CHOLEST SERPL-MCNC: 142 MG/DL
CO2 SERPL-SCNC: 21 MMOL/L (ref 21–32)
CREAT SERPL-MCNC: 1.4 MG/DL (ref 0.7–1.3)
ECHO BSA: 2.22 M2
GLOBULIN SER CALC-MCNC: 3.9 G/DL (ref 2–4)
GLUCOSE BLD STRIP.AUTO-MCNC: 300 MG/DL (ref 65–117)
GLUCOSE BLD STRIP.AUTO-MCNC: 302 MG/DL (ref 65–117)
GLUCOSE SERPL-MCNC: 335 MG/DL (ref 65–100)
HDLC SERPL-MCNC: 37 MG/DL
HDLC SERPL: 3.8 (ref 0–5)
LDLC SERPL CALC-MCNC: 65.8 MG/DL (ref 0–100)
MAGNESIUM SERPL-MCNC: 2.3 MG/DL (ref 1.6–2.4)
NT PRO BNP: 516 PG/ML
PHOSPHATE SERPL-MCNC: 3.5 MG/DL (ref 2.6–4.7)
POTASSIUM SERPL-SCNC: 4.2 MMOL/L (ref 3.5–5.1)
PROT SERPL-MCNC: 6.8 G/DL (ref 6.4–8.2)
SERVICE CMNT-IMP: ABNORMAL
SERVICE CMNT-IMP: ABNORMAL
SODIUM SERPL-SCNC: 133 MMOL/L (ref 136–145)
TRIGL SERPL-MCNC: 196 MG/DL
VLDLC SERPL CALC-MCNC: 39.2 MG/DL

## 2024-07-02 PROCEDURE — 6370000000 HC RX 637 (ALT 250 FOR IP)

## 2024-07-02 PROCEDURE — 2580000003 HC RX 258: Performed by: INTERNAL MEDICINE

## 2024-07-02 PROCEDURE — 94010 BREATHING CAPACITY TEST: CPT

## 2024-07-02 PROCEDURE — 6370000000 HC RX 637 (ALT 250 FOR IP): Performed by: NURSE PRACTITIONER

## 2024-07-02 PROCEDURE — 6370000000 HC RX 637 (ALT 250 FOR IP): Performed by: INTERNAL MEDICINE

## 2024-07-02 PROCEDURE — 80061 LIPID PANEL: CPT

## 2024-07-02 PROCEDURE — 80053 COMPREHEN METABOLIC PANEL: CPT

## 2024-07-02 PROCEDURE — 84100 ASSAY OF PHOSPHORUS: CPT

## 2024-07-02 PROCEDURE — 94761 N-INVAS EAR/PLS OXIMETRY MLT: CPT

## 2024-07-02 PROCEDURE — 83735 ASSAY OF MAGNESIUM: CPT

## 2024-07-02 PROCEDURE — 36415 COLL VENOUS BLD VENIPUNCTURE: CPT

## 2024-07-02 PROCEDURE — 83880 ASSAY OF NATRIURETIC PEPTIDE: CPT

## 2024-07-02 PROCEDURE — APPSS45 APP SPLIT SHARED TIME 31-45 MINUTES: Performed by: NURSE PRACTITIONER

## 2024-07-02 PROCEDURE — 82962 GLUCOSE BLOOD TEST: CPT

## 2024-07-02 RX ORDER — SPIRONOLACTONE 25 MG/1
25 TABLET ORAL DAILY
Qty: 30 TABLET | Refills: 2 | Status: ON HOLD | OUTPATIENT
Start: 2024-07-03 | End: 2024-07-06

## 2024-07-02 RX ORDER — ATORVASTATIN CALCIUM 20 MG/1
20 TABLET, FILM COATED ORAL NIGHTLY
Status: DISCONTINUED | OUTPATIENT
Start: 2024-07-02 | End: 2024-07-02 | Stop reason: HOSPADM

## 2024-07-02 RX ORDER — ATORVASTATIN CALCIUM 20 MG/1
20 TABLET, FILM COATED ORAL NIGHTLY
Qty: 30 TABLET | Refills: 2 | Status: ON HOLD | OUTPATIENT
Start: 2024-07-02 | End: 2024-07-06 | Stop reason: HOSPADM

## 2024-07-02 RX ORDER — BUMETANIDE 1 MG/1
1 TABLET ORAL DAILY
Qty: 30 TABLET | Refills: 2 | Status: ON HOLD | OUTPATIENT
Start: 2024-07-02 | End: 2024-07-06

## 2024-07-02 RX ORDER — INSULIN GLARGINE 100 [IU]/ML
20 INJECTION, SOLUTION SUBCUTANEOUS DAILY
Status: DISCONTINUED | OUTPATIENT
Start: 2024-07-02 | End: 2024-07-02 | Stop reason: HOSPADM

## 2024-07-02 RX ADMIN — SODIUM CHLORIDE, PRESERVATIVE FREE 10 ML: 5 INJECTION INTRAVENOUS at 08:32

## 2024-07-02 RX ADMIN — PHENAZOPYRIDINE 200 MG: 100 TABLET ORAL at 08:32

## 2024-07-02 RX ADMIN — GABAPENTIN 600 MG: 300 CAPSULE ORAL at 08:32

## 2024-07-02 RX ADMIN — SACUBITRIL AND VALSARTAN 2 TABLET: 24; 26 TABLET, FILM COATED ORAL at 08:32

## 2024-07-02 RX ADMIN — PHENAZOPYRIDINE 200 MG: 100 TABLET ORAL at 12:04

## 2024-07-02 RX ADMIN — ASPIRIN 81 MG: 81 TABLET, CHEWABLE ORAL at 08:31

## 2024-07-02 RX ADMIN — INSULIN GLARGINE 20 UNITS: 100 INJECTION, SOLUTION SUBCUTANEOUS at 08:31

## 2024-07-02 RX ADMIN — INSULIN LISPRO 6 UNITS: 100 INJECTION, SOLUTION INTRAVENOUS; SUBCUTANEOUS at 12:06

## 2024-07-02 RX ADMIN — CIPROFLOXACIN HYDROCHLORIDE 250 MG: 500 TABLET, FILM COATED ORAL at 08:32

## 2024-07-02 RX ADMIN — GUAIFENESIN 600 MG: 600 TABLET ORAL at 08:32

## 2024-07-02 RX ADMIN — INSULIN LISPRO 6 UNITS: 100 INJECTION, SOLUTION INTRAVENOUS; SUBCUTANEOUS at 08:31

## 2024-07-02 RX ADMIN — SPIRONOLACTONE 25 MG: 25 TABLET ORAL at 08:32

## 2024-07-02 ASSESSMENT — PAIN SCALES - GENERAL
PAINLEVEL_OUTOF10: 0

## 2024-07-02 NOTE — DISCHARGE SUMMARY
[facility]  [address] [place] [address] [phone]          Hospitalist Discharge Summary     Patient ID:  Shade Birmingham  [id number]  78 y.o.  1945    Admit date: 6/25/2024    Discharge date and time: 7/2/2024 11:21 AM    Admission Diagnoses: Acute pulmonary edema (HCC) [J81.0]  Hypoxia [R09.02]  Hypoxic [R09.02]  Acute cystitis without hematuria [N30.00]    Discharge Diagnoses:  Principal Diagnosis Acute systolic CHF (congestive heart failure) (HCC)                                            Principal Problem:    Acute systolic CHF (congestive heart failure) (HCC)  Active Problems:    UTI (urinary tract infection)    Claudication in peripheral vascular disease (HCC)    DM (diabetes mellitus) (HCC)    HTN (hypertension)    Prostate cancer (HCC)    Hypoxia    CLARENCE (acute kidney injury) (HCC)    Hyponatremia    Cardiomyopathy (HCC)    CAD (coronary artery disease)  Resolved Problems:    * No resolved hospital problems. *         Hospital Course:     77 yo hx of HTN, DM, CAD, prostate cancer, presented w/ dysuria, UTI, hypoxia, found to have new systolic/diastolic CHF EF 25-30%, severe CAD     1) Acute combined systolic/diastolic CHF: improving.  EF 25-30%.  Likely due to severe CAD seen on cath.  Cards started entresto, aldactone, bumex, jardiance    2) Severe CAD: cath showed LAD/RCA lesions.  Will cont plavix, statin.  Patient did not want to wait and be transferred to [facility].  Cards made an appointment for him to follow up with CT surgery     3) Acute resp failure/hypoxia: now resolved.  O2 sat was 85% on RA on admission.  Likely from pulm edema, CHF.  Chest CT neg for PE.  No need for home O2.  Pulm was following     4) Complicated UTI/dysuria: due to prostate CA.  Urine Cx w/ enterococcus.  Patient was on IV Zosyn, will complete a course of Cipro.  Urology was following     5) CLARENCE: likely has underlying CKD.  Cr stable on discharge     6) Chronic

## 2024-07-02 NOTE — CARE COORDINATION
Care Management Initial Assessment  6/27/2024 4:31 PM  If patient is discharged prior to next notation, then this note serves as note for discharge by case management.    Reason for Admission:   Acute pulmonary edema (HCC) [J81.0]  Hypoxia [R09.02]  Hypoxic [R09.02]  Acute cystitis without hematuria [N30.00]         Patient Admission Status: Inpatient  RUR: Readmission Risk Score: 13.9    Hospitalization in the last 30 days (Readmission):  No        Advance Care Planning:  Code Status: Full Code  Primary Healthcare Decision Maker: (P) Legal Next of Kin   Advance Directive: has NO advanced directive - not interested in additional information. Defers to NOK.     __________________________________________________________________________  Assessment:      06/27/24 1629   Service Assessment   Patient Orientation Alert and Oriented   Cognition Alert   History Provided By Patient   Primary Caregiver Self   Support Systems Family Members   Patient's Healthcare Decision Maker is: Legal Next of Kin   PCP Verified by CM Yes   Last Visit to PCP Within last 3 months   Prior Functional Level Independent in ADLs/IADLs   Current Functional Level Independent in ADLs/IADLs   Can patient return to prior living arrangement Yes   Ability to make needs known: Good   Family able to assist with home care needs: Other (comment)  (UNK)   Would you like for me to discuss the discharge plan with any other family members/significant others, and if so, who? No   Financial Resources Medicare   Community Resources None   Social/Functional History   Lives With Other (comment)  (Pt reported \"he lives with someone\" but would not elaborate (politely).)   Type of Home House   Home Equipment None   ADL Assistance Independent   Homemaking Assistance Independent   Ambulation Assistance Independent   Transfer Assistance Independent   Active  Yes   Occupation Retired   Discharge Planning   Type of Residence House   Living Arrangements Other 
7/2/2024  12:09 PM  Care Management Progress Note    Reason for Admission:   Acute pulmonary edema (HCC) [J81.0]  Hypoxia [R09.02]  Hypoxic [R09.02]  Acute cystitis without hematuria [N30.00]  Procedure(s) (LRB):  Right heart cath (N/A)  Ultrasound guided vascular access R and L fem access (N/A)  Coronary angiography (N/A)  1 Day Post-Op    Patient Admission Status: Inpatient  RUR: Readmission Risk Score: 14.6    Hospitalization in the last 30 days (Readmission):  No        Transition of care plan:  Discharge order submitted. Pt has medically cleared. Pt on RA.   Home - No CM needs indicated.   Outpatient follow-up.  Pt's family to transport.       06/27/24 0916   Service Assessment   Patient Orientation Alert and Oriented   Cognition Alert   History Provided By Patient   Primary Caregiver Self   Support Systems Family Members   Patient's Healthcare Decision Maker is: Legal Next of Kin   PCP Verified by CM Yes   Last Visit to PCP Within last 3 months   Prior Functional Level Independent in ADLs/IADLs   Current Functional Level Independent in ADLs/IADLs   Can patient return to prior living arrangement Yes   Ability to make needs known: Good   Family able to assist with home care needs: Other (comment)  (UNK)   Would you like for me to discuss the discharge plan with any other family members/significant others, and if so, who? No   Financial Resources Medicare   Community Resources None   Social/Functional History   Lives With Other (comment)  (Pt reported \"he lives with someone\" but would not elaborate (politely).)   Type of Home House   Home Equipment None   ADL Assistance Independent   Homemaking Assistance Independent   Ambulation Assistance Independent   Transfer Assistance Independent   Active  Yes   Occupation Retired   Discharge Planning   Type of Residence House   Living Arrangements Other (Comment)   Current Services Prior To Admission None   Potential Assistance Needed Durable Medical Equipment 
Care Management Progress Note      Reason for Admission:   Acute pulmonary edema (HCC) [J81.0]  Hypoxia [R09.02]  Hypoxic [R09.02]  Acute cystitis without hematuria [N30.00]         Patient Admission Status: Inpatient  RUR: Readmission Risk Score: 14.3      Hospitalization in the last 30 days (Readmission):  No          Transition of care plan:  Patient was discussed in IDR and continues to be medically managed. Patient is being followed by cardiology. Home o2 evaluation has been completed, patient does not need home o2.    Dispo: return home. Therapy has signed off, no therapy needs post discharge. No CM needs anticipated, please consult CM if needs arise.    Outpatient follow-up.    Pt's family to transport.            ___________________________________________   Marylin Clarke RN Case Manager  6/28/2024   2:58 PM       
(Comment)  (Being weaned. Pt reports he will not use it at home.)   Potential Assistance Purchasing Medications No   Type of Home Care Services None   Patient expects to be discharged to: House   Services At/After Discharge   Services At/After Discharge None   Mode of Transport at Discharge Other (see comment)   Confirm Follow Up Transport Family

## 2024-07-02 NOTE — PROGRESS NOTES
[facility] CARDIOLOGY                    Cardiology Care Note     [x]Initial Encounter     []Follow-up    Patient Name: Shade Birmingham - :1945 - MRN:[id number]  Primary Cardiologist: Rossy   Consulting Cardiologist: Dayron Solomon MD     Reason for encounter: heart failure     HPI:       Shade Birmingham is a 78 y.o. male Mr. Birmingham is a 78 y.o.  male with PMH of CAD, DM, HTN, prostate CA, neuropathy admitted for UTI and hypoxia. Per pt, noted last week he was having some urinary symptoms but had a trip to [facility] that he decided to take. When he returned he noted his symptoms had worsened and he was having chills today prompting him to come to the ED. He denies CP, SOB but was noted to be hypoxic in the ED. No cough. Denies edema but was noted to have some mild bilateral LE edema.     Subjective:      Shade Birmingham reports no complaints today. Currently denies chest pain, shortness of breath, headache, dizziness. Complains of bilateral lower extremity neuropathy. Asking to go home.  Discussed the need for possible cardiac catheterization.      Assessment and Plan     Respiratory failure - followed by pulmonary, now 94% on room air, will have outpt PFTs , emphysematous changes on CT per pulm, hx tobacco abuse, WBCs improved  HFrEF- BNP 2542, Trop 17         - Echo  EF 25-30%, grade III luque, dfx, mild MR          - Bumex 1 mg bid, last lisinopril 3 days ago           -start GDMT,aldactone and entresto increase to step 2, resume home jardiance per primary. Has bladder issues            - CT of chest no evidence of pulmonary embolus background emphysema with possible mild diffuse pulmonary edema, with emphysematous changes            - cath  by Rossy showed RCA 30-40%,  D1  50% with EF 60%           - stress testing today   CAD/PAD - plavix, ASA    PAF - currently in sinus   HTN -start entresto, last lisinopril 4 days ago  DM per primary, ssi, restart home jardiance   UTI /prostate CA 
   06/28/24 0915   Resting (Room Air)   SpO2 93   HR 81   During Walk (Room Air)   SpO2 91   HR 98   Symptoms Leg pain   After Walk   SpO2 92   HR 92   Comments cleared to walk by ANETTE Gregory.  ambulated in hallway on room air.  Pt c/o stiff legs with slight limp.  NO desat or SOB noted.   Does the Patient Qualify for Home O2 No       
0826- Attempted to give report . RN not available to receive report at this time.  
3:35 PM  Patient arrived. ID and allergies verified verbally with patient. Pt voices understanding of procedure to be performed. Consent obtained. Pt prepped for procedure.     
5:37 PM  TRANSFER - IN REPORT:    Verbal report received from Mai CHEEMA on Shade CAMILO Valencia  being received from cath for routine post-op      Report consisted of patient's Situation, Background, Assessment and   Recommendations(SBAR).     Information from the following report(s) Nurse Handoff Report was reviewed with the receiving nurse.    Opportunity for questions and clarification was provided.      Assessment completed upon patient's arrival to unit and care assumed.     5:52 PM  Blood aspirated from sheath. 7 Fr sheath pulled from r Groin. Quikclot applied. Manual pressure held by anette dhillon. ANETTE Yeager at bedside.    6:03 PM  Hemostasis achieved.    6:26 PM  2 ml air released from TR Band. No bleeding or hematoma noted. Radial and Ulnar pulse on right wrist palpable. Pt tolerated well. Will continue to monitor.    6:29 PM  3 ml air released from TR Band. No bleeding or hematoma noted. Radial and Ulnar pulse on right wrist palpable. Pt tolerated well. Will continue to monitor.    6:34 PM  3 ml air released from TR Band. No bleeding or hematoma noted. Radial and Ulnar pulse on right wrist palpable. Pt tolerated well. Will continue to monitor.    6:37 PM  3 ml air released from TR Band. No bleeding or hematoma noted. Radial and Ulnar pulse on right wrist palpable. Pt tolerated well. Will continue to monitor.    Air release completed. TR Band removed from right wrist. No bleeding or  Hematoma. Dressing applied. Wrist immobilizer in place. Radial and ulnar pulse remain palpable on affected extremity. Pt tolerated well. Instructions given to pt regarding movement and activity restrictions. Pt voiced understanding.    1903 PM  Patient sat up to 30 degrees. Wrist and groin site clean dry intact and soft to palpitation.     1910 PM  Site check performed with RN. Report given to 4th floor RN.  
BEDSIDE PFT FOR PRE-OP CABG    FEV1 = 2.50 L (71% of predicted)  FVC = 3.28 L (68% of predicted)  FEV1/FVC = 76%   PEF = 6.69 L/sec (78% of predicted)    3 good efforts with pt sitting up in chair.    Pt declined ABG.  Pt already dressed and discharged.  
[facility]  [address] [place] [address] [phone]        Hospitalist Progress Note      NAME: Shade Birmingham   :  1945  MRM:  [id number]    Date/Time of service: 2024  8:48 AM       Subjective:     Chief Complaint:  Patient was personally seen and examined by me during this time period.  Chart reviewed.  Still with dysuria.  Denied chest pain, SOB       Objective:       Vitals:       Last 24hrs VS reviewed since prior progress note. Most recent are:    Vitals:    24 0745   BP: (!) 143/63   Pulse: 78   Resp: 27   Temp: 98.4 °F (36.9 °C)   SpO2: 91%     SpO2 Readings from Last 6 Encounters:   24 91%          Intake/Output Summary (Last 24 hours) at 2024 0848  Last data filed at 2024 0814  Gross per 24 hour   Intake --   Output 1400 ml   Net -1400 ml        Exam:     Physical Exam:    Gen:  elderly, frail, NAD  HEENT:  Pink conjunctivae, PERRL, hearing intact to voice, moist mucous membranes  Neck:  Supple, without masses, thyroid non-tender  Resp:  No accessory muscle use, bilateral crackles at bases  Card:  No murmurs, normal S1, S2 without thrills, bruits or peripheral edema  Abd:  Soft, non-tender, non-distended, normoactive bowel sounds are present  Musc:  No cyanosis or clubbing  Skin:  No rashes   Neuro:  Cranial nerves 3-12 are grossly intact, follows commands appropriately  Psych:  Good insight, oriented to person, place and time, alert    Medications Reviewed: (see below)    Lab Data Reviewed: (see below)    ______________________________________________________________________    Medications:     Current Facility-Administered Medications   Medication Dose Route Frequency    ipratropium 0.5 mg-albuterol 2.5 mg (DUONEB) nebulizer solution 1 Dose  1 Dose Inhalation Q4H PRN    gabapentin (NEURONTIN) capsule 600 mg  600 mg Oral TID    HYDROmorphone (DILAUDID) injection 0.5 mg  0.5 mg IntraVENous Q4H PRN    oxyCODONE (ROXICODONE) 
[facility]  [address] [place] [address] [phone]        Hospitalist Progress Note      NAME: Shade Birmingham   :  1945  MRM:  [id number]    Date/Time of service: 2024  10:11 AM       Subjective:     Chief Complaint:  Patient was personally seen and examined by me during this time period.  Chart reviewed.  Dyspnea is improving       Objective:       Vitals:       Last 24hrs VS reviewed since prior progress note. Most recent are:    Vitals:    24 0744   BP: (!) 137/102   Pulse: 72   Resp: 15   Temp: 97.3 °F (36.3 °C)   SpO2: 96%     SpO2 Readings from Last 6 Encounters:   24 96%          Intake/Output Summary (Last 24 hours) at 2024 1011  Last data filed at 2024 0005  Gross per 24 hour   Intake --   Output 1688 ml   Net -1688 ml          Exam:     Physical Exam:    Gen:  elderly, frail, NAD  HEENT:  Pink conjunctivae, PERRL, hearing intact to voice, moist mucous membranes  Neck:  Supple, without masses, thyroid non-tender  Resp:  No accessory muscle use, bilateral crackles at bases  Card:  No murmurs, normal S1, S2 without thrills, bruits or peripheral edema  Abd:  Soft, non-tender, non-distended, normoactive bowel sounds are present  Musc:  No cyanosis or clubbing  Skin:  No rashes   Neuro:  Cranial nerves 3-12 are grossly intact, follows commands appropriately  Psych:  Good insight, oriented to person, place and time, alert    Medications Reviewed: (see below)    Lab Data Reviewed: (see below)    ______________________________________________________________________    Medications:     Current Facility-Administered Medications   Medication Dose Route Frequency    bumetanide (BUMEX) injection 1 mg  1 mg IntraVENous Q12H    spironolactone (ALDACTONE) tablet 25 mg  25 mg Oral Daily    sacubitril-valsartan (ENTRESTO) 24-26 MG per tablet 1 tablet  1 tablet Oral BID    ipratropium 0.5 mg-albuterol 2.5 mg (DUONEB) nebulizer solution 1 Dose 
[facility]  [address] [place] [address] [phone]        Hospitalist Progress Note      NAME: Shade Birmingham   :  1945  MRM:  [id number]    Date/Time of service: 2024  11:53 AM       Subjective:     Chief Complaint:  Patient was personally seen and examined by me during this time period.  Chart reviewed.  S/p stress test.  No chest pain.  Dyspnea better       Objective:       Vitals:       Last 24hrs VS reviewed since prior progress note. Most recent are:    Vitals:    24 1137   BP: 126/78   Pulse: 80   Resp: 16   Temp: 97.5 °F (36.4 °C)   SpO2: 96%     SpO2 Readings from Last 6 Encounters:   24 96%          Intake/Output Summary (Last 24 hours) at 2024 1153  Last data filed at 2024 0808  Gross per 24 hour   Intake --   Output 2775 ml   Net -2775 ml          Exam:     Physical Exam:    Gen:  elderly, frail, NAD  HEENT:  Pink conjunctivae, PERRL, hearing intact to voice, moist mucous membranes  Neck:  Supple, without masses, thyroid non-tender  Resp:  No accessory muscle use, bilateral crackles at bases  Card:  No murmurs, normal S1, S2 without thrills, bruits or peripheral edema  Abd:  Soft, non-tender, non-distended, normoactive bowel sounds are present  Musc:  No cyanosis or clubbing  Skin:  No rashes   Neuro:  Cranial nerves 3-12 are grossly intact, follows commands appropriately  Psych:  Good insight, oriented to person, place and time, alert    Medications Reviewed: (see below)    Lab Data Reviewed: (see below)    ______________________________________________________________________    Medications:     Current Facility-Administered Medications   Medication Dose Route Frequency    bumetanide (BUMEX) tablet 1 mg  1 mg Oral Daily    sacubitril-valsartan (ENTRESTO) 24-26 MG per tablet 2 tablet  2 tablet Oral BID    aspirin chewable tablet 81 mg  81 mg Oral Daily    ciprofloxacin (CIPRO) tablet 250 mg  250 mg Oral 2 times per day 
[facility]  [address] [place] [address] [phone]    [facility] Adult  Hospitalist Group                                                                                          Hospitalist Progress Note  Kay Doan MD        Date of Service:  2024  NAME:  Shade Birmingham  :  1945  MRN:  [id number]      Admission Summary:   79 yo hx of HTN, DM, CAD, prostate cancer, presented w/ dysuria, UTI, hypoxia, found to have new systolic/diastolic CHF EF 25-30%     Interval history / Subjective:   No new complaints     Assessment & Plan:     Acute combined systolic/diastolic CHF  -improving.  EF 25-30%.    -Unclear etiology.  Patient reported a \"normal heart cath\" in 2019 by Dr. Blair.    -inferior ischemia on stress, plan for cath tomorrow morning.  Drip discontinued by cardiology this morning  -cont entreso, spironolactone.  Bumex held due to CLARENCE, hyponatremia  -cards following      Acute resp failure/hypoxia  -now resolved.  -Likely from pulm edema, CHF.    -Chest CT neg for PE  -Pulm was following     Complicated UTI/dysuria  -due to prostate CA.    -Urine Cx w/ enterococcus.    -Patient was on IV Zosyn, will complete a course of Cipro.    -Urology was following     CLARENCE  -likely has underlying CKD.    -Hold diuretics for today and recheck in the morning     Chronic back pain/debility  -recent lumbar MRI with progressive DDD.    -Will need to follow up with Ortho outpatient.    -Cont PT/OT     HTN: BP stable.  Not on meds     DM type 2:   -A1C 6.6%.  Cont SSI     CAD  -cont plavix     Prostate CA  -s/p XRT in January.    -Will need outpatient follow up     Outisde Records, prior notes, labs, radiology, and medications reviewed     Code status: full  DVT prophylaxis: heparin gtt        Hospital Problems             Last Modified POA    * (Principal) Hypoxia 2024 Yes    UTI (urinary tract infection) 2024 Yes    Claudication in 
Cardiology Attestation note      SUBJECTIVE:    Shade Birmingham is a 78 y.o. who is admitted for Acute pulmonary edema (HCC) [J81.0]  Hypoxia [R09.02]  Hypoxic [R09.02]  Acute cystitis without hematuria [N30.00]     He was admitted with dysuria    78-year-old with a history of hypertension, CAD bladder cancer and prostate cancer who was admitted with dysuria and hypertension.  He is currently receiving RT for prostate cancer which started in January.  Over the last 6 days he has noticed dysuria and a 5/10 suprapubic abdominal discomfort.  He has not had any chest pain or shortness of breath.  Oxygen levels on admission on room air were in the 80% range and he was placed on 4 L.  On exam he was noted to have bilateral lower extremity edema and bibasilar crackles.  His white count was elevated at 15,000 and his sodium was 133    BP (!) 141/66   Pulse 77   Temp 97.3 °F (36.3 °C) (Oral)   Resp 14   Ht 1.88 m (6' 2\")   Wt 94.3 kg (208 lb)   SpO2 95%   BMI 26.71 kg/m²       PE:    General: Well developed, in no acute distress.  HEENT: No jaundice  Neck: Supple  Heart:  Normal S1/S2 negative S3 or S4. Regular, no murmur, gallop or rub, no jugular venous distention  Respiratory: Clear bilaterally x 4, no wheezing or rales  Extremities:  No edema, normal cap refill, no cyanosis.  Musculoskeletal: No clubbing, no deformities  Neuro: A&Ox3, speech clear, cooperative, no focal neurologic deficits  Skin: Skin color is normal. No rashes or lesions. Non diaphoretic        Impression/Recommendation;      1. C61, C77.5: [id number], iPSA 9.1, GG5 (+9/12) regional risk prostate cancer   . 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  Hypoxia  Echo EF 25-30%  Strict I/O's  CT pending for potential pulmonary embolus  Elevated proBNP 2542    3.  Claudication peripheral vascular disease    4.  T2DM  -A1c is 6.6    5.  Hypertension  Blood pressure this morning is ranged from 137/79 137/102 but 
[facility]  [address] [place] [address] [phone]    [facility], located within [facility] Adult  Hospitalist Group                                                                                          Hospitalist Progress Note  Kay Doan MD        Date of Service:  2024  NAME:  Shade Birmingham  :  1945  MRN:  [id number]      Admission Summary:   77 yo hx of HTN, DM, CAD, prostate cancer, presented w/ dysuria, UTI, hypoxia, found to have new systolic/diastolic CHF EF 25-30%     Interval history / Subjective:     No chest pain, sob.      Assessment & Plan:     Acute combined systolic/diastolic CHF  -improving.  EF 25-30%.    -Unclear etiology.  Patient reported a \"normal heart cath\" in 2019 by Dr. Blair.    -inferior ischemia on stress, plan for cath on Monday. Cont heparin gtt for now   -cont bumex, entreso, spironolactone   -cards following      Acute resp failure/hypoxia  -now resolved.  -Likely from pulm edema, CHF.    -Chest CT neg for PE  -Pulm was following     Complicated UTI/dysuria  -due to prostate CA.    -Urine Cx w/ enterococcus.    -Patient was on IV Zosyn, will complete a course of Cipro.    -Urology was following     CLARENCE  -likely has underlying CKD.    -Will monitor while on diuretics     Chronic back pain/debility  -recent lumbar MRI with progressive DDD.    -Will need to follow up with Ortho outpatient.    -Cont PT/OT     HTN: BP stable.  Not on meds     DM type 2:   -A1C 6.6%.  Cont SSI     CAD  -cont plavix     Prostate CA  -s/p XRT in January.    -Will need outpatient follow up     Outisde Records, prior notes, labs, radiology, and medications reviewed     Code status: full  DVT prophylaxis: heparin gtt        Hospital Problems             Last Modified POA    * (Principal) Hypoxia 2024 Yes    UTI (urinary tract infection) 2024 Yes    Claudication in peripheral vascular disease (HCC) 2024 Yes    DM (diabetes mellitus) 
[facility]  [address] [place] [address] [phone]    [facility] Adult  Hospitalist Group                                                                                          Hospitalist Progress Note  Kay Doan MD        Date of Service:  2024  NAME:  Shade Birmingham  :  1945  MRN:  [id number]      Admission Summary:   79 yo hx of HTN, DM, CAD, prostate cancer, presented w/ dysuria, UTI, hypoxia, found to have new systolic/diastolic CHF EF 25-30%     Interval history / Subjective:   No new complaints     Assessment & Plan:     Acute combined systolic/diastolic CHF  -improving.  EF 25-30%.    -Unclear etiology.  Patient reported a \"normal heart cath\" in 2019 by Dr. Blair.    -inferior ischemia on stress, plan for cath later today.  Drip discontinued by cardiology this morning  -cont entreso, spironolactone.  Bumex held due to CLARENCE, hyponatremia  -cards following      Acute resp failure/hypoxia  -now resolved.  -Likely from pulm edema, CHF.    -Chest CT neg for PE  -Pulm was following     Complicated UTI/dysuria  -due to prostate CA.    -Urine Cx w/ enterococcus.    -Patient was on IV Zosyn, will complete a course of Cipro through     -Urology was following     CLARENCE  -likely has underlying CKD.    -Hold diuretics for today and recheck in the morning     Chronic back pain/debility  -recent lumbar MRI with progressive DDD.    -Will need to follow up with Ortho outpatient.    -Cont PT/OT     HTN: BP stable.  Not on meds     DM type 2:   -A1C 6.6%.  Cont SSI     CAD  -cont plavix     Prostate CA  -s/p XRT in January.    -Will need outpatient follow up     Outisde Records, prior notes, labs, radiology, and medications reviewed     Code status: full  DVT prophylaxis: heparin gtt        Hospital Problems             Last Modified POA    * (Principal) Hypoxia 2024 Yes    UTI (urinary tract infection) 2024 Yes    Claudication in 
OT order received, chart reviewed. Attempted OT services, patient off unit (2 attempts). Evaluation remains pending at this time.   Margaret Garcia, OTR/L  
Occupational Therapy    Chart reviewed in preparation for OT evaluation.  Note patient was evaluated and discharged from PT yesterday, mobilizing at independent level.  Met with patient, who denied any ADL difficulty and appropriately declined need for OT evaluation.  Will complete OT order.    Jeffery Hoover, OTR/L  
[facility]     Name: Shade Birmingham MRN: [id number]   : 1945 Hospital: [facility]   Date: 2024        Impression Plan   Acute respiratory failure  Hypoxia - resolved  Emphysematous changes on CT chest  UTI/cystitis  Hx of tobacco abuse               May have some underlying COPD changes and atelectasis along with CHF  O2 if needed to keep sats above 90% - on RA  Continue diuresis  Cardiology planning cardiac cath today  Abx for UTI per primary team  Pulmonary follow up and PFTs as outpatient  Needs home O2 assesment prior to discharge    Patient is stable from a pulmonary standpoint.  We will sign off and arrange for outpatient pulmonary follow up as above.  Please call with questions.           Radiology  (personally reviewed) CT abdomen/pelvis: scattered emphysematous changes, bibasilar atelectasis       Subjective     Cc: hypoxia    79 yo with PMHx of bladder cancer presenting with severe pain with urination. Found to be hypoxic on admission. Pt denies SOB/cough/wheezing. Pt states he smoked 1/2 ppd for 36 years. Quit in . Pt is not particularly interesting in working up his hypoxia.     Interval history  Afebrile  BP stable  Sats 92-95% on RA  Na 130  Creat 1.67 - trending up  CRP 1.49 - better  Hgb 11.9 - stable      Echo with EF of 20-25%.    CTA chest negative for pulmonary embolism, background of emphysema.    ROS: No complaints this morning.  Denies SOB/increased WOB.  Denies CP.  Denies fever or chills.  Minimal cough.    Past Medical History:   Diagnosis Date    Arthritis     CAD (coronary artery disease)     Cancer (HCC)     hx bladder ca, skin CA    Chronic pain     Diabetes (HCC)     Elevated PSA     Hemorrhage of rectum and anus 10/4/2010    Hx of bladder cancer     Hypertension     Joint pain     Leg swelling     Memory disorder     Muscle pain     Muscle weakness     Neuropathy     Prostate cancer (HCC)     Snoring       Past Surgical History: 
Patient scheduled for cardiac catheterization on Monday. Discussed stress test results with patient and brother.   Starting heparin gtt.  NPO past midnight Sunday/Monday AM.   Eusebia Zuniga NP  
Reason for encounter: heart failure   Abnormal stress test  Inferior ischemia     HPI:        Shade Birmingham is a 78 y.o. male with PMH of CAD, DM, HTN, prostate CA, neuropathy admitted for UTI and hypoxia. Per pt, noted last week he was having some urinary symptoms but had a trip to [facility] that he decided to take. When he returned he noted his symptoms had worsened and he was having chills today prompting him to come to the ED. He denies CP, SOB but was noted to be hypoxic in the ED. No cough. Denies edema but was noted to have some mild bilateral LE edema.         Shade Birmingham reports worsening fatigue over the last few months.   Currently denies chest pain, shortness of breath, headache, dizziness. Complains of bilateral lower extremity neuropathy  LVEF 25%     Assessment and Plan      Acute systolic CHF           -start GDMT,aldactone and entresto, resume home jardiance per primary.           - CT of chest no evidence of pulmonary embolus background emphysema with possible mild diffuse pulmonary edema            - cath 2018 by Rossy showed RCA 30-40%,  D1  50% with EF 60%           - stress testing inferior ischemia  On heparin  plavix    Cardiac cath Monday.  Will hold heparin  Keep NPO after midnight    2. PAF - currently in sinus, on heparin waiting for cath and off OAC  Cath is tomorrow, hold heparin    HTN -on GDMT  DM per primary, ssi home jardiance   UTI /prostate CA per primary team  Renal failure is worse with hyponatremia  Urine output -650 cc  Will stop bumex for now         Antonio Moore M.D.   Electrophysiology/Cardiology   [facility] [place]   [address]                      [address] [facility]   [place] [address]                             [facility] [address] [phone]                                    ____________________________________________________________     Cardiac 
Reason for encounter: heart failure   Abnormal stress test  Inferior ischemia     HPI:        Shdae Birmingham is a 78 y.o. male with PMH of CAD, DM, HTN, prostate CA, neuropathy admitted for UTI and hypoxia. Per pt, noted last week he was having some urinary symptoms but had a trip to [facility] that he decided to take. When he returned he noted his symptoms had worsened and he was having chills today prompting him to come to the ED. He denies CP, SOB but was noted to be hypoxic in the ED. No cough. Denies edema but was noted to have some mild bilateral LE edema.         Shade Birmingham reports worsening fatigue over the last few months. Currently denies chest pain, shortness of breath, headache, dizziness. Complains of bilateral lower extremity neuropathy  LVEF 25%     Assessment and Plan      Acute systolic CHF           -start GDMT,aldactone and entresto, resume home jardiance per primary.           - CT of chest no evidence of pulmonary embolus background emphysema with possible mild diffuse pulmonary edema            - cath 2018 by Rossy showed RCA 30-40%,  D1  50% with EF 60%           - stress testing inferior ischemia  On heparin  plavix    Cardiac cath Monday  He agrees    2. PAF - currently in sinus, on heparin waiting for cath    HTN -on GDMT  DM per primary, ssi home jardiance   UTI /prostate CA per primary team   Antonio Moore M.D.   Electrophysiology/Cardiology   [facility] [place]   [address] [facility]                      [address] [facility]   [place] [address]                             [facility] [address] [phone]                                    ____________________________________________________________     Cardiac testing  Below is part of Dr. Blair record, it is not all inclusive       06/25/24     ECHO (TTE) COMPLETE (PRN CONTRAST/BUBBLE/STRAIN/3D) 06/26/2024  5:19 PM (Final)   
Report given to Eleni CHEEMA  
Spiritual Care Assessment/Progress Note  [facility] [place]    Name: Shade Birmingham MRN: [id number]    Age: 78 y.o.     Sex: male   Language: English     Date: 7/1/2024            Total Time Calculated: 15 min              Spiritual Assessment begun in [facility] [place][note 2] CARDIAC CATH LAB/EP/IR LAB  Service Provided For: Patient     Encounter Overview/Reason: Attempted Encounter    Spiritual beliefs:      [] Involved in a ayesha tradition/spiritual practice:      [] Supported by a ayesha community:      [] Claims no spiritual orientation:      [] Seeking spiritual identity:           [] Adheres to an individual form of spirituality:      [x] Not able to assess:                Identified resources for coping and support system:   Support System: Unknown       [] Prayer                  [] Devotional reading               [] Music                  [] Guided Imagery     [] Pet visits                                        [] Other: (COMMENT)     Specific area/focus of visit   Encounter:    Crisis:    Spiritual/Emotional needs: Type: Spiritual Support  Ritual, Rites and Sacraments:    Grief, Loss, and Adjustments:    Ethics/Mediation:    Behavioral Health:    Palliative Care:    Advance Care Planning:      Plan/Referrals: Other (Comment) (Please contact [facility] for further consults/)    Narrative:   visit for the patient on Post Surg for length of stay. Reviewed pt's chart. Upon arrival, pt was off the floor in a medical procedure. No family present at this time. Chart indicates no Spiritism preference. Offered words of good intent on behalf of the patient. Left a card introducing self and chaplaincy. Please contact [facility] for further referrals.    Chaplain Tello, MS, MDiv, [facility]  Spiritual Health Services  Paging service: [phone] (JOE)        
Tech bladder scan- 214ml  Pt voided 213ml about 10 minutes later   
reported    Activity Tolerance:   Good    After treatment:   Patient left in no apparent distress sitting up in chair and Call bell within reach      COMMUNICATION/EDUCATION:   The patient's plan of care was discussed with: registered nurse         Thank you for this referral.  Misty Valdez PT,DPT,NCS,CLT  Minutes: 20      Physical Therapy Evaluation Charge Determination   History Examination Presentation Decision-Making   MEDIUM  Complexity : 1-2 comorbidities / personal factors will impact the outcome/ POC  MEDIUM Complexity : 3 Standardized tests and measures addressin body structure, function, activity limitation and / or participation in recreation  MEDIUM Complexity : Evolving with changing characteristics  AM-PAC  LOW      Based on the above components, the patient evaluation is determined to be of the following complexity level: Low   
capsule 600 mg  600 mg Oral TID    HYDROmorphone (DILAUDID) injection 0.5 mg  0.5 mg IntraVENous Q4H PRN    oxyCODONE (ROXICODONE) immediate release tablet 5 mg  5 mg Oral Q4H PRN    benzonatate (TESSALON) capsule 100 mg  100 mg Oral TID PRN    guaiFENesin (MUCINEX) extended release tablet 600 mg  600 mg Oral BID    bumetanide (BUMEX) injection 1 mg  1 mg IntraVENous Once    phenazopyridine (PYRIDIUM) tablet 200 mg  200 mg Oral TID WC    sodium chloride flush 0.9 % injection 5-40 mL  5-40 mL IntraVENous 2 times per day    sodium chloride flush 0.9 % injection 5-40 mL  5-40 mL IntraVENous PRN    0.9 % sodium chloride infusion   IntraVENous PRN    ondansetron (ZOFRAN-ODT) disintegrating tablet 4 mg  4 mg Oral Q8H PRN    Or    ondansetron (ZOFRAN) injection 4 mg  4 mg IntraVENous Q6H PRN    polyethylene glycol (GLYCOLAX) packet 17 g  17 g Oral Daily PRN    acetaminophen (TYLENOL) tablet 650 mg  650 mg Oral Q6H PRN    Or    acetaminophen (TYLENOL) suppository 650 mg  650 mg Rectal Q6H PRN    clopidogrel (PLAVIX) tablet 75 mg  75 mg Oral Daily    glucose chewable tablet 16 g  4 tablet Oral PRN    dextrose bolus 10% 125 mL  125 mL IntraVENous PRN    Or    dextrose bolus 10% 250 mL  250 mL IntraVENous PRN    glucagon injection 1 mg  1 mg SubCUTAneous PRN    dextrose 10 % infusion   IntraVENous Continuous PRN    insulin lispro (HUMALOG,ADMELOG) injection vial 0-8 Units  0-8 Units SubCUTAneous TID WC    insulin lispro (HUMALOG,ADMELOG) injection vial 0-4 Units  0-4 Units SubCUTAneous Nightly      Antonio Moore M.D.   Electrophysiology/Cardiology   [facility] [place]   [address] [facility]                      [address] [facility]   [place] [address]                             [facility] [address] [phone]         
mg  650 mg Rectal Q6H PRN    [Held by provider] clopidogrel (PLAVIX) tablet 75 mg  75 mg Oral Daily    glucose chewable tablet 16 g  4 tablet Oral PRN    dextrose bolus 10% 125 mL  125 mL IntraVENous PRN    Or    dextrose bolus 10% 250 mL  250 mL IntraVENous PRN    glucagon injection 1 mg  1 mg SubCUTAneous PRN    dextrose 10 % infusion   IntraVENous Continuous PRN    insulin lispro (HUMALOG,ADMELOG) injection vial 0-8 Units  0-8 Units SubCUTAneous TID WC    insulin lispro (HUMALOG,ADMELOG) injection vial 0-4 Units  0-4 Units SubCUTAneous Nightly      Antonio Moore M.D.   Electrophysiology/Cardiology   [facility] [place]   [address] [facility]                      [address] [facility]   [place] [address]                             [facility] [address] [phone]

## 2024-07-02 NOTE — PLAN OF CARE
Problem: Safety - Adult  Goal: Free from fall injury  7/2/2024 0954 by Ria Davison RN  Outcome: Progressing  7/1/2024 2316 by Rita Hussein RN  Outcome: Progressing     Problem: Discharge Planning  Goal: Discharge to home or other facility with appropriate resources  7/2/2024 0954 by Ria Davison RN  Outcome: Progressing  7/1/2024 2316 by Rita Hussein RN  Outcome: Progressing     Problem: ABCDS Injury Assessment  Goal: Absence of physical injury  7/2/2024 0954 by Ria Davison RN  Outcome: Progressing  7/1/2024 2316 by Rita Hussein RN  Outcome: Progressing     Problem: Chronic Conditions and Co-morbidities  Goal: Patient's chronic conditions and co-morbidity symptoms are monitored and maintained or improved  7/2/2024 0954 by Ria Davison RN  Outcome: Progressing  7/1/2024 2316 by Rita Hussein RN  Outcome: Progressing

## 2024-07-02 NOTE — PLAN OF CARE
Problem: Safety - Adult  Goal: Free from fall injury  7/1/2024 2316 by Rita Hussein RN  Outcome: Progressing  7/1/2024 1310 by Bri Ochoa RN  [facility]     Problem: Discharge Planning  Goal: Discharge to home or other facility with appropriate resources  7/1/2024 2316 by Rita Hussein RN  Outcome: Progressing  7/1/2024 1310 by Bri Ochoa RN  Outcome: HH/[facility] Progressing     Problem: ABCDS Injury Assessment  Goal: Absence of physical injury  7/1/2024 2316 by Rita Hussein RN  Outcome: Progressing  7/1/2024 1310 by Bri Ochoa RN  Outcome: HH/[facility] Progressing     Problem: Chronic Conditions and Co-morbidities  Goal: Patient's chronic conditions and co-morbidity symptoms are monitored and maintained or improved  7/1/2024 2316 by Rita Hussein RN  Outcome: Progressing  7/1/2024 1310 by Bri Ochoa RN  Outcome: /[facility] Progressing

## 2024-07-02 NOTE — DISCHARGE INSTRUCTIONS
HOSPITALIST DISCHARGE INSTRUCTIONS          NAME: Shade Birmingham   :  1945   MRN:  [id number]     Date/Time:  2024 11:20 AM    ADMIT DATE: 2024     DISCHARGE DATE: 2024     ADMITTING DIAGNOSIS:  Acute systolic heart failure EF 25-30%, severe CAD s/p cath with LAD/RCA lesions    DISCHARGE DIAGNOSIS:  same    MEDICATIONS:  See after visit summary       It is important that you take the medication exactly as they are prescribed.   Keep your medication in the bottles provided by the pharmacist and keep a list of the medication names, dosages, and times to be taken in your wallet.   Do not take other medications without consulting your doctor     Pain Management: per above medications    What to do at Home    Recommended diet:  cardiac diet    Recommended activity: per Cardiology    1) Return to the hospital if you feel worse    2) If you experience any of the following symptoms then please call your primary care physician or return to the emergency room if you cannot get hold of your doctor:  Fever, chills, nausea, vomiting, diarrhea, change in mentation, falling, bleeding, shortness of breath, chest pain, severe headache, severe abdominal pain,     3) Make sure you Follow up with your doctors as directed    Follow Up:  Jori Martinez MD  [address] [address] [phone]    Follow up on 2024  1:30 pm    Fern Oviedo MD  [address] [facility] [address]  [facility] [address] [phone]    Schedule an appointment as soon as possible for a visit in 2 week(s)      Lucas Buckner MD  [address] [facility] [address] [phone]    Schedule an appointment as soon as possible for a visit in 1 week(s)    .      Information obtained by :  I understand that if any problems occur once I am at home I am to contact my physician.    I understand and acknowledge receipt of the instructions indicated above.

## 2024-07-05 ENCOUNTER — APPOINTMENT (OUTPATIENT)
Facility: HOSPITAL | Age: 79
DRG: 683 | End: 2024-07-05
Payer: MEDICARE

## 2024-07-05 ENCOUNTER — HOSPITAL ENCOUNTER (OUTPATIENT)
Facility: HOSPITAL | Age: 79
Setting detail: OBSERVATION
LOS: 1 days | Discharge: HOME | DRG: 683 | End: 2024-07-06
Attending: EMERGENCY MEDICINE | Admitting: INTERNAL MEDICINE
Payer: MEDICARE

## 2024-07-05 DIAGNOSIS — N17.9 AKI (ACUTE KIDNEY INJURY) (HCC): Primary | ICD-10-CM

## 2024-07-05 LAB
ALBUMIN SERPL-MCNC: 3.3 G/DL (ref 3.5–5)
ALBUMIN/GLOB SERPL: 0.7 (ref 1.1–2.2)
ALP SERPL-CCNC: 85 U/L (ref 45–117)
ALT SERPL-CCNC: 17 U/L (ref 12–78)
ANION GAP SERPL CALC-SCNC: 8 MMOL/L (ref 5–15)
APPEARANCE UR: CLEAR
AST SERPL-CCNC: 18 U/L (ref 15–37)
BACTERIA URNS QL MICRO: NEGATIVE /HPF
BASOPHILS # BLD: 0.1 K/UL (ref 0–0.1)
BASOPHILS NFR BLD: 1 % (ref 0–1)
BILIRUB SERPL-MCNC: 1.2 MG/DL (ref 0.2–1)
BILIRUB UR QL: NEGATIVE
BUN SERPL-MCNC: 58 MG/DL (ref 6–20)
BUN/CREAT SERPL: 25 (ref 12–20)
CALCIUM SERPL-MCNC: 9.4 MG/DL (ref 8.5–10.1)
CHLORIDE SERPL-SCNC: 101 MMOL/L (ref 97–108)
CK SERPL-CCNC: 34 U/L (ref 39–308)
CO2 SERPL-SCNC: 23 MMOL/L (ref 21–32)
COLOR UR: YELLOW
CREAT SERPL-MCNC: 2.31 MG/DL (ref 0.7–1.3)
DIFFERENTIAL METHOD BLD: ABNORMAL
EOSINOPHIL # BLD: 0.1 K/UL (ref 0–0.4)
EOSINOPHIL NFR BLD: 1 % (ref 0–7)
EPITH CASTS URNS QL MICRO: ABNORMAL /LPF
ERYTHROCYTE [DISTWIDTH] IN BLOOD BY AUTOMATED COUNT: 19.2 % (ref 11.5–14.5)
GLOBULIN SER CALC-MCNC: 4.8 G/DL (ref 2–4)
GLUCOSE SERPL-MCNC: 163 MG/DL (ref 65–100)
GLUCOSE UR STRIP.AUTO-MCNC: >1000 MG/DL
HCT VFR BLD AUTO: 35.6 % (ref 36.6–50.3)
HGB BLD-MCNC: 12.1 G/DL (ref 12.1–17)
HGB UR QL STRIP: NEGATIVE
HYALINE CASTS URNS QL MICRO: ABNORMAL /LPF (ref 0–2)
IMM GRANULOCYTES # BLD AUTO: 0.1 K/UL (ref 0–0.04)
IMM GRANULOCYTES NFR BLD AUTO: 1 % (ref 0–0.5)
KETONES UR QL STRIP.AUTO: ABNORMAL MG/DL
LEUKOCYTE ESTERASE UR QL STRIP.AUTO: NEGATIVE
LYMPHOCYTES # BLD: 0.4 K/UL (ref 0.8–3.5)
LYMPHOCYTES NFR BLD: 3 % (ref 12–49)
MCH RBC QN AUTO: 34.5 PG (ref 26–34)
MCHC RBC AUTO-ENTMCNC: 34 G/DL (ref 30–36.5)
MCV RBC AUTO: 101.4 FL (ref 80–99)
MONOCYTES # BLD: 2.4 K/UL (ref 0–1)
MONOCYTES NFR BLD: 17 % (ref 5–13)
NEUTS SEG # BLD: 11.2 K/UL (ref 1.8–8)
NEUTS SEG NFR BLD: 77 % (ref 32–75)
NITRITE UR QL STRIP.AUTO: NEGATIVE
NRBC # BLD: 0.02 K/UL (ref 0–0.01)
NRBC BLD-RTO: 0.1 PER 100 WBC
NT PRO BNP: 825 PG/ML
PH UR STRIP: 5.5 (ref 5–8)
PLATELET # BLD AUTO: 250 K/UL (ref 150–400)
PMV BLD AUTO: 10.9 FL (ref 8.9–12.9)
POTASSIUM SERPL-SCNC: 4.8 MMOL/L (ref 3.5–5.1)
PROT SERPL-MCNC: 8.1 G/DL (ref 6.4–8.2)
PROT UR STRIP-MCNC: ABNORMAL MG/DL
RBC # BLD AUTO: 3.51 M/UL (ref 4.1–5.7)
RBC #/AREA URNS HPF: ABNORMAL /HPF (ref 0–5)
RBC MORPH BLD: ABNORMAL
SODIUM SERPL-SCNC: 132 MMOL/L (ref 136–145)
SP GR UR REFRACTOMETRY: 1.02 (ref 1–1.03)
URINE CULTURE IF INDICATED: ABNORMAL
UROBILINOGEN UR QL STRIP.AUTO: 1 EU/DL (ref 0.2–1)
WBC # BLD AUTO: 14.3 K/UL (ref 4.1–11.1)
WBC URNS QL MICRO: ABNORMAL /HPF (ref 0–4)

## 2024-07-05 PROCEDURE — 81001 URINALYSIS AUTO W/SCOPE: CPT

## 2024-07-05 PROCEDURE — 2060000000 HC ICU INTERMEDIATE R&B

## 2024-07-05 PROCEDURE — 80053 COMPREHEN METABOLIC PANEL: CPT

## 2024-07-05 PROCEDURE — 85025 COMPLETE CBC W/AUTO DIFF WBC: CPT

## 2024-07-05 PROCEDURE — 36415 COLL VENOUS BLD VENIPUNCTURE: CPT

## 2024-07-05 PROCEDURE — 82550 ASSAY OF CK (CPK): CPT

## 2024-07-05 PROCEDURE — 94761 N-INVAS EAR/PLS OXIMETRY MLT: CPT

## 2024-07-05 PROCEDURE — 83880 ASSAY OF NATRIURETIC PEPTIDE: CPT

## 2024-07-05 PROCEDURE — 99285 EMERGENCY DEPT VISIT HI MDM: CPT

## 2024-07-05 PROCEDURE — 96361 HYDRATE IV INFUSION ADD-ON: CPT

## 2024-07-05 PROCEDURE — 2580000003 HC RX 258: Performed by: EMERGENCY MEDICINE

## 2024-07-05 PROCEDURE — 70450 CT HEAD/BRAIN W/O DYE: CPT

## 2024-07-05 PROCEDURE — 96360 HYDRATION IV INFUSION INIT: CPT

## 2024-07-05 RX ORDER — 0.9 % SODIUM CHLORIDE 0.9 %
1000 INTRAVENOUS SOLUTION INTRAVENOUS ONCE
Status: COMPLETED | OUTPATIENT
Start: 2024-07-05 | End: 2024-07-05

## 2024-07-05 RX ORDER — ACETAMINOPHEN 325 MG/1
650 TABLET ORAL EVERY 6 HOURS PRN
Status: DISCONTINUED | OUTPATIENT
Start: 2024-07-05 | End: 2024-07-06 | Stop reason: SDUPTHER

## 2024-07-05 RX ORDER — SODIUM CHLORIDE 0.9 % (FLUSH) 0.9 %
5-40 SYRINGE (ML) INJECTION EVERY 12 HOURS SCHEDULED
Status: DISCONTINUED | OUTPATIENT
Start: 2024-07-05 | End: 2024-07-06 | Stop reason: HOSPADM

## 2024-07-05 RX ORDER — ENOXAPARIN SODIUM 100 MG/ML
40 INJECTION SUBCUTANEOUS DAILY
Status: DISCONTINUED | OUTPATIENT
Start: 2024-07-05 | End: 2024-07-05

## 2024-07-05 RX ORDER — ACETAMINOPHEN 650 MG/1
650 SUPPOSITORY RECTAL EVERY 6 HOURS PRN
Status: DISCONTINUED | OUTPATIENT
Start: 2024-07-05 | End: 2024-07-06 | Stop reason: SDUPTHER

## 2024-07-05 RX ORDER — ATORVASTATIN CALCIUM 20 MG/1
40 TABLET, FILM COATED ORAL NIGHTLY
Status: DISCONTINUED | OUTPATIENT
Start: 2024-07-05 | End: 2024-07-06 | Stop reason: HOSPADM

## 2024-07-05 RX ORDER — POTASSIUM CHLORIDE 750 MG/1
40 TABLET, FILM COATED, EXTENDED RELEASE ORAL PRN
Status: DISCONTINUED | OUTPATIENT
Start: 2024-07-05 | End: 2024-07-06 | Stop reason: HOSPADM

## 2024-07-05 RX ORDER — ONDANSETRON 2 MG/ML
4 INJECTION INTRAMUSCULAR; INTRAVENOUS EVERY 6 HOURS PRN
Status: DISCONTINUED | OUTPATIENT
Start: 2024-07-05 | End: 2024-07-06 | Stop reason: HOSPADM

## 2024-07-05 RX ORDER — SODIUM CHLORIDE 0.9 % (FLUSH) 0.9 %
5-40 SYRINGE (ML) INJECTION PRN
Status: DISCONTINUED | OUTPATIENT
Start: 2024-07-05 | End: 2024-07-06 | Stop reason: HOSPADM

## 2024-07-05 RX ORDER — POLYETHYLENE GLYCOL 3350 17 G/17G
17 POWDER, FOR SOLUTION ORAL DAILY PRN
Status: DISCONTINUED | OUTPATIENT
Start: 2024-07-05 | End: 2024-07-06 | Stop reason: HOSPADM

## 2024-07-05 RX ORDER — ASPIRIN 81 MG/1
81 TABLET, CHEWABLE ORAL DAILY
Status: DISCONTINUED | OUTPATIENT
Start: 2024-07-06 | End: 2024-07-06

## 2024-07-05 RX ORDER — SODIUM CHLORIDE 9 MG/ML
INJECTION, SOLUTION INTRAVENOUS CONTINUOUS
Status: DISCONTINUED | OUTPATIENT
Start: 2024-07-05 | End: 2024-07-05

## 2024-07-05 RX ORDER — SODIUM CHLORIDE 9 MG/ML
INJECTION, SOLUTION INTRAVENOUS PRN
Status: DISCONTINUED | OUTPATIENT
Start: 2024-07-05 | End: 2024-07-06 | Stop reason: HOSPADM

## 2024-07-05 RX ORDER — MAGNESIUM SULFATE IN WATER 40 MG/ML
2000 INJECTION, SOLUTION INTRAVENOUS PRN
Status: DISCONTINUED | OUTPATIENT
Start: 2024-07-05 | End: 2024-07-06 | Stop reason: HOSPADM

## 2024-07-05 RX ORDER — POTASSIUM CHLORIDE 7.45 MG/ML
10 INJECTION INTRAVENOUS PRN
Status: DISCONTINUED | OUTPATIENT
Start: 2024-07-05 | End: 2024-07-06 | Stop reason: HOSPADM

## 2024-07-05 RX ORDER — CLOPIDOGREL BISULFATE 75 MG/1
75 TABLET ORAL DAILY
Status: DISCONTINUED | OUTPATIENT
Start: 2024-07-06 | End: 2024-07-06 | Stop reason: HOSPADM

## 2024-07-05 RX ORDER — ONDANSETRON 4 MG/1
4 TABLET, ORALLY DISINTEGRATING ORAL EVERY 8 HOURS PRN
Status: DISCONTINUED | OUTPATIENT
Start: 2024-07-05 | End: 2024-07-06 | Stop reason: HOSPADM

## 2024-07-05 RX ADMIN — SODIUM CHLORIDE 1000 ML: 9 INJECTION, SOLUTION INTRAVENOUS at 16:28

## 2024-07-05 ASSESSMENT — PAIN - FUNCTIONAL ASSESSMENT: PAIN_FUNCTIONAL_ASSESSMENT: 0-10

## 2024-07-05 ASSESSMENT — PAIN SCALES - GENERAL: PAINLEVEL_OUTOF10: 0

## 2024-07-05 NOTE — ED TRIAGE NOTES
Pt arrives to the ED with complaints of \"not feeling right\", reports he's got a \"swimming\" feeling in his head, reports feeling tingling in his fingers when he started taking Eliquis last week. Pt stopped taking the Eliquis, also reports feeling muscle weakness.

## 2024-07-05 NOTE — CARE COORDINATION
07/05/24 1716   Readmission Assessment   Number of Days since last admission? 1-7 days   Previous Disposition Home with Family   Who is being Interviewed Patient   What was the patient's/caregiver's perception as to why they think they needed to return back to the hospital? Other (Comment)  (worsening condition)   Did you visit your Primary Care Physician after you left the hospital, before you returned this time? No   Why weren't you able to visit your PCP? Did not have an appointment  (1st available appointment was 7-11-24)   Did you see a specialist, such as Cardiac, Pulmonary, Orthopedic Physician, etc. after you left the hospital? No   Who advised the patient to return to the hospital? Self-referral   Does the patient report anything that got in the way of taking their medications? Yes   What reasons did they give? Other (Comment)  (Patient was to begin Eliquis at discharge. He took it one time and felt it was causing his symptoms and did not continue to take it. Left messages with MD but did not receive a call back.)   In our efforts to provide the best possible care to you and others like you, can you think of anything that we could have done to help you after you left the hospital the first time, so that you might not have needed to return so soon? Other (Comment)  (increasing symptoms)     ARLENE LOWRY

## 2024-07-05 NOTE — H&P
Hospitalist Admission Note      NAME:  Shade Birmingham   :  1945   MRN:  [id number]     Date/Time:  2024 7:31 PM    Patient PCP: Lucas Buckner MD    ________________________________________________________________________    Given the patient's current clinical presentation, I have a high level of concern for decompensation if discharged from the emergency department.  Complex decision making was performed, which includes reviewing the patient's available past medical records, laboratory results, and x-ray films.       My assessment of this patient's clinical condition and my plan of care is as follows.    Assessment / Plan:  Patient is a 78-year-old male with history of coronary artery disease, prostate cancer, hypertension, diabetes comes to the hospital with CLARENCE.  Patient was recently admitted to this hospital and had a cardiac cath done which showed a lesions in the LAD and the RCA.  Patient is awaiting evaluation by CT surgery.  He is admitted for CLARENCE today.    1.  CLARENCE  Avoid nephrotoxic medications.  Holding diuretics at this time.  Nephrology consult  Patient was given 1 L fluid bolus in the ER.  I will hold off on any further fluids for now.  Will repeat the labs in the morning.    2.  Chronic combined systolic and diastolic heart failure  His last EF is 25 to 30%.  Patient was recently started on spironolactone and Bumex.  Will resume diuretics once his kidney function is little better.    3.  Coronary artery disease  Patient had a cardiac cath done on 2024 which showed lesions in the LAD and RCA.  Patient is continued on Plavix and statins.  Patient was initially going to be transferred to [facility] for CTS evaluation but patient decided to go home and his appointment was made with CT surgery as an outpatient.  Patient is agreeable to be seen by CT surgery or transfer to [facility] at this time.    4.  History of prostate cancer  Patient was recently admitted for UTI and urine  Brother          AA        Allergies   Allergen Reactions    Glipizide Other (See Comments)     SKIN BURNING        Prior to Admission medications    Medication Sig Start Date End Date Taking? Authorizing Provider   atorvastatin (LIPITOR) 20 MG tablet Take 1 tablet by mouth nightly  Patient not taking: Reported on 2024   John Arroyo MD   sacubitril-valsartan (ENTRESTO) 24-26 MG per tablet Take 2 tablets by mouth 2 times daily  Patient not taking: Reported on 2024   John Arroyo MD   bumetanide (BUMEX) 1 MG tablet Take 1 tablet by mouth daily 24   , Jhon SUAZO MD   spironolactone (ALDACTONE) 25 MG tablet Take 1 tablet by mouth daily 7/3/24   , John SUAZO MD   apixaban (ELIQUIS) 5 MG TABS tablet Take 1 tablet by mouth 2 times daily  Patient not taking: Reported on 2024   John Arroyo MD   ketoconazole (NIZORAL) 2 % shampoo Apply topically daily as needed for Itching Apply topically daily as needed.    Provider, Dana, MD   empagliflozin (JARDIANCE) 25 MG tablet Take 1 tablet by mouth daily    Provider, Dana, MD   cetirizine (ZYRTEC) 10 MG tablet Take 1 tablet by mouth daily    Automatic Reconciliation, Ar   clopidogrel (PLAVIX) 75 MG tablet Take 1 tablet by mouth daily    Automatic Reconciliation, Ar   gabapentin (NEURONTIN) 300 MG capsule Take 1 capsule by mouth 3 times daily.    Automatic Reconciliation, Ar   metFORMIN (GLUCOPHAGE) 1000 MG tablet Take 1 tablet by mouth 2 times daily (with meals)    Automatic Reconciliation, Ar       REVIEW OF SYSTEMS:  See HPI for details  General: Positive for weakness  Eyes: negative for blurred vision, eye pain, loss of vision, diplopia  Ear Nose and Throat: negative for rhinorrhea, pharyngitis, otalgia, tinnitus, speech or swallowing difficulties  Respiratory:  negative for pleuritic pain, cough, sputum production, wheezing, SOB, PARSONS  Cardiology:  negative for chest pain, palpitations, orthopnea, PND, edema, syncope

## 2024-07-05 NOTE — CARE COORDINATION
Care Management Initial Assessment       RUR: 19%  Readmission? Yes -   1st IM letter given? Yes - 07/05/24  Emergency Contact:    LNOK/son Yehuda Birmingham [phone]  Discharge transportation: family     To 7-2-24 Inpatient [facility] for new heart failure and multi vessel CAD.Discharged home with office follow up.     7-5-24 [facility] - Patient to ED for tingling fingers, muscle weakness, \"swimming in the head\". Patient was to begin Eliquis at discharge. He took it one time and felt it was causing his symptoms and did not continue to take it. Left messages with MD but did not receive a call back.     Met with patient and his brother Bernard Birmingham in patient's ED room. Patient confirmed information of face sheet. Will update his emergency contact. He does not have an AMD. His son Yehuda Birmingham ([phone]) is his legal next of kin.     Patient owns 2 walkers, 2 rollators, cane, electric wheelchair, and manual wheelchair. Patient does not use any DME. He uses [facility] at [address]. Patient is independent with his ADLs and normally drives. He plays golf on a regular basis. His ex-wife lives with him and they assist each other.        07/05/24 1704   Service Assessment   Patient Orientation Alert and Oriented   Cognition Alert   History Provided By Patient;Child/Family   Primary Caregiver Self   Accompanied By/Relationship brother Bernard Birmingham   Patient's Healthcare Decision Maker is: Legal Next of Kin  (son Yehuda Birmingham [phone])   PCP Verified by CM Yes   Last Visit to PCP Within last 3 months   Prior Functional Level Independent in ADLs/IADLs   Can patient return to prior living arrangement Yes   Ability to make needs known: Good   Family able to assist with home care needs: Yes   Would you like for me to discuss the discharge plan with any other family members/significant others, and if so, who? Yes  (son)   Financial Resources Medicare   Community Resources

## 2024-07-05 NOTE — ED NOTES
TRANSFER - OUT REPORT:    Verbal report given to Jeffery Birmingham  being transferred to [facility] for routine progression of patient care       Report consisted of patient's Situation, Background, Assessment and   Recommendations(SBAR).     Information from the following report(s) Nurse Handoff Report, Index, ED Encounter Summary, ED SBAR, MAR, Recent Results, and Neuro Assessment was reviewed with the receiving nurse.    [place] Fall Assessment:    Presents to emergency department  because of falls (Syncope, seizure, or loss of consciousness): No  Age > 70: Yes  Altered Mental Status, Intoxication with alcohol or substance confusion (Disorientation, impaired judgment, poor safety awaremess, or inability to follow instructions): No  Impaired Mobility: Ambulates or transfers with assistive devices or assistance; Unable to ambulate or transer.: No  Nursing Judgement: Yes          Lines:   Peripheral IV 07/05/24 Right Antecubital (Active)   Site Assessment Clean, dry & intact 07/05/24 1308   Line Status Specimen collected;Normal saline locked 07/05/24 1308   Phlebitis Assessment No symptoms 07/05/24 1308   Infiltration Assessment 0 07/05/24 1308   Dressing Status Clean, dry & intact 07/05/24 1308   Dressing Type Transparent 07/05/24 1308        Opportunity for questions and clarification was provided.      Patient transported with:  Monitor and Tech

## 2024-07-05 NOTE — ED PROVIDER NOTES
[facility] EMERGENCY DEPT  EMERGENCY DEPARTMENT ENCOUNTER      Pt Name: Shade Birmingham  MRN: [id number]  Birthdate 1945  Date of evaluation: 7/5/2024  Provider: Rivera Moses MD      HISTORY OF PRESENT ILLNESS      78-year-old male with recent admission to the hospital for new onset of heart failure with multivessel coronary disease presents to the emergency department complaint of \"not feeling right.\"  He reports some tingling in his fingers, muscle weakness, generalized not feeling well with a feeling of \"swimming in my head\" since discharge.  He thinks it may be due to his new medications, particularly Eliquis, which he is only taken once.    The history is provided by the patient, medical records and a relative.           Nursing Notes were reviewed.    REVIEW OF SYSTEMS         Review of Systems        PAST MEDICAL HISTORY     Past Medical History:   Diagnosis Date    Arthritis     CAD (coronary artery disease)     Cancer (HCC)     hx bladder ca, skin CA    Chronic pain     Diabetes (HCC)     Elevated PSA     Hemorrhage of rectum and anus 10/4/2010    Hx of bladder cancer     Hypertension     Joint pain     Leg swelling     Memory disorder     Muscle pain     Muscle weakness     Neuropathy     Prostate cancer (HCC)     Snoring          SURGICAL HISTORY       Past Surgical History:   Procedure Laterality Date    CARDIAC CATHETERIZATION  2018    CARDIAC PROCEDURE N/A 7/1/2024    Right heart cath performed by Victor M Royal MD at [facility] CARDIAC CATH LAB    CARDIAC PROCEDURE N/A 7/1/2024    Coronary angiography performed by Victor M Royal MD at [facility] CARDIAC CATH LAB    COLONOSCOPY N/A 9/6/2022    COLONOSCOPY performed by Kike Canela MD at [facility] ENDOSCOPY    GI      COLONOSCOPY    INVASIVE VASCULAR N/A 7/1/2024    Ultrasound guided vascular access R and L fem access performed by Victor M Royal MD at [facility] CARDIAC CATH LAB    TOTAL KNEE ARTHROPLASTY Right 01/2020    UROLOGICAL SURGERY       Insecurity: No Food Insecurity (6/26/2024)    Hunger Vital Sign     Worried About Running Out of Food in the Last Year: Never true     Ran Out of Food in the Last Year: Never true   Transportation Needs: No Transportation Needs (6/26/2024)    PRAPARE - Transportation     Lack of Transportation (Medical): No     Lack of Transportation (Non-Medical): No   Housing Stability: Low Risk  (6/26/2024)    Housing Stability Vital Sign     Unable to Pay for Housing in the Last Year: No     Number of Places Lived in the Last Year: 1     Unstable Housing in the Last Year: No         PHYSICAL EXAM       ED Triage Vitals [07/05/24 1304]   BP Temp Temp Source Pulse Respirations SpO2 Height Weight - Scale   (!) 141/52 97.5 °F (36.4 °C) Oral 88 18 96 % 1.88 m (6' 2\") 94.3 kg (208 lb)       Body mass index is 26.71 kg/m².    Physical Exam  Vitals and nursing note reviewed.   Constitutional:       General: He is not in acute distress.  Cardiovascular:      Rate and Rhythm: Normal rate.   Pulmonary:      Effort: No respiratory distress.   Neurological:      General: No focal deficit present.      Mental Status: He is alert and oriented to person, place, and time.             EMERGENCY DEPARTMENT COURSE and DIFFERENTIAL DIAGNOSIS/MDM:   Vitals:    Vitals:    07/05/24 1304   BP: (!) 141/52   Pulse: 88   Resp: 18   Temp: 97.5 °F (36.4 °C)   TempSrc: Oral   SpO2: 96%   Weight: 94.3 kg (208 lb)   Height: 1.88 m (6' 2\")         Medical Decision Making  78-year-old male presents to the emergency department above with chief complaint generalized fatigue and not feeling well.  He recently was admitted to the hospital with multivessel coronary disease and started on multiple medications.  Today in the emergency department he has demonstration of CLARENCE.  Will admit for further management.    Amount and/or Complexity of Data Reviewed  External Data Reviewed: notes.  Labs: ordered.  Radiology: ordered and independent interpretation performed.

## 2024-07-06 ENCOUNTER — APPOINTMENT (OUTPATIENT)
Facility: HOSPITAL | Age: 79
DRG: 683 | End: 2024-07-06
Payer: MEDICARE

## 2024-07-06 VITALS
HEIGHT: 74 IN | OXYGEN SATURATION: 96 % | HEART RATE: 81 BPM | BODY MASS INDEX: 26.69 KG/M2 | WEIGHT: 208 LBS | RESPIRATION RATE: 14 BRPM | DIASTOLIC BLOOD PRESSURE: 61 MMHG | TEMPERATURE: 97.7 F | SYSTOLIC BLOOD PRESSURE: 117 MMHG

## 2024-07-06 PROBLEM — E11.59 DM TYPE 2 CAUSING VASCULAR DISEASE (HCC): Status: ACTIVE | Noted: 2024-07-06

## 2024-07-06 PROBLEM — R80.9 PROTEINURIA: Status: ACTIVE | Noted: 2024-07-06

## 2024-07-06 PROBLEM — Z85.51 HX OF BLADDER CANCER: Status: ACTIVE | Noted: 2024-07-06

## 2024-07-06 PROBLEM — I50.21 ACUTE SYSTOLIC CHF (CONGESTIVE HEART FAILURE) (HCC): Status: RESOLVED | Noted: 2024-07-02 | Resolved: 2024-07-06

## 2024-07-06 PROBLEM — E11.49 DM TYPE 2 CAUSING NEUROLOGICAL DISEASE (HCC): Status: ACTIVE | Noted: 2024-07-06

## 2024-07-06 PROBLEM — E87.1 HYPONATREMIA: Status: RESOLVED | Noted: 2024-06-25 | Resolved: 2024-07-06

## 2024-07-06 PROBLEM — G89.29 CHRONIC PAIN: Status: ACTIVE | Noted: 2024-07-06

## 2024-07-06 PROBLEM — E11.29 DM TYPE 2 CAUSING RENAL DISEASE (HCC): Status: ACTIVE | Noted: 2024-07-06

## 2024-07-06 PROBLEM — M19.90 ARTHRITIS: Status: ACTIVE | Noted: 2024-07-06

## 2024-07-06 PROBLEM — D72.829 LEUKOCYTOSIS: Status: ACTIVE | Noted: 2024-07-06

## 2024-07-06 PROBLEM — D75.89 MACROCYTOSIS: Status: ACTIVE | Noted: 2024-07-06

## 2024-07-06 PROBLEM — E88.09 HYPOALBUMINEMIA: Status: ACTIVE | Noted: 2024-07-06

## 2024-07-06 PROBLEM — G62.9 NEUROPATHY: Status: ACTIVE | Noted: 2024-07-06

## 2024-07-06 PROBLEM — I50.22 CHF (CONGESTIVE HEART FAILURE), NYHA CLASS II, CHRONIC, SYSTOLIC (HCC): Status: ACTIVE | Noted: 2024-07-06

## 2024-07-06 PROBLEM — N18.30 CKD (CHRONIC KIDNEY DISEASE), STAGE III (HCC): Status: ACTIVE | Noted: 2024-07-06

## 2024-07-06 PROBLEM — F03.90 DEMENTIA (HCC): Status: ACTIVE | Noted: 2024-07-06

## 2024-07-06 PROBLEM — R09.02 HYPOXIA: Status: RESOLVED | Noted: 2024-06-25 | Resolved: 2024-07-06

## 2024-07-06 LAB
-: ABNORMAL
ALBUMIN SERPL-MCNC: 3.1 G/DL (ref 3.5–5)
ALBUMIN/GLOB SERPL: 0.6 (ref 1.1–2.2)
ALP SERPL-CCNC: 114 U/L (ref 45–117)
ALT SERPL-CCNC: 16 U/L (ref 12–78)
ANION GAP SERPL CALC-SCNC: 6 MMOL/L (ref 5–15)
APPEARANCE UR: CLEAR
AST SERPL-CCNC: 12 U/L (ref 15–37)
BASOPHILS # BLD: 0.1 K/UL (ref 0–0.1)
BASOPHILS NFR BLD: 1 % (ref 0–1)
BILIRUB SERPL-MCNC: 1.2 MG/DL (ref 0.2–1)
BILIRUB UR QL: NEGATIVE
BUN SERPL-MCNC: 48 MG/DL (ref 6–20)
BUN/CREAT SERPL: 27 (ref 12–20)
CALCIUM SERPL-MCNC: 9.2 MG/DL (ref 8.5–10.1)
CHLORIDE SERPL-SCNC: 106 MMOL/L (ref 97–108)
CO2 SERPL-SCNC: 22 MMOL/L (ref 21–32)
COLOR UR: ABNORMAL
COMMENT:: NORMAL
CREAT SERPL-MCNC: 1.76 MG/DL (ref 0.7–1.3)
CREAT UR-MCNC: 61 MG/DL
DIFFERENTIAL METHOD BLD: ABNORMAL
EOSINOPHIL # BLD: 0.3 K/UL (ref 0–0.4)
EOSINOPHIL #/AREA URNS HPF: NEGATIVE
EOSINOPHIL NFR BLD: 3 % (ref 0–7)
ERYTHROCYTE [DISTWIDTH] IN BLOOD BY AUTOMATED COUNT: 19 % (ref 11.5–14.5)
FERRITIN SERPL-MCNC: 1349 NG/ML (ref 26–388)
FOLATE SERPL-MCNC: 11.7 NG/ML (ref 5–21)
GLOBULIN SER CALC-MCNC: 4.8 G/DL (ref 2–4)
GLUCOSE SERPL-MCNC: 232 MG/DL (ref 65–100)
GLUCOSE UR STRIP.AUTO-MCNC: >1000 MG/DL
HAPTOGLOB SERPL-MCNC: 236 MG/DL (ref 30–200)
HAV IGM SER QL: REACTIVE
HBV CORE IGM SER QL: NONREACTIVE
HBV SURFACE AG SER QL: <0.1 INDEX
HBV SURFACE AG SER QL: NEGATIVE
HCT VFR BLD AUTO: 34.2 % (ref 36.6–50.3)
HCV AB SER IA-ACNC: 0.19 INDEX
HCV AB SERPL QL IA: NONREACTIVE
HGB BLD-MCNC: 11.8 G/DL (ref 12.1–17)
HGB UR QL STRIP: NEGATIVE
IMM GRANULOCYTES # BLD AUTO: 0.1 K/UL (ref 0–0.04)
IMM GRANULOCYTES NFR BLD AUTO: 1 % (ref 0–0.5)
IRON SATN MFR SERPL: 30 % (ref 20–50)
IRON SERPL-MCNC: 69 UG/DL (ref 35–150)
KETONES UR QL STRIP.AUTO: NEGATIVE MG/DL
LDH SERPL L TO P-CCNC: 149 U/L (ref 85–241)
LEUKOCYTE ESTERASE UR QL STRIP.AUTO: NEGATIVE
LYMPHOCYTES # BLD: 0.4 K/UL (ref 0.8–3.5)
LYMPHOCYTES NFR BLD: 4 % (ref 12–49)
MAGNESIUM SERPL-MCNC: 2 MG/DL (ref 1.6–2.4)
MCH RBC QN AUTO: 34.5 PG (ref 26–34)
MCHC RBC AUTO-ENTMCNC: 34.5 G/DL (ref 30–36.5)
MCV RBC AUTO: 100 FL (ref 80–99)
MONOCYTES # BLD: 1.7 K/UL (ref 0–1)
MONOCYTES NFR BLD: 18 % (ref 5–13)
NEUTS SEG # BLD: 6.7 K/UL (ref 1.8–8)
NEUTS SEG NFR BLD: 73 % (ref 32–75)
NITRITE UR QL STRIP.AUTO: NEGATIVE
NRBC # BLD: 0 K/UL (ref 0–0.01)
NRBC BLD-RTO: 0 PER 100 WBC
PH UR STRIP: 5.5 (ref 5–8)
PHOSPHATE SERPL-MCNC: 4.1 MG/DL (ref 2.6–4.7)
PLATELET # BLD AUTO: 250 K/UL (ref 150–400)
PMV BLD AUTO: 10.9 FL (ref 8.9–12.9)
POTASSIUM SERPL-SCNC: 4.8 MMOL/L (ref 3.5–5.1)
PROCALCITONIN SERPL-MCNC: 0.15 NG/ML
PROT SERPL-MCNC: 7.9 G/DL (ref 6.4–8.2)
PROT UR STRIP-MCNC: NEGATIVE MG/DL
PROT UR-MCNC: 19 MG/DL (ref 0–11.9)
RBC # BLD AUTO: 3.42 M/UL (ref 4.1–5.7)
RBC MORPH BLD: ABNORMAL
SODIUM SERPL-SCNC: 134 MMOL/L (ref 136–145)
SODIUM UR-SCNC: 48 MMOL/L
SP GR UR REFRACTOMETRY: 1.03 (ref 1–1.03)
SPECIMEN HOLD: NORMAL
TIBC SERPL-MCNC: 232 UG/DL (ref 250–450)
UROBILINOGEN UR QL STRIP.AUTO: 1 EU/DL (ref 0.2–1)
VIT B12 SERPL-MCNC: 273 PG/ML (ref 193–986)
WBC # BLD AUTO: 9.3 K/UL (ref 4.1–11.1)

## 2024-07-06 PROCEDURE — 84156 ASSAY OF PROTEIN URINE: CPT

## 2024-07-06 PROCEDURE — 6370000000 HC RX 637 (ALT 250 FOR IP): Performed by: INTERNAL MEDICINE

## 2024-07-06 PROCEDURE — 85025 COMPLETE CBC W/AUTO DIFF WBC: CPT

## 2024-07-06 PROCEDURE — 99223 1ST HOSP IP/OBS HIGH 75: CPT | Performed by: INTERNAL MEDICINE

## 2024-07-06 PROCEDURE — 83550 IRON BINDING TEST: CPT

## 2024-07-06 PROCEDURE — 83735 ASSAY OF MAGNESIUM: CPT

## 2024-07-06 PROCEDURE — 80053 COMPREHEN METABOLIC PANEL: CPT

## 2024-07-06 PROCEDURE — 82728 ASSAY OF FERRITIN: CPT

## 2024-07-06 PROCEDURE — 84100 ASSAY OF PHOSPHORUS: CPT

## 2024-07-06 PROCEDURE — 83540 ASSAY OF IRON: CPT

## 2024-07-06 PROCEDURE — 82570 ASSAY OF URINE CREATININE: CPT

## 2024-07-06 PROCEDURE — 2580000003 HC RX 258: Performed by: HOSPITALIST

## 2024-07-06 PROCEDURE — 82607 VITAMIN B-12: CPT

## 2024-07-06 PROCEDURE — 83615 LACTATE (LD) (LDH) ENZYME: CPT

## 2024-07-06 PROCEDURE — 83010 ASSAY OF HAPTOGLOBIN QUANT: CPT

## 2024-07-06 PROCEDURE — 84300 ASSAY OF URINE SODIUM: CPT

## 2024-07-06 PROCEDURE — 81002 URINALYSIS NONAUTO W/O SCOPE: CPT

## 2024-07-06 PROCEDURE — 36415 COLL VENOUS BLD VENIPUNCTURE: CPT

## 2024-07-06 PROCEDURE — 6370000000 HC RX 637 (ALT 250 FOR IP): Performed by: HOSPITALIST

## 2024-07-06 PROCEDURE — 87205 SMEAR GRAM STAIN: CPT

## 2024-07-06 PROCEDURE — 80074 ACUTE HEPATITIS PANEL: CPT

## 2024-07-06 PROCEDURE — 82746 ASSAY OF FOLIC ACID SERUM: CPT

## 2024-07-06 PROCEDURE — 76770 US EXAM ABDO BACK WALL COMP: CPT

## 2024-07-06 PROCEDURE — 84145 PROCALCITONIN (PCT): CPT

## 2024-07-06 RX ORDER — ACETAMINOPHEN 325 MG/1
650 TABLET ORAL EVERY 6 HOURS PRN
Status: DISCONTINUED | OUTPATIENT
Start: 2024-07-06 | End: 2024-07-06 | Stop reason: HOSPADM

## 2024-07-06 RX ORDER — SPIRONOLACTONE 25 MG/1
25 TABLET ORAL EVERY OTHER DAY
Qty: 15 TABLET | Refills: 0 | Status: ON HOLD
Start: 2024-07-09 | End: 2024-08-08

## 2024-07-06 RX ORDER — BUMETANIDE 1 MG/1
1 TABLET ORAL EVERY OTHER DAY
Qty: 15 TABLET | Refills: 1 | Status: ON HOLD
Start: 2024-07-09 | End: 2024-08-10

## 2024-07-06 RX ORDER — CARVEDILOL 3.12 MG/1
3.12 TABLET ORAL 2 TIMES DAILY WITH MEALS
Qty: 60 TABLET | Refills: 3 | Status: ON HOLD | OUTPATIENT
Start: 2024-07-06

## 2024-07-06 RX ORDER — GABAPENTIN 100 MG/1
100 CAPSULE ORAL 3 TIMES DAILY
Status: DISCONTINUED | OUTPATIENT
Start: 2024-07-06 | End: 2024-07-06 | Stop reason: HOSPADM

## 2024-07-06 RX ORDER — ACETAMINOPHEN 650 MG/1
650 SUPPOSITORY RECTAL EVERY 6 HOURS PRN
Status: DISCONTINUED | OUTPATIENT
Start: 2024-07-06 | End: 2024-07-06 | Stop reason: HOSPADM

## 2024-07-06 RX ORDER — ATORVASTATIN CALCIUM 40 MG/1
40 TABLET, FILM COATED ORAL NIGHTLY
Qty: 30 TABLET | Refills: 3 | Status: ON HOLD | OUTPATIENT
Start: 2024-07-06

## 2024-07-06 RX ORDER — CARVEDILOL 3.12 MG/1
3.12 TABLET ORAL 2 TIMES DAILY WITH MEALS
Status: DISCONTINUED | OUTPATIENT
Start: 2024-07-06 | End: 2024-07-06 | Stop reason: HOSPADM

## 2024-07-06 RX ADMIN — CLOPIDOGREL BISULFATE 75 MG: 75 TABLET ORAL at 07:52

## 2024-07-06 RX ADMIN — GABAPENTIN 100 MG: 100 CAPSULE ORAL at 07:52

## 2024-07-06 RX ADMIN — APIXABAN 5 MG: 5 TABLET, FILM COATED ORAL at 07:52

## 2024-07-06 RX ADMIN — SODIUM CHLORIDE, PRESERVATIVE FREE 10 ML: 5 INJECTION INTRAVENOUS at 07:52

## 2024-07-06 RX ADMIN — CARVEDILOL 3.12 MG: 3.12 TABLET, FILM COATED ORAL at 09:33

## 2024-07-06 RX ADMIN — ASPIRIN 81 MG: 81 TABLET, CHEWABLE ORAL at 07:52

## 2024-07-06 NOTE — CONSULTS
Reason for encounter: heart failure   Abnormal stress test  Inferior ischemia  CAD  HFrEF     HPI:        Shade Birmingham is a 78 y.o. male with PMH of CAD, DM, HTN, prostate CA, neuropathy admitted for UTI and hypoxia. Per pt, noted last week he was having some urinary symptoms but had a trip to [facility] that he decided to take. When he returned he noted his symptoms had worsened and he was having chills today prompting him to come to the ED for first admission.     Is readmitted with CLARENCE, weakness.    Cath 7/2/24 reviewed  90+ % mRCA, ostial ramus, 80% mLAD  LVEF 25%  DM2  Discussed CABG at that time, xfer to [facility]  Patient deferred transfer to [facility] at that time, outpatient CTS arranged       Assessment and Plan        CAD, HFrEF, CLARENCE - cath as above. Offered and advised transfer to [facility] for CTS versus MV PCI at prior admission - patient deferred and outpatient CTS appt arranged.   Recent discharge on entresto, aldactone, bumex, jardiance.  Readmitted with CLARENCE likely from meds.  Hold bumex, entresto, jardiance given CLARENCE.  Has appt Monday 7/8 with Dr. Ambrocio of Cardiac Surgery - discussed with him and he thinks this is all from the new meds and will be able to get to the appt Monday.  Denies chest pain, SOB.  Continue eliquis, clopidogrel, atorvastatin.  Add low dose coreg.                                     ____________________________________________________________     Cardiac testing  Below is part of Dr. Blair record, it is not all inclusive     06/25/24    NM STRESS TEST WITH MYOCARDIAL PERFUSION 06/28/2024  3:21 PM (Final)    Interpretation Summary    Stress Combined Conclusion: The study is most consistent with myocardial ischemia.    Perfusion Comments: LV perfusion is abnormal.    Perfusion Defect: There is a severe left ventricular stress perfusion defect that is medium to large in size present in the inferior segment(s) that is predominantly reversible.    ECG: Resting ECG demonstrates  effervescent tablet 40 mEq  40 mEq Oral PRN    Or    potassium chloride 10 mEq/100 mL IVPB (Peripheral Line)  10 mEq IntraVENous PRN    magnesium sulfate 2000 mg in 50 mL IVPB premix  2,000 mg IntraVENous PRN    ondansetron (ZOFRAN-ODT) disintegrating tablet 4 mg  4 mg Oral Q8H PRN    Or    ondansetron (ZOFRAN) injection 4 mg  4 mg IntraVENous Q6H PRN    polyethylene glycol (GLYCOLAX) packet 17 g  17 g Oral Daily PRN    acetaminophen (TYLENOL) tablet 650 mg  650 mg Oral Q6H PRN    Or    acetaminophen (TYLENOL) suppository 650 mg  650 mg Rectal Q6H PRN    aspirin chewable tablet 81 mg  81 mg Oral Daily    atorvastatin (LIPITOR) tablet 40 mg  40 mg Oral Nightly    apixaban (ELIQUIS) tablet 5 mg  5 mg Oral BID      Antonio Moore M.D.   Electrophysiology/Cardiology   [facility] [place]   [address], [facility] [address]                      [address] [facility] [address], [facility] [address]   [place] [address]                             [facility] [address] [phone]

## 2024-07-06 NOTE — DISCHARGE INSTRUCTIONS
HOSPITALIST DISCHARGE INSTRUCTIONS  NAME:  Shade Birmingham   :  1945   MRN:  [id number]     Date/Time:  2024 11:12 AM    ADMIT DATE: 2024     DISCHARGE DATE: 2024     DISCHARGE DIAGNOSIS:  Acute kidney injury    DISCHARGE INSTRUCTIONS:  Thank you for allowing us to participate in your care. Your discharging Hospitalist is Anibal Lala MD. You were admitted for evaluation and treatment of the following:    Acute kidney injury / Chronic kidney disease, stage III / Proteinuria / Hypoalbuminemia / Hyponatremia / Hx Prostate cancer / Hx of bladder cancer - You had kidney trouble due to recent use of diuretics. We consulted nephrology.  We are holding diuretics.     Chronic combined systolic/diastolic congestive heart failure / Cardiomyopathy - You had a recent EF 25-30%.   Cardiology was consulted.  Hold off taking home entresto, jardiance, bumex and spironolactone until after Monday appointment with thoracic surgery.     Coronary artery disease / Hypertension - A recent cath showed significant disesae.  Continue Eliquis, plavix, atorvastatin.       Chronic back pain / Neuropathy - A recent lumbar MRI should disc disease.  Follow up with Orthopedics as an outpatient.  Resume gabapentin     Diabetes type 2 causing neurological, renal and vascular disease - Eat a diabetic diet. Resume home jardiance, metformin.       MEDICATIONS:    It is important that you take the medication exactly as they are prescribed.   Keep your medication in the bottles provided by the pharmacist and keep a list of the medication names, dosages, and times to be taken in your wallet.   Do not take other medications without consulting your doctor.       If you experience any of the following symptoms then please call your primary care physician or return to the emergency room if you cannot get hold of your doctor:  Fever, chills, nausea, vomiting, diarrhea, change in mentation, falling, bleeding, shortness of

## 2024-07-06 NOTE — PLAN OF CARE
Problem: Discharge Planning  Goal: Discharge to home or other facility with appropriate resources  7/5/2024 2357 by Hong Gilbert, RN  Outcome: Progressing  Flowsheets (Taken 7/5/2024 2100)  Discharge to home or other facility with appropriate resources: Identify barriers to discharge with patient and caregiver

## 2024-07-06 NOTE — DISCHARGE SUMMARY
Physician Discharge Summary     Patient ID:  Shade Birmingham  [id number]  78 y.o.  1945    Admit date: 7/5/2024    Discharge date of service and time: 7/6/2024  Greater than 30 minutes were spent providing discharge related services for this patient    Admission Diagnoses: CLARENCE (acute kidney injury) (Formerly [facility]) [N17.9]    Discharge Diagnoses:    Principal Diagnosis   CLARENCE (acute kidney injury) (Formerly [facility])                                             Hospital Course and other diagnoses  Acute kidney injury / Chronic kidney disease, stage III / Proteinuria / Hypoalbuminemia / Hyponatremia / Hx Prostate cancer / Hx of bladder cancer - POA, CLARENCE due to recent use of diuretics. Hyrated.  Check urine lytes and protein. I canceled the consult to nephrology.  Check US.  Holding diuretics.     Chronic combined systolic/diastolic congestive heart failure / Cardiomyopathy - POA, recent EF 25-30%.  Likely due to severe CAD. Recent discharge on entresto, aldactone, bumex, jardiance.  Cardiology consulted to re adjust meds.  Hold these new home meds until after Monday appointment with CT surgery.     Coronary artery disease / Hypertension - POA, recent cath showed LAD/RCA lesions.  Continue Eliquis, plavix, atorvastatin.       Chronic back pain / Neuropathy / Arthritis / Neurogenic claudication / debility - POA, recent lumbar MRI with progressive DDD and DM neuropathy.  Follow up with Ortho outpatient.  Resume gabapentin     DM type 2 causing neurological, renal and vascular disease - POA. Diabetic diet and counseling.  SSI per protocol.  Resume home metformin. Recent A1C 6.6%.       Dementia - POA, mild, suggested by exam.  Outpatient neuropsych follow up      Macrocytosis - Checking serologies.     PCP: Lucas Buckner MD    Consults: cardiology and nephrology    Significant Diagnostic Studies: See Hospital Course    Discharged home in improved condition.    Discharge Exam:  BP: 121/62   Pulse: 88   Resp: 18   Temp: 97.9 °F   TempSrc:  Oral   SpO2: 96%   Weight:  94 kg   Height:  188 cm         Gen:  Well-developed, well-nourished, in no acute distress  HEENT:  Pink conjunctivae, PERRL, hearing intact to voice, moist mucous membranes  Neck:  Supple, without masses, thyroid non-tender  Resp:  No accessory muscle use, clear breath sounds without wheezes rales or rhonchi  Card:  No murmurs, normal S1, S2 without thrills, bruits or peripheral edema  Abd:  Soft, non-tender, non-distended, normoactive bowel sounds are present, no mass  Lymph:  No cervical or inguinal adenopathy  Musc:  No cyanosis or clubbing  Skin:  No rashes or ulcers, skin turgor is good  Neuro:  Cranial nerves are grossly intact, no focal motor weakness, follows commands   Psych:  Poor insight, oriented to person, place and time, alert    Patient Instructions:   Current Discharge Medication List        START taking these medications    Details   carvedilol (COREG) 3.125 MG tablet Take 1 tablet by mouth 2 times daily (with meals)  Qty: 60 tablet, Refills: 3           CONTINUE these medications which have CHANGED    Details   atorvastatin (LIPITOR) 40 MG tablet Take 1 tablet by mouth nightly  Qty: 30 tablet, Refills: 3      bumetanide (BUMEX) 1 MG tablet Take 1 tablet by mouth every other day  Qty: 15 tablet, Refills: 1      spironolactone (ALDACTONE) 25 MG tablet Take 1 tablet by mouth every other day  Qty: 15 tablet, Refills: 0      empagliflozin (JARDIANCE) 25 MG tablet Take 1 tablet by mouth daily  Qty: 30 tablet, Refills: 3      sacubitril-valsartan (ENTRESTO) 24-26 MG per tablet Take 2 tablets by mouth 2 times daily  Qty: 60 tablet, Refills: 2           CONTINUE these medications which have NOT CHANGED    Details   apixaban (ELIQUIS) 5 MG TABS tablet Take 1 tablet by mouth 2 times daily  Qty: 60 tablet, Refills: 2    Associated Diagnoses: Paroxysmal A-fib (HCC)      ketoconazole (NIZORAL) 2 % shampoo Apply topically daily as needed for Itching Apply topically daily as needed.

## 2024-07-06 NOTE — PROGRESS NOTES
[facility]    Hospitalist Progress Note    NAME: Shade Birmingham   :  1945  MRM:  [id number]    Date/Time of service 2024  7:42 AM    To assist coordination of care and communication with nursing and staff, this note may be preliminary early in the day, but finalized by end of the day.        Assessment and Plan:     Acute kidney injury / Chronic kidney disease, stage III / Proteinuria / Hypoalbuminemia / Hyponatremia / Hx Prostate cancer / Hx of bladder cancer - POA, CLARENCE due to recent use of diuretics. Hyrated.  Check urine lytes and protein. Consult nephrology.  Check US.  Hold off diuretics.    Chronic combined systolic/diastolic congestive heart failure / Cardiomyopathy - POA, recent EF 25-30%.  Likely due to severe CAD. Recent discharge on entresto, aldactone, bumex, jardiance.  Cardiology consulted to re adjust meds.     Coronary artery disease / Hypertension - POA, recent cath showed LAD/RCA lesions.  Continue Eliquis, plavix, atorvastatin.  Patient now wants to be transferred to [facility].      Chronic back pain / Neuropathy / Arthritis / Neurogenic claudication / debility - POA, recent lumbar MRI with progressive DDD and DM neuropathy.  Follow up with Ortho outpatient.  Resume gabapentin     DM type 2 causing neurological, renal and vascular disease - POA. Diabetic diet and counseling.  SSI per protocol.  Holding home jardiance, metformin. Recent A1C 6.6%.      Dementia - POA, mild, evident on exam.  Outpatient neuropsych follow up     Macrocytosis - Check serologies.       Subjective:     Chief Complaint:  foot pain    ROS:  (bold if positive, if negative)    Tolerating some PT  Tolerating Diet        Objective:     Last 24hrs VS reviewed since prior progress note. Most recent are:    Vitals:    24 2312 24 2317 24 0338 24 0715   BP:  (!) 129/92 136/62 121/62   Pulse: 86 83 85 88   Resp:  18 18 18   Temp:  98.4 °F (36.9 °C) 98.2 °F (36.8  under the FDA's Emergency Use Authorization (EUA) only.     Fact sheet for Patients: https://www.fda.gov/media/037727/download  Fact sheet for Healthcare Providers: https://www.fda.gov/media/195741/download         Urine Culture Hold Sample [[id number]] Collected: 06/25/24 2200    Order Status: Completed Specimen: Urine Updated: 06/25/24 2209     Specimen HOld       Urine on hold in Microbiology dept for 2 days.  If unpreserved urine is submitted, it cannot be used for addtional testing after 24 hours, recollection will be required.          Culture, Urine [[id number]]  (Abnormal)  (Susceptibility) Collected: 06/25/24 2200    Order Status: Completed Specimen: Urine, clean catch Updated: 06/28/24 1044     Special Requests NO SPECIAL REQUESTS        [place] count --        >100,000  COLONIES/mL       Culture Enterococcus faecalis       Susceptibility        Enterococcus faecalis      BACTERIAL SUSCEPTIBILITY PANEL BREE      ampicillin <=2 ug/mL Sensitive      ciprofloxacin <=0.5 ug/mL Sensitive      DAPTOmycin 4 ug/mL Sensitive      levofloxacin 1 ug/mL Sensitive      linezolid 2 ug/mL Sensitive      nitrofurantoin <=16 ug/mL Sensitive      tetracycline <=1 ug/mL Sensitive      vancomycin 1 ug/mL Sensitive                                   Other pertinent lab: none    Total time spent with patient: 30 Minutes I personally reviewed chart, notes, data and current medications in the medical record.  I have personally examined and treated the patient at bedside during this period.                  Care Plan discussed with: Patient, Care Manager, Nursing Staff, Consultant/Specialist, and >50% of time spent in counseling and coordination of care    Discussed:  Care Plan and D/C Planning    Prophylaxis:  H2B/PPI and Eliquis    Disposition:   PT, OT, RN           ___________________________________________________    Attending Physician: Anibal Lala MD

## 2024-07-08 ENCOUNTER — PREP FOR PROCEDURE (OUTPATIENT)
Age: 79
End: 2024-07-08

## 2024-07-08 ENCOUNTER — INITIAL CONSULT (OUTPATIENT)
Age: 79
End: 2024-07-08
Payer: MEDICARE

## 2024-07-08 VITALS
WEIGHT: 201 LBS | HEART RATE: 79 BPM | SYSTOLIC BLOOD PRESSURE: 112 MMHG | DIASTOLIC BLOOD PRESSURE: 65 MMHG | OXYGEN SATURATION: 98 % | TEMPERATURE: 97.6 F | BODY MASS INDEX: 25.81 KG/M2 | RESPIRATION RATE: 16 BRPM

## 2024-07-08 DIAGNOSIS — I25.10 DISEASE OF CARDIOVASCULAR SYSTEM: ICD-10-CM

## 2024-07-08 DIAGNOSIS — I25.10 CORONARY ARTERY DISEASE INVOLVING NATIVE CORONARY ARTERY OF NATIVE HEART WITHOUT ANGINA PECTORIS: Primary | ICD-10-CM

## 2024-07-08 PROCEDURE — G8427 DOCREV CUR MEDS BY ELIG CLIN: HCPCS | Performed by: THORACIC SURGERY (CARDIOTHORACIC VASCULAR SURGERY)

## 2024-07-08 PROCEDURE — 99205 OFFICE O/P NEW HI 60 MIN: CPT | Performed by: THORACIC SURGERY (CARDIOTHORACIC VASCULAR SURGERY)

## 2024-07-08 PROCEDURE — 3074F SYST BP LT 130 MM HG: CPT | Performed by: THORACIC SURGERY (CARDIOTHORACIC VASCULAR SURGERY)

## 2024-07-08 PROCEDURE — 1111F DSCHRG MED/CURRENT MED MERGE: CPT | Performed by: THORACIC SURGERY (CARDIOTHORACIC VASCULAR SURGERY)

## 2024-07-08 PROCEDURE — 1123F ACP DISCUSS/DSCN MKR DOCD: CPT | Performed by: THORACIC SURGERY (CARDIOTHORACIC VASCULAR SURGERY)

## 2024-07-08 PROCEDURE — G8419 CALC BMI OUT NRM PARAM NOF/U: HCPCS | Performed by: THORACIC SURGERY (CARDIOTHORACIC VASCULAR SURGERY)

## 2024-07-08 PROCEDURE — 3078F DIAST BP <80 MM HG: CPT | Performed by: THORACIC SURGERY (CARDIOTHORACIC VASCULAR SURGERY)

## 2024-07-08 PROCEDURE — 1036F TOBACCO NON-USER: CPT | Performed by: THORACIC SURGERY (CARDIOTHORACIC VASCULAR SURGERY)

## 2024-07-08 RX ORDER — CHLORHEXIDINE GLUCONATE ORAL RINSE 1.2 MG/ML
15 SOLUTION DENTAL 2 TIMES DAILY
Qty: 60 ML | Refills: 0 | Status: SHIPPED | OUTPATIENT
Start: 2024-07-08 | End: 2024-07-10

## 2024-07-08 NOTE — PROGRESS NOTES
LAD lesion, 80% stenosed.   Mid LAD to Dist LAD lesion, 80% stenosed.   Dist LAD lesion, 90% stenosed.      Ramus Intermedius   Ramus lesion, 80% stenosed.      Left Circumflex   Ost Cx to Prox Cx lesion, 50% stenosed.      Second Obtuse Marginal Branch   2nd Mrg lesion, 50% stenosed.      Right Coronary Artery   Prox RCA to Mid RCA lesion, 99% stenosed.   Dist RCA lesion, 60% stenosed.      Right Posterior Descending Artery      Intervention     No interventions have been documented.     RHC findings:     RAPm=1       mmHg  RVSP= 23    mmHg  PAPm= 15       mmHg  PCWPm= 3   mmHg  CO= 4.71         l/min  CI=2.13     ECHO:  06/25/24    ECHO (TTE) COMPLETE (PRN CONTRAST/BUBBLE/STRAIN/3D) 06/26/2024  5:19 PM (Final)    Interpretation Summary    Left Ventricle: Severely reduced left ventricular systolic function with a visually estimated EF of 25 - 30%. Not well visualized. Left ventricle size is normal. Increased wall thickness. Unable to assess wall motion. Grade III diastolic dysfunction with increased LAP.    Right Ventricle: Right ventricle size is normal. Increased wall thickness.    Aortic Valve: Not well visualized. Trileaflet valve. Mildly calcified cusp. Sclerosis of the aortic valve cusp.    Mitral Valve: Mild regurgitation.    Left Atrium: Not well visualized. Left atrium is dilated.    Right Atrium: Not well visualized. Right atrium is dilated.    Image quality is adequate.    Signed by: Nawaf Cali DO on 6/26/2024  5:19 PM      Past Medical History:   Diagnosis Date    Arthritis     CAD (coronary artery disease)     CHF (congestive heart failure), NYHA class II, chronic, systolic (HCC)     Chronic pain     CKD (chronic kidney disease), stage III (HCC)     Dementia (HCC)     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)     DM type 2 causing renal disease (HCC)     DM type 2 causing vascular disease (HCC)     Hx of bladder cancer     Neuropathy     Prostate cancer (HCC)      Past Surgical History:

## 2024-07-10 ENCOUNTER — HOSPITAL ENCOUNTER (OUTPATIENT)
Facility: HOSPITAL | Age: 79
Discharge: HOME | End: 2024-07-12
Payer: MEDICARE

## 2024-07-10 ENCOUNTER — OFFICE VISIT (OUTPATIENT)
Age: 79
End: 2024-07-10

## 2024-07-10 ENCOUNTER — HOSPITAL ENCOUNTER (OUTPATIENT)
Facility: HOSPITAL | Age: 79
Discharge: HOME | End: 2024-07-13
Payer: MEDICARE

## 2024-07-10 VITALS
BODY MASS INDEX: 25.86 KG/M2 | SYSTOLIC BLOOD PRESSURE: 118 MMHG | HEIGHT: 74 IN | TEMPERATURE: 97.7 F | DIASTOLIC BLOOD PRESSURE: 63 MMHG | HEART RATE: 61 BPM | WEIGHT: 201.5 LBS

## 2024-07-10 DIAGNOSIS — I25.10 CORONARY ARTERY DISEASE INVOLVING NATIVE CORONARY ARTERY OF NATIVE HEART WITHOUT ANGINA PECTORIS: ICD-10-CM

## 2024-07-10 DIAGNOSIS — I25.10 CORONARY ARTERY DISEASE INVOLVING NATIVE CORONARY ARTERY OF NATIVE HEART WITHOUT ANGINA PECTORIS: Primary | ICD-10-CM

## 2024-07-10 LAB
ALBUMIN SERPL-MCNC: 3.1 G/DL (ref 3.5–5)
ALBUMIN/GLOB SERPL: 0.8 (ref 1.1–2.2)
ALP SERPL-CCNC: 101 U/L (ref 45–117)
ALT SERPL-CCNC: 16 U/L (ref 12–78)
ANION GAP SERPL CALC-SCNC: 5 MMOL/L (ref 5–15)
APPEARANCE UR: CLEAR
APTT PPP: 25.5 SEC (ref 22.1–31)
ARTERIAL PATENCY WRIST A: POSITIVE
AST SERPL-CCNC: 12 U/L (ref 15–37)
BACTERIA URNS QL MICRO: NEGATIVE /HPF
BASE DEFICIT BLD-SCNC: 6.5 MMOL/L
BASOPHILS # BLD: 0.1 K/UL (ref 0–0.1)
BASOPHILS NFR BLD: 1 % (ref 0–1)
BDY SITE: ABNORMAL
BILIRUB SERPL-MCNC: 0.6 MG/DL (ref 0.2–1)
BILIRUB UR QL: NEGATIVE
BUN SERPL-MCNC: 34 MG/DL (ref 6–20)
BUN/CREAT SERPL: 24 (ref 12–20)
CALCIUM SERPL-MCNC: 9 MG/DL (ref 8.5–10.1)
CHLORIDE SERPL-SCNC: 107 MMOL/L (ref 97–108)
CO2 SERPL-SCNC: 24 MMOL/L (ref 21–32)
COLOR UR: ABNORMAL
CREAT SERPL-MCNC: 1.39 MG/DL (ref 0.7–1.3)
DIFFERENTIAL METHOD BLD: ABNORMAL
EKG ATRIAL RATE: 68 BPM
EKG DIAGNOSIS: NORMAL
EKG P AXIS: 65 DEGREES
EKG P-R INTERVAL: 176 MS
EKG Q-T INTERVAL: 434 MS
EKG QRS DURATION: 92 MS
EKG QTC CALCULATION (BAZETT): 461 MS
EKG R AXIS: 7 DEGREES
EKG T AXIS: 40 DEGREES
EKG VENTRICULAR RATE: 68 BPM
EOSINOPHIL # BLD: 0.3 K/UL (ref 0–0.4)
EOSINOPHIL NFR BLD: 4 % (ref 0–7)
EPITH CASTS URNS QL MICRO: ABNORMAL /LPF
ERYTHROCYTE [DISTWIDTH] IN BLOOD BY AUTOMATED COUNT: 18.7 % (ref 11.5–14.5)
EST. AVERAGE GLUCOSE BLD GHB EST-MCNC: 151 MG/DL
GAS FLOW.O2 O2 DELIVERY SYS: ABNORMAL
GLOBULIN SER CALC-MCNC: 4 G/DL (ref 2–4)
GLUCOSE SERPL-MCNC: 154 MG/DL (ref 65–100)
GLUCOSE UR STRIP.AUTO-MCNC: >1000 MG/DL
HBA1C MFR BLD: 6.9 % (ref 4–5.6)
HCO3 BLD-SCNC: 18.3 MMOL/L (ref 22–26)
HCT VFR BLD AUTO: 33.6 % (ref 36.6–50.3)
HGB BLD-MCNC: 11 G/DL (ref 12.1–17)
HGB UR QL STRIP: NEGATIVE
HISTORY CHECK: NORMAL
HYALINE CASTS URNS QL MICRO: ABNORMAL /LPF (ref 0–5)
IMM GRANULOCYTES # BLD AUTO: 0.1 K/UL (ref 0–0.04)
IMM GRANULOCYTES NFR BLD AUTO: 1 % (ref 0–0.5)
INR PPP: 1.1 (ref 0.9–1.1)
KETONES UR QL STRIP.AUTO: NEGATIVE MG/DL
LEUKOCYTE ESTERASE UR QL STRIP.AUTO: NEGATIVE
LYMPHOCYTES # BLD: 0.3 K/UL (ref 0.8–3.5)
LYMPHOCYTES NFR BLD: 4 % (ref 12–49)
MAGNESIUM SERPL-MCNC: 1.9 MG/DL (ref 1.6–2.4)
MCH RBC QN AUTO: 33.6 PG (ref 26–34)
MCHC RBC AUTO-ENTMCNC: 32.7 G/DL (ref 30–36.5)
MCV RBC AUTO: 102.8 FL (ref 80–99)
MONOCYTES # BLD: 1.2 K/UL (ref 0–1)
MONOCYTES NFR BLD: 15 % (ref 5–13)
NEUTS SEG # BLD: 5.9 K/UL (ref 1.8–8)
NEUTS SEG NFR BLD: 75 % (ref 32–75)
NITRITE UR QL STRIP.AUTO: NEGATIVE
NRBC # BLD: 0 K/UL (ref 0–0.01)
NRBC BLD-RTO: 0 PER 100 WBC
NT PRO BNP: 1085 PG/ML
O2/TOTAL GAS SETTING VFR VENT: 21 %
PCO2 BLD: 33.5 MMHG (ref 35–45)
PH BLD: 7.35 (ref 7.35–7.45)
PH UR STRIP: 5.5 (ref 5–8)
PLATELET # BLD AUTO: 270 K/UL (ref 150–400)
PMV BLD AUTO: 10.7 FL (ref 8.9–12.9)
PO2 BLD: 82 MMHG (ref 80–100)
POTASSIUM SERPL-SCNC: 4.6 MMOL/L (ref 3.5–5.1)
PROT SERPL-MCNC: 7.1 G/DL (ref 6.4–8.2)
PROT UR STRIP-MCNC: NEGATIVE MG/DL
PROTHROMBIN TIME: 11.1 SEC (ref 9–11.1)
RBC # BLD AUTO: 3.27 M/UL (ref 4.1–5.7)
RBC #/AREA URNS HPF: ABNORMAL /HPF (ref 0–5)
RBC MORPH BLD: ABNORMAL
SAO2 % BLD: 95.6 % (ref 92–97)
SODIUM SERPL-SCNC: 136 MMOL/L (ref 136–145)
SP GR UR REFRACTOMETRY: 1.02 (ref 1–1.03)
SPECIMEN TYPE: ABNORMAL
THERAPEUTIC RANGE: NORMAL SECS (ref 58–77)
TSH SERPL DL<=0.05 MIU/L-ACNC: 4.47 UIU/ML (ref 0.36–3.74)
URINE CULTURE IF INDICATED: ABNORMAL
UROBILINOGEN UR QL STRIP.AUTO: 0.2 EU/DL (ref 0.2–1)
WBC # BLD AUTO: 7.9 K/UL (ref 4.1–11.1)
WBC URNS QL MICRO: ABNORMAL /HPF (ref 0–4)

## 2024-07-10 PROCEDURE — 83036 HEMOGLOBIN GLYCOSYLATED A1C: CPT

## 2024-07-10 PROCEDURE — 36415 COLL VENOUS BLD VENIPUNCTURE: CPT

## 2024-07-10 PROCEDURE — 83880 ASSAY OF NATRIURETIC PEPTIDE: CPT

## 2024-07-10 PROCEDURE — 84443 ASSAY THYROID STIM HORMONE: CPT

## 2024-07-10 PROCEDURE — 93880 EXTRACRANIAL BILAT STUDY: CPT

## 2024-07-10 PROCEDURE — 93970 EXTREMITY STUDY: CPT

## 2024-07-10 PROCEDURE — 85025 COMPLETE CBC W/AUTO DIFF WBC: CPT

## 2024-07-10 PROCEDURE — 71046 X-RAY EXAM CHEST 2 VIEWS: CPT

## 2024-07-10 PROCEDURE — 80053 COMPREHEN METABOLIC PANEL: CPT

## 2024-07-10 PROCEDURE — 93922 UPR/L XTREMITY ART 2 LEVELS: CPT

## 2024-07-10 PROCEDURE — 93005 ELECTROCARDIOGRAM TRACING: CPT | Performed by: NURSE PRACTITIONER

## 2024-07-10 PROCEDURE — 93010 ELECTROCARDIOGRAM REPORT: CPT | Performed by: SPECIALIST

## 2024-07-10 PROCEDURE — 85730 THROMBOPLASTIN TIME PARTIAL: CPT

## 2024-07-10 PROCEDURE — 36600 WITHDRAWAL OF ARTERIAL BLOOD: CPT

## 2024-07-10 PROCEDURE — 86901 BLOOD TYPING SEROLOGIC RH(D): CPT

## 2024-07-10 PROCEDURE — 83735 ASSAY OF MAGNESIUM: CPT

## 2024-07-10 PROCEDURE — 86850 RBC ANTIBODY SCREEN: CPT

## 2024-07-10 PROCEDURE — 85610 PROTHROMBIN TIME: CPT

## 2024-07-10 PROCEDURE — 86900 BLOOD TYPING SEROLOGIC ABO: CPT

## 2024-07-10 PROCEDURE — 81001 URINALYSIS AUTO W/SCOPE: CPT

## 2024-07-10 PROCEDURE — 82803 BLOOD GASES ANY COMBINATION: CPT

## 2024-07-10 RX ORDER — ASPIRIN 81 MG/1
81 TABLET ORAL EVERY MORNING
COMMUNITY

## 2024-07-10 ASSESSMENT — PAIN DESCRIPTION - DESCRIPTORS: DESCRIPTORS: ACHING

## 2024-07-10 ASSESSMENT — PAIN SCALES - GENERAL: PAINLEVEL_OUTOF10: 8

## 2024-07-10 ASSESSMENT — PAIN DESCRIPTION - LOCATION: LOCATION: LEG

## 2024-07-10 ASSESSMENT — PAIN DESCRIPTION - ORIENTATION: ORIENTATION: RIGHT;LEFT

## 2024-07-10 NOTE — PERIOP NOTE
SPOKE WITH RAJI AT [facility]. REQUESTED MOST RECENT OFFICE NOTES, EKG, AND TEST(S) TO BE FAXED TO PAT DEPT. RAJI STATES THAT PATIENT WAS LAST SEEN IN OFFICE 08/2023.    PATIENT HAS PASSPORT AND INSTRUCTED TO GO TO [facility] BUILDING FOR ADDITIONAL TESTING.

## 2024-07-10 NOTE — CONSULTS
CSS   History and Physical    Subjective:      H&P copied from consult note with Anais Quezada NP, on 7/8/10. Pt denies any changes.     Shade Birmingham is a 78 y.o. male who was referred for cardiac evaluation Dr. Oviedo. He has a PMH of HFrEF (EF 25-30%), multivessel CAD, HTN, HLD, paroxsymal atrial fibrillation (on Eliquis), PAD s/p fem-fem bypass, CKD II, hx prostate cancer, recent complicated UTI, DM Type II, and chronic back pain with DDD. He has had 2 recent hospitalizations. He initially presented to [place] ED on  with complaints of dysuria and SOB. During that admission was diagnosed with new found HFrEF, CLARENCE on CKD, and [facility] revealed MVD. It was recommended he be transferred to [facility] for surgical evaluation but patient declined and was discharged home on  with follow up scheduled with Cardiac Surgery. Interim, he was admitted on  to [facility] again with feelings of \"swimming in his head\" and just not feeling right. Diuretics were held due to CLARENCE on admission. He was discharged home again on , has not take diuretics since discharge and has not had any further episodes.     Mr. Birmingham states that he has not had any chest pain or discomfort. He endorses some SOB that he has noticed recently. He states that he has noticed some increased fatigue but it all started with the most recent hospitalization. He endorses some weight lost with his radiation, approximately 15lbs. He endorses bilateral claudication in his calves.     Mr. Birmingham lives with his ex-wife. He is independent of his ADLS at baseline. 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 He states he smoked for 35 years, quit in . He drinks approximately ~2-3 glasses of wine nightly. Denies recreational drug use. He is COVID vaccinated. Father  from lung failure (), mother with bone cancer. Patient's daughter had ablations and permanent pacemaker.     Cardiac Testing    Cardiac catheterization on 24 with   [place]:  Coronary Findings    Diagnostic  Dominance: Right  Left Anterior Descending   Prox LAD to Mid LAD lesion, 80% stenosed.   Mid LAD to Dist LAD lesion, 80% stenosed.   Dist LAD lesion, 90% stenosed.      Ramus Intermedius   Ramus lesion, 80% stenosed.      Left Circumflex   Ost Cx to Prox Cx lesion, 50% stenosed.      Second Obtuse Marginal Branch   2nd Mrg lesion, 50% stenosed.      Right Coronary Artery   Prox RCA to Mid RCA lesion, 99% stenosed.   Dist RCA lesion, 60% stenosed.      Right Posterior Descending Artery      Intervention     No interventions have been documented.     RHC findings:     RAPm=1       mmHg  RVSP= 23    mmHg  PAPm= 15       mmHg  PCWPm= 3   mmHg  CO= 4.71         l/min  CI=2.13     ECHO:  06/25/24    ECHO (TTE) COMPLETE (PRN CONTRAST/BUBBLE/STRAIN/3D) 06/26/2024  5:19 PM (Final)    Interpretation Summary    Left Ventricle: Severely reduced left ventricular systolic function with a visually estimated EF of 25 - 30%. Not well visualized. Left ventricle size is normal. Increased wall thickness. Unable to assess wall motion. Grade III diastolic dysfunction with increased LAP.    Right Ventricle: Right ventricle size is normal. Increased wall thickness.    Aortic Valve: Not well visualized. Trileaflet valve. Mildly calcified cusp. Sclerosis of the aortic valve cusp.    Mitral Valve: Mild regurgitation.    Left Atrium: Not well visualized. Left atrium is dilated.    Right Atrium: Not well visualized. Right atrium is dilated.    Image quality is adequate.    Signed by: Nawaf Cali DO on 6/26/2024  5:19 PM      Past Medical History:   Diagnosis Date    Arthritis     Bladder cancer (HCC) 2007    CAD (coronary artery disease)     Cataract     LEFT EYE (NOT RIPE ENOUGH TO REMOVE).  RIGHT EYE CATARACT REMOVED.    CHF (congestive heart failure), NYHA class II, chronic, systolic (HCC)     Chronic pain     CKD (chronic kidney disease), stage III (HCC)     DM type 2 causing neurological  hospitalization  - PFT completed at [facility], FEV1/FVC 76%  - ABG ordered     CLARENCE on likely CKD Stage II: Baseline Cr 1.2-1.6, GFR 40-60s  - Cr peaked at 2.31 on readmission 7/5, down trended to 1.76 with holding of diuretics   - BMP at [facility]    Hx Prostate Cancer and Prostatitis: TURBT 2018, last XRT treatment in January 2024  - Reports approximately 15-20lb weight loss since January with treatment    Recent Complicated UTI/Dysuria: +Enterococcus, Completed course of ciprofloxacin  - Will repeat UA at [facility]    Diabetes Mellitus, Type II: A1c 6.6%;  PTA Metformin 1000mg BID, Jardiance   - Continue Metformin (last dose 7/13)  - Stop Jardiance today  - Ok for carb load as discussed    Anemia: Hgb 11.8 during hospitalization, likely chronic    Chronic Back Pain: PTA gabapentin 300mg TID; recent lumbar MRI with progressive DDD  - Consider decreasing given current renal dysfunction if ongoing feelings of weakness     Time Spent: 75 minutes    Signed By: Saeid Lala PA-C     July 10, 2024

## 2024-07-10 NOTE — H&P (VIEW-ONLY)
CSS   History and Physical    Subjective:      H&P copied from consult note with Anais Quezada NP, on 7/8/10. Pt denies any changes.     Shade Birmingham is a 78 y.o. male who was referred for cardiac evaluation Dr. Oviedo. He has a PMH of HFrEF (EF 25-30%), multivessel CAD, HTN, HLD, paroxsymal atrial fibrillation (on Eliquis), PAD s/p fem-fem bypass, CKD II, hx prostate cancer, recent complicated UTI, DM Type II, and chronic back pain with DDD. He has had 2 recent hospitalizations. He initially presented to [place] ED on  with complaints of dysuria and SOB. During that admission was diagnosed with new found HFrEF, CLARENCE on CKD, and [facility] revealed MVD. It was recommended he be transferred to [facility] for surgical evaluation but patient declined and was discharged home on  with follow up scheduled with Cardiac Surgery. Interim, he was admitted on  to [facility] again with feelings of \"swimming in his head\" and just not feeling right. Diuretics were held due to CLARENCE on admission. He was discharged home again on , has not take diuretics since discharge and has not had any further episodes.     Mr. Birmingham states that he has not had any chest pain or discomfort. He endorses some SOB that he has noticed recently. He states that he has noticed some increased fatigue but it all started with the most recent hospitalization. He endorses some weight lost with his radiation, approximately 15lbs. He endorses bilateral claudication in his calves.     Mr. Birmingham lives with his ex-wife. He is independent of his ADLS at baseline. 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 He states he smoked for 35 years, quit in . He drinks approximately ~2-3 glasses of wine nightly. Denies recreational drug use. He is COVID vaccinated. Father  from lung failure (), mother with bone cancer. Patient's daughter had ablations and permanent pacemaker.     Cardiac Testing    Cardiac catheterization on 24 with

## 2024-07-10 NOTE — CONSENT
Informed Consent for Blood Component Transfusion Note    I have discussed with the patient the rationale for blood component transfusion; its benefits in treating or preventing fatigue, organ damage, or death; and its risk which includes mild transfusion reactions, rare risk of blood borne infection, or more serious but rare reactions. I have discussed the alternatives to transfusion, including the risk and consequences of not receiving transfusion. The patient had an opportunity to ask questions and had agreed to proceed with transfusion of blood components.    Electronically signed by Saeid Lala PA-C on 7/10/24 at 1:56 PM EDT

## 2024-07-10 NOTE — PERIOP NOTE
[address] [address]   MAIN OR                                  [phone]    MAIN PRE OP             [phone]                                                                                AMBULATORY PRE OP          [phone]  PRE-ADMISSION TESTING    [phone]     Surgery Date:  7/16/2024       Is surgery arrival time given by surgeon?  YES  NO    If “NO”, [facility] staff will call you between 4 and 7pm the day before your surgery with your arrival time. (If your surgery is on a Monday, we will call you the Friday before.)    Call [phone] after 7pm Monday-Friday if you did not receive this call.    INSTRUCTIONS BEFORE YOUR SURGERY   When You  Arrive Arrive at [facility] Patient Access on 1st floor the day of your surgery.  Have your insurance card, photo ID,living will/advanced directive/POA (if applicable),  and any copayment (if needed)   Food   and   Drink NO solid food after midnight the night before surgery. You can drink clear liquids from midnight until ONE hour prior to your arrival at the hospital on the day of your surgery.     No alcohol (beer, wine, liquor) or marijuana (smoking) 24 hours, edibles (3 days). Stop smoking cigarettes 14 days before surgery (helps w/healing and breathing).   Bathing Clothing  Jewelry  Valuables     When you shower the morning of surgery, please do not apply anything to your skin (lotions, powders, deodorant (if having breast surgery), or makeup, especially mascara). Remove fingernail polish except for clear.  Follow Chlorhexidine body wash instructions provided to you during PAT appointment. The night before and morning of surgery.  Do not shave or trim anywhere 24 hours before surgery  Wear your hair loose or down; no pony-tails, buns, or metal hair clips  Wear loose, comfortable, clean clothes  Wear glasses instead of contacts. Bring a case to keep your glasses safe.  Leave money,

## 2024-07-11 LAB
ECHO BSA: 2.18 M2
VAS LEFT ABI: 0.6
VAS LEFT ARM BP: 143 MMHG
VAS LEFT CCA DIST EDV: 20.1 CM/S
VAS LEFT CCA DIST PSV: 104.7 CM/S
VAS LEFT CCA PROX EDV: 9.7 CM/S
VAS LEFT CCA PROX PSV: 73 CM/S
VAS LEFT DORSALIS PEDIS BP: 90 MMHG
VAS LEFT ECA EDV: 0 CM/S
VAS LEFT ECA PSV: 157.6 CM/S
VAS LEFT GSV ANKLE DIAM: 1.7 MM
VAS LEFT GSV AT KNEE DIAM: 2.2 MM
VAS LEFT GSV BK PROX DIAM: 1.8 MM
VAS LEFT GSV THIGH DIST DIAM: 2.6 MM
VAS LEFT GSV THIGH MID DIAM: 2 MM
VAS LEFT GSV THIGH PROX DIAM: 2.4 MM
VAS LEFT ICA DIST EDV: 21.7 CM/S
VAS LEFT ICA DIST PSV: 101.8 CM/S
VAS LEFT ICA MID EDV: 16.4 CM/S
VAS LEFT ICA MID PSV: 90.4 CM/S
VAS LEFT ICA PROX EDV: 51.1 CM/S
VAS LEFT ICA PROX PSV: 185 CM/S
VAS LEFT ICA/CCA PSV: 1.8 NO UNITS
VAS LEFT PTA BP: 85 MMHG
VAS LEFT SSV DIST DIAM: 2 MM
VAS LEFT SSV PROX DIAM: 2.1 MM
VAS LEFT VERTEBRAL EDV: 7.5 CM/S
VAS LEFT VERTEBRAL PSV: 26.5 CM/S
VAS RIGHT ABI: 0.67
VAS RIGHT ARM BP: 151 MMHG
VAS RIGHT CCA DIST EDV: 11.3 CM/S
VAS RIGHT CCA DIST PSV: 76.2 CM/S
VAS RIGHT CCA PROX EDV: 6.9 CM/S
VAS RIGHT CCA PROX PSV: 60.8 CM/S
VAS RIGHT DORSALIS PEDIS BP: 101 MMHG
VAS RIGHT ECA EDV: 0 CM/S
VAS RIGHT ECA PSV: 68.5 CM/S
VAS RIGHT GSV ANKLE DIAM: 1.7 MM
VAS RIGHT GSV AT KNEE DIAM: 2.2 MM
VAS RIGHT GSV BK PROX DIAM: 1.3 MM
VAS RIGHT GSV THIGH DIST DIAM: 2.5 MM
VAS RIGHT GSV THIGH MID DIAM: 2.5 MM
VAS RIGHT GSV THIGH PROX DIAM: 3.1 MM
VAS RIGHT ICA DIST EDV: 19.6 CM/S
VAS RIGHT ICA DIST PSV: 82.9 CM/S
VAS RIGHT ICA MID EDV: 23.7 CM/S
VAS RIGHT ICA MID PSV: 113.4 CM/S
VAS RIGHT ICA PROX EDV: 21.2 CM/S
VAS RIGHT ICA PROX PSV: 93.7 CM/S
VAS RIGHT ICA/CCA PSV: 1.5 NO UNITS
VAS RIGHT PTA BP: 83 MMHG
VAS RIGHT SSV DIST DIAM: 1.8 MM
VAS RIGHT SSV PROX DIAM: 1.6 MM
VAS RIGHT VERTEBRAL EDV: 12.4 CM/S
VAS RIGHT VERTEBRAL PSV: 59.7 CM/S

## 2024-07-12 ENCOUNTER — ANESTHESIA EVENT (OUTPATIENT)
Facility: HOSPITAL | Age: 79
End: 2024-07-12
Payer: MEDICARE

## 2024-07-12 RX ORDER — ONDANSETRON 2 MG/ML
4 INJECTION INTRAMUSCULAR; INTRAVENOUS
Status: CANCELLED | OUTPATIENT
Start: 2024-07-12 | End: 2024-07-13

## 2024-07-12 RX ORDER — PROCHLORPERAZINE EDISYLATE 5 MG/ML
5 INJECTION INTRAMUSCULAR; INTRAVENOUS
Status: CANCELLED | OUTPATIENT
Start: 2024-07-12 | End: 2024-07-13

## 2024-07-12 RX ORDER — HYDROMORPHONE HYDROCHLORIDE 1 MG/ML
0.5 INJECTION, SOLUTION INTRAMUSCULAR; INTRAVENOUS; SUBCUTANEOUS EVERY 5 MIN PRN
Status: CANCELLED | OUTPATIENT
Start: 2024-07-12

## 2024-07-12 RX ORDER — FENTANYL CITRATE 50 UG/ML
25 INJECTION, SOLUTION INTRAMUSCULAR; INTRAVENOUS EVERY 5 MIN PRN
Status: CANCELLED | OUTPATIENT
Start: 2024-07-12

## 2024-07-12 RX ORDER — SODIUM CHLORIDE 9 MG/ML
INJECTION, SOLUTION INTRAVENOUS PRN
Status: CANCELLED | OUTPATIENT
Start: 2024-07-12

## 2024-07-12 RX ORDER — HYDRALAZINE HYDROCHLORIDE 20 MG/ML
10 INJECTION INTRAMUSCULAR; INTRAVENOUS ONCE
Status: CANCELLED | OUTPATIENT
Start: 2024-07-12 | End: 2024-07-12

## 2024-07-12 RX ORDER — SODIUM CHLORIDE 0.9 % (FLUSH) 0.9 %
5-40 SYRINGE (ML) INJECTION EVERY 12 HOURS SCHEDULED
Status: CANCELLED | OUTPATIENT
Start: 2024-07-12

## 2024-07-12 RX ORDER — SODIUM CHLORIDE 0.9 % (FLUSH) 0.9 %
5-40 SYRINGE (ML) INJECTION PRN
Status: CANCELLED | OUTPATIENT
Start: 2024-07-12

## 2024-07-12 RX ORDER — OXYCODONE HYDROCHLORIDE 5 MG/1
5 TABLET ORAL
Status: CANCELLED | OUTPATIENT
Start: 2024-07-12 | End: 2024-07-13

## 2024-07-12 RX ORDER — NALOXONE HYDROCHLORIDE 0.4 MG/ML
INJECTION, SOLUTION INTRAMUSCULAR; INTRAVENOUS; SUBCUTANEOUS PRN
Status: CANCELLED | OUTPATIENT
Start: 2024-07-12

## 2024-07-14 LAB
ABO + RH BLD: NORMAL
BLD PROD TYP BPU: NORMAL
BLD PROD TYP BPU: NORMAL
BLOOD BANK DISPENSE STATUS: NORMAL
BLOOD BANK DISPENSE STATUS: NORMAL
BLOOD GROUP ANTIBODIES SERPL: NORMAL
BPU ID: NORMAL
BPU ID: NORMAL
CROSSMATCH RESULT: NORMAL
CROSSMATCH RESULT: NORMAL
SPECIMEN EXP DATE BLD: NORMAL
UNIT DIVISION: 0
UNIT DIVISION: 0

## 2024-07-15 ENCOUNTER — APPOINTMENT (OUTPATIENT)
Facility: HOSPITAL | Age: 79
DRG: 234 | End: 2024-07-15
Attending: THORACIC SURGERY (CARDIOTHORACIC VASCULAR SURGERY)
Payer: MEDICARE

## 2024-07-15 ENCOUNTER — HOSPITAL ENCOUNTER (OUTPATIENT)
Facility: HOSPITAL | Age: 79
Discharge: HOME | End: 2024-07-17
Attending: THORACIC SURGERY (CARDIOTHORACIC VASCULAR SURGERY)

## 2024-07-15 ENCOUNTER — HOSPITAL ENCOUNTER (INPATIENT)
Facility: HOSPITAL | Age: 79
LOS: 7 days | Discharge: HOME | DRG: 234 | End: 2024-07-22
Attending: THORACIC SURGERY (CARDIOTHORACIC VASCULAR SURGERY) | Admitting: THORACIC SURGERY (CARDIOTHORACIC VASCULAR SURGERY)
Payer: MEDICARE

## 2024-07-15 ENCOUNTER — ANESTHESIA (OUTPATIENT)
Facility: HOSPITAL | Age: 79
End: 2024-07-15
Payer: MEDICARE

## 2024-07-15 DIAGNOSIS — Z95.1 S/P CABG X 3: ICD-10-CM

## 2024-07-15 DIAGNOSIS — I25.10 CORONARY ARTERY DISEASE INVOLVING NATIVE CORONARY ARTERY OF NATIVE HEART WITHOUT ANGINA PECTORIS: Primary | ICD-10-CM

## 2024-07-15 DIAGNOSIS — E11.65 INADEQUATELY CONTROLLED DIABETES MELLITUS (HCC): ICD-10-CM

## 2024-07-15 DIAGNOSIS — Z95.1 POSTSURGICAL AORTOCORONARY BYPASS STATUS: Primary | ICD-10-CM

## 2024-07-15 PROBLEM — Z98.890 S/P LEFT ATRIAL APPENDAGE LIGATION: Status: ACTIVE | Noted: 2024-07-15

## 2024-07-15 PROBLEM — I25.118 CORONARY ARTERY DISEASE OF NATIVE ARTERY OF NATIVE HEART WITH STABLE ANGINA PECTORIS (HCC): Status: ACTIVE | Noted: 2024-07-15

## 2024-07-15 LAB
ACUTE KIDNEY INJURY RISK NEPHROCHECK: 0.36 (ref 0–0.3)
ALBUMIN SERPL-MCNC: 2.7 G/DL (ref 3.5–5)
ALBUMIN SERPL-MCNC: 3.4 G/DL (ref 3.5–5)
ALBUMIN/GLOB SERPL: 1.1 (ref 1.1–2.2)
ALBUMIN/GLOB SERPL: 1.3 (ref 1.1–2.2)
ALP SERPL-CCNC: 55 U/L (ref 45–117)
ALP SERPL-CCNC: 61 U/L (ref 45–117)
ALT SERPL-CCNC: 15 U/L (ref 12–78)
ALT SERPL-CCNC: 17 U/L (ref 12–78)
ANION GAP BLD CALC-SCNC: 11 (ref 10–20)
ANION GAP BLD CALC-SCNC: ABNORMAL (ref 10–20)
ANION GAP SERPL CALC-SCNC: 7 MMOL/L (ref 5–15)
ANION GAP SERPL CALC-SCNC: 7 MMOL/L (ref 5–15)
APTT PPP: 31.6 SEC (ref 22.1–31)
AST SERPL-CCNC: 27 U/L (ref 15–37)
AST SERPL-CCNC: 30 U/L (ref 15–37)
BASE DEFICIT BLD-SCNC: 1 MMOL/L
BASE DEFICIT BLD-SCNC: 1.1 MMOL/L
BASE DEFICIT BLD-SCNC: 1.3 MMOL/L
BASE DEFICIT BLD-SCNC: 1.8 MMOL/L
BASE DEFICIT BLD-SCNC: 2.4 MMOL/L
BASE DEFICIT BLD-SCNC: 3.1 MMOL/L
BASE DEFICIT BLD-SCNC: 3.7 MMOL/L
BASE DEFICIT BLD-SCNC: 4.1 MMOL/L
BASE DEFICIT BLD-SCNC: 6.3 MMOL/L
BASE EXCESS BLD CALC-SCNC: 0.6 MMOL/L
BASE EXCESS BLD CALC-SCNC: 1.4 MMOL/L
BASE EXCESS BLD CALC-SCNC: 2.2 MMOL/L
BASE EXCESS BLDV CALC-SCNC: 0.3 MMOL/L
BDY SITE: ABNORMAL
BILIRUB SERPL-MCNC: 0.6 MG/DL (ref 0.2–1)
BILIRUB SERPL-MCNC: 0.8 MG/DL (ref 0.2–1)
BUN SERPL-MCNC: 19 MG/DL (ref 6–20)
BUN SERPL-MCNC: 21 MG/DL (ref 6–20)
BUN/CREAT SERPL: 18 (ref 12–20)
BUN/CREAT SERPL: 20 (ref 12–20)
CA-I BLD-MCNC: 1.03 MMOL/L (ref 1.12–1.32)
CA-I BLD-MCNC: 1.04 MMOL/L (ref 1.12–1.32)
CA-I BLD-MCNC: 1.12 MMOL/L (ref 1.12–1.32)
CA-I BLD-MCNC: 1.13 MMOL/L (ref 1.12–1.32)
CA-I BLD-MCNC: 1.14 MMOL/L (ref 1.12–1.32)
CA-I BLD-MCNC: 1.18 MMOL/L (ref 1.12–1.32)
CA-I BLD-MCNC: 1.2 MMOL/L (ref 1.12–1.32)
CA-I BLD-MCNC: 1.24 MMOL/L (ref 1.12–1.32)
CA-I BLD-MCNC: 1.24 MMOL/L (ref 1.12–1.32)
CA-I BLD-MCNC: 1.28 MMOL/L (ref 1.12–1.32)
CA-I BLD-MCNC: 1.29 MMOL/L (ref 1.12–1.32)
CALCIUM SERPL-MCNC: 7.8 MG/DL (ref 8.5–10.1)
CALCIUM SERPL-MCNC: 8.1 MG/DL (ref 8.5–10.1)
CHLORIDE BLD-SCNC: 107 MMOL/L (ref 100–108)
CHLORIDE SERPL-SCNC: 112 MMOL/L (ref 97–108)
CHLORIDE SERPL-SCNC: 112 MMOL/L (ref 97–108)
CO2 BLD-SCNC: 24 MMOL/L (ref 19–24)
CO2 SERPL-SCNC: 21 MMOL/L (ref 21–32)
CO2 SERPL-SCNC: 24 MMOL/L (ref 21–32)
CREAT SERPL-MCNC: 1.05 MG/DL (ref 0.7–1.3)
CREAT SERPL-MCNC: 1.07 MG/DL (ref 0.7–1.3)
CREAT UR-MCNC: 1 MG/DL (ref 0.6–1.3)
ECHO BSA: 2.18 M2
EKG ATRIAL RATE: 64 BPM
EKG DIAGNOSIS: NORMAL
EKG P AXIS: 14 DEGREES
EKG P-R INTERVAL: 224 MS
EKG Q-T INTERVAL: 470 MS
EKG QRS DURATION: 86 MS
EKG QTC CALCULATION (BAZETT): 484 MS
EKG R AXIS: 15 DEGREES
EKG T AXIS: 6 DEGREES
EKG VENTRICULAR RATE: 64 BPM
ERYTHROCYTE [DISTWIDTH] IN BLOOD BY AUTOMATED COUNT: 19.3 % (ref 11.5–14.5)
ERYTHROCYTE [DISTWIDTH] IN BLOOD BY AUTOMATED COUNT: 19.4 % (ref 11.5–14.5)
FIO2 ON VENT: 80 %
GAS FLOW.O2 O2 DELIVERY SYS: ABNORMAL
GLOBULIN SER CALC-MCNC: 2.5 G/DL (ref 2–4)
GLOBULIN SER CALC-MCNC: 2.7 G/DL (ref 2–4)
GLUCOSE BLD STRIP.AUTO-MCNC: 112 MG/DL (ref 74–99)
GLUCOSE BLD STRIP.AUTO-MCNC: 116 MG/DL (ref 65–117)
GLUCOSE BLD STRIP.AUTO-MCNC: 117 MG/DL (ref 74–99)
GLUCOSE BLD STRIP.AUTO-MCNC: 118 MG/DL (ref 74–99)
GLUCOSE BLD STRIP.AUTO-MCNC: 122 MG/DL (ref 65–117)
GLUCOSE BLD STRIP.AUTO-MCNC: 123 MG/DL (ref 65–117)
GLUCOSE BLD STRIP.AUTO-MCNC: 126 MG/DL (ref 65–117)
GLUCOSE BLD STRIP.AUTO-MCNC: 126 MG/DL (ref 65–117)
GLUCOSE BLD STRIP.AUTO-MCNC: 136 MG/DL (ref 74–99)
GLUCOSE BLD STRIP.AUTO-MCNC: 140 MG/DL (ref 74–99)
GLUCOSE BLD STRIP.AUTO-MCNC: 144 MG/DL (ref 74–99)
GLUCOSE BLD STRIP.AUTO-MCNC: 144 MG/DL (ref 74–99)
GLUCOSE BLD STRIP.AUTO-MCNC: 145 MG/DL (ref 74–99)
GLUCOSE BLD STRIP.AUTO-MCNC: 151 MG/DL (ref 74–99)
GLUCOSE BLD STRIP.AUTO-MCNC: 155 MG/DL (ref 65–117)
GLUCOSE BLD STRIP.AUTO-MCNC: 158 MG/DL (ref 74–99)
GLUCOSE BLD STRIP.AUTO-MCNC: 160 MG/DL (ref 65–117)
GLUCOSE BLD STRIP.AUTO-MCNC: 164 MG/DL (ref 65–117)
GLUCOSE BLD STRIP.AUTO-MCNC: 178 MG/DL (ref 65–117)
GLUCOSE BLD STRIP.AUTO-MCNC: 179 MG/DL (ref 74–99)
GLUCOSE BLD STRIP.AUTO-MCNC: 182 MG/DL (ref 65–117)
GLUCOSE SERPL-MCNC: 156 MG/DL (ref 65–100)
GLUCOSE SERPL-MCNC: 159 MG/DL (ref 65–100)
HCO3 BLD-SCNC: 21.3 MMOL/L (ref 22–26)
HCO3 BLDA-SCNC: 22 MMOL/L
HCO3 BLDA-SCNC: 22 MMOL/L
HCO3 BLDA-SCNC: 23 MMOL/L
HCO3 BLDA-SCNC: 24 MMOL/L
HCO3 BLDA-SCNC: 25 MMOL/L
HCO3 BLDA-SCNC: 26 MMOL/L
HCO3 BLDA-SCNC: 26 MMOL/L
HCO3 BLDV-SCNC: 26.9 MMOL/L (ref 23–28)
HCT VFR BLD AUTO: 23.4 % (ref 36.6–50.3)
HCT VFR BLD AUTO: 24.1 % (ref 36.6–50.3)
HGB BLD-MCNC: 8.1 G/DL (ref 12.1–17)
HGB BLD-MCNC: 8.4 G/DL (ref 12.1–17)
IGM SERPL-MCNC: 188 MG/DL (ref 40–230)
INR PPP: 1.3 (ref 0.9–1.1)
LACTATE BLD-SCNC: 0.69 MMOL/L (ref 0.4–2)
LACTATE BLD-SCNC: 0.69 MMOL/L (ref 0.4–2)
LACTATE BLD-SCNC: 0.81 MMOL/L (ref 0.4–2)
LACTATE BLD-SCNC: 1.08 MMOL/L (ref 0.4–2)
LACTATE BLD-SCNC: 1.43 MMOL/L (ref 0.4–2)
LACTATE BLD-SCNC: 1.6 MMOL/L (ref 0.4–2)
LACTATE BLD-SCNC: 1.64 MMOL/L (ref 0.4–2)
LACTATE BLD-SCNC: 1.66 MMOL/L (ref 0.4–2)
LACTATE BLD-SCNC: 1.98 MMOL/L (ref 0.4–2)
LACTATE BLD-SCNC: <0.4 MMOL/L (ref 0.4–2)
LACTATE BLD-SCNC: <0.4 MMOL/L (ref 0.4–2)
MAGNESIUM SERPL-MCNC: 2.1 MG/DL (ref 1.6–2.4)
MCH RBC QN AUTO: 35.3 PG (ref 26–34)
MCH RBC QN AUTO: 35.5 PG (ref 26–34)
MCHC RBC AUTO-ENTMCNC: 34.6 G/DL (ref 30–36.5)
MCHC RBC AUTO-ENTMCNC: 34.9 G/DL (ref 30–36.5)
MCV RBC AUTO: 101.3 FL (ref 80–99)
MCV RBC AUTO: 102.6 FL (ref 80–99)
NRBC # BLD: 0.02 K/UL (ref 0–0.01)
NRBC # BLD: 0.02 K/UL (ref 0–0.01)
NRBC BLD-RTO: 0.1 PER 100 WBC
NRBC BLD-RTO: 0.1 PER 100 WBC
PCO2 BLD: 31.1 MMHG (ref 35–45)
PCO2 BLD: 36.6 MMHG (ref 35–45)
PCO2 BLD: 36.8 MMHG (ref 35–45)
PCO2 BLD: 39.5 MMHG (ref 35–45)
PCO2 BLD: 39.7 MMHG (ref 35–45)
PCO2 BLD: 39.9 MMHG (ref 35–45)
PCO2 BLD: 40.5 MMHG (ref 35–45)
PCO2 BLD: 40.7 MMHG (ref 35–45)
PCO2 BLD: 46.4 MMHG (ref 35–45)
PCO2 BLD: 46.7 MMHG (ref 35–45)
PCO2 BLD: 55.1 MMHG (ref 35–45)
PCO2 BLD: 85.7 MMHG (ref 35–45)
PCO2 BLDV: 55.3 MMHG (ref 41–51)
PEEP RESPIRATORY: 5
PH BLD: 7.06 (ref 7.35–7.45)
PH BLD: 7.27 (ref 7.35–7.45)
PH BLD: 7.29 (ref 7.35–7.45)
PH BLD: 7.32 (ref 7.35–7.45)
PH BLD: 7.34 (ref 7.35–7.45)
PH BLD: 7.35 (ref 7.35–7.45)
PH BLD: 7.38 (ref 7.35–7.45)
PH BLD: 7.38 (ref 7.35–7.45)
PH BLD: 7.42 (ref 7.35–7.45)
PH BLD: 7.44 (ref 7.35–7.45)
PH BLD: 7.46 (ref 7.35–7.45)
PH BLD: 7.46 (ref 7.35–7.45)
PH BLDV: 7.3 (ref 7.32–7.42)
PLATELET # BLD AUTO: 156 K/UL (ref 150–400)
PLATELET # BLD AUTO: 164 K/UL (ref 150–400)
PMV BLD AUTO: 10.7 FL (ref 8.9–12.9)
PMV BLD AUTO: 10.9 FL (ref 8.9–12.9)
PO2 BLD: 100 MMHG (ref 80–100)
PO2 BLD: 159 MMHG (ref 80–100)
PO2 BLD: 166 MMHG (ref 80–100)
PO2 BLD: 201 MMHG (ref 80–100)
PO2 BLD: 238 MMHG (ref 80–100)
PO2 BLD: 374 MMHG (ref 80–100)
PO2 BLD: 43 MMHG (ref 80–100)
PO2 BLD: 438 MMHG (ref 80–100)
PO2 BLD: 465 MMHG (ref 80–100)
PO2 BLD: 85 MMHG (ref 80–100)
PO2 BLD: >515 MMHG (ref 80–100)
PO2 BLD: >515 MMHG (ref 80–100)
PO2 BLDV: 55 MMHG (ref 25–40)
POTASSIUM BLD-SCNC: 4.2 MMOL/L (ref 3.5–5.5)
POTASSIUM BLD-SCNC: 4.5 MMOL/L (ref 3.5–5.5)
POTASSIUM BLD-SCNC: 4.5 MMOL/L (ref 3.5–5.5)
POTASSIUM BLD-SCNC: 4.9 MMOL/L (ref 3.5–5.5)
POTASSIUM BLD-SCNC: 5.1 MMOL/L (ref 3.5–5.5)
POTASSIUM BLD-SCNC: 5.2 MMOL/L (ref 3.5–5.5)
POTASSIUM BLD-SCNC: 5.4 MMOL/L (ref 3.5–5.5)
POTASSIUM BLD-SCNC: 5.5 MMOL/L (ref 3.5–5.5)
POTASSIUM SERPL-SCNC: 4.3 MMOL/L (ref 3.5–5.1)
POTASSIUM SERPL-SCNC: 4.5 MMOL/L (ref 3.5–5.1)
PROT SERPL-MCNC: 5.2 G/DL (ref 6.4–8.2)
PROT SERPL-MCNC: 6.1 G/DL (ref 6.4–8.2)
PROTHROMBIN TIME: 13.5 SEC (ref 9–11.1)
RBC # BLD AUTO: 2.28 M/UL (ref 4.1–5.7)
RBC # BLD AUTO: 2.38 M/UL (ref 4.1–5.7)
RESPIRATORY RATE: 16
SAO2 % BLD: 100 %
SAO2 % BLD: 82 %
SAO2 % BLD: 96 %
SAO2 % BLD: 97.4 % (ref 92–97)
SAO2 % BLD: 98 %
SAO2 % BLD: 99 %
SAO2 % BLDV: 83.8 % (ref 65–88)
SERVICE CMNT-IMP: ABNORMAL
SERVICE CMNT-IMP: NORMAL
SODIUM BLD-SCNC: 142 MMOL/L (ref 136–145)
SODIUM SERPL-SCNC: 140 MMOL/L (ref 136–145)
SODIUM SERPL-SCNC: 143 MMOL/L (ref 136–145)
SPECIMEN SITE: ABNORMAL
SPECIMEN TYPE: ABNORMAL
SPECIMEN TYPE: ABNORMAL
T4 FREE SERPL-MCNC: 1 NG/DL (ref 0.8–1.5)
THERAPEUTIC RANGE: ABNORMAL SECS (ref 58–77)
TSH SERPL DL<=0.05 MIU/L-ACNC: 4.09 UIU/ML (ref 0.36–3.74)
VENTILATION MODE VENT: ABNORMAL
VT SETTING VENT: 510
WBC # BLD AUTO: 17.9 K/UL (ref 4.1–11.1)
WBC # BLD AUTO: 18.9 K/UL (ref 4.1–11.1)

## 2024-07-15 PROCEDURE — 6370000000 HC RX 637 (ALT 250 FOR IP): Performed by: NURSE PRACTITIONER

## 2024-07-15 PROCEDURE — 021109W BYPASS CORONARY ARTERY, TWO ARTERIES FROM AORTA WITH AUTOLOGOUS VENOUS TISSUE, OPEN APPROACH: ICD-10-PCS | Performed by: THORACIC SURGERY (CARDIOTHORACIC VASCULAR SURGERY)

## 2024-07-15 PROCEDURE — C1889 IMPLANT/INSERT DEVICE, NOC: HCPCS | Performed by: THORACIC SURGERY (CARDIOTHORACIC VASCULAR SURGERY)

## 2024-07-15 PROCEDURE — 33518 CABG ARTERY-VEIN TWO: CPT

## 2024-07-15 PROCEDURE — 6370000000 HC RX 637 (ALT 250 FOR IP): Performed by: ANESTHESIOLOGY

## 2024-07-15 PROCEDURE — 84439 ASSAY OF FREE THYROXINE: CPT

## 2024-07-15 PROCEDURE — 02L70CK OCCLUSION OF LEFT ATRIAL APPENDAGE WITH EXTRALUMINAL DEVICE, OPEN APPROACH: ICD-10-PCS | Performed by: THORACIC SURGERY (CARDIOTHORACIC VASCULAR SURGERY)

## 2024-07-15 PROCEDURE — 2580000003 HC RX 258

## 2024-07-15 PROCEDURE — 6360000002 HC RX W HCPCS: Performed by: ANESTHESIOLOGY

## 2024-07-15 PROCEDURE — 2580000003 HC RX 258: Performed by: NURSE ANESTHETIST, CERTIFIED REGISTERED

## 2024-07-15 PROCEDURE — 33533 CABG ARTERIAL SINGLE: CPT

## 2024-07-15 PROCEDURE — 3600000008 HC SURGERY OHS BASE: Performed by: THORACIC SURGERY (CARDIOTHORACIC VASCULAR SURGERY)

## 2024-07-15 PROCEDURE — 6370000000 HC RX 637 (ALT 250 FOR IP)

## 2024-07-15 PROCEDURE — 4A023N6 MEASUREMENT OF CARDIAC SAMPLING AND PRESSURE, RIGHT HEART, PERCUTANEOUS APPROACH: ICD-10-PCS | Performed by: THORACIC SURGERY (CARDIOTHORACIC VASCULAR SURGERY)

## 2024-07-15 PROCEDURE — 84295 ASSAY OF SERUM SODIUM: CPT

## 2024-07-15 PROCEDURE — 6360000002 HC RX W HCPCS: Performed by: NURSE ANESTHETIST, CERTIFIED REGISTERED

## 2024-07-15 PROCEDURE — 82803 BLOOD GASES ANY COMBINATION: CPT

## 2024-07-15 PROCEDURE — 3700000001 HC ADD 15 MINUTES (ANESTHESIA): Performed by: THORACIC SURGERY (CARDIOTHORACIC VASCULAR SURGERY)

## 2024-07-15 PROCEDURE — 6360000002 HC RX W HCPCS

## 2024-07-15 PROCEDURE — 2700000000 HC OXYGEN THERAPY PER DAY

## 2024-07-15 PROCEDURE — 02100Z9 BYPASS CORONARY ARTERY, ONE ARTERY FROM LEFT INTERNAL MAMMARY, OPEN APPROACH: ICD-10-PCS | Performed by: THORACIC SURGERY (CARDIOTHORACIC VASCULAR SURGERY)

## 2024-07-15 PROCEDURE — 2580000003 HC RX 258: Performed by: NURSE PRACTITIONER

## 2024-07-15 PROCEDURE — 33508 ENDOSCOPIC VEIN HARVEST: CPT

## 2024-07-15 PROCEDURE — 2500000003 HC RX 250 WO HCPCS: Performed by: NURSE ANESTHETIST, CERTIFIED REGISTERED

## 2024-07-15 PROCEDURE — P9045 ALBUMIN (HUMAN), 5%, 250 ML: HCPCS

## 2024-07-15 PROCEDURE — 71045 X-RAY EXAM CHEST 1 VIEW: CPT

## 2024-07-15 PROCEDURE — 82330 ASSAY OF CALCIUM: CPT

## 2024-07-15 PROCEDURE — 6360000002 HC RX W HCPCS: Performed by: THORACIC SURGERY (CARDIOTHORACIC VASCULAR SURGERY)

## 2024-07-15 PROCEDURE — 84443 ASSAY THYROID STIM HORMONE: CPT

## 2024-07-15 PROCEDURE — 2580000003 HC RX 258: Performed by: ANESTHESIOLOGY

## 2024-07-15 PROCEDURE — 36415 COLL VENOUS BLD VENIPUNCTURE: CPT

## 2024-07-15 PROCEDURE — 99231 SBSQ HOSP IP/OBS SF/LOW 25: CPT | Performed by: CLINICAL NURSE SPECIALIST

## 2024-07-15 PROCEDURE — 82962 GLUCOSE BLOOD TEST: CPT

## 2024-07-15 PROCEDURE — 83735 ASSAY OF MAGNESIUM: CPT

## 2024-07-15 PROCEDURE — 3700000000 HC ANESTHESIA ATTENDED CARE: Performed by: THORACIC SURGERY (CARDIOTHORACIC VASCULAR SURGERY)

## 2024-07-15 PROCEDURE — 80053 COMPREHEN METABOLIC PANEL: CPT

## 2024-07-15 PROCEDURE — 2720000010 HC SURG SUPPLY STERILE: Performed by: THORACIC SURGERY (CARDIOTHORACIC VASCULAR SURGERY)

## 2024-07-15 PROCEDURE — 2500000003 HC RX 250 WO HCPCS

## 2024-07-15 PROCEDURE — 85730 THROMBOPLASTIN TIME PARTIAL: CPT

## 2024-07-15 PROCEDURE — 3600000018 HC SURGERY OHS ADDTL 15MIN: Performed by: THORACIC SURGERY (CARDIOTHORACIC VASCULAR SURGERY)

## 2024-07-15 PROCEDURE — P9045 ALBUMIN (HUMAN), 5%, 250 ML: HCPCS | Performed by: ANESTHESIOLOGY

## 2024-07-15 PROCEDURE — 6360000002 HC RX W HCPCS: Performed by: NURSE PRACTITIONER

## 2024-07-15 PROCEDURE — C1729 CATH, DRAINAGE: HCPCS | Performed by: THORACIC SURGERY (CARDIOTHORACIC VASCULAR SURGERY)

## 2024-07-15 PROCEDURE — 85027 COMPLETE CBC AUTOMATED: CPT

## 2024-07-15 PROCEDURE — 84132 ASSAY OF SERUM POTASSIUM: CPT

## 2024-07-15 PROCEDURE — 2709999900 HC NON-CHARGEABLE SUPPLY: Performed by: THORACIC SURGERY (CARDIOTHORACIC VASCULAR SURGERY)

## 2024-07-15 PROCEDURE — 6370000000 HC RX 637 (ALT 250 FOR IP): Performed by: NURSE ANESTHETIST, CERTIFIED REGISTERED

## 2024-07-15 PROCEDURE — 85610 PROTHROMBIN TIME: CPT

## 2024-07-15 PROCEDURE — 33268 EXCL LAA OPN OTH PX ANY METH: CPT

## 2024-07-15 PROCEDURE — 85018 HEMOGLOBIN: CPT

## 2024-07-15 PROCEDURE — C1713 ANCHOR/SCREW BN/BN,TIS/BN: HCPCS | Performed by: THORACIC SURGERY (CARDIOTHORACIC VASCULAR SURGERY)

## 2024-07-15 PROCEDURE — 82784 ASSAY IGA/IGD/IGG/IGM EACH: CPT

## 2024-07-15 PROCEDURE — 93005 ELECTROCARDIOGRAM TRACING: CPT

## 2024-07-15 PROCEDURE — 2000000000 HC ICU R&B

## 2024-07-15 PROCEDURE — A4648 IMPLANTABLE TISSUE MARKER: HCPCS | Performed by: THORACIC SURGERY (CARDIOTHORACIC VASCULAR SURGERY)

## 2024-07-15 PROCEDURE — 82947 ASSAY GLUCOSE BLOOD QUANT: CPT

## 2024-07-15 DEVICE — IMPLANTABLE DEVICE: Type: IMPLANTABLE DEVICE | Site: HEART | Status: FUNCTIONAL

## 2024-07-15 RX ORDER — SODIUM CHLORIDE 0.9 % (FLUSH) 0.9 %
5-40 SYRINGE (ML) INJECTION EVERY 12 HOURS SCHEDULED
Status: DISCONTINUED | OUTPATIENT
Start: 2024-07-15 | End: 2024-07-15 | Stop reason: HOSPADM

## 2024-07-15 RX ORDER — MAGNESIUM SULFATE IN WATER 40 MG/ML
2000 INJECTION, SOLUTION INTRAVENOUS PRN
Status: DISCONTINUED | OUTPATIENT
Start: 2024-07-15 | End: 2024-07-22 | Stop reason: HOSPADM

## 2024-07-15 RX ORDER — ONDANSETRON 2 MG/ML
4 INJECTION INTRAMUSCULAR; INTRAVENOUS EVERY 4 HOURS PRN
Status: DISCONTINUED | OUTPATIENT
Start: 2024-07-15 | End: 2024-07-22 | Stop reason: HOSPADM

## 2024-07-15 RX ORDER — HEPARIN SODIUM 1000 [USP'U]/ML
INJECTION, SOLUTION INTRAVENOUS; SUBCUTANEOUS PRN
Status: DISCONTINUED | OUTPATIENT
Start: 2024-07-15 | End: 2024-07-15 | Stop reason: SDUPTHER

## 2024-07-15 RX ORDER — FENTANYL CITRATE 50 UG/ML
100 INJECTION, SOLUTION INTRAMUSCULAR; INTRAVENOUS
Status: COMPLETED | OUTPATIENT
Start: 2024-07-15 | End: 2024-07-15

## 2024-07-15 RX ORDER — ONDANSETRON 2 MG/ML
4 INJECTION INTRAMUSCULAR; INTRAVENOUS AS NEEDED
Status: DISCONTINUED | OUTPATIENT
Start: 2024-07-15 | End: 2024-07-15 | Stop reason: HOSPADM

## 2024-07-15 RX ORDER — GLYCOPYRROLATE 0.2 MG/ML
INJECTION INTRAMUSCULAR; INTRAVENOUS PRN
Status: DISCONTINUED | OUTPATIENT
Start: 2024-07-15 | End: 2024-07-15 | Stop reason: SDUPTHER

## 2024-07-15 RX ORDER — SODIUM CHLORIDE 9 MG/ML
INJECTION, SOLUTION INTRAVENOUS PRN
Status: DISCONTINUED | OUTPATIENT
Start: 2024-07-15 | End: 2024-07-15 | Stop reason: HOSPADM

## 2024-07-15 RX ORDER — AMIODARONE HYDROCHLORIDE 200 MG/1
400 TABLET ORAL 2 TIMES DAILY
Status: DISCONTINUED | OUTPATIENT
Start: 2024-07-16 | End: 2024-07-19

## 2024-07-15 RX ORDER — DESMOPRESSIN ACETATE 4 UG/ML
INJECTION, SOLUTION INTRAVENOUS; SUBCUTANEOUS PRN
Status: DISCONTINUED | OUTPATIENT
Start: 2024-07-15 | End: 2024-07-15 | Stop reason: SDUPTHER

## 2024-07-15 RX ORDER — DEXTROSE MONOHYDRATE 100 MG/ML
INJECTION, SOLUTION INTRAVENOUS CONTINUOUS PRN
Status: DISCONTINUED | OUTPATIENT
Start: 2024-07-15 | End: 2024-07-22 | Stop reason: HOSPADM

## 2024-07-15 RX ORDER — INSULIN LISPRO 100 [IU]/ML
0-12 INJECTION, SOLUTION INTRAVENOUS; SUBCUTANEOUS
Status: DISCONTINUED | OUTPATIENT
Start: 2024-07-17 | End: 2024-07-22 | Stop reason: HOSPADM

## 2024-07-15 RX ORDER — ACETAMINOPHEN 500 MG
1000 TABLET ORAL ONCE
Status: DISCONTINUED | OUTPATIENT
Start: 2024-07-15 | End: 2024-07-15 | Stop reason: HOSPADM

## 2024-07-15 RX ORDER — SUCCINYLCHOLINE/SOD CL,ISO/PF 200MG/10ML
SYRINGE (ML) INTRAVENOUS PRN
Status: DISCONTINUED | OUTPATIENT
Start: 2024-07-15 | End: 2024-07-15 | Stop reason: SDUPTHER

## 2024-07-15 RX ORDER — HYDRALAZINE HYDROCHLORIDE 20 MG/ML
10 INJECTION INTRAMUSCULAR; INTRAVENOUS EVERY 6 HOURS PRN
Status: DISCONTINUED | OUTPATIENT
Start: 2024-07-15 | End: 2024-07-22 | Stop reason: HOSPADM

## 2024-07-15 RX ORDER — INSULIN LISPRO 100 [IU]/ML
1-20 INJECTION, SOLUTION INTRAVENOUS; SUBCUTANEOUS
Status: DISCONTINUED | OUTPATIENT
Start: 2024-07-15 | End: 2024-07-20

## 2024-07-15 RX ORDER — ACETAMINOPHEN 500 MG
1000 TABLET ORAL ONCE
Status: COMPLETED | OUTPATIENT
Start: 2024-07-15 | End: 2024-07-15

## 2024-07-15 RX ORDER — BISACODYL 10 MG
10 SUPPOSITORY, RECTAL RECTAL DAILY PRN
Status: DISCONTINUED | OUTPATIENT
Start: 2024-07-15 | End: 2024-07-22 | Stop reason: HOSPADM

## 2024-07-15 RX ORDER — FAMOTIDINE 20 MG/1
20 TABLET, FILM COATED ORAL 2 TIMES DAILY
Status: DISCONTINUED | OUTPATIENT
Start: 2024-07-15 | End: 2024-07-17

## 2024-07-15 RX ORDER — INSULIN LISPRO 100 [IU]/ML
0-6 INJECTION, SOLUTION INTRAVENOUS; SUBCUTANEOUS NIGHTLY
Status: DISCONTINUED | OUTPATIENT
Start: 2024-07-17 | End: 2024-07-22 | Stop reason: HOSPADM

## 2024-07-15 RX ORDER — CEFAZOLIN SODIUM 1 G/3ML
INJECTION, POWDER, FOR SOLUTION INTRAMUSCULAR; INTRAVENOUS PRN
Status: DISCONTINUED | OUTPATIENT
Start: 2024-07-15 | End: 2024-07-15 | Stop reason: ALTCHOICE

## 2024-07-15 RX ORDER — SODIUM CHLORIDE 0.9 % (FLUSH) 0.9 %
5-40 SYRINGE (ML) INJECTION PRN
Status: DISCONTINUED | OUTPATIENT
Start: 2024-07-15 | End: 2024-07-22 | Stop reason: HOSPADM

## 2024-07-15 RX ORDER — LANOLIN ALCOHOL/MO/W.PET/CERES
400 CREAM (GRAM) TOPICAL 2 TIMES DAILY
Status: DISCONTINUED | OUTPATIENT
Start: 2024-07-16 | End: 2024-07-22 | Stop reason: HOSPADM

## 2024-07-15 RX ORDER — HEPARIN SODIUM 10000 [USP'U]/ML
INJECTION, SOLUTION INTRAVENOUS; SUBCUTANEOUS PRN
Status: DISCONTINUED | OUTPATIENT
Start: 2024-07-15 | End: 2024-07-15 | Stop reason: ALTCHOICE

## 2024-07-15 RX ORDER — DEXMEDETOMIDINE HYDROCHLORIDE 4 UG/ML
.1-1.5 INJECTION, SOLUTION INTRAVENOUS CONTINUOUS
Status: DISCONTINUED | OUTPATIENT
Start: 2024-07-15 | End: 2024-07-16

## 2024-07-15 RX ORDER — POLYETHYLENE GLYCOL 3350 17 G/17G
17 POWDER, FOR SOLUTION ORAL DAILY
Status: DISCONTINUED | OUTPATIENT
Start: 2024-07-15 | End: 2024-07-22 | Stop reason: HOSPADM

## 2024-07-15 RX ORDER — SODIUM CHLORIDE 0.9 % (FLUSH) 0.9 %
5-40 SYRINGE (ML) INJECTION EVERY 12 HOURS SCHEDULED
Status: DISCONTINUED | OUTPATIENT
Start: 2024-07-15 | End: 2024-07-22 | Stop reason: HOSPADM

## 2024-07-15 RX ORDER — LANOLIN ALCOHOL/MO/W.PET/CERES
3 CREAM (GRAM) TOPICAL NIGHTLY PRN
Status: DISCONTINUED | OUTPATIENT
Start: 2024-07-15 | End: 2024-07-22 | Stop reason: HOSPADM

## 2024-07-15 RX ORDER — MIDAZOLAM HYDROCHLORIDE 1 MG/ML
INJECTION INTRAMUSCULAR; INTRAVENOUS PRN
Status: DISCONTINUED | OUTPATIENT
Start: 2024-07-15 | End: 2024-07-15 | Stop reason: SDUPTHER

## 2024-07-15 RX ORDER — PROTAMINE SULFATE 10 MG/ML
INJECTION, SOLUTION INTRAVENOUS PRN
Status: DISCONTINUED | OUTPATIENT
Start: 2024-07-15 | End: 2024-07-15 | Stop reason: SDUPTHER

## 2024-07-15 RX ORDER — LIDOCAINE HYDROCHLORIDE 20 MG/ML
INJECTION, SOLUTION EPIDURAL; INFILTRATION; INTRACAUDAL; PERINEURAL PRN
Status: DISCONTINUED | OUTPATIENT
Start: 2024-07-15 | End: 2024-07-15 | Stop reason: SDUPTHER

## 2024-07-15 RX ORDER — FENTANYL CITRATE 50 UG/ML
INJECTION, SOLUTION INTRAMUSCULAR; INTRAVENOUS PRN
Status: DISCONTINUED | OUTPATIENT
Start: 2024-07-15 | End: 2024-07-15 | Stop reason: SDUPTHER

## 2024-07-15 RX ORDER — PAPAVERINE HYDROCHLORIDE 30 MG/ML
INJECTION INTRAMUSCULAR; INTRAVENOUS PRN
Status: DISCONTINUED | OUTPATIENT
Start: 2024-07-15 | End: 2024-07-15 | Stop reason: ALTCHOICE

## 2024-07-15 RX ORDER — SENNA AND DOCUSATE SODIUM 50; 8.6 MG/1; MG/1
1 TABLET, FILM COATED ORAL 2 TIMES DAILY
Status: DISCONTINUED | OUTPATIENT
Start: 2024-07-15 | End: 2024-07-22 | Stop reason: HOSPADM

## 2024-07-15 RX ORDER — GABAPENTIN 300 MG/1
300 CAPSULE ORAL ONCE
Status: COMPLETED | OUTPATIENT
Start: 2024-07-15 | End: 2024-07-15

## 2024-07-15 RX ORDER — NEOSTIGMINE METHYLSULFATE 1 MG/ML
INJECTION, SOLUTION INTRAVENOUS PRN
Status: DISCONTINUED | OUTPATIENT
Start: 2024-07-15 | End: 2024-07-15 | Stop reason: SDUPTHER

## 2024-07-15 RX ORDER — SODIUM CHLORIDE 9 MG/ML
INJECTION, SOLUTION INTRAVENOUS CONTINUOUS
Status: DISCONTINUED | OUTPATIENT
Start: 2024-07-15 | End: 2024-07-20

## 2024-07-15 RX ORDER — HYDROMORPHONE HYDROCHLORIDE 1 MG/ML
0.5 INJECTION, SOLUTION INTRAMUSCULAR; INTRAVENOUS; SUBCUTANEOUS EVERY 4 HOURS PRN
Status: DISCONTINUED | OUTPATIENT
Start: 2024-07-15 | End: 2024-07-19

## 2024-07-15 RX ORDER — ACETAMINOPHEN 325 MG/1
975 TABLET ORAL EVERY 6 HOURS SCHEDULED
Status: DISCONTINUED | OUTPATIENT
Start: 2024-07-15 | End: 2024-07-22 | Stop reason: HOSPADM

## 2024-07-15 RX ORDER — MIDAZOLAM HYDROCHLORIDE 2 MG/2ML
2 INJECTION, SOLUTION INTRAMUSCULAR; INTRAVENOUS
Status: COMPLETED | OUTPATIENT
Start: 2024-07-15 | End: 2024-07-15

## 2024-07-15 RX ORDER — ROPIVACAINE HYDROCHLORIDE 5 MG/ML
INJECTION, SOLUTION EPIDURAL; INFILTRATION; PERINEURAL PRN
Status: DISCONTINUED | OUTPATIENT
Start: 2024-07-15 | End: 2024-07-15 | Stop reason: ALTCHOICE

## 2024-07-15 RX ORDER — DEXMEDETOMIDINE HYDROCHLORIDE 4 UG/ML
INJECTION, SOLUTION INTRAVENOUS CONTINUOUS PRN
Status: DISCONTINUED | OUTPATIENT
Start: 2024-07-15 | End: 2024-07-15 | Stop reason: SDUPTHER

## 2024-07-15 RX ORDER — OXYCODONE HYDROCHLORIDE 5 MG/1
10 TABLET ORAL EVERY 4 HOURS PRN
Status: DISCONTINUED | OUTPATIENT
Start: 2024-07-15 | End: 2024-07-19

## 2024-07-15 RX ORDER — GABAPENTIN 100 MG/1
200 CAPSULE ORAL 3 TIMES DAILY
Status: DISCONTINUED | OUTPATIENT
Start: 2024-07-15 | End: 2024-07-16

## 2024-07-15 RX ORDER — LIDOCAINE HYDROCHLORIDE 10 MG/ML
1 INJECTION, SOLUTION EPIDURAL; INFILTRATION; INTRACAUDAL; PERINEURAL
Status: DISCONTINUED | OUTPATIENT
Start: 2024-07-15 | End: 2024-07-15 | Stop reason: HOSPADM

## 2024-07-15 RX ORDER — SODIUM CHLORIDE 450 MG/100ML
INJECTION, SOLUTION INTRAVENOUS CONTINUOUS
Status: DISCONTINUED | OUTPATIENT
Start: 2024-07-15 | End: 2024-07-20

## 2024-07-15 RX ORDER — SODIUM CHLORIDE 0.9 % (FLUSH) 0.9 %
5-40 SYRINGE (ML) INJECTION PRN
Status: DISCONTINUED | OUTPATIENT
Start: 2024-07-15 | End: 2024-07-15 | Stop reason: HOSPADM

## 2024-07-15 RX ORDER — SODIUM CHLORIDE, SODIUM LACTATE, POTASSIUM CHLORIDE, CALCIUM CHLORIDE 600; 310; 30; 20 MG/100ML; MG/100ML; MG/100ML; MG/100ML
INJECTION, SOLUTION INTRAVENOUS CONTINUOUS
Status: DISCONTINUED | OUTPATIENT
Start: 2024-07-15 | End: 2024-07-15 | Stop reason: HOSPADM

## 2024-07-15 RX ORDER — ONDANSETRON 2 MG/ML
4 INJECTION INTRAMUSCULAR; INTRAVENOUS ONCE
Status: COMPLETED | OUTPATIENT
Start: 2024-07-15 | End: 2024-07-15

## 2024-07-15 RX ORDER — CHLORHEXIDINE GLUCONATE ORAL RINSE 1.2 MG/ML
15 SOLUTION DENTAL 2 TIMES DAILY
Status: DISCONTINUED | OUTPATIENT
Start: 2024-07-15 | End: 2024-07-22 | Stop reason: HOSPADM

## 2024-07-15 RX ORDER — ATORVASTATIN CALCIUM 40 MG/1
40 TABLET, FILM COATED ORAL NIGHTLY
Status: DISCONTINUED | OUTPATIENT
Start: 2024-07-15 | End: 2024-07-22 | Stop reason: HOSPADM

## 2024-07-15 RX ORDER — OXYCODONE HYDROCHLORIDE 5 MG/1
5 TABLET ORAL EVERY 4 HOURS PRN
Status: DISCONTINUED | OUTPATIENT
Start: 2024-07-15 | End: 2024-07-22 | Stop reason: HOSPADM

## 2024-07-15 RX ORDER — DIPHENHYDRAMINE HCL 25 MG
25 CAPSULE ORAL NIGHTLY PRN
Status: DISCONTINUED | OUTPATIENT
Start: 2024-07-16 | End: 2024-07-22 | Stop reason: HOSPADM

## 2024-07-15 RX ORDER — INSULIN GLARGINE 100 [IU]/ML
1-50 INJECTION, SOLUTION SUBCUTANEOUS
Status: DISCONTINUED | OUTPATIENT
Start: 2024-07-17 | End: 2024-07-18

## 2024-07-15 RX ORDER — ALBUMIN, HUMAN INJ 5% 5 %
12.5 SOLUTION INTRAVENOUS PRN
Status: COMPLETED | OUTPATIENT
Start: 2024-07-15 | End: 2024-07-15

## 2024-07-15 RX ORDER — POTASSIUM CHLORIDE 29.8 MG/ML
20 INJECTION INTRAVENOUS PRN
Status: DISCONTINUED | OUTPATIENT
Start: 2024-07-15 | End: 2024-07-20

## 2024-07-15 RX ORDER — ASPIRIN 81 MG/1
81 TABLET ORAL DAILY
Status: DISCONTINUED | OUTPATIENT
Start: 2024-07-16 | End: 2024-07-22 | Stop reason: HOSPADM

## 2024-07-15 RX ORDER — ROCURONIUM BROMIDE 10 MG/ML
INJECTION, SOLUTION INTRAVENOUS PRN
Status: DISCONTINUED | OUTPATIENT
Start: 2024-07-15 | End: 2024-07-15 | Stop reason: SDUPTHER

## 2024-07-15 RX ORDER — PROPOFOL 10 MG/ML
INJECTION, EMULSION INTRAVENOUS PRN
Status: DISCONTINUED | OUTPATIENT
Start: 2024-07-15 | End: 2024-07-15 | Stop reason: SDUPTHER

## 2024-07-15 RX ORDER — LIDOCAINE 4 G/G
2 PATCH TOPICAL DAILY
Status: DISCONTINUED | OUTPATIENT
Start: 2024-07-15 | End: 2024-07-22 | Stop reason: HOSPADM

## 2024-07-15 RX ORDER — ALBUMIN, HUMAN INJ 5% 5 %
12.5 SOLUTION INTRAVENOUS ONCE
Status: COMPLETED | OUTPATIENT
Start: 2024-07-15 | End: 2024-07-15

## 2024-07-15 RX ORDER — IPRATROPIUM BROMIDE AND ALBUTEROL SULFATE 2.5; .5 MG/3ML; MG/3ML
1 SOLUTION RESPIRATORY (INHALATION) EVERY 4 HOURS PRN
Status: DISCONTINUED | OUTPATIENT
Start: 2024-07-15 | End: 2024-07-16

## 2024-07-15 RX ORDER — MIDAZOLAM HYDROCHLORIDE 2 MG/2ML
1 INJECTION, SOLUTION INTRAMUSCULAR; INTRAVENOUS
Status: DISCONTINUED | OUTPATIENT
Start: 2024-07-15 | End: 2024-07-17

## 2024-07-15 RX ADMIN — PROPOFOL 50 MCG/KG/MIN: 10 INJECTION, EMULSION INTRAVENOUS at 08:38

## 2024-07-15 RX ADMIN — ATORVASTATIN CALCIUM 40 MG: 40 TABLET, FILM COATED ORAL at 20:44

## 2024-07-15 RX ADMIN — FENTANYL CITRATE 100 MCG: 50 INJECTION, SOLUTION INTRAMUSCULAR; INTRAVENOUS at 09:44

## 2024-07-15 RX ADMIN — FENTANYL CITRATE 100 MCG: 50 INJECTION, SOLUTION INTRAMUSCULAR; INTRAVENOUS at 08:05

## 2024-07-15 RX ADMIN — GABAPENTIN 300 MG: 300 CAPSULE ORAL at 07:03

## 2024-07-15 RX ADMIN — GLYCOPYRROLATE 0.4 MG: 0.2 INJECTION INTRAMUSCULAR; INTRAVENOUS at 14:38

## 2024-07-15 RX ADMIN — ROCURONIUM BROMIDE 10 MG: 10 SOLUTION INTRAVENOUS at 13:26

## 2024-07-15 RX ADMIN — FENTANYL CITRATE 100 MCG: 50 INJECTION, SOLUTION INTRAMUSCULAR; INTRAVENOUS at 08:30

## 2024-07-15 RX ADMIN — CHLORHEXIDINE GLUCONATE 15 ML: 1.2 RINSE ORAL at 20:44

## 2024-07-15 RX ADMIN — FENTANYL CITRATE 50 MCG: 50 INJECTION INTRAMUSCULAR; INTRAVENOUS at 07:09

## 2024-07-15 RX ADMIN — PROPOFOL 50 MG: 10 INJECTION, EMULSION INTRAVENOUS at 09:16

## 2024-07-15 RX ADMIN — WATER 2000 MG: 1 INJECTION INTRAMUSCULAR; INTRAVENOUS; SUBCUTANEOUS at 14:03

## 2024-07-15 RX ADMIN — MIDAZOLAM HYDROCHLORIDE 1 MG: 1 INJECTION, SOLUTION INTRAMUSCULAR; INTRAVENOUS at 07:46

## 2024-07-15 RX ADMIN — FENTANYL CITRATE 50 MCG: 50 INJECTION, SOLUTION INTRAMUSCULAR; INTRAVENOUS at 09:25

## 2024-07-15 RX ADMIN — CHLORHEXIDINE GLUCONATE 15 ML: 1.2 RINSE ORAL at 16:30

## 2024-07-15 RX ADMIN — SODIUM CHLORIDE: 4.5 INJECTION, SOLUTION INTRAVENOUS at 15:16

## 2024-07-15 RX ADMIN — ALBUMIN (HUMAN) 12.5 G: 12.5 INJECTION, SOLUTION INTRAVENOUS at 18:20

## 2024-07-15 RX ADMIN — ALBUMIN (HUMAN) 12.5 G: 12.5 INJECTION, SOLUTION INTRAVENOUS at 15:48

## 2024-07-15 RX ADMIN — ALBUMIN (HUMAN) 12.5 G: 12.5 INJECTION, SOLUTION INTRAVENOUS at 22:53

## 2024-07-15 RX ADMIN — ACETAMINOPHEN 1000 MG: 500 TABLET ORAL at 07:03

## 2024-07-15 RX ADMIN — DEXMEDETOMIDINE HYDROCHLORIDE 0.4 MCG/KG/HR: 400 INJECTION INTRAVENOUS at 08:57

## 2024-07-15 RX ADMIN — SENNOSIDES AND DOCUSATE SODIUM 1 TABLET: 50; 8.6 TABLET ORAL at 20:44

## 2024-07-15 RX ADMIN — WATER 2000 MG: 1 INJECTION INTRAMUSCULAR; INTRAVENOUS; SUBCUTANEOUS at 21:49

## 2024-07-15 RX ADMIN — DESMOPRESSIN ACETATE 27 MCG: 4 INJECTION, SOLUTION INTRAVENOUS; SUBCUTANEOUS at 12:22

## 2024-07-15 RX ADMIN — ROCURONIUM BROMIDE 10 MG: 10 SOLUTION INTRAVENOUS at 08:42

## 2024-07-15 RX ADMIN — GABAPENTIN 200 MG: 100 CAPSULE ORAL at 20:44

## 2024-07-15 RX ADMIN — ROCURONIUM BROMIDE 20 MG: 10 SOLUTION INTRAVENOUS at 09:49

## 2024-07-15 RX ADMIN — SODIUM CHLORIDE: 9 INJECTION, SOLUTION INTRAVENOUS at 10:05

## 2024-07-15 RX ADMIN — ROCURONIUM BROMIDE 10 MG: 10 SOLUTION INTRAVENOUS at 11:09

## 2024-07-15 RX ADMIN — WATER 2000 MG: 1 INJECTION INTRAMUSCULAR; INTRAVENOUS; SUBCUTANEOUS at 11:08

## 2024-07-15 RX ADMIN — PROTAMINE SULFATE 260 MG: 10 INJECTION, SOLUTION INTRAVENOUS at 12:01

## 2024-07-15 RX ADMIN — LIDOCAINE HYDROCHLORIDE 60 MG: 20 INJECTION, SOLUTION EPIDURAL; INFILTRATION; INTRACAUDAL; PERINEURAL at 07:46

## 2024-07-15 RX ADMIN — SODIUM CHLORIDE, POTASSIUM CHLORIDE, SODIUM LACTATE AND CALCIUM CHLORIDE: 600; 310; 30; 20 INJECTION, SOLUTION INTRAVENOUS at 07:03

## 2024-07-15 RX ADMIN — SODIUM CHLORIDE: 9 INJECTION, SOLUTION INTRAVENOUS at 08:00

## 2024-07-15 RX ADMIN — SODIUM CHLORIDE, POTASSIUM CHLORIDE, SODIUM LACTATE AND CALCIUM CHLORIDE: 600; 310; 30; 20 INJECTION, SOLUTION INTRAVENOUS at 13:42

## 2024-07-15 RX ADMIN — PROPOFOL 50 MG: 10 INJECTION, EMULSION INTRAVENOUS at 11:52

## 2024-07-15 RX ADMIN — WATER 2000 MG: 1 INJECTION INTRAMUSCULAR; INTRAVENOUS; SUBCUTANEOUS at 08:02

## 2024-07-15 RX ADMIN — ONDANSETRON 4 MG: 2 INJECTION INTRAMUSCULAR; INTRAVENOUS at 17:09

## 2024-07-15 RX ADMIN — MIDAZOLAM 1 MG: 1 INJECTION INTRAMUSCULAR; INTRAVENOUS at 07:09

## 2024-07-15 RX ADMIN — AMIODARONE HYDROCHLORIDE 1 MG/MIN: 50 INJECTION, SOLUTION INTRAVENOUS at 22:28

## 2024-07-15 RX ADMIN — PROPOFOL 50 MG: 10 INJECTION, EMULSION INTRAVENOUS at 10:00

## 2024-07-15 RX ADMIN — PROPOFOL 100 MG: 10 INJECTION, EMULSION INTRAVENOUS at 07:46

## 2024-07-15 RX ADMIN — Medication 3 MG: at 14:38

## 2024-07-15 RX ADMIN — FAMOTIDINE 20 MG: 20 TABLET, FILM COATED ORAL at 20:44

## 2024-07-15 RX ADMIN — MIDAZOLAM HYDROCHLORIDE 4 MG: 1 INJECTION, SOLUTION INTRAMUSCULAR; INTRAVENOUS at 08:26

## 2024-07-15 RX ADMIN — PROPOFOL 50 MG: 10 INJECTION, EMULSION INTRAVENOUS at 08:37

## 2024-07-15 RX ADMIN — ROCURONIUM BROMIDE 10 MG: 10 SOLUTION INTRAVENOUS at 07:46

## 2024-07-15 RX ADMIN — OXYCODONE 10 MG: 5 TABLET ORAL at 20:50

## 2024-07-15 RX ADMIN — ROCURONIUM BROMIDE 20 MG: 10 SOLUTION INTRAVENOUS at 12:27

## 2024-07-15 RX ADMIN — MUPIROCIN: 20 OINTMENT TOPICAL at 15:56

## 2024-07-15 RX ADMIN — FENTANYL CITRATE 100 MCG: 50 INJECTION, SOLUTION INTRAMUSCULAR; INTRAVENOUS at 08:04

## 2024-07-15 RX ADMIN — FENTANYL CITRATE 100 MCG: 50 INJECTION, SOLUTION INTRAMUSCULAR; INTRAVENOUS at 08:10

## 2024-07-15 RX ADMIN — PROPOFOL 50 MG: 10 INJECTION, EMULSION INTRAVENOUS at 09:46

## 2024-07-15 RX ADMIN — FAMOTIDINE 20 MG: 10 INJECTION, SOLUTION INTRAVENOUS at 15:29

## 2024-07-15 RX ADMIN — HEPARIN SODIUM 37000 UNITS: 1000 INJECTION, SOLUTION INTRAVENOUS; SUBCUTANEOUS at 09:28

## 2024-07-15 RX ADMIN — SODIUM CHLORIDE, PRESERVATIVE FREE 10 ML: 5 INJECTION INTRAVENOUS at 20:55

## 2024-07-15 RX ADMIN — MUPIROCIN: 20 OINTMENT TOPICAL at 20:44

## 2024-07-15 RX ADMIN — SODIUM CHLORIDE: 9 INJECTION, SOLUTION INTRAVENOUS at 07:07

## 2024-07-15 RX ADMIN — ROCURONIUM BROMIDE 10 MG: 10 SOLUTION INTRAVENOUS at 11:50

## 2024-07-15 RX ADMIN — AMINOCAPROIC ACID 10 G/HR: 250 INJECTION, SOLUTION INTRAVENOUS at 08:19

## 2024-07-15 RX ADMIN — SODIUM CHLORIDE 1.96 UNITS/HR: 9 INJECTION, SOLUTION INTRAVENOUS at 09:29

## 2024-07-15 RX ADMIN — FENTANYL CITRATE 100 MCG: 50 INJECTION, SOLUTION INTRAMUSCULAR; INTRAVENOUS at 08:25

## 2024-07-15 RX ADMIN — ROCURONIUM BROMIDE 20 MG: 10 SOLUTION INTRAVENOUS at 10:37

## 2024-07-15 RX ADMIN — ROCURONIUM BROMIDE 40 MG: 10 SOLUTION INTRAVENOUS at 07:57

## 2024-07-15 RX ADMIN — FENTANYL CITRATE 100 MCG: 50 INJECTION, SOLUTION INTRAMUSCULAR; INTRAVENOUS at 10:00

## 2024-07-15 RX ADMIN — PHENYLEPHRINE HYDROCHLORIDE 20 MCG/MIN: 10 INJECTION INTRAVENOUS at 13:52

## 2024-07-15 RX ADMIN — PHENYLEPHRINE HYDROCHLORIDE 40 MCG/MIN: 10 INJECTION INTRAVENOUS at 22:18

## 2024-07-15 RX ADMIN — Medication 200 MG: at 07:47

## 2024-07-15 RX ADMIN — EPINEPHRINE 1 MCG/MIN: 1 INJECTION INTRAMUSCULAR; INTRAVENOUS; SUBCUTANEOUS at 11:57

## 2024-07-15 RX ADMIN — EPINEPHRINE 3 MCG/MIN: 1 INJECTION INTRAMUSCULAR; INTRAVENOUS; SUBCUTANEOUS at 15:23

## 2024-07-15 RX ADMIN — PROPOFOL 50 MG: 10 INJECTION, EMULSION INTRAVENOUS at 09:13

## 2024-07-15 RX ADMIN — PROPOFOL 50 MG: 10 INJECTION, EMULSION INTRAVENOUS at 08:36

## 2024-07-15 RX ADMIN — ALBUMIN (HUMAN) 12.5 G: 12.5 INJECTION, SOLUTION INTRAVENOUS at 17:53

## 2024-07-15 RX ADMIN — ACETAMINOPHEN 975 MG: 325 TABLET ORAL at 23:02

## 2024-07-15 ASSESSMENT — PAIN DESCRIPTION - DESCRIPTORS: DESCRIPTORS: ACHING

## 2024-07-15 ASSESSMENT — PAIN SCALES - GENERAL
PAINLEVEL_OUTOF10: 6
PAINLEVEL_OUTOF10: 0
PAINLEVEL_OUTOF10: 6

## 2024-07-15 ASSESSMENT — PULMONARY FUNCTION TESTS: PIF_VALUE: 15

## 2024-07-15 ASSESSMENT — PAIN DESCRIPTION - LOCATION: LOCATION: BACK

## 2024-07-15 ASSESSMENT — PAIN DESCRIPTION - ORIENTATION: ORIENTATION: POSTERIOR

## 2024-07-15 ASSESSMENT — PAIN - FUNCTIONAL ASSESSMENT: PAIN_FUNCTIONAL_ASSESSMENT: 0-10

## 2024-07-15 NOTE — ANESTHESIA PROCEDURE NOTES
Procedure Performed: MC       Start Time:        End Time:      Preanesthesia Checklist:  Patient identified, IV assessed, risks and benefits discussed, monitors and equipment assessed, procedure being performed at surgeon's request and anesthesia consent obtained.    General Procedure Information  Diagnostic Indications for Echo:  assessment of ascending aorta, assessment of surgical repair, hemodynamic monitoring and assessment of valve function  Location performed:  OR  Intubated  Bite block not placed  Heart visualized  Probe Insertion:  Easy  Probe Type:  Mulitplane, 3D and epiaortic  Modalities:  2D, color flow mapping, 3D, continuous wave Doppler and pulse wave Doppler    Echocardiographic and Doppler Measurements    Ventricles    Right Ventricle:  Hypertrophy not present.  Thrombus not present.  Global function normal.    Left Ventricle:  Cavity size dilated.  Hypertrophy present.  Thrombus not present.  Ejection Fraction 50%.      Ventricular Regional Function:  1- Basal Anteroseptal:  normal  2- Basal Anterior:  normal  3- Basal Anterolateral:  normal  4- Basal Inferolateral:  hypokinetic  5- Basal Inferior:  hypokinetic  6- Basal Inferoseptal:  hypokinetic  7- Mid Anteroseptal:  normal  8- Mid Anterior:  normal  9- Mid Anterolateral:  normal  10- Mid Inferolateral:  normal  11- Mid Inferior:  normal  12- Mid Inferoseptal:  normal  13- Apical Anterior:  normal  14- Apical Lateral:  normal  15- Apical Inferior:  normal  16- Apical Septal:  normal  17- [place]:  normal      Valves    Aortic Valve:  Annulus normal.  Stenosis not present.  Regurgitation none.  Leaflet motions normal.      Mitral Valve:  Annulus normal.  Stenosis not present.  Leaflet motions normal.      Tricuspid Valve:  Annulus normal.  Stenosis not present.  Regurgitation none.  Leaflet motions normal.    Pulmonic Valve:  Annulus normal.  Stenosis not present.    Other Valve Findings:       Trace MR    Aorta    Ascending Aorta:  Size normal.

## 2024-07-15 NOTE — DIABETES MGMT
[facility]  PROGRAM FOR DIABETES HEALTH  DIABETES MANAGEMENT CONSULT    Consulted by Provider for advanced nursing evaluation and care for inpatient blood glucose management.    Setting Blood glucose targets   Critical care 140 - 180 mg/dl   Non-critical care 100 - 180 mg/dl     Evaluation and Action Plan   This 78 year old  male was admitted today 7/15/24 for planned CV surgery intervention and underwent CABG X3. Recovering from anesthesia at this time. On vent support (Fi02 80%/Peep 5) and insulin infusion. Patient has known Type 2 diabetes on multiple ant-diabetic agents. On insulin infusion per protocol X48 hours.     Management Rationale Action Plan   Medication   Basal needs Based on  [x] Post-op CV surgery protocol     Insulin infusion X48 hours   Nutritional needs     Corrective insulin     Additional orders        Diabetes Discharge Plan   Medication  TBD     Referral  []        Outpatient diabetes education   Additional orders            Initial Presentation   Shade Birmingham is a 78 y.o. male admitted 7/15/24 for planned surgical intervention and underwent CABG X3 today. Previously, patient had noted dizziness and fatigue and was hospitalized twice recently resulting in new diagnosis of HF noted. ECHO completed 6/26/24 revealed EF of 25-30%. Cardiac cath completed on 7/1/24 with multiple areas of stenosis noted.     HX:  Past Medical History:   Diagnosis Date    Arthritis     Bladder cancer (HCC) 2007    CAD (coronary artery disease)     Cataract     LEFT EYE (NOT RIPE ENOUGH TO REMOVE).  RIGHT EYE CATARACT REMOVED.    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)     DM type 2 causing renal disease (HCC)     DM type 2 causing vascular disease (HCC)     Full dentures     AT AGE 17    Gout     X1 AT AGE 31    Hx of bladder cancer     Hypertension      07/10/2024    AST 12 (L) 07/10/2024    ALKPHOS 101 07/10/2024    BILITOT 0.6 07/10/2024     Lab Results   Component Value Date/Time    TRIG 196 07/02/2024 05:16 AM     Lab Results   Component Value Date    TSH 4.09 (H) 07/15/2024      Lab Results   Component Value Date    LABA1C 6.9 (H) 07/10/2024    LABA1C 6.6 (H) 06/25/2024    LABA1C 7.1 (H) 10/12/2020     Factors impacting BG management  Factor Dose Comments   Nutrition:  NPO      Immediately post-op   Drugs:  Vasopressor load  Blood transfusion(s)     NONE   Affects insulin delivery   Pain Scheduled Tylenol, Neurontin & lidocaine patch    Infection Prophylactic Ancef Q8 hours X5 doses    Other:   Kidney function  Liver function   Creatinine 1.4/eGFR >60 (7/10/24)  Normal liver enzymes      Blood glucose pattern      Significant diabetes-related events  7/15/24 Admission  => on insulin infusion => using 0-8 units/hr    Assessment and Nursing Intervention   Nursing Diagnosis Risk for unstable blood glucose pattern   Nursing Intervention Domain 5250 Decision-making Support   Nursing Interventions Examined current inpatient diabetes/blood glucose control   Explored factors facilitating and impeding inpatient management  Explored corrective strategies with patient and responsible inpatient provider   Informed patient of rational for insulin strategy while hospitalized     Billing Code(s)   [] 04007 IP subsequent hospital care - 50 minutes    [] 85502 IP subsequent hospital care - 35 minutes   [] 39821 IP subsequent hospital care - 25 minutes  [] 71737 IP initial hospital care - 55 minutes  [x] 32780 IP initial hospital care - 40 minutes      Before making these care recommendations, I personally reviewed the hospitalization record, including notes, laboratory & diagnostic data and current medications, and examined the patient at the bedside.  Total minutes: 40    EDITH Patel - CNS  Clinical Nurse Specialist - Diabetes & endocrine disorders  Program  for Diabetes Health  Access via [employer]

## 2024-07-15 NOTE — PROGRESS NOTES
Occupational Therapy  07/15/24    Chart reviewed. Pt is POD 0 CABG x 3. Will hold today and follow up tomorrow pending medical stability. Thank you. Yesica Fried, OT

## 2024-07-15 NOTE — ANESTHESIA PROCEDURE NOTES
Central Venous Line:    A central venous line was placed using ultrasound guidance and surface landmarks, in the pre-op for the following indication(s): central venous access and CVP monitoring.    Sterility preparation included the following: provider used sterile gloves, gown, hat and mask, sterile gel and probe cover used in ultrasound-guided central venous catheter insertion and maximum sterile barriers used during central venous catheter insertion.    The patient was placed in Trendelenburg position.The right internal jugular vein was prepped.    The site was prepped with Chloraprep.  A 9 Fr (size), 11.5 (length), introducer double lumen was placed.    During the procedure, the following specific steps were taken: target vein identified, needle advanced into vein and blood aspirated and guidewire advanced into vein.    Intravenous verification was obtained by ultrasound, venous blood return, MC, MC and manometry.    Post insertion care included: all ports aspirated, all ports flushed easily, guidewire removed intact, Biopatch applied, line sutured in place and dressing applied.    During the procedure the patient experienced: patient tolerated procedure well with no complications.      Outcomes: uncomplicated and patient tolerated procedure well  Real-time US image taken/store: Yes  Anesthesia type: local..No    Additional notes:  SLIC placed without event   Staffing  Performed: Anesthesiologist   Performed by: Hever Soto DO  Authorized by: Hever Soto DO    Preanesthetic Checklist  Completed: patient identified, IV checked, site marked, risks and benefits discussed, surgical/procedural consents, equipment checked, pre-op evaluation, timeout performed, anesthesia consent given, oxygen available and monitors applied/VS acknowledged

## 2024-07-15 NOTE — PROGRESS NOTES
Cardiac Surgery Coordinator- No family present, placed update call to Mr Valencia darling. Reviewed plan of care and encouraged him to verbalize. He will be waiting at home and will be in to visit tomorrow. Will continue to follow and update.     1140- Placed follow up call to Mr Birmingham, no answer. Spoke to his ex-wife Krista, provided update from the OR. Exchanged contact numbers and offered emotional support.     1300- Mr Valencia Mariano arrived to the surgical waiting room. Provided update and encouraged him to verbalize Will coordinate an update with Dr Martinez     1500- Mr Valencia darling is in the fourth floor waiting room, will notify Dr Martinez.     1545- Escorted Mr Birmingham and his wife to the bedside, reviewed plan of care and encouraged them to verbalize. Exchanged contact information and offered emotional support. They will be going home for the day.

## 2024-07-15 NOTE — CONSULTS
CRITICAL CARE NOTE      Name: Shade Birmingham   : 1945   MRN: [id number]   Date: 7/15/2024      Reason for ICU Admission: s/p CABG    ICU PROBLEM LIST   Multivessel CAD s/p CABG x3  HTN  HFrEF (25-30%)  PAF  PAD s/p fem-fem bypass  COPD/Emphysema  CKD 2  DM II  Chronic back pain  Hx prostate cancer    HISTORY OF PRESENT ILLNESS:   Pt is a 79yo M w/ PMH as noted above who presents to CVICU post planned CABG. Of note, pt w/ recent admission for CLARENCE and UTI. CABG x3 (LIMA-LAD, SVG-PDA, SVG-Ramus) and ZAHRA clipping. Pt w/ sinus ancelmo in 40s post op so started at DDD pacing.     MC: LVH, LVEF 50% pre and post re-vascularization, LV basal akinesis. No residual ZAHRA noted post clipping. Trace MR.       min      ml, w/ 587 ml Cell saver       CXR w/ expected post sternotomy changes, supportive lines in place      2 atrial wires, 2 ventricular wires, 3 josh drains ( 2 med, 1 L pleural )      24 HOUR EVENTS:   Pt arrives to CVICU in DDD to 81, intubated, sedated on precedex, phenylephrine, epinephrine, insulin gtt     NEUROLOGICAL:    On Precedex, wean  Monitor mentation as awakens off sedation  As needed pain regimen     PULMONOLOGY:   Lung protective mechanical ventilation  Goal extubation postop day 0  Likely extubate to BiPAP given underlying respiratory disease.  As needed nebs  IS, PFV, out of bed to chair as tolerated once extubated     CARDIOVASCULAR:   Wean phenylephrine, epinephrine as tolerated   Goal MAP greater than 65, SBP less than 130, CI >2, CO >3.5  Pacer wires and Josh drain management per CT surgery   Amiodarone per protocol  Resume GDMT as tolerated once off vasopressors  Monitor volume status and resume diuresis as indicated  ?Repeat ECHO prior to D/C once off epi given marked difference in LVEF from  ECHO  Statin  Resume Antiplatelet and AC per CTS  Telemetry     GASTROINTESTINAL:   Pepcid  ADAT once extubated     RENAL/ELECTROLYTE/FLUIDS:   Strict ins and outs  Daily renal  Temp 98 °F (36.7 °C) (Oral)   Resp 16   Ht 1.88 m (6' 2\")   Wt 91.4 kg (201 lb 8 oz)   SpO2 95%   BMI 25.87 kg/m²      Temp (24hrs), Av °F (36.7 °C), Min:98 °F (36.7 °C), Max:98 °F (36.7 °C)           Intake/Output:     Intake/Output Summary (Last 24 hours) at 7/15/2024 1456  Last data filed at 7/15/2024 1403  Gross per 24 hour   Intake 3052 ml   Output 1140 ml   Net 1912 ml       Imaging    No valid procedures specified.         CRITICAL CARE DOCUMENTATION  I had a face to face encounter with the patient, reviewed and interpreted patient data including clinical events, labs, images, vital signs, I/O's, and examined patient.  I have discussed the case and the plan and management of the patient's care with the consulting services, the bedside nurses and the respiratory therapist.      NOTE OF PERSONAL INVOLVEMENT IN CARE   This patient has a high probability of imminent, clinically significant deterioration, which requires the highest level of preparedness to intervene urgently. I participated in the decision-making and personally managed or directed the management of the following life and organ supporting interventions that required my frequent assessment to treat or prevent imminent deterioration.    I personally spent 45 minutes of critical care time.  This is time spent at this critically ill patient's bedside actively involved in patient care as well as the coordination of care.  This does not include any procedural time which has been billed separately.    Vance Stanley MD  Staff Intensivist  [facility] Critical Care

## 2024-07-15 NOTE — ANESTHESIA PRE PROCEDURE
Department of Anesthesiology  Preprocedure Note       Name:  Shade Birmingham   Age:  78 y.o.  :  1945                                          MRN:  [id number]         Date:  7/15/2024      Surgeon: Surgeon(s):  Jori Martinez MD    Procedure: Procedure(s):  ON PUMP CORONARY ARTERY BYPASS GRAFT (CABG) WITH ENDOSCOPIC VEIN HARVEST (EVH) WITH LIGATION OF LEFT ATRIAL APPENDAGE AND CARDIOPLEGIA (NO PAC) (E R A S)    Medications prior to admission:   Prior to Admission medications    Medication Sig Start Date End Date Taking? Authorizing Provider   aspirin 81 MG EC tablet Take 1 tablet by mouth every morning    ProviderDana MD   insulin NPH (HUMULIN N;NOVOLIN N) 100 UNIT/ML injection vial Inject 40 Units into the skin nightly    ProviderDana MD   mupirocin (BACTROBAN) 2 % ointment Apply to the insides of the nostrils twice daily. Start 24.  Patient taking differently: Apply 1 each topically 2 times daily Apply to the insides of the nostrils twice daily. Start 24. 24   Anais Quezada APRN - NP   atorvastatin (LIPITOR) 40 MG tablet Take 1 tablet by mouth nightly 24   Anibal Lala MD   carvedilol (COREG) 3.125 MG tablet Take 1 tablet by mouth 2 times daily (with meals) 24   Anibal Lala MD   bumetanide (BUMEX) 1 MG tablet Take 1 tablet by mouth every other day 7/9/24 8/10/24  Anibal Lala MD   spironolactone (ALDACTONE) 25 MG tablet Take 1 tablet by mouth every other day  Patient taking differently: Take 1 tablet by mouth nightly TAKES 1/2 TABLET 24  Anibal Lala MD   empagliflozin (JARDIANCE) 25 MG tablet Take 1 tablet by mouth daily  Patient taking differently: Take 1 tablet by mouth every morning 24   Anibal Lala MD   sacubitril-valsartan (ENTRESTO) 24-26 MG per tablet Take 2 tablets by mouth 2 times daily 24   Anibal Lala MD   apixaban (ELIQUIS) 5 MG TABS tablet Take 1 tablet by mouth 2 times daily 24

## 2024-07-15 NOTE — INTERVAL H&P NOTE
Update History & Physical    The patient's History and Physical of July 10, 2024 was reviewed with the patient and I examined the patient. There was no change. The surgical site was confirmed by the patient and me.     Electronically signed by Saeid Lala PA-C on 7/15/2024 at 7:20 AM

## 2024-07-15 NOTE — BRIEF OP NOTE
BRIEF OP NOTE  Pre-Op Diagnosis: Disease of cardiovascular system [I25.10]    Post-Op Diagnosis: Disease of cardiovascular system [I25.10]      Procedure: Procedure(s):  CORONARY ARTERY BYPASS GRAFTING X 3 (LIMA-LAD, SVG-RPDA, SVG-RAMUS) WITH LIMA, BESVGH, LIGATION OF LEFT ATRIAL APPENDAGE, ECC, MC AND EPIAORTIC U/S BY DR VEGA    Surgeon:  Dr. Michelle MD    Assistant(s): Saeid Lala PA-C    Anesthesia: General      Infusions:  Epi, Precedex     Estimated Blood Loss: 375 ml     Cell Saver: 587 ml     Bypass Time: 111 min     Cross Clamp Time: 75 min    Specimens: * No specimens in log *    Drains and pacing wires: 2 atrial wires, 2 ventricular wires, 3 duglas drains (2 Mediastinal, 1 L pleural)    Wires completed by: Saeid Lala PA-C    Sternal incision closed by: Saeid Lala PA-C    Leg incision closed by:  Saeid Lala PA-C    Complications: None    Findings: See full operative note.    Implants:   Implant Name Type Inv. Item Serial No.  Lot No. LRB No. Used Action   DEVICE OCCL CLP L45MM SM FOOTPRINT 1 HND APPL SUTURELESS W/ - SNA Cardiovascular occluders DEVICE OCCL CLP L45MM SM FOOTPRINT 1 HND APPL SUTURELESS W/ NA [employer] [id number] N/A 1 Implanted

## 2024-07-16 ENCOUNTER — APPOINTMENT (OUTPATIENT)
Facility: HOSPITAL | Age: 79
DRG: 234 | End: 2024-07-16
Attending: THORACIC SURGERY (CARDIOTHORACIC VASCULAR SURGERY)
Payer: MEDICARE

## 2024-07-16 LAB
ACUTE KIDNEY INJURY RISK NEPHROCHECK: 1.07 (ref 0–0.3)
ACUTE KIDNEY INJURY RISK NEPHROCHECK: 1.11 (ref 0–0.3)
ANION GAP SERPL CALC-SCNC: 5 MMOL/L (ref 5–15)
BUN SERPL-MCNC: 21 MG/DL (ref 6–20)
BUN/CREAT SERPL: 18 (ref 12–20)
CALCIUM SERPL-MCNC: 8.1 MG/DL (ref 8.5–10.1)
CHLORIDE SERPL-SCNC: 112 MMOL/L (ref 97–108)
CO2 SERPL-SCNC: 23 MMOL/L (ref 21–32)
CREAT SERPL-MCNC: 1.14 MG/DL (ref 0.7–1.3)
CREAT SERPL-MCNC: 1.59 MG/DL (ref 0.7–1.3)
EKG ATRIAL RATE: 67 BPM
EKG DIAGNOSIS: NORMAL
EKG P AXIS: 28 DEGREES
EKG P-R INTERVAL: 224 MS
EKG Q-T INTERVAL: 444 MS
EKG QRS DURATION: 86 MS
EKG QTC CALCULATION (BAZETT): 469 MS
EKG R AXIS: 5 DEGREES
EKG T AXIS: 0 DEGREES
EKG VENTRICULAR RATE: 67 BPM
ERYTHROCYTE [DISTWIDTH] IN BLOOD BY AUTOMATED COUNT: 19.3 % (ref 11.5–14.5)
GLUCOSE BLD STRIP.AUTO-MCNC: 101 MG/DL (ref 65–117)
GLUCOSE BLD STRIP.AUTO-MCNC: 103 MG/DL (ref 65–117)
GLUCOSE BLD STRIP.AUTO-MCNC: 104 MG/DL (ref 65–117)
GLUCOSE BLD STRIP.AUTO-MCNC: 104 MG/DL (ref 65–117)
GLUCOSE BLD STRIP.AUTO-MCNC: 107 MG/DL (ref 65–117)
GLUCOSE BLD STRIP.AUTO-MCNC: 108 MG/DL (ref 65–117)
GLUCOSE BLD STRIP.AUTO-MCNC: 113 MG/DL (ref 65–117)
GLUCOSE BLD STRIP.AUTO-MCNC: 118 MG/DL (ref 65–117)
GLUCOSE BLD STRIP.AUTO-MCNC: 123 MG/DL (ref 65–117)
GLUCOSE BLD STRIP.AUTO-MCNC: 124 MG/DL (ref 65–117)
GLUCOSE BLD STRIP.AUTO-MCNC: 129 MG/DL (ref 65–117)
GLUCOSE BLD STRIP.AUTO-MCNC: 137 MG/DL (ref 65–117)
GLUCOSE BLD STRIP.AUTO-MCNC: 141 MG/DL (ref 65–117)
GLUCOSE BLD STRIP.AUTO-MCNC: 150 MG/DL (ref 65–117)
GLUCOSE BLD STRIP.AUTO-MCNC: 159 MG/DL (ref 65–117)
GLUCOSE BLD STRIP.AUTO-MCNC: 165 MG/DL (ref 65–117)
GLUCOSE BLD STRIP.AUTO-MCNC: 174 MG/DL (ref 65–117)
GLUCOSE BLD STRIP.AUTO-MCNC: 86 MG/DL (ref 65–117)
GLUCOSE BLD STRIP.AUTO-MCNC: 88 MG/DL (ref 65–117)
GLUCOSE BLD STRIP.AUTO-MCNC: 92 MG/DL (ref 65–117)
GLUCOSE BLD STRIP.AUTO-MCNC: 96 MG/DL (ref 65–117)
GLUCOSE BLD STRIP.AUTO-MCNC: NORMAL MG/DL (ref 65–117)
GLUCOSE SERPL-MCNC: 92 MG/DL (ref 65–100)
HAV IGM SER QL: REACTIVE
HCT VFR BLD AUTO: 22.9 % (ref 36.6–50.3)
HGB BLD-MCNC: 7.5 G/DL (ref 12.1–17)
LACTATE SERPL-SCNC: 1.9 MMOL/L (ref 0.4–2)
MAGNESIUM SERPL-MCNC: 2.2 MG/DL (ref 1.6–2.4)
MCH RBC QN AUTO: 34.2 PG (ref 26–34)
MCHC RBC AUTO-ENTMCNC: 32.8 G/DL (ref 30–36.5)
MCV RBC AUTO: 104.6 FL (ref 80–99)
NRBC # BLD: 0.02 K/UL (ref 0–0.01)
NRBC BLD-RTO: 0.1 PER 100 WBC
PLATELET # BLD AUTO: 181 K/UL (ref 150–400)
PMV BLD AUTO: 10.8 FL (ref 8.9–12.9)
POTASSIUM SERPL-SCNC: 4.6 MMOL/L (ref 3.5–5.1)
RBC # BLD AUTO: 2.19 M/UL (ref 4.1–5.7)
SERVICE CMNT-IMP: ABNORMAL
SERVICE CMNT-IMP: NORMAL
SODIUM SERPL-SCNC: 140 MMOL/L (ref 136–145)
WBC # BLD AUTO: 21.5 K/UL (ref 4.1–11.1)

## 2024-07-16 PROCEDURE — P9045 ALBUMIN (HUMAN), 5%, 250 ML: HCPCS | Performed by: STUDENT IN AN ORGANIZED HEALTH CARE EDUCATION/TRAINING PROGRAM

## 2024-07-16 PROCEDURE — 2500000003 HC RX 250 WO HCPCS

## 2024-07-16 PROCEDURE — 36415 COLL VENOUS BLD VENIPUNCTURE: CPT

## 2024-07-16 PROCEDURE — 2580000003 HC RX 258: Performed by: THORACIC SURGERY (CARDIOTHORACIC VASCULAR SURGERY)

## 2024-07-16 PROCEDURE — 2700000000 HC OXYGEN THERAPY PER DAY

## 2024-07-16 PROCEDURE — 2000000000 HC ICU R&B

## 2024-07-16 PROCEDURE — 6370000000 HC RX 637 (ALT 250 FOR IP)

## 2024-07-16 PROCEDURE — 76998 US GUIDE INTRAOP: CPT | Performed by: THORACIC SURGERY (CARDIOTHORACIC VASCULAR SURGERY)

## 2024-07-16 PROCEDURE — 33518 CABG ARTERY-VEIN TWO: CPT | Performed by: THORACIC SURGERY (CARDIOTHORACIC VASCULAR SURGERY)

## 2024-07-16 PROCEDURE — 94760 N-INVAS EAR/PLS OXIMETRY 1: CPT

## 2024-07-16 PROCEDURE — 6360000002 HC RX W HCPCS: Performed by: ANESTHESIOLOGY

## 2024-07-16 PROCEDURE — 2580000003 HC RX 258

## 2024-07-16 PROCEDURE — 83735 ASSAY OF MAGNESIUM: CPT

## 2024-07-16 PROCEDURE — 33268 EXCL LAA OPN OTH PX ANY METH: CPT | Performed by: THORACIC SURGERY (CARDIOTHORACIC VASCULAR SURGERY)

## 2024-07-16 PROCEDURE — 33533 CABG ARTERIAL SINGLE: CPT | Performed by: THORACIC SURGERY (CARDIOTHORACIC VASCULAR SURGERY)

## 2024-07-16 PROCEDURE — 97161 PT EVAL LOW COMPLEX 20 MIN: CPT

## 2024-07-16 PROCEDURE — 94640 AIRWAY INHALATION TREATMENT: CPT

## 2024-07-16 PROCEDURE — 33508 ENDOSCOPIC VEIN HARVEST: CPT | Performed by: THORACIC SURGERY (CARDIOTHORACIC VASCULAR SURGERY)

## 2024-07-16 PROCEDURE — 6360000002 HC RX W HCPCS: Performed by: STUDENT IN AN ORGANIZED HEALTH CARE EDUCATION/TRAINING PROGRAM

## 2024-07-16 PROCEDURE — 71045 X-RAY EXAM CHEST 1 VIEW: CPT

## 2024-07-16 PROCEDURE — 85027 COMPLETE CBC AUTOMATED: CPT

## 2024-07-16 PROCEDURE — 80048 BASIC METABOLIC PNL TOTAL CA: CPT

## 2024-07-16 PROCEDURE — P9045 ALBUMIN (HUMAN), 5%, 250 ML: HCPCS

## 2024-07-16 PROCEDURE — P9045 ALBUMIN (HUMAN), 5%, 250 ML: HCPCS | Performed by: ANESTHESIOLOGY

## 2024-07-16 PROCEDURE — 93005 ELECTROCARDIOGRAM TRACING: CPT

## 2024-07-16 PROCEDURE — 97110 THERAPEUTIC EXERCISES: CPT

## 2024-07-16 PROCEDURE — 6370000000 HC RX 637 (ALT 250 FOR IP): Performed by: NURSE PRACTITIONER

## 2024-07-16 PROCEDURE — 86709 HEPATITIS A IGM ANTIBODY: CPT

## 2024-07-16 PROCEDURE — 97165 OT EVAL LOW COMPLEX 30 MIN: CPT

## 2024-07-16 PROCEDURE — 6360000002 HC RX W HCPCS

## 2024-07-16 PROCEDURE — 97116 GAIT TRAINING THERAPY: CPT

## 2024-07-16 PROCEDURE — 83605 ASSAY OF LACTIC ACID: CPT

## 2024-07-16 PROCEDURE — 97535 SELF CARE MNGMENT TRAINING: CPT

## 2024-07-16 PROCEDURE — 82962 GLUCOSE BLOOD TEST: CPT

## 2024-07-16 RX ORDER — IPRATROPIUM BROMIDE AND ALBUTEROL SULFATE 2.5; .5 MG/3ML; MG/3ML
1 SOLUTION RESPIRATORY (INHALATION) 3 TIMES DAILY
Status: DISCONTINUED | OUTPATIENT
Start: 2024-07-16 | End: 2024-07-16

## 2024-07-16 RX ORDER — ALBUMIN (HUMAN) 12.5 G/50ML
25 SOLUTION INTRAVENOUS ONCE
Status: DISCONTINUED | OUTPATIENT
Start: 2024-07-16 | End: 2024-07-16

## 2024-07-16 RX ORDER — ALBUMIN, HUMAN INJ 5% 5 %
12.5 SOLUTION INTRAVENOUS ONCE
Status: COMPLETED | OUTPATIENT
Start: 2024-07-16 | End: 2024-07-16

## 2024-07-16 RX ORDER — FUROSEMIDE 10 MG/ML
80 INJECTION INTRAMUSCULAR; INTRAVENOUS ONCE
Status: COMPLETED | OUTPATIENT
Start: 2024-07-16 | End: 2024-07-16

## 2024-07-16 RX ORDER — GABAPENTIN 100 MG/1
200 CAPSULE ORAL 3 TIMES DAILY
Status: DISCONTINUED | OUTPATIENT
Start: 2024-07-16 | End: 2024-07-22 | Stop reason: HOSPADM

## 2024-07-16 RX ORDER — NOREPINEPHRINE BITARTRATE 0.06 MG/ML
1-100 INJECTION, SOLUTION INTRAVENOUS CONTINUOUS
Status: DISCONTINUED | OUTPATIENT
Start: 2024-07-16 | End: 2024-07-18

## 2024-07-16 RX ORDER — GABAPENTIN 300 MG/1
300 CAPSULE ORAL 3 TIMES DAILY
Status: DISCONTINUED | OUTPATIENT
Start: 2024-07-16 | End: 2024-07-16

## 2024-07-16 RX ORDER — IPRATROPIUM BROMIDE AND ALBUTEROL SULFATE 2.5; .5 MG/3ML; MG/3ML
1 SOLUTION RESPIRATORY (INHALATION)
Status: DISCONTINUED | OUTPATIENT
Start: 2024-07-16 | End: 2024-07-20

## 2024-07-16 RX ORDER — FUROSEMIDE 10 MG/ML
40 INJECTION INTRAMUSCULAR; INTRAVENOUS ONCE
Status: COMPLETED | OUTPATIENT
Start: 2024-07-16 | End: 2024-07-16

## 2024-07-16 RX ORDER — POTASSIUM CHLORIDE 750 MG/1
20 TABLET, FILM COATED, EXTENDED RELEASE ORAL ONCE
Status: COMPLETED | OUTPATIENT
Start: 2024-07-16 | End: 2024-07-16

## 2024-07-16 RX ADMIN — GABAPENTIN 200 MG: 100 CAPSULE ORAL at 15:58

## 2024-07-16 RX ADMIN — FUROSEMIDE 80 MG: 10 INJECTION, SOLUTION INTRAMUSCULAR; INTRAVENOUS at 15:58

## 2024-07-16 RX ADMIN — AMIODARONE HYDROCHLORIDE 400 MG: 200 TABLET ORAL at 20:17

## 2024-07-16 RX ADMIN — CHLORHEXIDINE GLUCONATE 15 ML: 1.2 RINSE ORAL at 09:13

## 2024-07-16 RX ADMIN — ASPIRIN 81 MG: 81 TABLET, COATED ORAL at 09:11

## 2024-07-16 RX ADMIN — POTASSIUM CHLORIDE 20 MEQ: 750 TABLET, FILM COATED, EXTENDED RELEASE ORAL at 16:00

## 2024-07-16 RX ADMIN — OXYCODONE 10 MG: 5 TABLET ORAL at 11:18

## 2024-07-16 RX ADMIN — POLYETHYLENE GLYCOL 3350 17 G: 17 POWDER, FOR SOLUTION ORAL at 09:11

## 2024-07-16 RX ADMIN — ALBUMIN (HUMAN) 12.5 G: 12.5 INJECTION, SOLUTION INTRAVENOUS at 01:09

## 2024-07-16 RX ADMIN — FAMOTIDINE 20 MG: 10 INJECTION, SOLUTION INTRAVENOUS at 09:12

## 2024-07-16 RX ADMIN — GABAPENTIN 200 MG: 100 CAPSULE ORAL at 20:16

## 2024-07-16 RX ADMIN — OXYCODONE 10 MG: 5 TABLET ORAL at 04:54

## 2024-07-16 RX ADMIN — SODIUM CHLORIDE: 4.5 INJECTION, SOLUTION INTRAVENOUS at 01:45

## 2024-07-16 RX ADMIN — ACETAMINOPHEN 975 MG: 325 TABLET ORAL at 05:11

## 2024-07-16 RX ADMIN — MAGNESIUM OXIDE TAB 400 MG (241.3 MG ELEMENTAL MG) 400 MG: 400 (241.3 MG) TAB at 20:16

## 2024-07-16 RX ADMIN — SENNOSIDES AND DOCUSATE SODIUM 1 TABLET: 50; 8.6 TABLET ORAL at 09:11

## 2024-07-16 RX ADMIN — IPRATROPIUM BROMIDE AND ALBUTEROL SULFATE 1 DOSE: .5; 3 SOLUTION RESPIRATORY (INHALATION) at 21:09

## 2024-07-16 RX ADMIN — CHLORHEXIDINE GLUCONATE 15 ML: 1.2 RINSE ORAL at 20:17

## 2024-07-16 RX ADMIN — ATORVASTATIN CALCIUM 40 MG: 40 TABLET, FILM COATED ORAL at 20:16

## 2024-07-16 RX ADMIN — ALBUMIN (HUMAN) 12.5 G: 12.5 INJECTION, SOLUTION INTRAVENOUS at 15:58

## 2024-07-16 RX ADMIN — MUPIROCIN: 20 OINTMENT TOPICAL at 20:17

## 2024-07-16 RX ADMIN — MUPIROCIN: 20 OINTMENT TOPICAL at 09:12

## 2024-07-16 RX ADMIN — AMIODARONE HYDROCHLORIDE 400 MG: 200 TABLET ORAL at 09:11

## 2024-07-16 RX ADMIN — ACETAMINOPHEN 975 MG: 325 TABLET ORAL at 11:17

## 2024-07-16 RX ADMIN — WATER 2000 MG: 1 INJECTION INTRAMUSCULAR; INTRAVENOUS; SUBCUTANEOUS at 06:43

## 2024-07-16 RX ADMIN — FAMOTIDINE 20 MG: 20 TABLET, FILM COATED ORAL at 20:16

## 2024-07-16 RX ADMIN — HYDROMORPHONE HYDROCHLORIDE 0.5 MG: 1 INJECTION INTRAMUSCULAR; INTRAVENOUS; SUBCUTANEOUS at 01:09

## 2024-07-16 RX ADMIN — SODIUM CHLORIDE: 9 INJECTION, SOLUTION INTRAVENOUS at 01:46

## 2024-07-16 RX ADMIN — FUROSEMIDE 40 MG: 10 INJECTION, SOLUTION INTRAMUSCULAR; INTRAVENOUS at 07:49

## 2024-07-16 RX ADMIN — ALBUMIN (HUMAN) 12.5 G: 12.5 INJECTION, SOLUTION INTRAVENOUS at 07:48

## 2024-07-16 RX ADMIN — MAGNESIUM OXIDE TAB 400 MG (241.3 MG ELEMENTAL MG) 400 MG: 400 (241.3 MG) TAB at 09:11

## 2024-07-16 RX ADMIN — OXYCODONE 10 MG: 5 TABLET ORAL at 17:52

## 2024-07-16 RX ADMIN — SODIUM CHLORIDE 1.8 UNITS/HR: 9 INJECTION, SOLUTION INTRAVENOUS at 01:27

## 2024-07-16 RX ADMIN — IPRATROPIUM BROMIDE AND ALBUTEROL SULFATE 1 DOSE: .5; 3 SOLUTION RESPIRATORY (INHALATION) at 13:09

## 2024-07-16 RX ADMIN — WATER 2000 MG: 1 INJECTION INTRAMUSCULAR; INTRAVENOUS; SUBCUTANEOUS at 22:08

## 2024-07-16 RX ADMIN — WATER 2000 MG: 1 INJECTION INTRAMUSCULAR; INTRAVENOUS; SUBCUTANEOUS at 15:58

## 2024-07-16 RX ADMIN — GABAPENTIN 200 MG: 100 CAPSULE ORAL at 09:11

## 2024-07-16 RX ADMIN — SENNOSIDES AND DOCUSATE SODIUM 1 TABLET: 50; 8.6 TABLET ORAL at 20:17

## 2024-07-16 RX ADMIN — SODIUM CHLORIDE, PRESERVATIVE FREE 10 ML: 5 INJECTION INTRAVENOUS at 09:12

## 2024-07-16 RX ADMIN — OXYCODONE 10 MG: 5 TABLET ORAL at 21:13

## 2024-07-16 ASSESSMENT — PAIN DESCRIPTION - LOCATION
LOCATION: CHEST;BACK
LOCATION: CHEST
LOCATION: CHEST
LOCATION: BACK;CHEST
LOCATION: BACK;CHEST
LOCATION: CHEST

## 2024-07-16 ASSESSMENT — PAIN DESCRIPTION - DESCRIPTORS
DESCRIPTORS: ACHING

## 2024-07-16 ASSESSMENT — PAIN SCALES - GENERAL
PAINLEVEL_OUTOF10: 5
PAINLEVEL_OUTOF10: 8
PAINLEVEL_OUTOF10: 8
PAINLEVEL_OUTOF10: 0
PAINLEVEL_OUTOF10: 7
PAINLEVEL_OUTOF10: 0
PAINLEVEL_OUTOF10: 0
PAINLEVEL_OUTOF10: 4

## 2024-07-16 ASSESSMENT — PAIN DESCRIPTION - ORIENTATION
ORIENTATION: ANTERIOR
ORIENTATION: ANTERIOR
ORIENTATION: MID
ORIENTATION: LEFT;POSTERIOR;ANTERIOR
ORIENTATION: POSTERIOR;ANTERIOR
ORIENTATION: ANTERIOR;LEFT;POSTERIOR

## 2024-07-16 NOTE — PROGRESS NOTES
1435- Patient arrived to unit. Received report from NICHOLAS Breaux. And anesthesia.     1458- ABG and labs drawn on patient. Per MD Jaden. Reduce Fio2 from 80% to 40%.    1537- X-Ray at bedisde    1559- Dex turned off.     1620- Patient more alert. Able to follow commands and move all extremities.     1630- Vent respiratory rate halved. EKG done. Showed SR with 1 degree AVB    1700- Patient placed on spont. Vent settings. Bearhugger applied for temp of 96.8 axillary.     1730- Pacer set to backup rate of 50.    1755- ABG done, discussed results with MD Jaden. Patient extubated to 4L NC.     1830- Bearhugger removed. Temp 97.6 orally.    1900- Patient having more PACs / afib? Czekajloat at bedside. No new orders at this time.     2000- Report given to ANETTE Kamara.

## 2024-07-16 NOTE — PROGRESS NOTES
0800: Report received from ANETTE Kamara. Dillan dual verified. Patient care assumed. Pt. Up in chair.     0810: Dr. Martinez and MARSHA Moralez at bedside. Orders to wean Epi and Hany off. Keep lee at this time.     0933: Hany off. Epi at 1.     1003: Epi off.     1010: Bedside and Verbal shift change report given to ANETTE Mcmahon/ANETTE Guzman (oncoming nurse) by ANETTE Nicholson (offgoing nurse). Report included the following information SBAR, Kardex, OR Summary, Intake/Output, MAR, Recent Results, Cardiac Rhythm , and Alarm Parameters .

## 2024-07-16 NOTE — PROGRESS NOTES
CRITICAL CARE NOTE      Name: Shade Birmingham   : 1945   MRN: [id number]   Date: 2024      Reason for ICU Admission: s/p CABG    ICU PROBLEM LIST   Multivessel CAD s/p CABG x3  HTN  HFrEF (25-30%)  PAF  PAD s/p fem-fem bypass  COPD/Emphysema  CKD 2  DM II  Chronic back pain  Hx prostate cancer    HISTORY OF PRESENT ILLNESS:   Pt is a 77yo M w/ PMH as noted above who presents to CVICU post planned CABG. Of note, pt w/ recent admission for CLARENCE and UTI. CABG x3 (LIMA-LAD, SVG-PDA, SVG-Ramus) and ZAHRA clipping. Pt w/ sinus ancelmo in 40s post op so started at DDD pacing. Pt arrived to CVICU in DDD to 81, intubated, sedated on precedex, phenylephrine, epinephrine, insulin gtt    24 HOUR EVENTS:   Extubated POD 0, amiodarone o/n however stopped due to bradycardia. In Afib, rate controlled. Low dose levophed and phenyleprhine. CXR w/ increased bl edema. Decreasing UOP this AM.      NEUROLOGICAL:    Monitor mentation, delirium precautions  As needed pain regimen     PULMONOLOGY:   O2 per NC for spO2 92-98%  Diuresis   As needed nebs  IS, PFV, out of bed to chair as tolerated once extubated     CARDIOVASCULAR:   Wean phenylephrine, epinephrine as tolerated   Goal MAP greater than 65, SBP less than 130, CI >2, CO >3.5  Pacer wires and Josh drain management per CT surgery   Diuresis w/ albumin adjunct   Resume GDMT as tolerated once off vasopressors  Monitor volume status and resume diuresis as indicated  ?Repeat ECHO prior to D/C once off epi given marked difference in LVEF from  ECHO  Statin  Resume Antiplatelet and AC per CTS  Telemetry     GASTROINTESTINAL:   Pepcid  ADAT      RENAL/ELECTROLYTE/FLUIDS:   Strict ins and outs  Daily renal panel  Monitor and replace electrolytes     ENDOCRINE:   Insulin drip per protocol, then transition to basal  Glucose goal 120-180     HEMATOLOGY/ONCOLOGY:   SCDs  Chemical prophylaxis as cleared by surgery     ID/MICRO:      Perioperative Ancef      ICU DAILY CHECKLIST

## 2024-07-16 NOTE — PROGRESS NOTES
4 Eyes Skin Assessment     NAME:  Shade Birmnigham  YOB: 1945  MEDICAL RECORD NUMBER:  [id number]    The patient is being assessed for  Post-Op Surgical    I agree that at least one RN has performed a thorough Head to Toe Skin Assessment on the patient. ALL assessment sites listed below have been assessed.      Areas assessed by both nurses:    Head, Face, Ears, Shoulders, Back, Chest, Arms, Elbows, Hands, Sacrum. Buttock, Coccyx, Ischium, Legs. Feet and Heels, and Under Medical Devices         Does the Patient have a Wound? No noted wound(s)       Germ[place] Prevention initiated by RN: Yes  Wound Care Orders initiated by RN: No    Pressure Injury (Stage 3,4, Unstageable, DTI, NWPT, and Complex wounds) if present, place Wound referral order by RN under : No    New Ostomies, if present place, Ostomy referral order under : No     Nurse 1 eSignature: Electronically signed by FRANCIS CRUZ RN on 7/15/24 at 8:12 PM EDT    **SHARE this note so that the co-signing nurse can place an eSignature**    Nurse 2 eSignature: Electronically signed by Thomas Calvo RN on 7/15/24 at 8:14 PM EDT

## 2024-07-16 NOTE — ANESTHESIA POSTPROCEDURE EVALUATION
anesthesia: None    Post-anesthesia assessment completed. No concerns.    I have evaluated the patient and the patient is stable and ready to be discharged from PACU .    Signed By: Jose Daniel Zamarripa MD    7/16/2024    Multimodal analgesia pain management approach  Pain management: satisfactory to patient    No notable events documented.

## 2024-07-16 NOTE — PROGRESS NOTES
Cardiac Surgery Care Coordinator-  Met with Shade Birmingham. Reviewed plan of care and discussed goals for the day. Shade Bimringham has a good understanding of his plan for the day.  Reinforced sternal precautions and encouraged continued use of the incentive spirometer.  Shade Birmingham can pull 500-1500ml with good effort. Discussed possible discharge date and encouraged Shade Birmingham to verbalize.   Will continue to follow for educational and emotional needs.

## 2024-07-16 NOTE — CARE COORDINATION
Care Management Initial Assessment       RUR: 23% - readmission  Readmission? Yes  1st IM letter given? Yes - 7/16/24  1st  letter given: No    Chart reviewed. Patient currently working with therapy and RN at bedside. Patient readmitted for planned CABG. Brother also at the bedside. Patient resides at address on file with ex-wife of 11 years. Extensive DME in the home includes electric wheelchair, manual wheelchair, rollator, and RW. Patient able to use any of these PRN. He is up in the chair and ambulating short steps with therapy this AM.     Per Cardiac Surgery rounds - patient will need echo closer to discharge to determine [facility] need. Possible weekend discharge and anticipate will be home health appropriate.    CM will continue to follow.       07/16/24 1028   Service Assessment   Patient Orientation Alert and Oriented   Cognition Alert   History Provided By Patient;Child/Family   Primary Caregiver Self   Accompanied By/Relationship brother Kalpana Wagner   Support Systems Family Members   Patient's Healthcare Decision Maker is: Legal Next of Kin   PCP Verified by CM Yes   Last Visit to PCP Within last 3 months   Prior Functional Level Independent in ADLs/IADLs   Current Functional Level Independent in ADLs/IADLs   Can patient return to prior living arrangement Yes   Ability to make needs known: Good   Family able to assist with home care needs: Yes   Would you like for me to discuss the discharge plan with any other family members/significant others, and if so, who? Yes  (brothmahogany Wagner)   Financial Resources Medicare   Community Resources None   Social/Functional History   Lives With Other (comment)  (Ex- wife)   Type of Home House   Home Equipment Wheelchair - Manual;Walker - Rolling;Rollator;Wheelchair - Electric   Receives Help From Family   ADL Assistance Independent   Homemaking Assistance Independent   Homemaking Responsibilities Yes   Ambulation Assistance Independent   Transfer Assistance  Independent   Active  Yes   Mode of Transportation Car   Occupation Retired   Discharge Planning   Type of Residence House   Living Arrangements Spouse/Significant Other   Current Services Prior To Admission None   Potential Assistance Needed Home Care   Potential DME Needed Other (Comment)  ([facility])   DME Ordered? No   Potential Assistance Purchasing Medications No   Type of Home Care Services Nursing Services;Skilled Therapy   Patient expects to be discharged to: House   Services At/After Discharge   Transition of Care Consult (CM Consult) Home Health   Internal Home Health Yes   Services At/After Discharge Home Health    Resource Information Provided? No   Mode of Transport at Discharge Other (see comment)  (family)   Confirm Follow Up Transport Family   Condition of Participation: Discharge Planning   The Plan for Transition of Care is related to the following treatment goals: home health   The Patient and/or Patient Representative was provided with a Choice of Provider? Patient   The Patient and/Or Patient Representative agree with the Discharge Plan? Yes   Freedom of Choice list was provided with basic dialogue that supports the patient's individualized plan of care/goals, treatment preferences, and shares the quality data associated with the providers?  Yes     Ethan Serna, MPH  Care Manager l [facility]  Available via [employer]

## 2024-07-16 NOTE — PROGRESS NOTES
2000: Bedside and Verbal shift change report given to ANETTE Kamara (oncoming nurse) by Lisandro/ANETTE Guzman (offgoing nurse). Report included the following information Intake/Output, MAR, Recent Results, and Cardiac Rhythm Atrial Fib/NSR .     Goal to wean epi       2115: Pt in and out of Afib rate controlled; MD Reun notified; orders to start amio gtt & Wean epi and if BP support is needed use Hany    2200: ERAS completed; pt to the edge of the bed; dangle legs; MAPs to the 40s; started Hany gtt to assist with pressure     2206: Epi gtt off     2245: Albumin given d/t CVP of 4-5; MD Renu Notified of CLARENCE of 0.36-albumin given; give another albumin if needed for low UOP      0130: Pt HR dipping into the 50s; BP not tolerating well; notified MD Philip - orders to pause amio gtt for an hour and reassess.     Attempted to atrially pace pt - inappropriate capture     0150: Epi gtt restarted at 1 to help with HR     0300: MD Renu notified of intermittent Afib; keep amio off d/t HR no new orders at this time      0531:  MD Renu Notified of low UOP for last 3 hours; no new orders at this time.     0730: Bedside and Verbal shift change report given to Lyn (oncoming nurse) by ANETTE Kamara (offgoing nurse). Report included the following information Intake/Output, MAR, and Cardiac Rhythm NSR  .

## 2024-07-16 NOTE — CARDIO/PULMONARY
Chart reviewed: Patient is 78 y.o. male admitted with Disease of cardiovascular system [I25.10]  Coronary artery disease of native artery of native heart with stable angina pectoris (HCC) [I25.118]    Education: [employer] education folder is at home.  Met with Shade Birmingham  and his brother to begin cardiac surgery post discharge instructions and to discuss participation in the Cardiac Rehab Program.     Educated using teach back method. Reviewed the use of bear for sternal support, daily weight and temperature monitoring,  and use of incentive spirometer.   Shade TON Birmingham was able to demonstrate proper use of incentive spirometer, achieving 1000 ml.  .     Discussed Cardiac Rehab Program format, benefits, and encouraged participation. A referral for OP CR at [facility] was placed and the contact information is on his AVS.General questions answered. Shade S Valencia verbalized understanding.       .     Mandi Gifford RN

## 2024-07-16 NOTE — CARE COORDINATION
07/16/24 1034   Readmission Assessment   Number of Days since last admission? 8-30 days   Previous Disposition Home with Family   Who is being Interviewed Patient   What was the patient's/caregiver's perception as to why they think they needed to return back to the hospital? Other (Comment)  (planned CABG)   Did you visit your Primary Care Physician after you left the hospital, before you returned this time? No   Why weren't you able to visit your PCP? Other (Comment)  (planned CABG - went to CT surgery)   Did you see a specialist, such as Cardiac, Pulmonary, Orthopedic Physician, etc. after you left the hospital? Yes   Who advised the patient to return to the hospital? Physician   Does the patient report anything that got in the way of taking their medications? No   What reasons did they give? Other (Comment)  (planned CABG)   In our efforts to provide the best possible care to you and others like you, can you think of anything that we could have done to help you after you left the hospital the first time, so that you might not have needed to return so soon? Other (Comment)  (planned CABG)     Ethan Serna, MPH  Care Manager l [facility]  Available via [employer]

## 2024-07-16 NOTE — OP NOTE
Cardiothoracic Surgery Operative Note    Date of Dictation: 07/16/24    Date of Procedure: 07/15/24    Referring Physician: Victor M Royal MD    Preoperative Diagnoses:    Stable Angina  Multivessel CAD  Congestive Heart Failure  Hypertension  Dyslipidema  Diabetes  Former Smoker  CKD  Peripheral Arterial Disease  Atrial Fibrillation  Prostate Cancer    Postoperative Diagnosis:    Same as Pre-op.    Procedure:    1. CABG x 3.   Left internal mammary artery to LAD.   Reverse saphenous vein graft to R-PDA.   Reverse saphenous vein graft to INT.  2. EVH of both legs.  3. Epiaortic Ultrasound.  4. ZAHRA occlusion with 45 mm clip.    Surgeon: Jori Martinez MD, PhD, YAJAIRA, [facility].    Assistant(s): NICHOLAS Beck    The assistance of the PA was required due to the critical nature of the surgery.    Anesthesia: General endotracheal anesthesia and MC.    Anesthesiologist(s):  Hever Marina MD.    Findings:    The vein was of adequate quality.  The coronaries had severe atherosclerotic disease.  The ascending aorta was found to have no atheromatous disease by epiaortic ultrasound examination.    Post-bypass LV systolic function was fair.  Post-bypass RV systolic function was good.    Estimated Blood Loss:  Documented in the anesthesia record.    STS Data: Estimated Mortality Risk 7 %.    The risks, benefits and alternatives to surgery were discussed with the patient and they requested to proceed with surgery. Nelly-operative antibiotics and beta blockers were given within the STS-recommended windows.    Operative Details:    The patient was placed supine on the operating table. Standard monitors and lines were placed, anesthesia was given and the patient was intubated. A MC was performed. The patient was prepped and draped in a sterile manner. A pre-incision time-out was performed. A median sternotomy was performed. Hemostasis was achieved.  The left internal mammary artery was harvested in-situ in a pedicled  fashion and had adequate flow. Meanwhile, saphenous vein from the leg was harvested endoscopically. IV heparin was given to maintain an ACT over 400. The pericardium was opened.    An epiaortic ultrasound was performed which demonstrated no atheromatous disease in the ascending aorta.    The aorta and right atrium were cannulated for cardiopulmonary bypass. A slit was fashioned in the pericardium to allow passage of the LIMA into the mediastinum. The coronary arteries were inspected and grafting targets were identified. The vein graft was inspected and determined to be of adequate quality for use as a conduit. Cardiopulmonary bypass was initiated. A cardioplegia cannula was placed in the root. The aorta was cross-clamped. The heart was arrested.    The ZAHRA was occluded with a 45 mm clip.    The coronary bypasses were constructed one at a time with each distal anastomosis followed by a proximal anastomosis. Cardioplegia was given intermittently. The LIMA was anastomosed to the LAD and the crossclamp was removed.    The anastomotic sites were inspected for bleeding. Hemostasis was achieved. Ventricular pacing wires were placed. Ventilation was resumed. Cardiopulmonary bypass was weaned. The root vent was removed. A protamine test dose was given and the heart was decannulated. Chest tubes were placed.    Hemostasis was reconfirmed at all surgical sites. The sternum was reapproximated with stainless steel wires. The presternal fascia, subcutaneous and dermal layers were reapproximated with running sutures. The wounds were covered with sterile dressings.    The PA was critical for harvest of graft conduit as well as cannulation, assistance with construction of each anastomosis, decannulation, and closure of the wound.    Specimens:  None.    Pacing Wires: Ventricular X 2, Atrial X 2.    Chest Tubes: Left pleural, posterior pericardial and mediastinal.    CPB Time:  111 min.    Aortic Clamp Time:  75

## 2024-07-16 NOTE — PROGRESS NOTES
1000- Received report from ANETTE Nicholson.     1300- Ok to de-line per, but keep lee per MARSHA Moralez. Patient assisted back to bed with strong 2x assist. Patient weak, but tolerated fairly well.     1500- Report given to ANETTE Gil.

## 2024-07-16 NOTE — PLAN OF CARE
Problem: Occupational Therapy - Adult  Goal: By Discharge: Performs self-care activities at highest level of function for planned discharge setting.  See evaluation for individualized goals.  Description: FUNCTIONAL STATUS PRIOR TO ADMISSION:    Receives Help From: Family, ADL Assistance: Independent,  ,  ,  ,  ,  , Homemaking Assistance: Independent, Ambulation Assistance: Independent, Transfer Assistance: Independent, Active : Yes     HOME SUPPORT: Patient lived with ex-wife and was completely independent, driving, completing IADLs.    Occupational Therapy Goals:  Initiated 7/16/2024    1.  Patient will perform ADLs standing 5 mins without fatigue or LOB with Contact Guard Assist within 7 days.  2.  Patient will perform upper body ADLs with Set-up within 7 days.  3.  Patient will perform lower body ADLs with Minimal Assist within 7 days.    4.  Patient will perform gathering ADL items high and low 2/2 with Contact Guard Assist within 7 days.  5.  Patient will perform toilet transfers with Contact Guard Assist within 7 days.  6.  Patient will perform all aspects of toileting with Contact Guard Assist within 7 days.  7.  Patient will participate in cardiac/sternal upper extremity therapeutic exercise/activities to increase independence with ADLs with supervision/set-up for 5 minutes within 7 days.   8.  Patient will adhere to Move in the Tube precautions during functional tasks with minimal verbal cues within 7 days.     Outcome: Progressing   OCCUPATIONAL THERAPY EVALUATION    Patient: Shade Birmingham (78 y.o. male)  Date: 7/16/2024  Primary Diagnosis: Disease of cardiovascular system [I25.10]  Coronary artery disease of native artery of native heart with stable angina pectoris (HCC) [I25.118]  Procedure(s) (LRB):  CORONARY ARTERY BYPASS GRAFTING X 3 WITH GUSTAVO GILLIS, LIGATION OF LEFT ATRIAL APPENDAGE, ECC, MC AND EPIAORTIC U/S BY DR VEGA (N/A) 1 Day Post-Op     Precautions: Fall Risk, Surgical  REPLACED VEIN L LEG C STENTS          Expanded or extensive additional review of patient history:   Social/Functional History  Lives With: Other (comment) (Ex- wife)  Type of Home: House  Home Layout: One level  Home Access: Stairs to enter without rails  Entrance Stairs - Number of Steps: 1  Entrance Stairs - Rails: None  Bathroom Shower/Tub: Walk-in shower  Bathroom Equipment: Shower chair, Grab bars in shower  Home Equipment: Wheelchair - Manual, Walker - Rolling, Rollator, Wheelchair - Electric  Has the patient had two or more falls in the past year or any fall with injury in the past year?: No  Receives Help From: Family  ADL Assistance: Independent  Homemaking Assistance: Independent  Homemaking Responsibilities: Yes  Ambulation Assistance: Independent  Transfer Assistance: Independent  Active : Yes  Mode of Transportation: Car  Occupation: Retired      Hand Dominance: right     EXAMINATION OF PERFORMANCE DEFICITS:    Cognitive/Behavioral Status:  Orientation  Overall Orientation Status: Within Normal Limits  Orientation Level: Oriented X4  Cognition  Overall Cognitive Status: WFL    Skin: sternal incision, chest tube in place    Edema: very mild in ankles    Hearing:   Hearing  Hearing: Within functional limits    Vision/Perceptual:          Vision  Vision: Within Functional Limits       Range of Motion:   AROM: Generally decreased, functional  PROM: Generally decreased, functional      Strength:  Strength: Generally decreased, functional      Coordination:  Coordination: Generally decreased, functional     Coordination: Generally decreased, functional      Tone & Sensation:   Tone: Normal  Sensation: Intact          Functional Mobility and Transfers for ADLs:    Bed Mobility:     Bed Mobility Training  Bed Mobility Training: No    Transfers:      Transfer Training  Transfer Training: Yes  Sit to Stand: Minimum assistance;Assist X2  Stand to Sit: Minimum assistance;Assist X2                  ADLs. Instructed and indicated understanding of s/s of too much activity, how to respond to s/s safely.    Patient instructed on no asymmetrical reaching over head to ensure BUEs are lifted together above 90* of shoulder flexion.    Pt instructed that they may have to adjust home setup to increase ease with items closer to waist height to prevent deep bending and asymmetrical UE WB/pushing for stabilization during bending.         Therapeutic Exercises:   Patient instructed on the benefits and demonstrated cardiac exercises while standing with Minimal Assist. Instructed and indicated understanding on how to progress reps, sets against gravity, pacing through progressive muscle strengthening standing based on surgeon clearance for more weight in prep for basic and instrumental ADLs. Instruction on the use of household items in place of weights as needed.    CARDIAC   EXERCISE    Sets    Reps    Active  Active Assist    Passive  Self ROM    Comments    Shoulder flexion  1  5   [x]                            []                             []                             []                                Shoulder abduction  1  5  [x]                             []                             []                             []                                Scapular elevation  1  5  [x]                             []                              []                             []                                Scapular retraction  1  5  [x]                             []                             []                             []                                      [facility] AM-PACTM \"6 Clicks\"                                                       Daily Activity Inpatient Short Form  How much help from another person does the patient currently need... Total; A Lot A Little None   1.  Putting on and taking off regular lower body clothing? []  1 [x]  2 []  3 []  4   2.  Bathing (including washing, rinsing, drying)?  []  1 [x]  2 []  3 []  4   3.  Toileting, which includes using toilet, bedpan or urinal? [] 1 [x]  2 []  3 []  4   4.  Putting on and taking off regular upper body clothing? []  1 []  2 [x]  3 []  4   5.  Taking care of personal grooming such as brushing teeth? []  1 []  2 [x]  3 []  4   6.  Eating meals? []  1 []  2 [x]  3 []  4   © 2007, Trustees of [facility], under license to [employer]. All rights reserved     Score: 15/24     Interpretation of Tool:  Represents clinically-significant functional categories (i.e. Activities of daily living).    Cutoff score 39.4 (19) correlates to a good likelihood of discharging home versus a facility  Georgette Jack, Elaine Crowley, Travis Roldan, Krista Padilla, Randall Langley, Dominick Jack, AM-PAC “6-Clicks” Functional Assessment Scores Predict Acute Care Hospital Discharge Destination, Physical Therapy, Volume 94, Issue 9, 1 September 2014, Pages 7330-0947, [email or web address]    Pain Rating:  Pt with high pain in chest with cough, exertion, and deep breathing  Pain Intervention(s):   patient medicated for pain prior to session and repositioning    Activity Tolerance:   Fair , requires frequent rest breaks, and observed shortness of breath on exertion    After treatment:   Patient left in no apparent distress sitting up in chair and Call bell within reach    COMMUNICATION/EDUCATION:   The patient's plan of care was discussed with: physical therapist and registered nurse    Patient Education  Education Given To: Patient  Education Provided: Role of Therapy;Plan of Care;Home Exercise Program;Precautions;ADL Adaptive Strategies;Transfer Training;Energy Conservation;Mobility Training;Fall Prevention Strategies;Equipment;IADL Safety  Education Provided Comments: move in tube, PLB  Education Method: Demonstration;Verbal;Teach Back  Barriers to Learning: None  Education Outcome: Verbalized understanding;Demonstrated understanding;Continued

## 2024-07-16 NOTE — PLAN OF CARE
Problem: Physical Therapy - Adult  Goal: By Discharge: Performs mobility at highest level of function for planned discharge setting.  See evaluation for individualized goals.  Description: FUNCTIONAL STATUS PRIOR TO ADMISSION: Patient was independent and active without use of DME. Denies hx falls. Lives with ex-wife who is available to provide supervision at D/C.     HOME SUPPORT PRIOR TO ADMISSION: The patient lived with ex-wife but did not require assistance.    Physical Therapy Goals  Initiated 7/16/2024  1.  Patient will move from supine to sit and sit to supine in bed with contact guard assist within 5 day(s).    2.  Patient will perform sit to stand with contact guard assist within 5 day(s).  3.  Patient will transfer from bed to chair and chair to bed with contact guard assist using the least restrictive device within 5 day(s).  4.  Patient will ambulate with contact guard assist for 100 feet with the least restrictive device within 5 day(s).   5.  Patient will ascend/descend 1 stairs with no handrail(s) with contact guard assist within 5 day(s).  6.  Patient will perform cardiac exercises per protocol with supervision/set-up within 5 days.  7.  Patient will verbally recall and functionally demonstrate mindful-based movements (\"move in the tube\") principles without cues within 5 days.      Outcome: Progressing   PHYSICAL THERAPY EVALUATION    Patient: Shade Birmingham (78 y.o. male)  Date: 7/16/2024  Primary Diagnosis: Disease of cardiovascular system [I25.10]  Coronary artery disease of native artery of native heart with stable angina pectoris (HCC) [I25.118]  Procedure(s) (LRB):  CORONARY ARTERY BYPASS GRAFTING X 3 WITH GILLIS, BESVGH, LIGATION OF LEFT ATRIAL APPENDAGE, ECC, MC AND EPIAORTIC U/S BY DR VEGA (N/A) 1 Day Post-Op   Precautions: Restrictions/Precautions: Fall Risk, Surgical Protocols (Move in the tube)                      ASSESSMENT :   DEFICITS/IMPAIRMENTS:   The patient is limited by  week in the home, however, may require supervision, cognitive and/or physical assistance due to the following concerns listed below:    Other factors to consider for discharge: high risk for falls, not safe to be alone, concern for safely navigating or managing the home environment, and new weight bearing restrictions limiting activity or patient is unable to maintain    IF patient discharges home will need the following DME: continuing to assess with progress                SUBJECTIVE:   Patient stated “my legs feel weak.”    OBJECTIVE DATA SUMMARY:       Past Medical History:   Diagnosis Date    Arthritis     Bladder cancer (HCC) 2007    CAD (coronary artery disease)     Cataract     LEFT EYE (NOT RIPE ENOUGH TO REMOVE).  RIGHT EYE CATARACT REMOVED.    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 2023 - JAN. 2024    UTI (urinary tract infection)      Past Surgical History:   Procedure Laterality Date    CARDIAC CATHETERIZATION  2018    CARDIAC PROCEDURE N/A 07/01/2024    Right heart cath performed by Victor M Royal MD at [facility] CARDIAC CATH LAB    CARDIAC PROCEDURE N/A 07/01/2024    Coronary angiography performed by Victor M Royal MD at [facility] CARDIAC CATH LAB    CATARACT REMOVAL Right 2020    COLONOSCOPY N/A 09/06/2022    COLONOSCOPY performed by Kike Canela MD at [facility] ENDOSCOPY    CORONARY ARTERY BYPASS GRAFT N/A 7/15/2024    CORONARY ARTERY BYPASS GRAFTING X 3 WITH GUSTAVO GILLIS, LIGATION OF LEFT ATRIAL APPENDAGE, ECC, MC AND EPIAORTIC U/S BY DR VEGA performed by Jori Martinez MD at [facility] OPEN HEART     CYSTOSCOPY      MULTIPLE    GI      COLONOSCOPY    INVASIVE VASCULAR N/A 07/01/2024    Ultrasound guided vascular access R and L fem access performed by Victor M Royal MD at [facility] CARDIAC CATH LAB    TONSILLECTOMY      AGE 7-8    TOTAL KNEE ARTHROPLASTY Right 01/2020    UROLOGICAL SURGERY      laser treatment bladder CA    VASCULAR SURGERY  2019     STENTS    VASCULAR SURGERY Left 2018    REPLACED VEIN L LEG C STENTS       Home Situation:  Social/Functional History  Lives With: Other (comment) (Ex- wife)  Type of Home: House  Home Layout: One level  Home Access: Stairs to enter without rails  Entrance Stairs - Number of Steps: 1  Entrance Stairs - Rails: None  Bathroom Shower/Tub: Walk-in shower  Bathroom Equipment: Shower chair, Grab bars in shower  Home Equipment: Wheelchair - Manual, Walker - Rolling, Rollator, Wheelchair - Electric  Has the patient had two or more falls in the past year or any fall with injury in the past year?: No  Receives Help From: Family  ADL Assistance: Independent  Homemaking Assistance: Independent  Homemaking Responsibilities: Yes  Ambulation Assistance: Independent  Transfer Assistance: Independent  Active : Yes  Mode of Transportation: Car  Occupation: Retired    Cognitive/Behavioral Status:  Orientation  Overall Orientation Status: Within Normal Limits  Orientation Level: Oriented X4  Cognition  Overall Cognitive Status: WFL    Skin: intact where visible    Edema: mild bilateral LE edema     Hearing:        Vision/Perceptual:                  Strength:    Strength: Generally decreased, functional    Tone & Sensation:   Tone: Normal  Sensation: Intact    Coordination:  Coordination: Generally decreased, functional    Range Of Motion:  AROM: Generally decreased, functional       Functional Mobility:  Bed Mobility:     Bed Mobility Training  Bed Mobility Training: No  Transfers:     Transfer Training  Transfer Training: Yes  Sit to Stand: Minimum

## 2024-07-16 NOTE — CARE COORDINATION
See Redmission Assessment    Ethan Serna, MPH  Care Manager l [facility]  Available via [employer]

## 2024-07-16 NOTE — PROGRESS NOTES
1500- bedside report and drip verified. Patient sleeping in bed.    1621- updated Andrew NP on sats 88-91%. Orders to hold off delining patient at this time. Updated her that lasix and albumin was given and patient is starting to have urine output.    1745- Bedside and Verbal shift change report given to Anh CHEEMA (oncoming nurse) by Jeffery RN (offgoing nurse). Report included the following information Nurse Handoff Report, Surgery Report, Recent Results, and Cardiac Rhythm SR .

## 2024-07-16 NOTE — PROGRESS NOTES
Cardiac Surgery ICU Progress Note    Admit Date: 7/15/2024  POD:  1 Day Post-Op    Procedure:  Procedure(s):  1. CABG x 3.              Left internal mammary artery to LAD.              Reverse saphenous vein graft to R-PDA.              Reverse saphenous vein graft to INT.  2. EVH of both legs.  3. Epiaortic Ultrasound.  4. ZAHRA occlusion with 45 mm clip.    Admission synopsis:  7/15 DOS: Arrived to ICU intubated and sedated on precedex and epinephrine. Extubated to 4L NC. Albumin x1. Afib, amio gtt. Hany started. Amio discontinued for bradycardia.  7/16 POD1: Albumin x1 with Lasix 40 mg IV.   Subjective:   Patient seen with Dr. Martinez. Pt reports some mild lightheadedness when standing out of bed for the first time, denies nausea, reports, \"chest cavity pain,\" is 5 out of 10. Pleasant. In SR at 68, on 5L NC, afebrile.     Objective:   Vitals:      Continuous infusions:  Epinephrine 2 mcgs/min  Hany 20 mcgs/min    Oxygen Flow Rate: 5L NC     EKG/Rhythm:        Extubation Date / Time: 7/15 17:55 to 4L NC    CT Output: 400 + 350 = 750    CXR: 7/16 on L , 7/15 on R    Xray Result (most recent):  XR CHEST PORTABLE 07/15/2024    Narrative  EXAM:  XR CHEST PORTABLE    INDICATION: Post op open heart surgery    COMPARISON: Chest radiograph 7/10/2024, CT chest 6/26/2024    TECHNIQUE: Semiupright portable chest AP view    FINDINGS:    Median sternotomy. Left atrial appendage occluder clip. ET tube terminates over  the mid intrathoracic trachea. NG tube extends inferior the field-of-view. Right  jugular approach intravenous catheter terminates over the mid SVC. Several  subxiphoid approach thoracic drains.    Cardiomediastinal silhouette is enlarged. Moderate to severe edema pattern. Hazy  bilateral mid to lower lung atelectasis/airspace disease. Small bilateral  pleural effusions. No definite pneumothoraces.    Impression  Support apparatus and postsurgical changes as above. Moderate to severe edema  pattern. Hazy bilateral  during hospitalization  - duonebs three times daily    Acute postoperative blood loss anemia on chronic macrocytic anemia: Expected. Above transfusion threshold. Folate normal. RDW high.  - Monitor CT output and cbc  - initiate ferrous sulfate x 1 month for hct < 30% following BM    Acute postoperative leukocytosis: Likely inflammatory and contributed to by atelectasis. Afebrile.  - Monitor cbc  - Pulm toilet: acapella, IS, cough, deep breathe, ambulate   - monitor for s&s of infection      CKD Stage II: Baseline Cr 1.2-1.6, GFR 40-60s  - continue lee today  - afternoon BMP     Hx Prostate Cancer and Prostatitis: TURBT 2018, last XRT treatment in January 2024  - Reports approximately 15-20lb weight loss since January with treatment  - does not take any BPH medications at baseline, will monitor closely for urinary retention     Recent Complicated UTI/Dysuria: +Enterococcus, Completed course of ciprofloxacin  - Will monitor urine output and symptoms closely     Diabetes Mellitus, Type II: A1c 6.6%;  PTA Metformin 1000mg BID, Jardiance   - Continue insulin infusion per protocol  - appreciate diabetes management     Chronic Back Pain: PTA gabapentin 300mg TID; recent lumbar MRI with progressive DDD  - Cont Gabapentin at lower dose of 200 mg TID    Nutrition: advance as tolerated  GI proph: pepcid  Bowel reg: glycolax, senokot, prn bisacodyl  DVT proph: SCDs, ambulate    Dispo: Remain in CVI today. Continue all invasive lines. Hope to transfer to [facility] tomorrow and discharge home over the weekend.    Signed By: EDITH Fair - NP

## 2024-07-16 NOTE — PROGRESS NOTES
I have read and agree with  RN documentation and care. I have reviewed the MAR and all flow sheets associated with Patient's care today.

## 2024-07-17 ENCOUNTER — APPOINTMENT (OUTPATIENT)
Facility: HOSPITAL | Age: 79
DRG: 234 | End: 2024-07-17
Attending: THORACIC SURGERY (CARDIOTHORACIC VASCULAR SURGERY)
Payer: MEDICARE

## 2024-07-17 LAB
ACUTE KIDNEY INJURY RISK NEPHROCHECK: 0.98 (ref 0–0.3)
ANION GAP SERPL CALC-SCNC: 10 MMOL/L (ref 5–15)
ANION GAP SERPL CALC-SCNC: 7 MMOL/L (ref 5–15)
BUN SERPL-MCNC: 31 MG/DL (ref 6–20)
BUN SERPL-MCNC: 31 MG/DL (ref 6–20)
BUN/CREAT SERPL: 18 (ref 12–20)
BUN/CREAT SERPL: 18 (ref 12–20)
CALCIUM SERPL-MCNC: 8.3 MG/DL (ref 8.5–10.1)
CALCIUM SERPL-MCNC: 8.5 MG/DL (ref 8.5–10.1)
CHLORIDE SERPL-SCNC: 105 MMOL/L (ref 97–108)
CHLORIDE SERPL-SCNC: 106 MMOL/L (ref 97–108)
CO2 SERPL-SCNC: 20 MMOL/L (ref 21–32)
CO2 SERPL-SCNC: 22 MMOL/L (ref 21–32)
CREAT SERPL-MCNC: 1.71 MG/DL (ref 0.7–1.3)
CREAT SERPL-MCNC: 1.72 MG/DL (ref 0.7–1.3)
ERYTHROCYTE [DISTWIDTH] IN BLOOD BY AUTOMATED COUNT: 20 % (ref 11.5–14.5)
GLUCOSE BLD STRIP.AUTO-MCNC: 106 MG/DL (ref 65–117)
GLUCOSE BLD STRIP.AUTO-MCNC: 114 MG/DL (ref 65–117)
GLUCOSE BLD STRIP.AUTO-MCNC: 123 MG/DL (ref 65–117)
GLUCOSE BLD STRIP.AUTO-MCNC: 135 MG/DL (ref 65–117)
GLUCOSE BLD STRIP.AUTO-MCNC: 174 MG/DL (ref 65–117)
GLUCOSE BLD STRIP.AUTO-MCNC: 220 MG/DL (ref 65–117)
GLUCOSE BLD STRIP.AUTO-MCNC: 252 MG/DL (ref 65–117)
GLUCOSE BLD STRIP.AUTO-MCNC: 280 MG/DL (ref 65–117)
GLUCOSE BLD STRIP.AUTO-MCNC: 68 MG/DL (ref 65–117)
GLUCOSE BLD STRIP.AUTO-MCNC: 79 MG/DL (ref 65–117)
GLUCOSE BLD STRIP.AUTO-MCNC: 88 MG/DL (ref 65–117)
GLUCOSE SERPL-MCNC: 113 MG/DL (ref 65–100)
GLUCOSE SERPL-MCNC: 79 MG/DL (ref 65–100)
HCT VFR BLD AUTO: 20.5 % (ref 36.6–50.3)
HCT VFR BLD AUTO: 21.4 % (ref 36.6–50.3)
HGB BLD-MCNC: 6.8 G/DL (ref 12.1–17)
HGB BLD-MCNC: 7.1 G/DL (ref 12.1–17)
HISTORY CHECK: NORMAL
LACTATE SERPL-SCNC: 1 MMOL/L (ref 0.4–2)
MAGNESIUM SERPL-MCNC: 2.1 MG/DL (ref 1.6–2.4)
MCH RBC QN AUTO: 34.1 PG (ref 26–34)
MCHC RBC AUTO-ENTMCNC: 33.2 G/DL (ref 30–36.5)
MCV RBC AUTO: 102.9 FL (ref 80–99)
NRBC # BLD: 0.12 K/UL (ref 0–0.01)
NRBC BLD-RTO: 0.7 PER 100 WBC
PLATELET # BLD AUTO: 130 K/UL (ref 150–400)
PMV BLD AUTO: 11.4 FL (ref 8.9–12.9)
POTASSIUM SERPL-SCNC: 4.4 MMOL/L (ref 3.5–5.1)
POTASSIUM SERPL-SCNC: 4.7 MMOL/L (ref 3.5–5.1)
RBC # BLD AUTO: 2.08 M/UL (ref 4.1–5.7)
SERVICE CMNT-IMP: ABNORMAL
SERVICE CMNT-IMP: NORMAL
SODIUM SERPL-SCNC: 135 MMOL/L (ref 136–145)
SODIUM SERPL-SCNC: 135 MMOL/L (ref 136–145)
WBC # BLD AUTO: 16.1 K/UL (ref 4.1–11.1)

## 2024-07-17 PROCEDURE — 2580000003 HC RX 258: Performed by: THORACIC SURGERY (CARDIOTHORACIC VASCULAR SURGERY)

## 2024-07-17 PROCEDURE — 99232 SBSQ HOSP IP/OBS MODERATE 35: CPT | Performed by: CLINICAL NURSE SPECIALIST

## 2024-07-17 PROCEDURE — 2700000000 HC OXYGEN THERAPY PER DAY

## 2024-07-17 PROCEDURE — 94640 AIRWAY INHALATION TREATMENT: CPT

## 2024-07-17 PROCEDURE — P9016 RBC LEUKOCYTES REDUCED: HCPCS

## 2024-07-17 PROCEDURE — 71045 X-RAY EXAM CHEST 1 VIEW: CPT

## 2024-07-17 PROCEDURE — 80048 BASIC METABOLIC PNL TOTAL CA: CPT

## 2024-07-17 PROCEDURE — 6360000002 HC RX W HCPCS

## 2024-07-17 PROCEDURE — 2500000003 HC RX 250 WO HCPCS: Performed by: ANESTHESIOLOGY

## 2024-07-17 PROCEDURE — 85027 COMPLETE CBC AUTOMATED: CPT

## 2024-07-17 PROCEDURE — 2580000003 HC RX 258

## 2024-07-17 PROCEDURE — 83735 ASSAY OF MAGNESIUM: CPT

## 2024-07-17 PROCEDURE — 97535 SELF CARE MNGMENT TRAINING: CPT

## 2024-07-17 PROCEDURE — 85018 HEMOGLOBIN: CPT

## 2024-07-17 PROCEDURE — 76770 US EXAM ABDO BACK WALL COMP: CPT

## 2024-07-17 PROCEDURE — 97530 THERAPEUTIC ACTIVITIES: CPT

## 2024-07-17 PROCEDURE — 6370000000 HC RX 637 (ALT 250 FOR IP): Performed by: THORACIC SURGERY (CARDIOTHORACIC VASCULAR SURGERY)

## 2024-07-17 PROCEDURE — 36430 TRANSFUSION BLD/BLD COMPNT: CPT

## 2024-07-17 PROCEDURE — 6370000000 HC RX 637 (ALT 250 FOR IP)

## 2024-07-17 PROCEDURE — 6370000000 HC RX 637 (ALT 250 FOR IP): Performed by: NURSE PRACTITIONER

## 2024-07-17 PROCEDURE — 94760 N-INVAS EAR/PLS OXIMETRY 1: CPT

## 2024-07-17 PROCEDURE — 6370000000 HC RX 637 (ALT 250 FOR IP): Performed by: CLINICAL NURSE SPECIALIST

## 2024-07-17 PROCEDURE — 82962 GLUCOSE BLOOD TEST: CPT

## 2024-07-17 PROCEDURE — 83605 ASSAY OF LACTIC ACID: CPT

## 2024-07-17 PROCEDURE — 2000000000 HC ICU R&B

## 2024-07-17 PROCEDURE — 36415 COLL VENOUS BLD VENIPUNCTURE: CPT

## 2024-07-17 PROCEDURE — 97110 THERAPEUTIC EXERCISES: CPT

## 2024-07-17 PROCEDURE — 6360000002 HC RX W HCPCS: Performed by: STUDENT IN AN ORGANIZED HEALTH CARE EDUCATION/TRAINING PROGRAM

## 2024-07-17 PROCEDURE — P9047 ALBUMIN (HUMAN), 25%, 50ML: HCPCS | Performed by: STUDENT IN AN ORGANIZED HEALTH CARE EDUCATION/TRAINING PROGRAM

## 2024-07-17 PROCEDURE — 85014 HEMATOCRIT: CPT

## 2024-07-17 RX ORDER — SODIUM CHLORIDE 9 MG/ML
INJECTION, SOLUTION INTRAVENOUS PRN
Status: DISCONTINUED | OUTPATIENT
Start: 2024-07-17 | End: 2024-07-19

## 2024-07-17 RX ORDER — MAGNESIUM SULFATE IN WATER 40 MG/ML
2000 INJECTION, SOLUTION INTRAVENOUS ONCE
Status: COMPLETED | OUTPATIENT
Start: 2024-07-17 | End: 2024-07-17

## 2024-07-17 RX ORDER — BUMETANIDE 0.25 MG/ML
2 INJECTION INTRAMUSCULAR; INTRAVENOUS ONCE
Status: COMPLETED | OUTPATIENT
Start: 2024-07-17 | End: 2024-07-17

## 2024-07-17 RX ORDER — FUROSEMIDE 10 MG/ML
40 INJECTION INTRAMUSCULAR; INTRAVENOUS ONCE
Status: DISCONTINUED | OUTPATIENT
Start: 2024-07-17 | End: 2024-07-17

## 2024-07-17 RX ORDER — METOPROLOL TARTRATE 1 MG/ML
2.5 INJECTION, SOLUTION INTRAVENOUS EVERY 6 HOURS PRN
Status: DISCONTINUED | OUTPATIENT
Start: 2024-07-17 | End: 2024-07-19

## 2024-07-17 RX ORDER — HEPARIN SODIUM 5000 [USP'U]/ML
5000 INJECTION, SOLUTION INTRAVENOUS; SUBCUTANEOUS EVERY 8 HOURS SCHEDULED
Status: DISCONTINUED | OUTPATIENT
Start: 2024-07-17 | End: 2024-07-22 | Stop reason: HOSPADM

## 2024-07-17 RX ORDER — ALBUMIN (HUMAN) 12.5 G/50ML
12.5 SOLUTION INTRAVENOUS ONCE
Status: COMPLETED | OUTPATIENT
Start: 2024-07-17 | End: 2024-07-17

## 2024-07-17 RX ORDER — INSULIN GLARGINE 100 [IU]/ML
7 INJECTION, SOLUTION SUBCUTANEOUS ONCE
Status: COMPLETED | OUTPATIENT
Start: 2024-07-17 | End: 2024-07-17

## 2024-07-17 RX ORDER — FAMOTIDINE 20 MG/1
20 TABLET, FILM COATED ORAL DAILY
Status: DISCONTINUED | OUTPATIENT
Start: 2024-07-18 | End: 2024-07-22 | Stop reason: HOSPADM

## 2024-07-17 RX ORDER — GUAIFENESIN 600 MG/1
600 TABLET, EXTENDED RELEASE ORAL PRN
Status: DISCONTINUED | OUTPATIENT
Start: 2024-07-17 | End: 2024-07-22 | Stop reason: HOSPADM

## 2024-07-17 RX ORDER — SODIUM CHLORIDE 9 MG/ML
INJECTION, SOLUTION INTRAVENOUS PRN
Status: DISCONTINUED | OUTPATIENT
Start: 2024-07-17 | End: 2024-07-17

## 2024-07-17 RX ADMIN — GABAPENTIN 200 MG: 100 CAPSULE ORAL at 20:33

## 2024-07-17 RX ADMIN — INSULIN LISPRO 2 UNITS: 100 INJECTION, SOLUTION INTRAVENOUS; SUBCUTANEOUS at 17:43

## 2024-07-17 RX ADMIN — MAGNESIUM OXIDE TAB 400 MG (241.3 MG ELEMENTAL MG) 400 MG: 400 (241.3 MG) TAB at 20:32

## 2024-07-17 RX ADMIN — OXYCODONE 10 MG: 5 TABLET ORAL at 02:06

## 2024-07-17 RX ADMIN — ACETAMINOPHEN 975 MG: 325 TABLET ORAL at 11:05

## 2024-07-17 RX ADMIN — METOPROLOL TARTRATE 2.5 MG: 1 INJECTION, SOLUTION INTRAVENOUS at 21:34

## 2024-07-17 RX ADMIN — GABAPENTIN 200 MG: 100 CAPSULE ORAL at 08:44

## 2024-07-17 RX ADMIN — SENNOSIDES AND DOCUSATE SODIUM 1 TABLET: 50; 8.6 TABLET ORAL at 20:33

## 2024-07-17 RX ADMIN — HEPARIN SODIUM 5000 UNITS: 5000 INJECTION INTRAVENOUS; SUBCUTANEOUS at 14:41

## 2024-07-17 RX ADMIN — BUMETANIDE 2 MG: 0.25 INJECTION INTRAMUSCULAR; INTRAVENOUS at 18:17

## 2024-07-17 RX ADMIN — CHLORHEXIDINE GLUCONATE 15 ML: 1.2 RINSE ORAL at 09:35

## 2024-07-17 RX ADMIN — IPRATROPIUM BROMIDE AND ALBUTEROL SULFATE 1 DOSE: .5; 3 SOLUTION RESPIRATORY (INHALATION) at 15:40

## 2024-07-17 RX ADMIN — MUPIROCIN: 20 OINTMENT TOPICAL at 09:35

## 2024-07-17 RX ADMIN — ATORVASTATIN CALCIUM 40 MG: 40 TABLET, FILM COATED ORAL at 20:32

## 2024-07-17 RX ADMIN — FAMOTIDINE 20 MG: 20 TABLET, FILM COATED ORAL at 08:44

## 2024-07-17 RX ADMIN — SODIUM CHLORIDE: 9 INJECTION, SOLUTION INTRAVENOUS at 01:08

## 2024-07-17 RX ADMIN — INSULIN LISPRO 3 UNITS: 100 INJECTION, SOLUTION INTRAVENOUS; SUBCUTANEOUS at 21:08

## 2024-07-17 RX ADMIN — SODIUM CHLORIDE, PRESERVATIVE FREE 10 ML: 5 INJECTION INTRAVENOUS at 20:36

## 2024-07-17 RX ADMIN — ACETAMINOPHEN 975 MG: 325 TABLET ORAL at 17:44

## 2024-07-17 RX ADMIN — ACETAMINOPHEN 975 MG: 325 TABLET ORAL at 00:01

## 2024-07-17 RX ADMIN — HEPARIN SODIUM 5000 UNITS: 5000 INJECTION INTRAVENOUS; SUBCUTANEOUS at 08:44

## 2024-07-17 RX ADMIN — AMIODARONE HYDROCHLORIDE 150 MG: 50 INJECTION, SOLUTION INTRAVENOUS at 09:32

## 2024-07-17 RX ADMIN — MAGNESIUM HYDROXIDE 30 ML: 400 SUSPENSION ORAL at 11:05

## 2024-07-17 RX ADMIN — AMIODARONE HYDROCHLORIDE 400 MG: 200 TABLET ORAL at 20:33

## 2024-07-17 RX ADMIN — MAGNESIUM OXIDE TAB 400 MG (241.3 MG ELEMENTAL MG) 400 MG: 400 (241.3 MG) TAB at 08:44

## 2024-07-17 RX ADMIN — INSULIN GLARGINE 7 UNITS: 100 INJECTION, SOLUTION SUBCUTANEOUS at 11:04

## 2024-07-17 RX ADMIN — CHLORHEXIDINE GLUCONATE 15 ML: 1.2 RINSE ORAL at 20:37

## 2024-07-17 RX ADMIN — ALBUMIN (HUMAN) 12.5 G: 0.25 INJECTION, SOLUTION INTRAVENOUS at 08:54

## 2024-07-17 RX ADMIN — AMIODARONE HYDROCHLORIDE 150 MG: 50 INJECTION, SOLUTION INTRAVENOUS at 10:26

## 2024-07-17 RX ADMIN — AMIODARONE HYDROCHLORIDE 1 MG/MIN: 50 INJECTION, SOLUTION INTRAVENOUS at 10:42

## 2024-07-17 RX ADMIN — MUPIROCIN: 20 OINTMENT TOPICAL at 20:37

## 2024-07-17 RX ADMIN — ASPIRIN 81 MG: 81 TABLET, COATED ORAL at 08:44

## 2024-07-17 RX ADMIN — WATER 2000 MG: 1 INJECTION INTRAMUSCULAR; INTRAVENOUS; SUBCUTANEOUS at 05:44

## 2024-07-17 RX ADMIN — IPRATROPIUM BROMIDE AND ALBUTEROL SULFATE 1 DOSE: .5; 3 SOLUTION RESPIRATORY (INHALATION) at 09:11

## 2024-07-17 RX ADMIN — SODIUM CHLORIDE: 4.5 INJECTION, SOLUTION INTRAVENOUS at 01:15

## 2024-07-17 RX ADMIN — BUMETANIDE 2 MG: 0.25 INJECTION INTRAMUSCULAR; INTRAVENOUS at 09:19

## 2024-07-17 RX ADMIN — POLYETHYLENE GLYCOL 3350 17 G: 17 POWDER, FOR SOLUTION ORAL at 08:44

## 2024-07-17 RX ADMIN — HEPARIN SODIUM 5000 UNITS: 5000 INJECTION INTRAVENOUS; SUBCUTANEOUS at 21:30

## 2024-07-17 RX ADMIN — SENNOSIDES AND DOCUSATE SODIUM 1 TABLET: 50; 8.6 TABLET ORAL at 08:44

## 2024-07-17 RX ADMIN — AMIODARONE HYDROCHLORIDE 400 MG: 200 TABLET ORAL at 08:44

## 2024-07-17 RX ADMIN — MAGNESIUM SULFATE HEPTAHYDRATE 2000 MG: 40 INJECTION, SOLUTION INTRAVENOUS at 09:08

## 2024-07-17 RX ADMIN — IPRATROPIUM BROMIDE AND ALBUTEROL SULFATE 1 DOSE: .5; 3 SOLUTION RESPIRATORY (INHALATION) at 19:08

## 2024-07-17 RX ADMIN — INSULIN HUMAN 14 UNITS: 100 INJECTION, SUSPENSION SUBCUTANEOUS at 21:08

## 2024-07-17 RX ADMIN — SODIUM CHLORIDE, PRESERVATIVE FREE 10 ML: 5 INJECTION INTRAVENOUS at 09:00

## 2024-07-17 RX ADMIN — ACETAMINOPHEN 975 MG: 325 TABLET ORAL at 05:44

## 2024-07-17 RX ADMIN — CHLOROTHIAZIDE SODIUM 250 MG: 500 INJECTION, POWDER, LYOPHILIZED, FOR SOLUTION INTRAVENOUS at 17:21

## 2024-07-17 ASSESSMENT — PAIN DESCRIPTION - ORIENTATION: ORIENTATION: ANTERIOR

## 2024-07-17 ASSESSMENT — PAIN SCALES - GENERAL
PAINLEVEL_OUTOF10: 0
PAINLEVEL_OUTOF10: 0
PAINLEVEL_OUTOF10: 5
PAINLEVEL_OUTOF10: 8
PAINLEVEL_OUTOF10: 4

## 2024-07-17 ASSESSMENT — PAIN DESCRIPTION - DESCRIPTORS: DESCRIPTORS: ACHING

## 2024-07-17 NOTE — DIABETES MGMT
[facility] DI[facility]ES HEALTH  DIABETES MANAGEMENT CONSULT    Consulted by Provider for advanced nursing evaluation and care for inpatient blood glucose management.    Setting Blood glucose targets   Critical care 140 - 180 mg/dl   Non-critical care 100 - 180 mg/dl     Evaluation and Action Plan   This 78 year old  male was admitted 7/15/24 for planned CV surgery intervention and underwent CABG X3 same day. Recovering in CVICU. Off vent support and pressors; remains on insulin infusion.     Patient has known Type 2 diabetes on Jardiance and NPH insulin PTA. Metformin had been discontinued. Treated with insulin infusion post-operatively per protocol. Just transitioned off infusion with 7 units. Used an average of 20 units the past two days. Insulin needs higher today @49 units, which is closer to his home dose. Would consider using NPH insulin and dosing with 14-15 units twice daily(which is 70% of home dose).     Management Rationale Action Plan   Medication   Basal needs Based on  [x] Home diabetes regimen     NPH insulin 14 units twice daily    Humalog insulin based on carb consumption    Low corrective approach   Nutritional needs     Corrective insulin     Additional orders   Jardiance per cardiology    60 gram carb-controlled meals     Diabetes Discharge Plan   Medication  TBD     Referral  []        Outpatient diabetes education   Additional orders            Initial Presentation   Shade Birmingham is a 78 y.o. male admitted 7/15/24 for planned surgical intervention and underwent CABG X3 today. Previously, patient had noted dizziness and fatigue and was hospitalized twice recently resulting in new diagnosis of HF noted. ECHO completed 6/26/24 revealed EF of 25-30%. Cardiac cath completed on 7/1/24 with multiple areas of stenosis noted.     HX:  Past Medical History:   Diagnosis Date    Arthritis     Bladder cancer (HCC) 2007    CAD (coronary artery disease)     Cataract     LEFT EYE (NOT RIPE  weight male in no acute distress  Neuro  Alert & oriented  Vital Signs   Vitals:    07/17/24 1100   BP: (!) 85/67   Pulse: 59   Resp: 18   Temp:    SpO2: 91%     Extremities No foot wounds    Diabetic foot exam:    Left Foot     Visual Exam: Warm & dry. Normal toe nails    Pulse DP: +1   Right Foot   Visual Exam: Warm & dry. Normal toe nails    Pulse DP: +1     Laboratory  Recent Labs     07/15/24  1853 07/16/24  0211 07/17/24  0313   WBC 17.9* 21.5* 16.1*   HGB 8.1* 7.5* 7.1*   HCT 23.4* 22.9* 21.4*   .6* 104.6* 102.9*    181 130*     Recent Labs     07/16/24  0211 07/16/24  1401 07/16/24  2345 07/17/24  0313     --  135* 135*   K 4.6  --  4.4 4.7   *  --  106 105   CO2 23  --  22 20*   BUN 21*  --  31* 31*   CREATININE 1.14 1.59* 1.72* 1.71*     Recent Labs     07/16/24  1401 07/16/24  2345 07/17/24  0313   LABGLOM 44* 40* 41*     Lab Results   Component Value Date    ALT 17 07/15/2024    AST 27 07/15/2024    ALKPHOS 55 07/15/2024    BILITOT 0.8 07/15/2024     Lab Results   Component Value Date/Time    TRIG 196 07/02/2024 05:16 AM     Lab Results   Component Value Date    TSH 4.09 (H) 07/15/2024      Lab Results   Component Value Date    LABA1C 6.9 (H) 07/10/2024    LABA1C 6.6 (H) 06/25/2024    LABA1C 7.1 (H) 10/12/2020     Factors impacting BG management  Factor Dose Comments   Nutrition:  Regular meals   Not carb controlled    Pain Scheduled Tylenol, Neurontin & lidocaine patch    Infection Prophylactic Ancef Q8 hours X5 doses  (Completed) Low grade fever  WBC elevated   Other:   Kidney function  Liver function   CLARENCE  Normal liver enzymes      Blood glucose pattern      Significant diabetes-related events  7/15/24 Admission  => on insulin infusion => using 0-8 units/hr  7/16/24 Used 18 units of total insulin  7/17/24 Using 1-3 units of insulin per hour. Transitioned off with 7 units of Lantus    Assessment and Nursing Intervention   Nursing Diagnosis Risk for unstable blood

## 2024-07-17 NOTE — PROGRESS NOTES
Renal Dosing/Monitoring  Medication: famotidine   Current regimen:  20 mg PO/IV every 12 hr  Recent Labs     07/16/24  0211 07/16/24  1401 07/16/24  2345 07/17/24  0313   CREATININE 1.14 1.59* 1.72* 1.71*   BUN 21*  --  31* 31*     Estimated CrCl:  42 ml/min  Plan: Change to 20 mh PO/IV daily for CrCl<50 ml/min    **close after initial review

## 2024-07-17 NOTE — PLAN OF CARE
Problem: Physical Therapy - Adult  Goal: By Discharge: Performs mobility at highest level of function for planned discharge setting.  See evaluation for individualized goals.  Description: FUNCTIONAL STATUS PRIOR TO ADMISSION: Patient was independent and active without use of DME. Denies hx falls. Lives with ex-wife who is available to provide supervision at D/C.     HOME SUPPORT PRIOR TO ADMISSION: The patient lived with ex-wife but did not require assistance.    Physical Therapy Goals  Initiated 7/16/2024  1.  Patient will move from supine to sit and sit to supine in bed with contact guard assist within 5 day(s).    2.  Patient will perform sit to stand with contact guard assist within 5 day(s).  3.  Patient will transfer from bed to chair and chair to bed with contact guard assist using the least restrictive device within 5 day(s).  4.  Patient will ambulate with contact guard assist for 100 feet with the least restrictive device within 5 day(s).   5.  Patient will ascend/descend 1 stairs with no handrail(s) with contact guard assist within 5 day(s).  6.  Patient will perform cardiac exercises per protocol with supervision/set-up within 5 days.  7.  Patient will verbally recall and functionally demonstrate mindful-based movements (\"move in the tube\") principles without cues within 5 days.      Outcome: Progressing   PHYSICAL THERAPY TREATMENT    Patient: Shade Birmingham (78 y.o. male)  Date: 7/17/2024  Diagnosis: Disease of cardiovascular system [I25.10]  Coronary artery disease of native artery of native heart with stable angina pectoris (HCC) [I25.118] Disease of cardiovascular system  Procedure(s) (LRB):  CORONARY ARTERY BYPASS GRAFTING X 3 WITH GUSTAVO GILLIS, LIGATION OF LEFT ATRIAL APPENDAGE, ECC, MC AND EPIAORTIC U/S BY DR VEGA (N/A) 2 Days Post-Op  Precautions: Fall Risk, Surgical Protocols (Move in the tube)                      ASSESSMENT:  Patient continues to benefit from skilled PT services and is  the sternum during load-bearing activities with verbal cues required for compliance.    Cardiac diagnosis intervention:  Patient instructed and educated on mindful movement principles based on “Move in The Tube” concept to include maintaining bilateral elbows close to rib cage when performing any load-bearing activity such as getting in/out of bed, pushing up from a chair, opening a door, or lifting a box.  Patient was given a handout with diagrams of each correct/incorrect method of performing each of the above tasks.    Therapeutic Exercises:   Patient instructed on the benefits and demonstrated cardiac exercises while standing with Minimal Assist. Instructed and indicated understanding on how to progress reps, sets against gravity, pacing through progressive muscle strengthening standing based on surgeon clearance for more weight in prep for functional activity. Instruction on the use of household items in place of weights as needed.    CARDIAC   EXERCISE    Sets    Reps    Active  Active Assist    Passive  Self ROM    Comments    Scapular elevation  1  5  [x]                             []                              []                             []                                Scapular retraction  1  5  [x]                             []                             []                             []                                                                                                                                                                                                                                                             Pain Ratin/10 post-op pain    Pain Intervention(s):   nursing notified, rest, and repositioning    Activity Tolerance:   Good, requires rest breaks, observed shortness of breath on exertion, and desaturates with activity and requires oxygen    After treatment:   Patient left in no apparent distress in bed, Call bell within reach, and Side rails

## 2024-07-17 NOTE — PROGRESS NOTES
Physical Therapy:  07/17/24     Chart reviewed in preparation for PT treatment. Spoke to RN who reports patient just converted into afib, requests PT defer at this time for amio bolus. Will defer and continue to follow for PT treatment as able.     Thank you,  Germania Payan, PT, DPT

## 2024-07-17 NOTE — PROCEDURES
PROCEDURE NOTE  Date: 7/17/2024   Name: Shade Birmingham  YOB: 1945    Procedures        Pleurevac to water seal, no air leak present with deep breathing by patient. Chest tube sites cleansed with alcohol. Sutures cut and removed. Chest tubes discontinued on exhalation. Occlusive dressing applied. Patient tolerated procedure well.

## 2024-07-17 NOTE — CONSULTS
NEPHROLOGY CONSULT NOTE     Patient: Shade Birmingham MRN: [id number]  PCP: Lucas Buckner MD   :     1945  Age:   78 y.o.  Sex:  male      Referring physician: Jori Martinez MD  Reason for consultation: 78 y.o. male with Disease of cardiovascular system [I25.10]  Coronary artery disease of native artery of native heart with stable angina pectoris (HCC) [I25.118] complicated by CLARENCE   Admission Date: 7/15/2024  5:15 AM  LOS: 2 days      ASSESSMENT and PLAN :   CLARENCE on CKD:  - suspect 2/2 post op ATN  - agree with bumex 2mg IV x 1  - hold MRA, SGLT2i, Entresto until CLARENCE resolved  - renal U/S ordered, UA ordered  - daily labs and I/Os  - ok to remove lee if pt able to collect UOP    CKD 3a:  - baseline Cr 1.4    CAD s/p CABG x 3 7/15:  - per CTS    Blood loss anemia:  - would benefit from PRBCs    Afib with RVR:  - ZAHRA occlussion 7/15  - on amio infusion now    HFrEF:  - EF 20-25%  - diurese PRN as above  - holding GDMT until CLARENCE resolves    PAD s/p fem-fem bypass  LICA stenosis  HTN  HLD  DM2  Prostate cancer s/p radiation  COPD     Active Problems / Assessment AAActive  :   Principal Problem:    Disease of cardiovascular system  Active Problems:    Inadequately controlled diabetes mellitus (HCC)    Coronary artery disease of native artery of native heart with stable angina pectoris (HCC)    S/P CABG x 3    S/P left atrial appendage ligation  Resolved Problems:    * No resolved hospital problems. *       Subjective:   HPI: Shade Birmingham is a 78 y.o.  male who has been admitted to the hospital for CABG.  He has a hx of prostate cancer, s/p radiation, mild CKD, baseline Cr around 1.4 preop, CAD, PAD s/p fem-fem bypass, LICA, OA, DM2, CHF, afib, COPD.  He underwent CABG x 3 with ZAHRA occlusion on 7/15.  Cr was 1.5 preop, up to 1.7.  nonoliguric.  Was on pressors for short time and now off.  On NC O2, BP holding stable.  In afib this AM.  Hgb is 7.  He did get bumex this AM.  CXR noted.   Ar   gabapentin (NEURONTIN) 300 MG capsule Take 1 capsule by mouth 3 times daily.    Automatic Reconciliation, Ar   metFORMIN (GLUCOPHAGE) 1000 MG tablet Take 1 tablet by mouth 2 times daily (with meals)    Automatic Reconciliation, Ar       Allergies   Allergen Reactions    Glipizide Other (See Comments)     SKIN BURNING       Social Hx:  reports that he quit smoking about 17 years ago. His smoking use included cigarettes. He has never used smokeless tobacco. He reports current alcohol use of about 14.0 standard drinks of alcohol per week. He reports that he does not use drugs.     Family History   Problem Relation Age of Onset    Cancer Mother         BONE    Lung Disease Father         BLACK LUNG    Pancreatic Cancer Sister     Cancer Brother     No Known Problems Brother     Cancer Brother     Other Brother          AA    Neuropathy Other     Anesth Problems Neg Hx        Review of Systems:  A twelve point review of system was performed today. Pertinent positives and negatives are mentioned in the HPI. The reminder of the ROS is negative and noncontributory.     Objective:    Vitals:    Vitals:    24 0911 24 0957 24 1000 24 1007   BP:  (!) 102/51 (!) 98/49 103/68   Pulse: (!) 107 (!) 101 (!) 105 (!) 142   Resp: 20 21 19    Temp:       TempSrc:       SpO2: 93% 93% 94% (!) 88%   Weight:       Height:         I&O's:   0701 -  0700  In: 1111.1 [P.O.:730; I.V.:381.1]  Out: 192 [Urine:1452]  /68   Pulse (!) 142   Temp 98 °F (36.7 °C) (Oral)   Resp 19   Ht 1.88 m (6' 2\")   Wt 98 kg (216 lb 1.6 oz)   SpO2 (!) 88%   BMI 27.75 kg/m²     Physical Exam:  General:Alert, No distress,   HEENT: Eyes are PERRL.  conjunctival pallor  Neck:Supple,no mass palpable  Lungs : reduced bibasilar breath sounds  CVS: RRR, S1 S2 normal, No rub, 1+ b/l LE edema  Abdomen: Soft, Non tender, No hepatosplenomegaly, bowel sounds present  Extremities: No cyanosis, No clubbing  Skin: No rash or  lesions.  : lee in place  Neurologic: non focal, AAO x 3  Psych: normal affect    Laboratory Results:    Lab Results   Component Value Date    CREATININE 1.71 (H) 07/17/2024    BUN 31 (H) 07/17/2024     (L) 07/17/2024    K 4.7 07/17/2024     07/17/2024    CO2 20 (L) 07/17/2024       Lab Results   Component Value Date    CREATININE 1.71 (H) 07/17/2024    CREATININE 1.72 (H) 07/16/2024    CREATININE 1.59 (H) 07/16/2024    CREATININE 1.14 07/16/2024    CREATININE 1.07 07/15/2024    BUN 31 (H) 07/17/2024    BUN 31 (H) 07/16/2024    BUN 21 (H) 07/16/2024    BUN 21 (H) 07/15/2024    BUN 19 07/15/2024    K 4.7 07/17/2024    K 4.4 07/16/2024    K 4.6 07/16/2024    K 4.3 07/15/2024    K 4.5 07/15/2024       Lab Results   Component Value Date    WBC 16.1 (H) 07/17/2024    RBC 2.08 (L) 07/17/2024    HGB 7.1 (L) 07/17/2024    HCT 21.4 (L) 07/17/2024    .9 (H) 07/17/2024    MCH 34.1 (H) 07/17/2024    RDW 20.0 (H) 07/17/2024     (L) 07/17/2024       Lab Results   Component Value Date    CALCIUM 8.3 (L) 07/17/2024    PHOS 4.1 07/06/2024       Urine dipstick:   No results found for: \"COLOR\", \"CASTS\"        Thank you for allowing us to participate in the care of this patient.   We will follow patient. Please don’t hesitate to call with any questions    Ruperto Moore MD  7/17/2024    [facility] [address], [place] [address]  Phone - [phone]   Fax - [phone]  [email or web address]

## 2024-07-17 NOTE — PROGRESS NOTES
0800- bedside report and drip verified.   Michelle ROMERO and Naomy NP at bedside for rounds. Plan to deline, remove lee, and wean O2 for sats > 90%.    0858- converted to A fib. Naomy NP on unit and ordered amiodarone bolus.    0932- amiodarone bolus started.    1026- 2nd amiodarone bolus given. Will start drip after.    1042- converted back to SR 50's. Stopped amiodarone drip (just started). Updated Naomy NP.    1130- desating to 60's% when turned during US. Patient asymptotic and recovered after placing back supine and sitting upright. Sats now 90%. Jaden ROMERO aware.    1258- transitioned off insulin drip.    1403- updated Naomy NP on Hgb 6.8; orders for transfusion.    1430- blood transfusion started.    1550- chest tubes removed by Naomy LARSON.    1610- right central line removed per order. Patient tolerated well, pressure held and dressing placed.    2000- Bedside and Verbal shift change report given to Edwin RN (oncoming nurse) by Jeffery CHEEMA (offgoing nurse). Report included the following information Nurse Handoff Report, Adult Overview, Intake/Output, Recent Results, and Cardiac Rhythm SR .

## 2024-07-17 NOTE — PROGRESS NOTES
Occupational Therapy  07/17/24    Chart reviewed in prep for OT session. Pt's in Afib with HR up to 140s at rest. Cardiac surgery NP requesting therapy hold at this time. Will follow up later today as able/appropriate.    Thank you,  Fany Carson, VITA Hollins, [place], OTR/L

## 2024-07-17 NOTE — PLAN OF CARE
Problem: Occupational Therapy - Adult  Goal: By Discharge: Performs self-care activities at highest level of function for planned discharge setting.  See evaluation for individualized goals.  Description: FUNCTIONAL STATUS PRIOR TO ADMISSION:    Receives Help From: Family, ADL Assistance: Independent,  ,  ,  ,  ,  , Homemaking Assistance: Independent, Ambulation Assistance: Independent, Transfer Assistance: Independent, Active : Yes     HOME SUPPORT: Patient lived with ex-wife and was completely independent, driving, completing IADLs.    Occupational Therapy Goals:  Initiated 7/16/2024    1.  Patient will perform ADLs standing 5 mins without fatigue or LOB with Contact Guard Assist within 7 days.  2.  Patient will perform upper body ADLs with Set-up within 7 days.  3.  Patient will perform lower body ADLs with Minimal Assist within 7 days.    4.  Patient will perform gathering ADL items high and low 2/2 with Contact Guard Assist within 7 days.  5.  Patient will perform toilet transfers with Contact Guard Assist within 7 days.  6.  Patient will perform all aspects of toileting with Contact Guard Assist within 7 days.  7.  Patient will participate in cardiac/sternal upper extremity therapeutic exercise/activities to increase independence with ADLs with supervision/set-up for 5 minutes within 7 days.   8.  Patient will adhere to Move in the Tube precautions during functional tasks with minimal verbal cues within 7 days.     Outcome: Progressing     OCCUPATIONAL THERAPY TREATMENT  Patient: Shade Birmingham (78 y.o. male)  Date: 7/17/2024  Primary Diagnosis: Disease of cardiovascular system [I25.10]  Coronary artery disease of native artery of native heart with stable angina pectoris (HCC) [I25.118]  Procedure(s) (LRB):  CORONARY ARTERY BYPASS GRAFTING X 3 WITH GUSTAVO GILLIS, LIGATION OF LEFT ATRIAL APPENDAGE, ECC, MC AND EPIAORTIC U/S BY DR VEGA (N/A) 2 Days Post-Op   Precautions: Fall Risk, Surgical Protocols  pushing up from a chair, opening a door, or lifting a box.  Patient has been provided handouts with diagrams of each correct/incorrect method of performing each of the above tasks.     Patient instructed on the ability to utilize upper extremities “outside the tube” when doing any non-load bearing activity such as washing hair/body, brushing teeth, retrieving clothing items, or scratching your back. Patient encouraged to also perform upper extremity exercises \"outside of the tube\" to prevent scar tissue formation around sternal incision site.    Patient instructed on no asymmetrical reaching over head to ensure BUEs are lifted together above 90* of shoulder flexion.      Therapeutic Exercises:   Patient instructed on the benefits and demonstrated cardiac exercises while seated  with Contact Guard Assist. Instructed and indicated understanding on how to progress reps, sets against gravity, pacing through progressive muscle strengthening standing based on surgeon clearance for more weight in prep for basic and instrumental ADLs. Instruction on the use of household items in place of weights as needed.    CARDIAC   EXERCISE    Sets    Reps    Active  Active Assist    Passive  Self ROM    Comments    Shoulder flexion  1  5   [x]                            []                             []                             []                                Shoulder abduction  1  5  [x]                             []                             []                             []                                Scapular elevation  1  5  [x]                             []                              []                             []                                      Pain Rating:  Some chest/incisional pain, not rated      Activity Tolerance:   Fair   Please refer to the flowsheet for vital signs taken during this treatment.    After treatment:   Patient left in no apparent distress in bed, Call bell within reach, and Bed/ chair  alarm activated    COMMUNICATION/EDUCATION:   The patient's plan of care was discussed with: physical therapist and registered nurse    Patient Education  Education Given To: Patient  Education Provided: Role of Therapy;Plan of Care;Home Exercise Program;Precautions;ADL Adaptive Strategies;Transfer Training;Energy Conservation;Mobility Training;Fall Prevention Strategies;Equipment;IADL Safety  Education Provided Comments: Move in the tube  Education Method: Demonstration;Verbal;Teach Back  Barriers to Learning: None  Education Outcome: Verbalized understanding;Demonstrated understanding;Continued education needed    Thank you for this referral.  VITA Taveras  Minutes: 25    Regarding student involvement in patient care:  A student participated in this treatment session. Per CMS Medicare statements and AOTA guidelines I certify that the following was true:  1. I was present and directly observed the entire session.  2. I made all skilled judgments and clinical decisions regarding care.  3. I am the practitioner responsible for assessment, treatment, and documentation.

## 2024-07-17 NOTE — PROGRESS NOTES
Critical Care Medicine Plan of Care    Pt w/ increased O2 requirements o/n, suspect FER component after discussion w/ pt, recommend CPAP QHS and during naps. CXR w/ continued bl edema, net negative w/ diuresis, noted Cr bump this AM. Pt otherwise with continued expected post op course.Critical Care will follow peripherally at this time while they remain in CVICU. Dispo per primary. Please do not hesitate to contact Intensivist service if there is a clinical question, or if pt with acute change in clinical status.     Vance Stanley MD  Staff Intensivist  [facility]

## 2024-07-17 NOTE — PROGRESS NOTES
Cardiac Surgery ICU Progress Note    Admit Date: 7/15/2024  POD:  2 Days Post-Op    Procedure:  Procedure(s):  1. CABG x 3.              Left internal mammary artery to LAD.              Reverse saphenous vein graft to R-PDA.              Reverse saphenous vein graft to INT.  2. EVH of both legs.  3. Epiaortic Ultrasound.  4. ZAHRA occlusion with 45 mm clip.    Admission synopsis:  7/15 DOS: Arrived to ICU intubated and sedated on precedex and epinephrine. Extubated to 4L NC. Albumin x1. Afib, amio gtt. Hany started. Amio discontinued for bradycardia.  7/16 POD1: Albumin x2 with Lasix 40 mg IV in am, 80 mg in afternoon with lackluster response. Weaned off hany and epi in am. O2 increased to 7L from 5L.  7/17 POD2: afib RVR - amio bolus x2 with conversion to SR. Bumex 2 mg IV with concentrated albumin in am, afternoon diuril 250 mg followed by Bumex 2 mg. Nephrology consult. 1 unit PRBC for hgb 6.8. D/C chest tubes.   Subjective:   Patient seen with Dr. Martinez. Pt reports minimal sleep and also his frustration with not being able to use his arms much, he denies nausea. Converted to afib RVR just now rate 130, he is on 6L NC, tmax 37.4.     Objective:   Vitals:      Oxygen Flow Rate: 6L NC     EKG/Rhythm:        Extubation Date / Time: 7/15 17:55 to 4L NC    CT Output: 250 + 220 = 750    CXR: 7/17 on L, 7/16 on R, decreased aeration particularly on R    Xray Result (most recent):  XR CHEST PORTABLE 07/16/2024    Narrative  INDICATION:  Post op open heart surgery    EXAM: CXR Portable.    FINDINGS: Portable chest shows interval extubation and NG tube removal with  stable right IJ CVL and chest drains since yesterday. There is no apparent  pneumothorax.  Lungs show persistent pulmonary edema pattern. Heart size is  stable. There is no midline shift.    Impression  No significant change.      Electronically signed by ADDIE BRYAN       Admission Weight: Last Weight   Weight - Scale: 91.4 kg (201 lb 8 oz) Weight - Scale:  98 kg (216 lb 1.6 oz)     Intake / Output / Drain:  Current Shift: 07/17 0701 - 07/17 1900  In: 112.9 [I.V.:112.9]  Out: 75 [Urine:35]  Last 24 hrs.:   Intake/Output Summary (Last 24 hours) at 7/17/2024 0917  Last data filed at 7/17/2024 0800  Gross per 24 hour   Intake 1092.09 ml   Output 1762 ml   Net -669.91 ml       EXAM:  General:  No acute distress             Lungs:   Diminished throughout   Incision:  No erythema, drainage or swelling.   Heart:  Regular rate and rhythm, faint heart sounds, S1, S2 normal, no murmur, click, rub or gallop.   Abdomen:   Soft, non-tender. Bowel sounds normal. No masses,  No organomegaly. + flatus, - BM   Extremities:  2+ bilateral ankle edema. PPP. Warm.   Neurologic:  Gross motor and sensory apparatus intact. Equal strength bilaterally.     Labs:   Recent Labs     07/15/24  1500 07/15/24  1853 07/17/24  0313   WBC 18.9*   < > 16.1*   HGB 8.4*   < > 7.1*   HCT 24.1*   < > 21.4*      < > 130*      < > 135*   K 4.5   < > 4.7   BUN 19   < > 31*   INR 1.3*  --   --     < > = values in this interval not displayed.        Assessment:     Principal Problem:    Disease of cardiovascular system  Active Problems:    Inadequately controlled diabetes mellitus (HCC)    Coronary artery disease of native artery of native heart with stable angina pectoris (HCC)    S/P CABG x 3    S/P left atrial appendage ligation  Resolved Problems:    * No resolved hospital problems. *       Plan/Recommendations/Medical Decision Making:     Multivessel CAD: PTA Plavix  - Cont ASA 81mg daily  - I have messaged Dr. Carney regarding Plavix necessity - will hold this while inpatient per Dr. Martinez and will potentially resume this at discharge once I hear from vascular  - Continue Atorvastatin   - Will resume coreg when hemodynamically appropriate  - cont epicardial wires, will eval chest tubes this afternoon for removal  - continue cordis while receiving amio bolus this am, d/c lee    Acute

## 2024-07-18 ENCOUNTER — APPOINTMENT (OUTPATIENT)
Facility: HOSPITAL | Age: 79
DRG: 234 | End: 2024-07-18
Attending: THORACIC SURGERY (CARDIOTHORACIC VASCULAR SURGERY)
Payer: MEDICARE

## 2024-07-18 LAB
ABO + RH BLD: NORMAL
ANION GAP SERPL CALC-SCNC: 8 MMOL/L (ref 5–15)
BLD PROD TYP BPU: NORMAL
BLOOD BANK BLOOD PRODUCT EXPIRATION DATE: NORMAL
BLOOD BANK DISPENSE STATUS: NORMAL
BLOOD BANK ISBT PRODUCT BLOOD TYPE: 5100
BLOOD BANK PRODUCT CODE: NORMAL
BLOOD BANK UNIT TYPE AND RH: NORMAL
BLOOD GROUP ANTIBODIES SERPL: NORMAL
BPU ID: NORMAL
BUN SERPL-MCNC: 43 MG/DL (ref 6–20)
BUN/CREAT SERPL: 23 (ref 12–20)
CALCIUM SERPL-MCNC: 8.2 MG/DL (ref 8.5–10.1)
CHLORIDE SERPL-SCNC: 102 MMOL/L (ref 97–108)
CO2 SERPL-SCNC: 26 MMOL/L (ref 21–32)
CREAT SERPL-MCNC: 1.9 MG/DL (ref 0.7–1.3)
CROSSMATCH RESULT: NORMAL
ECHO BSA: 2.18 M2
ECHO LVOT PEAK GRADIENT: 3 MMHG
ECHO LVOT PEAK VELOCITY: 0.9 M/S
ECHO RV TAPSE: 1.1 CM (ref 1.7–?)
ERYTHROCYTE [DISTWIDTH] IN BLOOD BY AUTOMATED COUNT: 20 % (ref 11.5–14.5)
GLUCOSE BLD STRIP.AUTO-MCNC: 156 MG/DL (ref 65–117)
GLUCOSE BLD STRIP.AUTO-MCNC: 170 MG/DL (ref 65–117)
GLUCOSE BLD STRIP.AUTO-MCNC: 183 MG/DL (ref 65–117)
GLUCOSE BLD STRIP.AUTO-MCNC: 278 MG/DL (ref 65–117)
GLUCOSE SERPL-MCNC: 189 MG/DL (ref 65–100)
HCT VFR BLD AUTO: 24.7 % (ref 36.6–50.3)
HGB BLD-MCNC: 6.2 G/DL (ref 12.1–17)
HGB BLD-MCNC: 6.5 G/DL (ref 12.1–17)
HGB BLD-MCNC: 6.5 G/DL (ref 12.1–17)
HGB BLD-MCNC: 6.6 G/DL (ref 12.1–17)
HGB BLD-MCNC: 6.8 G/DL (ref 12.1–17)
HGB BLD-MCNC: 6.9 G/DL (ref 12.1–17)
HGB BLD-MCNC: 7.6 G/DL (ref 12.1–17)
HGB BLD-MCNC: 7.9 G/DL (ref 12.1–17)
HGB BLD-MCNC: 8.2 G/DL (ref 12.1–17)
HGB BLD-MCNC: 9.1 G/DL (ref 12.1–17)
HGB BLD-MCNC: 9.9 G/DL (ref 12.1–17)
MAGNESIUM SERPL-MCNC: 2.5 MG/DL (ref 1.6–2.4)
MCH RBC QN AUTO: 33.1 PG (ref 26–34)
MCHC RBC AUTO-ENTMCNC: 33.2 G/DL (ref 30–36.5)
MCV RBC AUTO: 99.6 FL (ref 80–99)
NRBC # BLD: 0.14 K/UL (ref 0–0.01)
NRBC BLD-RTO: 1.3 PER 100 WBC
PLATELET # BLD AUTO: 119 K/UL (ref 150–400)
PMV BLD AUTO: 10.6 FL (ref 8.9–12.9)
POTASSIUM SERPL-SCNC: 3.7 MMOL/L (ref 3.5–5.1)
RBC # BLD AUTO: 2.48 M/UL (ref 4.1–5.7)
SERVICE CMNT-IMP: ABNORMAL
SODIUM SERPL-SCNC: 136 MMOL/L (ref 136–145)
SPECIMEN EXP DATE BLD: NORMAL
UNIT DIVISION: 0
UNIT ISSUE DATE/TIME: NORMAL
WBC # BLD AUTO: 10.9 K/UL (ref 4.1–11.1)

## 2024-07-18 PROCEDURE — 6360000002 HC RX W HCPCS: Performed by: NURSE PRACTITIONER

## 2024-07-18 PROCEDURE — 85027 COMPLETE CBC AUTOMATED: CPT

## 2024-07-18 PROCEDURE — 6370000000 HC RX 637 (ALT 250 FOR IP)

## 2024-07-18 PROCEDURE — 97116 GAIT TRAINING THERAPY: CPT

## 2024-07-18 PROCEDURE — 36415 COLL VENOUS BLD VENIPUNCTURE: CPT

## 2024-07-18 PROCEDURE — 80048 BASIC METABOLIC PNL TOTAL CA: CPT

## 2024-07-18 PROCEDURE — 94760 N-INVAS EAR/PLS OXIMETRY 1: CPT

## 2024-07-18 PROCEDURE — 6370000000 HC RX 637 (ALT 250 FOR IP): Performed by: STUDENT IN AN ORGANIZED HEALTH CARE EDUCATION/TRAINING PROGRAM

## 2024-07-18 PROCEDURE — 2580000003 HC RX 258: Performed by: NURSE PRACTITIONER

## 2024-07-18 PROCEDURE — 83735 ASSAY OF MAGNESIUM: CPT

## 2024-07-18 PROCEDURE — 97530 THERAPEUTIC ACTIVITIES: CPT

## 2024-07-18 PROCEDURE — 6370000000 HC RX 637 (ALT 250 FOR IP): Performed by: NURSE PRACTITIONER

## 2024-07-18 PROCEDURE — 2060000000 HC ICU INTERMEDIATE R&B

## 2024-07-18 PROCEDURE — 97535 SELF CARE MNGMENT TRAINING: CPT

## 2024-07-18 PROCEDURE — 99231 SBSQ HOSP IP/OBS SF/LOW 25: CPT | Performed by: CLINICAL NURSE SPECIALIST

## 2024-07-18 PROCEDURE — 6360000002 HC RX W HCPCS

## 2024-07-18 PROCEDURE — 2580000003 HC RX 258

## 2024-07-18 PROCEDURE — 82962 GLUCOSE BLOOD TEST: CPT

## 2024-07-18 PROCEDURE — 6370000000 HC RX 637 (ALT 250 FOR IP): Performed by: CLINICAL NURSE SPECIALIST

## 2024-07-18 PROCEDURE — 30233N1 TRANSFUSION OF NONAUTOLOGOUS RED BLOOD CELLS INTO PERIPHERAL VEIN, PERCUTANEOUS APPROACH: ICD-10-PCS | Performed by: THORACIC SURGERY (CARDIOTHORACIC VASCULAR SURGERY)

## 2024-07-18 PROCEDURE — 93308 TTE F-UP OR LMTD: CPT

## 2024-07-18 PROCEDURE — 2700000000 HC OXYGEN THERAPY PER DAY

## 2024-07-18 PROCEDURE — 94640 AIRWAY INHALATION TREATMENT: CPT

## 2024-07-18 PROCEDURE — 71045 X-RAY EXAM CHEST 1 VIEW: CPT

## 2024-07-18 RX ORDER — SIMETHICONE 80 MG
80 TABLET,CHEWABLE ORAL EVERY 6 HOURS PRN
Status: DISCONTINUED | OUTPATIENT
Start: 2024-07-18 | End: 2024-07-22 | Stop reason: HOSPADM

## 2024-07-18 RX ORDER — POTASSIUM CHLORIDE 750 MG/1
20 TABLET, FILM COATED, EXTENDED RELEASE ORAL ONCE
Status: COMPLETED | OUTPATIENT
Start: 2024-07-18 | End: 2024-07-18

## 2024-07-18 RX ORDER — BUMETANIDE 0.25 MG/ML
2 INJECTION INTRAMUSCULAR; INTRAVENOUS ONCE
Status: COMPLETED | OUTPATIENT
Start: 2024-07-18 | End: 2024-07-18

## 2024-07-18 RX ADMIN — SENNOSIDES AND DOCUSATE SODIUM 1 TABLET: 50; 8.6 TABLET ORAL at 08:14

## 2024-07-18 RX ADMIN — SODIUM CHLORIDE, PRESERVATIVE FREE 10 ML: 5 INJECTION INTRAVENOUS at 08:16

## 2024-07-18 RX ADMIN — MUPIROCIN: 20 OINTMENT TOPICAL at 08:14

## 2024-07-18 RX ADMIN — IPRATROPIUM BROMIDE AND ALBUTEROL SULFATE 1 DOSE: .5; 3 SOLUTION RESPIRATORY (INHALATION) at 07:44

## 2024-07-18 RX ADMIN — SIMETHICONE 80 MG: 80 TABLET, CHEWABLE ORAL at 11:41

## 2024-07-18 RX ADMIN — INSULIN LISPRO 2 UNITS: 100 INJECTION, SOLUTION INTRAVENOUS; SUBCUTANEOUS at 09:38

## 2024-07-18 RX ADMIN — CHLORHEXIDINE GLUCONATE 15 ML: 1.2 RINSE ORAL at 08:14

## 2024-07-18 RX ADMIN — HEPARIN SODIUM 5000 UNITS: 5000 INJECTION INTRAVENOUS; SUBCUTANEOUS at 05:58

## 2024-07-18 RX ADMIN — INSULIN LISPRO 6 UNITS: 100 INJECTION, SOLUTION INTRAVENOUS; SUBCUTANEOUS at 12:34

## 2024-07-18 RX ADMIN — ATORVASTATIN CALCIUM 40 MG: 40 TABLET, FILM COATED ORAL at 20:35

## 2024-07-18 RX ADMIN — HEPARIN SODIUM 5000 UNITS: 5000 INJECTION INTRAVENOUS; SUBCUTANEOUS at 13:23

## 2024-07-18 RX ADMIN — MAGNESIUM OXIDE TAB 400 MG (241.3 MG ELEMENTAL MG) 400 MG: 400 (241.3 MG) TAB at 08:14

## 2024-07-18 RX ADMIN — ACETAMINOPHEN 975 MG: 325 TABLET ORAL at 18:49

## 2024-07-18 RX ADMIN — ASPIRIN 81 MG: 81 TABLET, COATED ORAL at 08:14

## 2024-07-18 RX ADMIN — POTASSIUM CHLORIDE 20 MEQ: 750 TABLET, FILM COATED, EXTENDED RELEASE ORAL at 16:25

## 2024-07-18 RX ADMIN — MAGNESIUM HYDROXIDE 30 ML: 400 SUSPENSION ORAL at 13:23

## 2024-07-18 RX ADMIN — ACETAMINOPHEN 975 MG: 325 TABLET ORAL at 00:12

## 2024-07-18 RX ADMIN — MAGNESIUM OXIDE TAB 400 MG (241.3 MG ELEMENTAL MG) 400 MG: 400 (241.3 MG) TAB at 20:35

## 2024-07-18 RX ADMIN — IPRATROPIUM BROMIDE AND ALBUTEROL SULFATE 1 DOSE: .5; 3 SOLUTION RESPIRATORY (INHALATION) at 19:29

## 2024-07-18 RX ADMIN — INSULIN HUMAN 16 UNITS: 100 INJECTION, SUSPENSION SUBCUTANEOUS at 20:33

## 2024-07-18 RX ADMIN — AMIODARONE HYDROCHLORIDE 400 MG: 200 TABLET ORAL at 20:34

## 2024-07-18 RX ADMIN — INSULIN HUMAN 14 UNITS: 100 INJECTION, SUSPENSION SUBCUTANEOUS at 09:39

## 2024-07-18 RX ADMIN — CHLORHEXIDINE GLUCONATE 15 ML: 1.2 RINSE ORAL at 20:35

## 2024-07-18 RX ADMIN — POLYETHYLENE GLYCOL 3350 17 G: 17 POWDER, FOR SOLUTION ORAL at 08:14

## 2024-07-18 RX ADMIN — MUPIROCIN: 20 OINTMENT TOPICAL at 20:35

## 2024-07-18 RX ADMIN — INSULIN LISPRO 1 UNITS: 100 INJECTION, SOLUTION INTRAVENOUS; SUBCUTANEOUS at 20:32

## 2024-07-18 RX ADMIN — AMIODARONE HYDROCHLORIDE 400 MG: 200 TABLET ORAL at 08:14

## 2024-07-18 RX ADMIN — SODIUM CHLORIDE, PRESERVATIVE FREE 10 ML: 5 INJECTION INTRAVENOUS at 20:35

## 2024-07-18 RX ADMIN — ACETAMINOPHEN 975 MG: 325 TABLET ORAL at 11:41

## 2024-07-18 RX ADMIN — FAMOTIDINE 20 MG: 20 TABLET, FILM COATED ORAL at 08:14

## 2024-07-18 RX ADMIN — INSULIN LISPRO 2 UNITS: 100 INJECTION, SOLUTION INTRAVENOUS; SUBCUTANEOUS at 16:24

## 2024-07-18 RX ADMIN — GABAPENTIN 200 MG: 100 CAPSULE ORAL at 20:33

## 2024-07-18 RX ADMIN — POTASSIUM BICARBONATE 40 MEQ: 782 TABLET, EFFERVESCENT ORAL at 10:25

## 2024-07-18 RX ADMIN — GABAPENTIN 200 MG: 100 CAPSULE ORAL at 13:23

## 2024-07-18 RX ADMIN — CHLOROTHIAZIDE SODIUM 250 MG: 500 INJECTION, POWDER, LYOPHILIZED, FOR SOLUTION INTRAVENOUS at 10:20

## 2024-07-18 RX ADMIN — HEPARIN SODIUM 5000 UNITS: 5000 INJECTION INTRAVENOUS; SUBCUTANEOUS at 22:06

## 2024-07-18 RX ADMIN — GABAPENTIN 200 MG: 100 CAPSULE ORAL at 08:14

## 2024-07-18 RX ADMIN — ACETAMINOPHEN 975 MG: 325 TABLET ORAL at 05:57

## 2024-07-18 RX ADMIN — BUMETANIDE 2 MG: 0.25 INJECTION INTRAMUSCULAR; INTRAVENOUS at 10:51

## 2024-07-18 RX ADMIN — SENNOSIDES AND DOCUSATE SODIUM 1 TABLET: 50; 8.6 TABLET ORAL at 20:34

## 2024-07-18 ASSESSMENT — PAIN SCALES - GENERAL
PAINLEVEL_OUTOF10: 0
PAINLEVEL_OUTOF10: 8
PAINLEVEL_OUTOF10: 0

## 2024-07-18 ASSESSMENT — PAIN DESCRIPTION - LOCATION: LOCATION: ABDOMEN

## 2024-07-18 NOTE — PLAN OF CARE
Problem: Physical Therapy - Adult  Goal: By Discharge: Performs mobility at highest level of function for planned discharge setting.  See evaluation for individualized goals.  Description: FUNCTIONAL STATUS PRIOR TO ADMISSION: Patient was independent and active without use of DME. Denies hx falls. Lives with ex-wife who is available to provide supervision at D/C.     HOME SUPPORT PRIOR TO ADMISSION: The patient lived with ex-wife but did not require assistance.    Physical Therapy Goals  Initiated 7/16/2024  1.  Patient will move from supine to sit and sit to supine in bed with contact guard assist within 5 day(s).    2.  Patient will perform sit to stand with contact guard assist within 5 day(s).  3.  Patient will transfer from bed to chair and chair to bed with contact guard assist using the least restrictive device within 5 day(s).  4.  Patient will ambulate with contact guard assist for 100 feet with the least restrictive device within 5 day(s).   5.  Patient will ascend/descend 1 stairs with no handrail(s) with contact guard assist within 5 day(s).  6.  Patient will perform cardiac exercises per protocol with supervision/set-up within 5 days.  7.  Patient will verbally recall and functionally demonstrate mindful-based movements (\"move in the tube\") principles without cues within 5 days.      Outcome: Progressing   PHYSICAL THERAPY TREATMENT    Patient: Shade Birmingham (78 y.o. male)  Date: 7/18/2024  Diagnosis: Disease of cardiovascular system [I25.10]  Coronary artery disease of native artery of native heart with stable angina pectoris (HCC) [I25.118] Disease of cardiovascular system  Procedure(s) (LRB):  CORONARY ARTERY BYPASS GRAFTING X 3 WITH GUSTAVO GILLIS, LIGATION OF LEFT ATRIAL APPENDAGE, ECC, MC AND EPIAORTIC U/S BY DR VEGA (N/A) 3 Days Post-Op  Precautions: Fall Risk, Surgical Protocols (Move in the tube)                      ASSESSMENT:  Patient continues to benefit from skilled PT services and is  weight bearing restrictions limiting activity or patient is unable to maintain    IF patient discharges home will need the following DME: continuing to assess with progress       SUBJECTIVE:   Patient stated, \"I don't understand why I can't reach out for the counter.\"    OBJECTIVE DATA SUMMARY:   Critical Behavior:  Orientation  Overall Orientation Status: Within Normal Limits  Cognition  Overall Cognitive Status: Exceptions  Arousal/Alertness: Appears intact  Following Commands: Appears intact  Attention Span: Appears intact  Memory: Decreased recall of precautions  Safety Judgement: Decreased awareness of need for safety;Decreased awareness of need for assistance  Problem Solving: Decreased awareness of errors  Insights: Decreased awareness of deficits  Initiation: Appears intact  Sequencing: Requires cues for some    Functional Mobility Training:  Bed Mobility:  Bed Mobility Training  Bed Mobility Training: No  Transfers:  Transfer Training  Transfer Training: Yes  Sit to Stand: Moderate assistance;Assist X2  Stand to Sit: Moderate assistance;Assist X2 (poor eccentric quadriceps control)  Bed to Chair: Moderate assistance;Assist X2  Balance:  Balance  Sitting: Intact  Standing: Impaired  Standing - Static: Fair;Constant support  Standing - Dynamic: Fair;Poor;Constant support   Ambulation/Gait Training:     Gait  Gait Training: Yes  Overall Level of Assistance: Moderate assistance;Assist X2  Distance (ft): 20 Feet  Assistive Device: Gait belt  Interventions: Safety awareness training;Tactile cues;Verbal cues;Manual cues;Weight shifting training/pressure relief  Base of Support: Widened  Speed/[place]at: Slow;Shuffled  Step Length: Right shortened;Left shortened  Gait Abnormalities: Shuffling gait;Decreased step clearance;Trunk sway increased        Neuro Re-Education:                                                                                                                                                                                                                                          Intervention/Education specific to: \"Move in the tube\"  Patient mobilized on continuous portable monitor/telemetry.    The patient is not verbalizing and is not demonstrating understanding of mindful-based movements (\"move in the tube\") principles of keeping UEs proximal to ribcage to prevent lateral pull on the sternum during load-bearing activities with visual, verbal, and tactile  cues required for compliance.    Cardiac diagnosis intervention:  Patient instructed and educated on mindful movement principles based on “Move in The Tube” concept to include maintaining bilateral elbows close to rib cage when performing any load-bearing activity such as getting in/out of bed, pushing up from a chair, opening a door, or lifting a box.  Patient was given a handout with diagrams of each correct/incorrect method of performing each of the above tasks.                                                                                                                                                                                                                               Pain Ratin/10 abdominal pain in setting of constipation     Pain Intervention(s):   nursing notified and addressing, rest, and repositioning    Activity Tolerance:   Good, requires frequent rest breaks, observed shortness of breath on exertion, and desaturates with activity and requires oxygen    After treatment:   Patient left in no apparent distress sitting up in chair and Call bell within reach      COMMUNICATION/EDUCATION:   The patient's plan of care was discussed with: occupational therapist, registered nurse, and     Patient Education  Education Given To: Patient  Education Provided: Plan of Care;Transfer Training;Energy Conservation;Fall Prevention Strategies;Precautions  Education Provided Comments: move in the tube, PLB  Education Method:  Verbal;Demonstration  Barriers to Learning: None  Education Outcome: Verbalized understanding;Demonstrated understanding;Continued education needed      Germania Payan, PT, DPT  Minutes: 29

## 2024-07-18 NOTE — PROGRESS NOTES
Spiritual Care Assessment/Progress Note  [facility]    Name: Shade Birmingham MRN: [id number]    Age: 78 y.o.     Sex: male   Language: English     Date: 7/18/2024            Total Time Calculated: 20 min              Spiritual Assessment begun in [facility] 4 CV INTNSV CARE  Service Provided For: Patient  Referral/Consult From: Rounding  Encounter Overview/Reason: Attempted Encounter    Spiritual beliefs:      [] Involved in a ayesha tradition/spiritual practice:      [] Supported by a ayesha community:      [] Claims no spiritual orientation:      [] Seeking spiritual identity:           [] Adheres to an individual form of spirituality:      [x] Not able to assess:                Identified resources for coping and support system:   Support System: Unknown       [] Prayer                  [] Devotional reading               [] Music                  [] Guided Imagery     [] Pet visits                                        [] Other: (COMMENT)     Specific area/focus of visit   Encounter:    Crisis:    Spiritual/Emotional needs:    Ritual, Rites and Sacraments:    Grief, Loss, and Adjustments:    Ethics/Mediation:    Behavioral Health:    Palliative Care:    Advance Care Planning:           Narrative:   Referral source:   Shade Birmingham at [facility] in [facility] 4 CV INTNSV CARE. I reviewed the medical record prior to this encounter.    Spiritual Assessment:     Patient appeared to be sleeping. No family present. Provided ministry of presence and silent prayer.    Plan of Care: Please contact [facility] for future services.     Visited by: Chaplain Graciela Anguiano Mdiv, [facility]   paging Service [phone])

## 2024-07-18 NOTE — DIABETES MGMT
[facility]  PROGRAM FOR DIABETES HEALTH  DIABETES MANAGEMENT CONSULT    Consulted by Provider for advanced nursing evaluation and care for inpatient blood glucose management.    Setting Blood glucose targets   Critical care 140 - 180 mg/dl   Non-critical care 100 - 180 mg/dl     Evaluation and Action Plan   This 78 year old  male was admitted 7/15/24 for planned CV surgery intervention and underwent CABG X3 same day. Recovering in CVICU. Off vent support, pressors, and insulin infusion. Chest tubes out. Remains on 02NC. Given PRBCs for blood loss anemia. Belly discomfort as patient has not had bowel movement. PT/OT working with patient.     Patient has known Type 2 diabetes on Jardiance and NPH insulin PTA. Metformin had been discontinued. Treated with insulin infusion post-operatively per protocol. Just transitioned off infusion with 7 units. Used an average of 20 units the past two days. Insulin needs higher today @49 units, which is closer to his home dose. Would consider using NPH insulin and dosing with 14-15 units twice daily(which is 70% of home dose).     Management Rationale Action Plan   Medication   Basal needs Based on  [x] Home diabetes regimen     Increase NPH insulin to 16 units twice daily    Humalog insulin based on carb consumption    Low corrective approach   Nutritional needs     Corrective insulin     Additional orders   Jardiance per cardiology    60 gram carb-controlled meals     Diabetes Discharge Plan   Medication  TBD     Referral  []        Outpatient diabetes education   Additional orders            Initial Presentation   Shade Birmingham is a 78 y.o. male admitted 7/15/24 for planned surgical intervention and underwent CABG X3 today. Previously, patient had noted dizziness and fatigue and was hospitalized twice recently resulting in new diagnosis of HF noted. ECHO completed 6/26/24 revealed EF of 25-30%. Cardiac cath completed on 7/1/24 with multiple areas of stenosis noted.  ablations and permanent pacemaker.      Overall evaluation:    [x] Achieves A1c target with drug therapy & self-care practices    Subjective   Working with PT/OT     Objective   Physical exam  General Normal weight male in no acute distress  Neuro  Alert & oriented  Vital Signs   Vitals:    07/18/24 0800   BP:    Pulse:    Resp:    Temp: 97.9 °F (36.6 °C)   SpO2:      Extremities No foot wounds    Diabetic foot exam:    Left Foot     Visual Exam: Warm & dry. Normal toe nails    Pulse DP: +1   Right Foot   Visual Exam: Warm & dry. Normal toe nails    Pulse DP: +1     Laboratory  Recent Labs     07/16/24  0211 07/17/24  0313 07/17/24  1316 07/18/24  0426   WBC 21.5* 16.1*  --  10.9   HGB 7.5* 7.1* 6.8* 8.2*   HCT 22.9* 21.4* 20.5* 24.7*   .6* 102.9*  --  99.6*    130*  --  119*       Recent Labs     07/16/24  2345 07/17/24  0313 07/18/24  0426   * 135* 136   K 4.4 4.7 3.7    105 102   CO2 22 20* 26   BUN 31* 31* 43*   CREATININE 1.72* 1.71* 1.90*       Recent Labs     07/16/24  2345 07/17/24  0313 07/18/24  0426   LABGLOM 40* 41* 36*       Lab Results   Component Value Date    ALT 17 07/15/2024    AST 27 07/15/2024    ALKPHOS 55 07/15/2024    BILITOT 0.8 07/15/2024     Lab Results   Component Value Date/Time    TRIG 196 07/02/2024 05:16 AM     Lab Results   Component Value Date    TSH 4.09 (H) 07/15/2024      Lab Results   Component Value Date    LABA1C 6.9 (H) 07/10/2024    LABA1C 6.6 (H) 06/25/2024    LABA1C 7.1 (H) 10/12/2020     Factors impacting BG management  Factor Dose Comments   Nutrition:  Regular meals   60 gm carb control   Eating well per nursing   Blood transfusions PRBC (7/17/24) A1c inaccurate for next 3 months   Pain Scheduled Tylenol, Neurontin & lidocaine patch    Infection Prophylactic Ancef  (Completed) Afebrile  WBC normalized   Other:   Kidney function  Liver function   CLARENCE  Normal liver enzymes      Blood glucose pattern      Significant diabetes-related

## 2024-07-18 NOTE — PROGRESS NOTES
7/18/2024    0800: Bedside shift report received from Skinny CHEEMA.  - Patient resting comfortably in chair.   - Heart failure team rounded on patient.  - Weaned patient's oxygen per MD from 6L to 4L (O2 goal is 90%)    1000: Diuretics ordered and administered.     1100: Patient ambulating hallway with pt.     1200: Pt responding well to diuretics.     1400: NC O2 weaned to 2L, SpO2 @goal.     1500: Echo @bedside.     1600:Transfer order in place.     1930: Bedside shift report given to Skinny CHEEMA

## 2024-07-18 NOTE — PLAN OF CARE
Problem: Occupational Therapy - Adult  Goal: By Discharge: Performs self-care activities at highest level of function for planned discharge setting.  See evaluation for individualized goals.  Description: FUNCTIONAL STATUS PRIOR TO ADMISSION:    Receives Help From: Family, ADL Assistance: Independent,  ,  ,  ,  ,  , Homemaking Assistance: Independent, Ambulation Assistance: Independent, Transfer Assistance: Independent, Active : Yes     HOME SUPPORT: Patient lived with ex-wife and was completely independent, driving, completing IADLs.    Occupational Therapy Goals:  Initiated 7/16/2024    1.  Patient will perform ADLs standing 5 mins without fatigue or LOB with Contact Guard Assist within 7 days.  2.  Patient will perform upper body ADLs with Set-up within 7 days.  3.  Patient will perform lower body ADLs with Minimal Assist within 7 days.    4.  Patient will perform gathering ADL items high and low 2/2 with Contact Guard Assist within 7 days.  5.  Patient will perform toilet transfers with Contact Guard Assist within 7 days.  6.  Patient will perform all aspects of toileting with Contact Guard Assist within 7 days.  7.  Patient will participate in cardiac/sternal upper extremity therapeutic exercise/activities to increase independence with ADLs with supervision/set-up for 5 minutes within 7 days.   8.  Patient will adhere to Move in the Tube precautions during functional tasks with minimal verbal cues within 7 days.     Outcome: Progressing   OCCUPATIONAL THERAPY TREATMENT  Patient: Shade Birmingham (78 y.o. male)  Date: 7/18/2024  Primary Diagnosis: Disease of cardiovascular system [I25.10]  Coronary artery disease of native artery of native heart with stable angina pectoris (HCC) [I25.118]  Procedure(s) (LRB):  CORONARY ARTERY BYPASS GRAFTING X 3 WITH GUSTAVO GILLIS, LIGATION OF LEFT ATRIAL APPENDAGE, ECC, MC AND EPIAORTIC U/S BY DR VEGA (N/A) 3 Days Post-Op   Precautions: Fall Risk, Surgical Protocols  stated “I'm confused like the devil.”    OBJECTIVE DATA SUMMARY:   Cognitive/Behavioral Status:  Orientation  Overall Orientation Status: Within Normal Limits  Cognition  Overall Cognitive Status: Exceptions  Arousal/Alertness: Appears intact  Following Commands: Appears intact  Attention Span: Appears intact  Memory: Decreased recall of precautions  Safety Judgement: Decreased awareness of need for safety;Decreased awareness of need for assistance  Problem Solving: Decreased awareness of errors  Insights: Decreased awareness of deficits  Initiation: Appears intact  Sequencing: Requires cues for some    Functional Mobility and Transfers for ADLs:  Bed Mobility:  Bed Mobility Training  Bed Mobility Training: No     Transfers:   Transfer Training  Transfer Training: Yes  Sit to Stand: Moderate assistance;Assist X2  Stand to Sit: Moderate assistance;Assist X2 (poor eccentric quadriceps control)  Bed to Chair: Moderate assistance;Assist X2           Balance:     Balance  Sitting: Intact  Standing: Impaired  Standing - Static: Fair;Constant support  Standing - Dynamic: Fair;Poor;Constant support      ADL Intervention:                   Grooming: Minimal assistance   Grooming Skilled Clinical Factors: oral hygiene and washed face standing at sink, mod A for balance                                                   Functional Mobility: Moderate assistance  Functional Mobility Skilled Clinical Factors: Mod Ax2 for transfers and functional mobility, SpO2 down to mid 80s with mobility, HR stable, recovering SpO2 with PLB and rest breaks     Skin Care: Bath wipes;Chlorhexidine wipes      Patient instructed and educated on mindful movement principles based on “Move in The Tube” maintaining bilateral elbows close to rib cage when performing any load-bearing activity.  Educated on applying this concept when getting in/out of bed, pushing up from a chair, opening a door, or lifting a box.  Patient has been provided handouts with  diagrams of each correct/incorrect method of performing each of the above tasks.     Patient instructed on the ability to utilize upper extremities “outside the tube” when doing any non-load bearing activity such as washing hair/body, brushing teeth, retrieving clothing items, or scratching your back. Patient encouraged to also perform upper extremity exercises \"outside of the tube\" to prevent scar tissue formation around sternal incision site.      Pain Ratin/10 stomach ache      Activity Tolerance:   Fair , requires rest breaks, observed shortness of breath on exertion, and desaturates with activity and requires oxygen  Please refer to the flowsheet for vital signs taken during this treatment.    After treatment:   Patient left in no apparent distress sitting up in chair, Call bell within reach, and Bed/ chair alarm activated    COMMUNICATION/EDUCATION:   The patient's plan of care was discussed with: physical therapist and registered nurse    Patient Education  Education Given To: Patient  Education Provided: Role of Therapy;Plan of Care;Home Exercise Program;Precautions;ADL Adaptive Strategies;Transfer Training;Energy Conservation;Mobility Training;Fall Prevention Strategies;Equipment;IADL Safety  Education Provided Comments: Move in the tube  Education Method: Demonstration;Verbal;Teach Back  Barriers to Learning: None  Education Outcome: Verbalized understanding;Demonstrated understanding;Continued education needed    Thank you for this referral.  Fany Carson [facility]  Minutes: 27    Regarding student involvement in patient care:  A student participated in this treatment session. Per CMS Medicare statements and AOTA guidelines I certify that the following was true:  1. I was present and directly observed the entire session.  2. I made all skilled judgments and clinical decisions regarding care.  3. I am the practitioner responsible for assessment, treatment, and documentation.

## 2024-07-18 NOTE — PROGRESS NOTES
Cardiac Surgery ICU Progress Note    Admit Date: 7/15/2024  POD:  3 Days Post-Op    Procedure:   7/15/24 with Dr. Martinez:  1. CABG x 3.              Left internal mammary artery to LAD.              Reverse saphenous vein graft to R-PDA.              Reverse saphenous vein graft to INT.  2. EVH of both legs.  3. Epiaortic Ultrasound.  4. ZAHRA occlusion with 45 mm clip.    Admission synopsis:  7/15 DOS: Elective CABG with Dr. Martinez. Post op MC, EF 50%, hypokinesis of inferior walls, no residual ZAHRA seen. Arrived to ICU intubated and sedated on precedex and epinephrine. Extubated to 4L NC. Albumin x1. Afib, amio gtt. Hany started. Amio discontinued for bradycardia.  7/16 POD1: Albumin x2 with Lasix 40 mg IV in am, 80 mg in afternoon with lackluster response. Weaned off hany and epi in am. O2 increased to 7L from 5L.  7/17 POD2: afib RVR - amio bolus x2 with conversion to SR. Bumex 2 mg IV with concentrated albumin in am, afternoon diuril 250 mg followed by Bumex 2 mg. Nephrology consult. 1 unit PRBC for hgb 6.8. D/C chest tubes.  7/18 POD 3: Ongoing afib, predominantly rate controlled but IV required 2.5mg IV metoprolol last night. Cr continues to uptrend 1.9, diuril and IV bumex once today. 6L NC. Possible transfer this afternoon.   Subjective:   Patient seen with Dr. Martinez, up in chair, 6L NC, rates varies between SR in the 70s and Afib in low 100s. Afebrile. He states he slept on and off overnight.      Objective:   Vitals:      Oxygen Flow Rate: 6L NC     EKG/Rhythm:        Extubation Date / Time: 7/15 17:55 to 4L NC    CT Output: Discontinued 7/17  AV wires (pull when appropriate)    CXR: 7/18 on left ongoing pulmonary edema/atelectasis     Xray Result (most recent):  XR CHEST PORTABLE 07/18/2024    Narrative  EXAM: XR CHEST PORTABLE    DATE: 7/18/2024 4:55 AM    INDICATION: Post op open heart surgery    COMPARISON: 7/17/2024    TECHNIQUE: A portable AP radiograph of the chest was  messaged Dr. Carney regarding Plavix necessity - will hold this while inpatient per Dr. Martinez and will potentially resume this at discharge once I hear from vascular  - Continue Atorvastatin   - Will resume coreg when hemodynamically appropriate  - Chest tubes pulled 7/17; continue wires (pull when appropriate)     Acute postoperative hypotension without shock: R/t hypovolemia, cardiomyopathy, and influenced by CPB related vasoplegia  Improved. Weaned off pressors POD1.  - Slow position changes  - Encourage high protein diet      Chronic HFrEF r/t ICM: EF 25-30%; PTA Coreg 3.125 mg, Bumex 1mg QOD, Spironolactone 25mg QOD, Jardiance, Entresto  - Post op MC, EF 50% on Epinephrine  - Repeat ECHO pending (7/18) to evaluate for need for LifeVest  GDMT:   - ARNi/ACEi/ARB: will resume Entresto when hemodynamically appropriate   - BB: will resume coreg when hemodynamically appropriate   - MRA: will resume when hemodynamically appropriate   - SGLT2i: will resume when appropriate   - Consult Advanced Heart Failure for post-op Management  - Diuril and Bumex today     Paroxymal Atrial Fibrillation s/p ZAHRA occlusion: No residual shelf on postop MC. PTA Eliquis 5mg BID   - Amio bolus x2 followed by continuous infusion. Goal is rate control not conversion as afib is chronic problem  - Remains on amio at 0.5 and PO amiodarone 400mg BID  - Maintain mg > 2.4, k > 3.9  - Will resume coreg when able this morning, MAP currently 63-70  - Discussed with Intensivist, if ongoing RVR, consider initiation of digoxin      HTN: PTA Coreg 3.125mg, Entresto. Currently inactive.     Hypertriglyceridemia: PTA Atorvastatin 40mg  - Continue Atorvastatin      PAD s/p Fem-Fem Bypass, moderate LICA stenosis: Bypass in October 2020 with Dr. Heck (now follows with Rom[facility]) left to right fem-fem bypass with R CFA endarterectomy, left iliac angioplasty. +Claudication, worsening  - Has follow up with Vascular Surgery outpatient in  mg TID    Nutrition: advance as tolerated  GI proph: pepcid  Bowel reg: glycolax, senokot, prn bisacodyl  DVT proph: Heparin SQ, SCDs, ambulate    Dispo: Continue ICU level of care, would like to see oxygen needs down some. Monitor rhythm, if does not require bolus of betablocker or amiodarone, will transfer this afternoon.     Signed By: EDITH DANGELO - MARSHA

## 2024-07-18 NOTE — PROGRESS NOTES
Cardiac Surgery Care Coordinator- Met with Shade Birmingham  reviewed plan of care and discussed potential discharge plan. Reinforced sternal precautions and encouraged continued use of the incentive spirometer. Reviewed goals for the day and emphasized the importance of increased activity to meet discharge goals. Will continue to follow for educational and emotional needs.

## 2024-07-18 NOTE — CARE COORDINATION
Transition of Care Plan:    RUR: 25%  Prior Level of Functioning: Lives w ex wife, ind, drives, owns multiple DME  Disposition: Pending IPR decision and choice from patient  If SNF or IPR: Date FOC offered: 7/18  Date FOC received:   Accepting facility:   Date authorization started with reference number: NA  Follow up appointments: Specialist  DME needed: Defer to IPR  Transportation at discharge: Stretcher w O2 most likely  IM/IMM Medicare/ letter given: 7/16  Caregiver Contact: García Valencia [phone]  Discharge Caregiver contacted prior to discharge?   Care Conference needed?   Barriers to discharge:  Medical, IPR choice, placement    Therapy is recommending IPR and CM met w patient and his visitor at bedside to provide education and IPR choice.  Patient requests to think about it and CM agreed to follow up later today.    Update 3:28pm: Attempted to see patient to f/u on IPR but he was sleeping.    ARLENE Quezada CCM  Care Management

## 2024-07-18 NOTE — PROGRESS NOTES
[address], [facility] [place] [address]  Phone: [phone]   [phone]    [email or web address]     Nephrology Progress Note    Patient Name : Shade Birmingham      : 1945     MRN : [id number]  Date of Admission : 7/15/2024  Date of Servive : 24    CC:  Follow up for CLARENCE on CKD       Assessment and Plan   CLARENCE on CKD:  - suspect 2/2 post op ATN  - Cr up, good UOP  - hold MRA, SGLT2i, Entresto until CLARENCE resolved  - diurese PRN  - daily labs and I/Os  - no urgent need for RRT     CKD 3a:  - baseline Cr 1.4  - US neg for hydro, UA Pending     CAD s/p CABG x 3 7/15:  - per CTS     Blood loss anemia:  - PRBCs     Afib with RVR:  - ZAHRA occlussion 7/15  - on amio infusion now     HFrEF:  - EF 20-25%  - diurese PRN as above  - hold GDMT until CLARENCE resolves     PAD s/p fem-fem bypass  LICA stenosis  HTN  HLD  DM2  Prostate cancer s/p radiation  COPD     Interval History:  Seen and examined.  Resting in bed.  Received PRBCs then bumex/diuril yesterday evening.  SOB improving.  BP stable, HR controlled.    Review of Systems: Pertinent items are noted in HPI.    Current Medications:   Current Facility-Administered Medications   Medication Dose Route Frequency    guaiFENesin (MUCINEX) extended release tablet 600 mg  600 mg Oral PRN    heparin (porcine) injection 5,000 Units  5,000 Units SubCUTAneous 3 times per day    amiodarone (CORDARONE) 450 mg in dextrose 5 % 250 mL infusion ([id number])  0.5 mg/min IntraVENous Continuous    insulin NPH (HumuLIN N;NovoLIN N) injection vial 14 Units  14 Units SubCUTAneous 2 times per day    famotidine (PEPCID) tablet 20 mg  20 mg Oral Daily    Or    famotidine (PEPCID) 20 mg in sodium chloride (PF) 0.9 % 10 mL injection  20 mg IntraVENous Daily    0.9 % sodium chloride infusion   IntraVENous PRN    metoprolol (LOPRESSOR) injection 2.5 mg  2.5 mg IntraVENous Q6H PRN    gabapentin (NEURONTIN) capsule 200 mg  200 mg Oral  TID    norepinephrine (LEVOPHED) 16 mg in sodium chloride 0.9 % 250 mL infusion  1-100 mcg/min IntraVENous Continuous    ipratropium 0.5 mg-albuterol 2.5 mg (DUONEB) nebulizer solution 1 Dose  1 Dose Inhalation TID RT    atorvastatin (LIPITOR) tablet 40 mg  40 mg Oral Nightly    melatonin tablet 3 mg  3 mg Oral Nightly PRN    0.9 % sodium chloride infusion   IntraVENous Continuous    0.45 % sodium chloride infusion   IntraVENous Continuous    sodium chloride flush 0.9 % injection 5-40 mL  5-40 mL IntraVENous 2 times per day    sodium chloride flush 0.9 % injection 5-40 mL  5-40 mL IntraVENous PRN    ondansetron (ZOFRAN) injection 4 mg  4 mg IntraVENous Q4H PRN    aspirin EC tablet 81 mg  81 mg Oral Daily    acetaminophen (TYLENOL) tablet 975 mg  975 mg Oral 4 times per day    lidocaine 4 % external patch 2 patch  2 patch Topical Daily    oxyCODONE (ROXICODONE) immediate release tablet 5 mg  5 mg Oral Q4H PRN    Or    oxyCODONE (ROXICODONE) immediate release tablet 10 mg  10 mg Oral Q4H PRN    HYDROmorphone HCl PF (DILAUDID) injection 0.5 mg  0.5 mg IntraVENous Q4H PRN    amiodarone (CORDARONE) tablet 400 mg  400 mg Oral BID    chlorhexidine (PERIDEX) 0.12 % solution 15 mL  15 mL Mouth/Throat BID    hydrALAZINE (APRESOLINE) injection 10 mg  10 mg IntraVENous Q6H PRN    magnesium oxide (MAG-OX) tablet 400 mg  400 mg Oral BID    mupirocin (BACTROBAN) 2 % ointment   Each Nostril BID    diphenhydrAMINE (BENADRYL) capsule 25 mg  25 mg Oral Nightly PRN    polyethylene glycol (GLYCOLAX) packet 17 g  17 g Oral Daily    sennosides-docusate sodium (SENOKOT-S) 8.6-50 MG tablet 1 tablet  1 tablet Oral BID    bisacodyl (DULCOLAX) suppository 10 mg  10 mg Rectal Daily PRN    potassium chloride 20 mEq/50 mL IVPB (Central Line)  20 mEq IntraVENous PRN    magnesium sulfate 2000 mg in 50 mL IVPB premix  2,000 mg IntraVENous PRN    phenylephrine (RODERICK-SYNEPHRINE) 50 mg in sodium chloride 0.9 % 250 mL infusion ([id number])   mcg/min  IntraVENous Continuous    insulin (HumuLIN R) 100 units in sodium chloride 0.9% 100 mL infusion  0.1-50 Units/hr IntraVENous Continuous    insulin lispro (HUMALOG,ADMELOG) injection vial 1-20 Units  1-20 Units SubCUTAneous TID WC    insulin lispro (HUMALOG,ADMELOG) injection vial 0-12 Units  0-12 Units SubCUTAneous TID WC    insulin lispro (HUMALOG,ADMELOG) injection vial 0-6 Units  0-6 Units SubCUTAneous Nightly    glucose chewable tablet 16 g  4 tablet Oral PRN    dextrose bolus 10% 125 mL  125 mL IntraVENous PRN    Or    dextrose bolus 10% 250 mL  250 mL IntraVENous PRN    glucagon injection 1 mg  1 mg SubCUTAneous PRN    dextrose 10 % infusion   IntraVENous Continuous PRN    EPINEPHrine 5 mg in sodium chloride 0.9 % 250 mL infusion  0-20 mcg/min IntraVENous Continuous      Allergies   Allergen Reactions    Glipizide Other (See Comments)     SKIN BURNING       Objective:  Vitals:    Vitals:    07/18/24 0400 07/18/24 0500 07/18/24 0659 07/18/24 0800   BP: (!) 115/49 (!) 121/50     Pulse: 71 99     Resp: 15 16     Temp: 98.2 °F (36.8 °C)   97.9 °F (36.6 °C)   TempSrc: Oral   Oral   SpO2: 91% 92%     Weight:   98.3 kg (216 lb 11.4 oz)    Height:         Intake and Output:  No intake/output data recorded.  07/16 1901 - 07/18 0700  In: 2513.5 [P.O.:1620; I.V.:474.5]  Out: 5077 [Urine:4717]    Physical Examination:  General: NAD,on NC  Neck:  Supple, no mass  Resp:  Lungs CTA B/L  CV:  RRR,  no murmur or rub, trace b/l LE edema  GI:  Soft, NT, + BS, no HS megaly  Neurologic:  Non focal  Psych:             AAO x 3 appropriate affect   Skin:  No Rash  :  No lee    []    High complexity decision making was performed  []    Patient is at high-risk of decompensation with multiple organ involvement    Lab Data Personally Reviewed: I have reviewed all the pertinent labs, microbiology data and radiology studies during assessment.    Labs:  Recent Labs     07/16/24  2345 07/17/24  0313 07/18/24  0426   * 135* 136   K

## 2024-07-19 ENCOUNTER — APPOINTMENT (OUTPATIENT)
Facility: HOSPITAL | Age: 79
DRG: 234 | End: 2024-07-19
Attending: THORACIC SURGERY (CARDIOTHORACIC VASCULAR SURGERY)
Payer: MEDICARE

## 2024-07-19 LAB
ANION GAP SERPL CALC-SCNC: 8 MMOL/L (ref 5–15)
BUN SERPL-MCNC: 42 MG/DL (ref 6–20)
BUN/CREAT SERPL: 29 (ref 12–20)
CALCIUM SERPL-MCNC: 8.5 MG/DL (ref 8.5–10.1)
CHLORIDE SERPL-SCNC: 100 MMOL/L (ref 97–108)
CO2 SERPL-SCNC: 28 MMOL/L (ref 21–32)
CREAT SERPL-MCNC: 1.44 MG/DL (ref 0.7–1.3)
ERYTHROCYTE [DISTWIDTH] IN BLOOD BY AUTOMATED COUNT: 19.6 % (ref 11.5–14.5)
GLUCOSE BLD STRIP.AUTO-MCNC: 134 MG/DL (ref 65–117)
GLUCOSE BLD STRIP.AUTO-MCNC: 138 MG/DL (ref 65–117)
GLUCOSE BLD STRIP.AUTO-MCNC: 194 MG/DL (ref 65–117)
GLUCOSE BLD STRIP.AUTO-MCNC: 348 MG/DL (ref 65–117)
GLUCOSE SERPL-MCNC: 99 MG/DL (ref 65–100)
HCT VFR BLD AUTO: 23.6 % (ref 36.6–50.3)
HGB BLD-MCNC: 8.3 G/DL (ref 12.1–17)
MAGNESIUM SERPL-MCNC: 2.5 MG/DL (ref 1.6–2.4)
MCH RBC QN AUTO: 34 PG (ref 26–34)
MCHC RBC AUTO-ENTMCNC: 35.2 G/DL (ref 30–36.5)
MCV RBC AUTO: 96.7 FL (ref 80–99)
NRBC # BLD: 0.09 K/UL (ref 0–0.01)
NRBC BLD-RTO: 0.7 PER 100 WBC
PLATELET # BLD AUTO: 147 K/UL (ref 150–400)
PMV BLD AUTO: 10.8 FL (ref 8.9–12.9)
POTASSIUM SERPL-SCNC: 3.6 MMOL/L (ref 3.5–5.1)
RBC # BLD AUTO: 2.44 M/UL (ref 4.1–5.7)
SERVICE CMNT-IMP: ABNORMAL
SODIUM SERPL-SCNC: 136 MMOL/L (ref 136–145)
WBC # BLD AUTO: 12.2 K/UL (ref 4.1–11.1)

## 2024-07-19 PROCEDURE — 82962 GLUCOSE BLOOD TEST: CPT

## 2024-07-19 PROCEDURE — 6360000002 HC RX W HCPCS

## 2024-07-19 PROCEDURE — 85027 COMPLETE CBC AUTOMATED: CPT

## 2024-07-19 PROCEDURE — 2580000003 HC RX 258

## 2024-07-19 PROCEDURE — 6370000000 HC RX 637 (ALT 250 FOR IP)

## 2024-07-19 PROCEDURE — 6370000000 HC RX 637 (ALT 250 FOR IP): Performed by: CLINICAL NURSE SPECIALIST

## 2024-07-19 PROCEDURE — 36415 COLL VENOUS BLD VENIPUNCTURE: CPT

## 2024-07-19 PROCEDURE — 2060000000 HC ICU INTERMEDIATE R&B

## 2024-07-19 PROCEDURE — 97530 THERAPEUTIC ACTIVITIES: CPT

## 2024-07-19 PROCEDURE — 6370000000 HC RX 637 (ALT 250 FOR IP): Performed by: NURSE PRACTITIONER

## 2024-07-19 PROCEDURE — 71045 X-RAY EXAM CHEST 1 VIEW: CPT

## 2024-07-19 PROCEDURE — 83735 ASSAY OF MAGNESIUM: CPT

## 2024-07-19 PROCEDURE — 97535 SELF CARE MNGMENT TRAINING: CPT

## 2024-07-19 PROCEDURE — 94760 N-INVAS EAR/PLS OXIMETRY 1: CPT

## 2024-07-19 PROCEDURE — 97116 GAIT TRAINING THERAPY: CPT

## 2024-07-19 PROCEDURE — 2700000000 HC OXYGEN THERAPY PER DAY

## 2024-07-19 PROCEDURE — 80048 BASIC METABOLIC PNL TOTAL CA: CPT

## 2024-07-19 PROCEDURE — 94640 AIRWAY INHALATION TREATMENT: CPT

## 2024-07-19 RX ORDER — BUMETANIDE 0.25 MG/ML
2 INJECTION INTRAMUSCULAR; INTRAVENOUS ONCE
Status: COMPLETED | OUTPATIENT
Start: 2024-07-19 | End: 2024-07-19

## 2024-07-19 RX ORDER — CARVEDILOL 3.12 MG/1
3.12 TABLET ORAL 2 TIMES DAILY WITH MEALS
Status: DISCONTINUED | OUTPATIENT
Start: 2024-07-19 | End: 2024-07-20

## 2024-07-19 RX ORDER — BISACODYL 10 MG
10 SUPPOSITORY, RECTAL RECTAL ONCE
Status: COMPLETED | OUTPATIENT
Start: 2024-07-19 | End: 2024-07-19

## 2024-07-19 RX ORDER — DIGOXIN 0.25 MG/ML
250 INJECTION INTRAMUSCULAR; INTRAVENOUS ONCE
Status: COMPLETED | OUTPATIENT
Start: 2024-07-19 | End: 2024-07-19

## 2024-07-19 RX ORDER — FERROUS SULFATE 325(65) MG
325 TABLET ORAL
Status: DISCONTINUED | OUTPATIENT
Start: 2024-07-20 | End: 2024-07-22

## 2024-07-19 RX ORDER — POTASSIUM CHLORIDE 750 MG/1
40 TABLET, FILM COATED, EXTENDED RELEASE ORAL ONCE
Status: COMPLETED | OUTPATIENT
Start: 2024-07-19 | End: 2024-07-19

## 2024-07-19 RX ORDER — AMIODARONE HYDROCHLORIDE 200 MG/1
400 TABLET ORAL 2 TIMES DAILY
Status: DISCONTINUED | OUTPATIENT
Start: 2024-07-19 | End: 2024-07-22 | Stop reason: HOSPADM

## 2024-07-19 RX ADMIN — MAGNESIUM OXIDE TAB 400 MG (241.3 MG ELEMENTAL MG) 400 MG: 400 (241.3 MG) TAB at 22:34

## 2024-07-19 RX ADMIN — HEPARIN SODIUM 5000 UNITS: 5000 INJECTION INTRAVENOUS; SUBCUTANEOUS at 06:09

## 2024-07-19 RX ADMIN — HUMAN INSULIN 15 UNITS: 100 INJECTION, SUSPENSION SUBCUTANEOUS at 08:41

## 2024-07-19 RX ADMIN — SODIUM CHLORIDE, PRESERVATIVE FREE 10 ML: 5 INJECTION INTRAVENOUS at 08:50

## 2024-07-19 RX ADMIN — SODIUM CHLORIDE, PRESERVATIVE FREE 10 ML: 5 INJECTION INTRAVENOUS at 22:37

## 2024-07-19 RX ADMIN — HUMAN INSULIN 15 UNITS: 100 INJECTION, SUSPENSION SUBCUTANEOUS at 22:33

## 2024-07-19 RX ADMIN — OXYCODONE 5 MG: 5 TABLET ORAL at 00:37

## 2024-07-19 RX ADMIN — POLYETHYLENE GLYCOL 3350 17 G: 17 POWDER, FOR SOLUTION ORAL at 08:41

## 2024-07-19 RX ADMIN — MUPIROCIN: 20 OINTMENT TOPICAL at 22:37

## 2024-07-19 RX ADMIN — ATORVASTATIN CALCIUM 40 MG: 40 TABLET, FILM COATED ORAL at 22:36

## 2024-07-19 RX ADMIN — DIGOXIN 250 MCG: 0.25 INJECTION INTRAMUSCULAR; INTRAVENOUS at 10:17

## 2024-07-19 RX ADMIN — CARVEDILOL 3.12 MG: 3.12 TABLET, FILM COATED ORAL at 17:35

## 2024-07-19 RX ADMIN — MAGNESIUM OXIDE TAB 400 MG (241.3 MG ELEMENTAL MG) 400 MG: 400 (241.3 MG) TAB at 08:41

## 2024-07-19 RX ADMIN — BUMETANIDE 2 MG: 0.25 INJECTION INTRAMUSCULAR; INTRAVENOUS at 10:17

## 2024-07-19 RX ADMIN — ACETAMINOPHEN 975 MG: 325 TABLET ORAL at 11:40

## 2024-07-19 RX ADMIN — GABAPENTIN 200 MG: 100 CAPSULE ORAL at 08:41

## 2024-07-19 RX ADMIN — HEPARIN SODIUM 5000 UNITS: 5000 INJECTION INTRAVENOUS; SUBCUTANEOUS at 13:05

## 2024-07-19 RX ADMIN — INSULIN LISPRO 1 UNITS: 100 INJECTION, SOLUTION INTRAVENOUS; SUBCUTANEOUS at 22:33

## 2024-07-19 RX ADMIN — CHLOROTHIAZIDE SODIUM 250 MG: 500 INJECTION, POWDER, LYOPHILIZED, FOR SOLUTION INTRAVENOUS at 10:22

## 2024-07-19 RX ADMIN — INSULIN LISPRO 8 UNITS: 100 INJECTION, SOLUTION INTRAVENOUS; SUBCUTANEOUS at 11:41

## 2024-07-19 RX ADMIN — CHLORHEXIDINE GLUCONATE 15 ML: 1.2 RINSE ORAL at 08:50

## 2024-07-19 RX ADMIN — CARVEDILOL 3.12 MG: 3.12 TABLET, FILM COATED ORAL at 08:41

## 2024-07-19 RX ADMIN — MUPIROCIN: 20 OINTMENT TOPICAL at 08:50

## 2024-07-19 RX ADMIN — CHLORHEXIDINE GLUCONATE 15 ML: 1.2 RINSE ORAL at 22:37

## 2024-07-19 RX ADMIN — GABAPENTIN 200 MG: 100 CAPSULE ORAL at 22:34

## 2024-07-19 RX ADMIN — ACETAMINOPHEN 975 MG: 325 TABLET ORAL at 17:35

## 2024-07-19 RX ADMIN — ACETAMINOPHEN 975 MG: 325 TABLET ORAL at 22:34

## 2024-07-19 RX ADMIN — ASPIRIN 81 MG: 81 TABLET, COATED ORAL at 08:41

## 2024-07-19 RX ADMIN — IPRATROPIUM BROMIDE AND ALBUTEROL SULFATE 1 DOSE: .5; 3 SOLUTION RESPIRATORY (INHALATION) at 07:27

## 2024-07-19 RX ADMIN — POTASSIUM CHLORIDE 40 MEQ: 750 TABLET, FILM COATED, EXTENDED RELEASE ORAL at 10:17

## 2024-07-19 RX ADMIN — FAMOTIDINE 20 MG: 20 TABLET, FILM COATED ORAL at 08:41

## 2024-07-19 RX ADMIN — MAGNESIUM HYDROXIDE 30 ML: 400 SUSPENSION ORAL at 08:41

## 2024-07-19 RX ADMIN — HEPARIN SODIUM 5000 UNITS: 5000 INJECTION INTRAVENOUS; SUBCUTANEOUS at 22:30

## 2024-07-19 RX ADMIN — GABAPENTIN 200 MG: 100 CAPSULE ORAL at 13:05

## 2024-07-19 RX ADMIN — BISACODYL 10 MG: 10 SUPPOSITORY RECTAL at 08:41

## 2024-07-19 RX ADMIN — IPRATROPIUM BROMIDE AND ALBUTEROL SULFATE 1 DOSE: .5; 3 SOLUTION RESPIRATORY (INHALATION) at 19:48

## 2024-07-19 RX ADMIN — AMIODARONE HYDROCHLORIDE 400 MG: 200 TABLET ORAL at 08:41

## 2024-07-19 RX ADMIN — SENNOSIDES AND DOCUSATE SODIUM 1 TABLET: 50; 8.6 TABLET ORAL at 08:41

## 2024-07-19 RX ADMIN — AMIODARONE HYDROCHLORIDE 400 MG: 200 TABLET ORAL at 22:34

## 2024-07-19 RX ADMIN — ACETAMINOPHEN 975 MG: 325 TABLET ORAL at 06:09

## 2024-07-19 ASSESSMENT — PAIN SCALES - GENERAL
PAINLEVEL_OUTOF10: 4
PAINLEVEL_OUTOF10: 4
PAINLEVEL_OUTOF10: 3
PAINLEVEL_OUTOF10: 0
PAINLEVEL_OUTOF10: 6
PAINLEVEL_OUTOF10: 0
PAINLEVEL_OUTOF10: 4
PAINLEVEL_OUTOF10: 6

## 2024-07-19 ASSESSMENT — PAIN DESCRIPTION - ORIENTATION
ORIENTATION: ANTERIOR
ORIENTATION: MID;ANTERIOR
ORIENTATION: ANTERIOR;MID
ORIENTATION: ANTERIOR;MID
ORIENTATION: MID;ANTERIOR

## 2024-07-19 ASSESSMENT — PAIN - FUNCTIONAL ASSESSMENT
PAIN_FUNCTIONAL_ASSESSMENT: PREVENTS OR INTERFERES SOME ACTIVE ACTIVITIES AND ADLS

## 2024-07-19 ASSESSMENT — PAIN DESCRIPTION - DESCRIPTORS
DESCRIPTORS: ACHING

## 2024-07-19 ASSESSMENT — PAIN DESCRIPTION - LOCATION
LOCATION: CHEST

## 2024-07-19 NOTE — PROGRESS NOTES
0800: Bedside and Verbal shift change report given to Yesica RN (oncoming nurse) by Skinny RN (offgoing nurse). Report included the following information Nurse Handoff Report, Index, Adult Overview, Surgery Report, Intake/Output, MAR, Recent Results, Cardiac Rhythm Afib, and Alarm Parameters.     0930: Patient had large BM.     0945: TRANSFER - OUT REPORT:    Verbal report given to Quita RN(name) on Shade Birmingham  being transferred to [facility](unit) for routine progression of patient care  Report consisted of patient’s Situation, Background, Assessment and   Recommendations(SBAR).   Information from the following report(s) SBAR, Kardex, OR Summary, Procedure Summary, Intake/Output, MAR, Accordion, Recent Results, Cardiac Rhythm NSR/Afib, and Alarm Parameters  was reviewed with the receiving nurse.  Lines:   Peripheral IV 07/15/24 Distal;Left;Anterior Cephalic (Active)   Site Assessment Not assessed 07/19/24 0800   Line Status Capped;Flushed;Normal saline locked 07/19/24 0800   Line Care Connections checked and tightened 07/19/24 0800   Phlebitis Assessment No symptoms 07/19/24 0800   Infiltration Assessment 0 07/19/24 0800   Alcohol Cap Used Yes 07/19/24 0800   Dressing Status Clean, dry & intact 07/19/24 0800   Dressing Type Transparent 07/19/24 0800       Peripheral IV 07/16/24 Right Antecubital (Active)   Site Assessment Not assessed 07/19/24 0800   Line Status Blood return noted;Capped;Flushed;Normal saline locked 07/19/24 0800   Line Care Connections checked and tightened 07/19/24 0800   Phlebitis Assessment No symptoms 07/19/24 0800   Infiltration Assessment 0 07/19/24 0800   Alcohol Cap Used Yes 07/19/24 0800   Dressing Status Clean, dry & intact 07/19/24 0800   Dressing Type Transparent 07/19/24 0800   Dressing Intervention New 07/16/24 1500     Opportunity for questions and clarification was provided.    Patient transported with:  Monitor, O2 @ 6lpm, Registered Nurse, and Tech

## 2024-07-19 NOTE — PROGRESS NOTES
..Bedside shift change report given to kelsey (oncoming nurse) by ROSA MARIA trinh (offgoing nurse). Report included the following information Nurse Handoff Report, Index, ED Encounter Summary, Adult Overview, MAR, Med Rec Status, Cardiac Rhythm nsr to afib, Quality Measures, and Neuro Assessment.      1000: Transferred to [facility] 466 - PT @bedside    1430: OT @bedside    1650: Call from [employer] representative, will be here to fit him to vest between [id number].     1745: [employer] Rep @bedside  1800: Lidocaine patches removed for [employer] application   1825: [employer] rep completed education - [employer] left on patient at this time. Will remove prior to returning to bed from chair.    1900: Returned to bed from chair. [employer] removed at this time.     1923: Shift handoff report given to oncoming, RN.

## 2024-07-19 NOTE — PROGRESS NOTES
Cardiac Surgery Care Coordinator- Met with Shade Birmingham reviewed plan of care and discussed upcoming discharge plan. Reinforced sternal precautions and encouraged continued use of the incentive spirometer. Began discharge teaching and encouraged him to verbalize.  Reviewed goals for the day and discussed the  importance of increased activity and continued activity after discharge. Provided him with the shower kit and reviewed instructions to keep incisions clean. Red reminder bracelet placed on left wrist, reviewed importance of wearing the red reminder bracelet and when to call MD. Will continue to follow for educational and emotional needs.

## 2024-07-19 NOTE — CARE COORDINATION
Transition of Care Plan:     RUR: 23%  Prior Level of Functioning: Lives w ex wife, ind, drives, owns multiple DME  Disposition: IPR 1- [facility], 2- [facility]  If SNF or IPR: Date FOC offered: 7/18  Date FOC received: 7/19  Accepting facility: Pending in [facility]  Date authorization started with reference number: NA  Follow up appointments: Specialist  DME needed: Defer to IPR  Transportation at discharge: Stretcher w O2 most likely  IM/IMM Medicare/ letter given: 7/16  Caregiver Contact: García Birmingham [phone]  Discharge Caregiver contacted prior to discharge?   Care Conference needed?   Barriers to discharge:  Medical, placement     Met w patient and his visitor at bedside to follow up on IPR choice and patient requests [facility] or [facility].  CM will send referral when [facility] is up.    ARLENE Quezada CCM  Care Management

## 2024-07-19 NOTE — PLAN OF CARE
Problem: Occupational Therapy - Adult  Goal: By Discharge: Performs self-care activities at highest level of function for planned discharge setting.  See evaluation for individualized goals.  Description: FUNCTIONAL STATUS PRIOR TO ADMISSION:    Receives Help From: Family, ADL Assistance: Independent,  ,  ,  ,  ,  , Homemaking Assistance: Independent, Ambulation Assistance: Independent, Transfer Assistance: Independent, Active : Yes     HOME SUPPORT: Patient lived with ex-wife and was completely independent, driving, completing IADLs.    Occupational Therapy Goals:  Initiated 7/16/2024    1.  Patient will perform ADLs standing 5 mins without fatigue or LOB with Contact Guard Assist within 7 days.  2.  Patient will perform upper body ADLs with Set-up within 7 days.  3.  Patient will perform lower body ADLs with Minimal Assist within 7 days.    4.  Patient will perform gathering ADL items high and low 2/2 with Contact Guard Assist within 7 days.  5.  Patient will perform toilet transfers with Contact Guard Assist within 7 days.  6.  Patient will perform all aspects of toileting with Contact Guard Assist within 7 days.  7.  Patient will participate in cardiac/sternal upper extremity therapeutic exercise/activities to increase independence with ADLs with supervision/set-up for 5 minutes within 7 days.   8.  Patient will adhere to Move in the Tube precautions during functional tasks with minimal verbal cues within 7 days.     Outcome: Progressing   OCCUPATIONAL THERAPY TREATMENT  Patient: Shade Birmingham (78 y.o. male)  Date: 7/19/2024  Primary Diagnosis: Disease of cardiovascular system [I25.10]  Coronary artery disease of native artery of native heart with stable angina pectoris (HCC) [I25.118]  Procedure(s) (LRB):  CORONARY ARTERY BYPASS GRAFTING X 3 WITH GUSTAVO GILLIS, LIGATION OF LEFT ATRIAL APPENDAGE, ECC, MC AND EPIAORTIC U/S BY DR VEGA (N/A) 4 Days Post-Op   Precautions: Fall Risk, Surgical Protocols  (Move in the tube)                Chart, occupational therapy assessment, plan of care, and goals were reviewed.    ASSESSMENT  Patient continues to benefit from skilled OT services and is progressing towards goals. Patient received in chair, expressing urgency to have bowel movement. Pt intially on 1.5L NC with SpO2 in low 90s, bumped up to 2L for activity and left on 2L at end of session, SpO2 low 90s and patient SOB though recovering. Required Mod Ax1 for functional mobility to access bathroom for toileting and Mod Ax1 for toilet transfer. Pt with poor coordination and frequently reaching out to wall for steadying despite multimodal cues, BLE R>L softening with prolonged standing. In standing, he demonstrated functional posterior reach for perineal hygiene, then washed hands. During functional tasks, patient required Max cues for adherence to MITT precautions with fair carryover. Patient returned to chair, asking about shaving and his medications, made RN aware of his questions. Patient expressing frustration with his progress since surgery, reassured patient he is improving and encouraged him to remain mobile with PT/nursing over the weekend. Continue to recommend IPR upon d/c.              PLAN :  Patient continues to benefit from skilled intervention to address the above impairments.  Continue treatment per established plan of care to address goals.    Recommend with staff: Recommend with nursing, ADLs with assist, OOB to chair 3x/day via assist, and toileting via functional mobility to and from bathroom with assist. Thank you for completing as able in order to maintain patient strength, endurance and independence.     Recommendation for discharge: (in order for the patient to meet his/her long term goals): Therapy 3 hours/day 5-7 days/week    Other factors to consider for discharge: poor safety awareness, high risk for falls, not safe to be alone, and concern for safely navigating or managing the home  environment    IF patient discharges home will need the following DME: continuing to assess with progress       SUBJECTIVE:   Patient stated “I just want to be myself again.”    OBJECTIVE DATA SUMMARY:   Cognitive/Behavioral Status:  Orientation  Overall Orientation Status: Within Normal Limits  Orientation Level: Oriented X4  Cognition  Overall Cognitive Status: Exceptions  Arousal/Alertness: Appears intact  Following Commands: Appears intact  Attention Span: Appears intact  Memory: Decreased recall of precautions  Safety Judgement: Decreased awareness of need for safety;Decreased awareness of need for assistance  Problem Solving: Decreased awareness of errors;Assistance required to correct errors made  Insights: Decreased awareness of deficits  Initiation: Appears intact  Sequencing: Requires cues for some    Functional Mobility and Transfers for ADLs:  Bed Mobility:  Bed Mobility Training  Bed Mobility Training: No     Transfers:   Transfer Training  Transfer Training: Yes  Sit to Stand: Moderate assistance;Assist X1  Stand to Sit: Moderate assistance;Assist X1  Bed to Chair: Moderate assistance;Assist X1  Toilet Transfer: Moderate assistance;Assist X1;Adaptive equipment (BSC frame over toilet)           Balance:     Balance  Sitting: Intact  Standing: Impaired  Standing - Static: Fair;Constant support  Standing - Dynamic: Fair;Poor;Constant support      ADL Intervention:                   Grooming: Minimal assistance   Grooming Skilled Clinical Factors: washed hands standing at sink, mod A for balance                                    Toileting: Moderate assistance  Toileting Skilled Clinical Factors: Pt required mod A for toilet transfer & clothing management, demonstrated functional posterior reach for perineal hygiene in standing           Functional Mobility: Moderate assistance  Functional Mobility Skilled Clinical Factors: Mod Ax1 for functional/bathroom mobility, pt with fair balance possibly worsened by

## 2024-07-19 NOTE — CARE COORDINATION
CM sent referral to Fabian Taylor at [facility] ([email or web address]) since [employer] is down. CM notified of pt's choice of [facility].    ARLENE Daniels     Pt is a 23y/o male hx of autism here after found chewing on left side of tongue after dental procedure earlier this afternoon.

## 2024-07-19 NOTE — PROGRESS NOTES
[address], [place] [address]  Phone: [phone]   [phone]    [email or web address]     Nephrology Progress Note    Patient Name : Shade Birmingham      : 1945     MRN : [id number]  Date of Admission : 7/15/2024  Date of Servive : 24    CC:  Follow up for CLARENCE on CKD       Assessment and Plan   CLARENCE on CKD:  - suspect 2/2 post op ATN  - Cr improving, stable UOP  - hold MRA, SGLT2i, Entresto until CLARENCE resolved  - diurese PRN  - daily labs and I/Os  - no urgent need for RRT     CKD 3a:  - baseline Cr 1.4  - US neg for hydro, UA Pending     CAD s/p CABG x 3 7/15:  - per CTS     Blood loss anemia:  - PRBCs     Afib with RVR:  - ZAHRA occlussion 7/15  - resolved after amio bolus, on oral amio now     HFrEF:  - EF 20-25%  - diurese PRN as above  - hold GDMT until CLARENCE resolves     PAD s/p fem-fem bypass  LICA stenosis  HTN  HLD  DM2  Prostate cancer s/p radiation  COPD     Interval History:  Seen and examined.  Resting in bed.  Hgb stable, Cr better.  Voiding well after bumex/diuril yesterday.  On NC O2.  No cp, sob, n/v/d    Review of Systems: Pertinent items are noted in HPI.    Current Medications:   Current Facility-Administered Medications   Medication Dose Route Frequency    carvedilol (COREG) tablet 3.125 mg  3.125 mg Oral BID WC    simethicone (MYLICON) chewable tablet 80 mg  80 mg Oral Q6H PRN    insulin NPH (HumuLIN N;NovoLIN N) injection vial 16 Units  16 Units SubCUTAneous 2 times per day    magnesium hydroxide (MILK OF MAGNESIA) 400 MG/5ML suspension 30 mL  30 mL Oral Daily    guaiFENesin (MUCINEX) extended release tablet 600 mg  600 mg Oral PRN    heparin (porcine) injection 5,000 Units  5,000 Units SubCUTAneous 3 times per day    amiodarone (CORDARONE) 450 mg in dextrose 5 % 250 mL infusion ([id number])  0.5 mg/min IntraVENous Continuous    famotidine (PEPCID) tablet 20 mg  20 mg Oral Daily    Or    famotidine (PEPCID) 20 mg

## 2024-07-19 NOTE — PROGRESS NOTES
..TRANSFER - IN REPORT:    Verbal report received from ANETTE trinh on Shade Birmingham  being received from CVICU bed 1 for routine progression of patient care      Report consisted of patient's Situation, Background, Assessment and   Recommendations(SBAR).     Information from the following report(s) Nurse Handoff Report, ED Encounter Summary, Surgery Report, Intake/Output, MAR, Cardiac Rhythm normal sinus to afib, Quality Measures, and Neuro Assessment was reviewed with the receiving nurse.    Opportunity for questions and clarification was provided.      Assessment completed upon patient's arrival to unit and care assumed.

## 2024-07-19 NOTE — PLAN OF CARE
Problem: Physical Therapy - Adult  Goal: By Discharge: Performs mobility at highest level of function for planned discharge setting.  See evaluation for individualized goals.  Description: FUNCTIONAL STATUS PRIOR TO ADMISSION: Patient was independent and active without use of DME. Denies hx falls. Lives with ex-wife who is available to provide supervision at D/C.     HOME SUPPORT PRIOR TO ADMISSION: The patient lived with ex-wife but did not require assistance.    Physical Therapy Goals  Initiated 7/16/2024  1.  Patient will move from supine to sit and sit to supine in bed with contact guard assist within 5 day(s).    2.  Patient will perform sit to stand with contact guard assist within 5 day(s).  3.  Patient will transfer from bed to chair and chair to bed with contact guard assist using the least restrictive device within 5 day(s).  4.  Patient will ambulate with contact guard assist for 100 feet with the least restrictive device within 5 day(s).   5.  Patient will ascend/descend 1 stairs with no handrail(s) with contact guard assist within 5 day(s).  6.  Patient will perform cardiac exercises per protocol with supervision/set-up within 5 days.  7.  Patient will verbally recall and functionally demonstrate mindful-based movements (\"move in the tube\") principles without cues within 5 days.      Outcome: Progressing   PHYSICAL THERAPY TREATMENT    Patient: Shade Birmingham (78 y.o. male)  Date: 7/19/2024  Diagnosis: Disease of cardiovascular system [I25.10]  Coronary artery disease of native artery of native heart with stable angina pectoris (HCC) [I25.118] Disease of cardiovascular system  Procedure(s) (LRB):  CORONARY ARTERY BYPASS GRAFTING X 3 WITH GUSTAVO GILLIS, LIGATION OF LEFT ATRIAL APPENDAGE, ECC, MC AND EPIAORTIC U/S BY DR VEGA (N/A) 4 Days Post-Op  Precautions: Fall Risk, Surgical Protocols (Move in the tube)                      ASSESSMENT:  Patient continues to benefit from skilled PT services and is  or patient is unable to maintain    IF patient discharges home will need the following DME: continuing to assess with progress       SUBJECTIVE:   Patient stated, \"I want to be able to do more.\"    OBJECTIVE DATA SUMMARY:   Critical Behavior:  Orientation  Overall Orientation Status: Within Normal Limits  Orientation Level: Oriented X4  Cognition  Overall Cognitive Status: Exceptions  Arousal/Alertness: Appears intact  Following Commands: Appears intact  Attention Span: Appears intact  Memory: Decreased recall of precautions  Safety Judgement: Decreased awareness of need for safety;Decreased awareness of need for assistance  Problem Solving: Decreased awareness of errors;Assistance required to correct errors made  Insights: Decreased awareness of deficits  Initiation: Appears intact  Sequencing: Appears intact    Functional Mobility Training:  Bed Mobility:  Bed Mobility Training  Bed Mobility Training: No  Transfers:  Transfer Training  Transfer Training: Yes  Sit to Stand: Moderate assistance;Maximum assistance;Assist X1  Stand to Sit: Moderate assistance;Assist X1  Bed to Chair: Moderate assistance;Assist X1  Balance:  Balance  Sitting: Intact  Standing: Impaired  Standing - Static: Fair;Constant support  Standing - Dynamic: Fair;Constant support   Ambulation/Gait Training:     Gait  Gait Training: Yes  Overall Level of Assistance: Moderate assistance;Assist X1  Distance (ft): 15 Feet  Assistive Device: Gait belt  Interventions: Safety awareness training;Tactile cues;Verbal cues;Manual cues;Weight shifting training/pressure relief  Base of Support: Widened  Speed/[place]at: Slow;Shuffled  Step Length: Right shortened;Left shortened  Gait Abnormalities: Shuffling gait;Decreased step clearance;Trunk sway increased        Neuro Re-Education:                                                                                                                                                                                                                                          Intervention/Education specific to: \"Move in the tube\"  Patient mobilized on continuous portable monitor/telemetry.    The patient is not verbalizing and is not demonstrating understanding of mindful-based movements (\"move in the tube\") principles of keeping UEs proximal to ribcage to prevent lateral pull on the sternum during load-bearing activities with visual, verbal, and tactile cues required for compliance.    Cardiac diagnosis intervention:  Patient instructed and educated on mindful movement principles based on “Move in The Tube” concept to include maintaining bilateral elbows close to rib cage when performing any load-bearing activity such as getting in/out of bed, pushing up from a chair, opening a door, or lifting a box.  Patient was given a handout with diagrams of each correct/incorrect method of performing each of the above tasks.                                                                                                                                                                                                                               Pain Ratin/10   Pain Intervention(s):   pain is at a level acceptable to the patient    Activity Tolerance:   Good, requires frequent rest breaks, observed shortness of breath on exertion, and desaturates with activity and requires oxygen    After treatment:   Patient left in no apparent distress sitting up in chair, Call bell within reach, and Bed/ chair alarm activated      COMMUNICATION/EDUCATION:   The patient's plan of care was discussed with: occupational therapist and registered nurse    Patient Education  Education Given To: Patient  Education Provided: Plan of Care;Transfer Training;Energy Conservation;Fall Prevention Strategies;Precautions  Education Provided Comments: move in the tube, PLB  Education Method: Verbal;Demonstration  Barriers to Learning: None  Education Outcome: Verbalized  understanding;Demonstrated understanding;Continued education needed      Germania Payan, PT, DPT  Minutes: 31

## 2024-07-19 NOTE — PROGRESS NOTES
Cardiac Surgery ICU Progress Note    Admit Date: 7/15/2024  POD:  4 Days Post-Op    Procedure:   7/15/24 with Dr. Martinez:  1. CABG x 3.              Left internal mammary artery to LAD.              Reverse saphenous vein graft to R-PDA.              Reverse saphenous vein graft to INT.  2. EVH of both legs.  3. Epiaortic Ultrasound.  4. ZAHRA occlusion with 45 mm clip.    Admission synopsis:  7/15 DOS: Elective CABG with Dr. Martinez. Post op MC, EF 50%, hypokinesis of inferior walls, no residual ZAHRA seen. Arrived to ICU intubated and sedated on precedex and epinephrine. Extubated to 4L NC. Albumin x1. Afib, amio gtt. Hany started. Amio discontinued for bradycardia.  7/16 POD1: Albumin x2 with Lasix 40 mg IV in am, 80 mg in afternoon with lackluster response. Weaned off hany and epi in am. O2 increased to 7L from 5L.  7/17 POD2: afib RVR - amio bolus x2 with conversion to SR. Bumex 2 mg IV with concentrated albumin in am, afternoon diuril 250 mg followed by Bumex 2 mg. Nephrology consult. 1 unit PRBC for hgb 6.8. D/C chest tubes.  7/18 POD 3: Ongoing afib, predominantly rate controlled but IV required 2.5mg IV metoprolol last night. Cr continues to uptrend 1.9, diuril and IV bumex once today. 6L NC. Possible transfer this afternoon.  7/19 POD4: Lifevest. Add BB.   Subjective:   Patient seen with Dr. Weiner, up in chair, 5L NC, rates varies between SR in the 70s and Afib 120s. Afebrile. He states he slept on and off overnight.      Objective:   Vitals:      Oxygen Flow Rate: 5L NC     EKG/Rhythm:        Extubation Date / Time: 7/15 17:55 to 4L NC    CT Output: Discontinued 7/17  AV wires (pull when appropriate)    CXR: 7/19 on left ongoing pulmonary edema/atelectasis     Xray Result (most recent):  XR CHEST PORTABLE 07/19/2024    Narrative  INDICATION:  Post op open heart surgery    EXAM: CXR Portable.    FINDINGS: Portable chest shows no significant change since yesterday. There is  no apparent pneumothorax.   Lungs show pulmonary edema, and basilar haziness  favoring pleural effusions. Heart size is stable. There is prior median  sternotomy.    Impression  No significant change.      Electronically signed by ADDIE BRYAN     Admission Weight: Last Weight   Weight - Scale: 91.4 kg (201 lb 8 oz) Weight - Scale: 98.6 kg (217 lb 6 oz)     Intake / Output / Drain:  Current Shift: No intake/output data recorded.  Last 24 hrs.:   Intake/Output Summary (Last 24 hours) at 7/19/2024 0913  Last data filed at 7/19/2024 0000  Gross per 24 hour   Intake 360 ml   Output 2250 ml   Net -1890 ml       EXAM:  General:  No acute distress             Lungs:   Diminished throughout, 5L NC   Incision:  No erythema, drainage or swelling.   Heart:  Regular rate and rhythm, faint heart sounds, S1, S2 normal, no murmur, click, rub or gallop.   Abdomen:   Soft, non-tender. Bowel sounds normal.Last BM 7/19, denies nausea   Extremities:  2+ bilateral ankle edema. PPP. Warm.   Neurologic:  Gross motor and sensory apparatus intact. Equal strength bilaterally.     Labs:   Recent Labs     07/19/24  0412   WBC 12.2*   HGB 8.3*   HCT 23.6*   *      K 3.6   BUN 42*        Assessment:     Principal Problem:    Disease of cardiovascular system  Active Problems:    Inadequately controlled diabetes mellitus (HCC)    Coronary artery disease of native artery of native heart with stable angina pectoris (HCC)    S/P CABG x 3    S/P left atrial appendage ligation  Resolved Problems:    * No resolved hospital problems. *       Plan/Recommendations/Medical Decision Making:   Multivessel CAD s/p CABG: PTA Plavix  - Cont ASA 81mg daily  - I have messaged Dr. Carney regarding Plavix necessity - will hold this while inpatient per Dr. Martinez and will potentially resume this at discharge once I hear from vascular  - Continue Atorvastatin   - Resume coreg  - Chest tubes pulled 7/17; continue wires (pull when appropriate)     Acute postoperative  Folate normal. RDW high.  - Initiate ferrous sulfate x 1 month for hct < 30% following BM  - CBC daily    Acute postoperative leukocytosis: Likely inflammatory and contributed to by atelectasis. Afebrile.  - Monitor cbc  - Pulm toilet: acapella, IS, cough, deep breathe, ambulate   - Monitor for s&s of infection      CKD Stage II: Baseline Cr 1.2-1.7, GFR 40-60s  - Nephrology consulted on 7/17, will need outpatient follow up likely, crt improved today  - avoid nephrotoxic agents for now     Hx Prostate Cancer and Prostatitis: TURBT 2018, last XRT treatment in January 2024  - Reports approximately 15-20lb weight loss since January with treatment  - Does not take any BPH medications at baseline, will monitor closely for urinary retention     Recent Complicated UTI/Dysuria: +Enterococcus, Completed course of ciprofloxacin  - Pre-op UA negative  - Will monitor urine output and symptoms closely     Diabetes Mellitus, Type II: A1c 6.6%;  PTA Metformin 1000mg BID, Jardiance   - Transitioned off insulin drip to NPH  - Care per Diabetes Management      Chronic Back Pain: PTA gabapentin 300mg TID; recent lumbar MRI with progressive DDD  - Cont Gabapentin at lower dose of 200 mg TID    Nutrition: advance as tolerated  GI proph: pepcid  Bowel reg: glycolax, senokot, prn bisacodyl  DVT proph: Heparin SQ, SCDs, ambulate    Dispo: Transfer to [facility]. Would like O2 requirement to decrease and would like HR to be more controlled prior to discharge to [facility]. Sunday vs. Monday.    Signed By: EDITH Fair - NP

## 2024-07-20 ENCOUNTER — APPOINTMENT (OUTPATIENT)
Facility: HOSPITAL | Age: 79
DRG: 234 | End: 2024-07-20
Attending: THORACIC SURGERY (CARDIOTHORACIC VASCULAR SURGERY)
Payer: MEDICARE

## 2024-07-20 LAB
ANION GAP SERPL CALC-SCNC: 6 MMOL/L (ref 5–15)
BUN SERPL-MCNC: 44 MG/DL (ref 6–20)
BUN/CREAT SERPL: 32 (ref 12–20)
CALCIUM SERPL-MCNC: 8.8 MG/DL (ref 8.5–10.1)
CHLORIDE SERPL-SCNC: 99 MMOL/L (ref 97–108)
CO2 SERPL-SCNC: 30 MMOL/L (ref 21–32)
CREAT SERPL-MCNC: 1.38 MG/DL (ref 0.7–1.3)
DIGOXIN SERPL-MCNC: 0.6 NG/ML (ref 0.9–2)
ERYTHROCYTE [DISTWIDTH] IN BLOOD BY AUTOMATED COUNT: 19.6 % (ref 11.5–14.5)
GLUCOSE BLD STRIP.AUTO-MCNC: 119 MG/DL (ref 65–117)
GLUCOSE BLD STRIP.AUTO-MCNC: 190 MG/DL (ref 65–117)
GLUCOSE BLD STRIP.AUTO-MCNC: 230 MG/DL (ref 65–117)
GLUCOSE BLD STRIP.AUTO-MCNC: 240 MG/DL (ref 65–117)
GLUCOSE SERPL-MCNC: 94 MG/DL (ref 65–100)
HCT VFR BLD AUTO: 24.3 % (ref 36.6–50.3)
HGB BLD-MCNC: 8.2 G/DL (ref 12.1–17)
MAGNESIUM SERPL-MCNC: 2.6 MG/DL (ref 1.6–2.4)
MCH RBC QN AUTO: 33.3 PG (ref 26–34)
MCHC RBC AUTO-ENTMCNC: 33.7 G/DL (ref 30–36.5)
MCV RBC AUTO: 98.8 FL (ref 80–99)
NRBC # BLD: 0.07 K/UL (ref 0–0.01)
NRBC BLD-RTO: 0.7 PER 100 WBC
PLATELET # BLD AUTO: 182 K/UL (ref 150–400)
PMV BLD AUTO: 10.5 FL (ref 8.9–12.9)
POTASSIUM SERPL-SCNC: 3.5 MMOL/L (ref 3.5–5.1)
POTASSIUM SERPL-SCNC: 4.1 MMOL/L (ref 3.5–5.1)
RBC # BLD AUTO: 2.46 M/UL (ref 4.1–5.7)
SERVICE CMNT-IMP: ABNORMAL
SODIUM SERPL-SCNC: 135 MMOL/L (ref 136–145)
WBC # BLD AUTO: 10.4 K/UL (ref 4.1–11.1)

## 2024-07-20 PROCEDURE — 2060000000 HC ICU INTERMEDIATE R&B

## 2024-07-20 PROCEDURE — 80162 ASSAY OF DIGOXIN TOTAL: CPT

## 2024-07-20 PROCEDURE — 6370000000 HC RX 637 (ALT 250 FOR IP): Performed by: CLINICAL NURSE SPECIALIST

## 2024-07-20 PROCEDURE — 6370000000 HC RX 637 (ALT 250 FOR IP): Performed by: NURSE PRACTITIONER

## 2024-07-20 PROCEDURE — 97110 THERAPEUTIC EXERCISES: CPT

## 2024-07-20 PROCEDURE — 83735 ASSAY OF MAGNESIUM: CPT

## 2024-07-20 PROCEDURE — 36415 COLL VENOUS BLD VENIPUNCTURE: CPT

## 2024-07-20 PROCEDURE — 71046 X-RAY EXAM CHEST 2 VIEWS: CPT

## 2024-07-20 PROCEDURE — 97116 GAIT TRAINING THERAPY: CPT

## 2024-07-20 PROCEDURE — 2580000003 HC RX 258: Performed by: NURSE PRACTITIONER

## 2024-07-20 PROCEDURE — 80048 BASIC METABOLIC PNL TOTAL CA: CPT

## 2024-07-20 PROCEDURE — 6360000002 HC RX W HCPCS: Performed by: NURSE PRACTITIONER

## 2024-07-20 PROCEDURE — 2700000000 HC OXYGEN THERAPY PER DAY

## 2024-07-20 PROCEDURE — 2580000003 HC RX 258

## 2024-07-20 PROCEDURE — 82962 GLUCOSE BLOOD TEST: CPT

## 2024-07-20 PROCEDURE — 6360000002 HC RX W HCPCS

## 2024-07-20 PROCEDURE — 85027 COMPLETE CBC AUTOMATED: CPT

## 2024-07-20 PROCEDURE — 94640 AIRWAY INHALATION TREATMENT: CPT

## 2024-07-20 PROCEDURE — 6370000000 HC RX 637 (ALT 250 FOR IP)

## 2024-07-20 PROCEDURE — 84132 ASSAY OF SERUM POTASSIUM: CPT

## 2024-07-20 PROCEDURE — 94760 N-INVAS EAR/PLS OXIMETRY 1: CPT

## 2024-07-20 RX ORDER — BUMETANIDE 1 MG/1
2 TABLET ORAL DAILY
Status: DISCONTINUED | OUTPATIENT
Start: 2024-07-20 | End: 2024-07-21

## 2024-07-20 RX ORDER — POTASSIUM CHLORIDE 750 MG/1
40 TABLET, FILM COATED, EXTENDED RELEASE ORAL ONCE
Status: COMPLETED | OUTPATIENT
Start: 2024-07-20 | End: 2024-07-20

## 2024-07-20 RX ORDER — CARVEDILOL 6.25 MG/1
6.25 TABLET ORAL 2 TIMES DAILY WITH MEALS
Status: DISCONTINUED | OUTPATIENT
Start: 2024-07-20 | End: 2024-07-21

## 2024-07-20 RX ORDER — IPRATROPIUM BROMIDE AND ALBUTEROL SULFATE 2.5; .5 MG/3ML; MG/3ML
1 SOLUTION RESPIRATORY (INHALATION) EVERY 4 HOURS PRN
Status: DISCONTINUED | OUTPATIENT
Start: 2024-07-20 | End: 2024-07-22 | Stop reason: HOSPADM

## 2024-07-20 RX ADMIN — ACETAMINOPHEN 975 MG: 325 TABLET ORAL at 06:41

## 2024-07-20 RX ADMIN — CARVEDILOL 6.25 MG: 6.25 TABLET, FILM COATED ORAL at 17:54

## 2024-07-20 RX ADMIN — GABAPENTIN 200 MG: 100 CAPSULE ORAL at 08:54

## 2024-07-20 RX ADMIN — HUMAN INSULIN 15 UNITS: 100 INJECTION, SUSPENSION SUBCUTANEOUS at 08:55

## 2024-07-20 RX ADMIN — ACETAMINOPHEN 975 MG: 325 TABLET ORAL at 11:56

## 2024-07-20 RX ADMIN — EMPAGLIFLOZIN 10 MG: 10 TABLET, FILM COATED ORAL at 11:56

## 2024-07-20 RX ADMIN — ATORVASTATIN CALCIUM 40 MG: 40 TABLET, FILM COATED ORAL at 21:47

## 2024-07-20 RX ADMIN — FAMOTIDINE 20 MG: 20 TABLET, FILM COATED ORAL at 08:55

## 2024-07-20 RX ADMIN — SODIUM CHLORIDE, PRESERVATIVE FREE 10 ML: 5 INJECTION INTRAVENOUS at 08:56

## 2024-07-20 RX ADMIN — POTASSIUM CHLORIDE 40 MEQ: 750 TABLET, FILM COATED, EXTENDED RELEASE ORAL at 07:29

## 2024-07-20 RX ADMIN — IPRATROPIUM BROMIDE AND ALBUTEROL SULFATE 1 DOSE: .5; 3 SOLUTION RESPIRATORY (INHALATION) at 07:24

## 2024-07-20 RX ADMIN — INSULIN LISPRO 1 UNITS: 100 INJECTION, SOLUTION INTRAVENOUS; SUBCUTANEOUS at 09:59

## 2024-07-20 RX ADMIN — INSULIN LISPRO 2 UNITS: 100 INJECTION, SOLUTION INTRAVENOUS; SUBCUTANEOUS at 18:15

## 2024-07-20 RX ADMIN — CHLORHEXIDINE GLUCONATE 15 ML: 1.2 RINSE ORAL at 21:48

## 2024-07-20 RX ADMIN — HUMAN INSULIN 15 UNITS: 100 INJECTION, SUSPENSION SUBCUTANEOUS at 21:48

## 2024-07-20 RX ADMIN — SENNOSIDES AND DOCUSATE SODIUM 1 TABLET: 50; 8.6 TABLET ORAL at 21:47

## 2024-07-20 RX ADMIN — INSULIN LISPRO 2 UNITS: 100 INJECTION, SOLUTION INTRAVENOUS; SUBCUTANEOUS at 21:48

## 2024-07-20 RX ADMIN — SODIUM CHLORIDE, PRESERVATIVE FREE 10 ML: 5 INJECTION INTRAVENOUS at 21:49

## 2024-07-20 RX ADMIN — HEPARIN SODIUM 5000 UNITS: 5000 INJECTION INTRAVENOUS; SUBCUTANEOUS at 14:16

## 2024-07-20 RX ADMIN — FERROUS SULFATE TAB 325 MG (65 MG ELEMENTAL FE) 325 MG: 325 (65 FE) TAB at 08:54

## 2024-07-20 RX ADMIN — INSULIN LISPRO 4 UNITS: 100 INJECTION, SOLUTION INTRAVENOUS; SUBCUTANEOUS at 13:01

## 2024-07-20 RX ADMIN — CARVEDILOL 6.25 MG: 6.25 TABLET, FILM COATED ORAL at 08:55

## 2024-07-20 RX ADMIN — ACETAMINOPHEN 975 MG: 325 TABLET ORAL at 17:51

## 2024-07-20 RX ADMIN — SACUBITRIL AND VALSARTAN 1 TABLET: 24; 26 TABLET, FILM COATED ORAL at 21:47

## 2024-07-20 RX ADMIN — GABAPENTIN 200 MG: 100 CAPSULE ORAL at 21:47

## 2024-07-20 RX ADMIN — MAGNESIUM OXIDE TAB 400 MG (241.3 MG ELEMENTAL MG) 400 MG: 400 (241.3 MG) TAB at 21:47

## 2024-07-20 RX ADMIN — AMIODARONE HYDROCHLORIDE 400 MG: 200 TABLET ORAL at 21:47

## 2024-07-20 RX ADMIN — AMIODARONE HYDROCHLORIDE 400 MG: 200 TABLET ORAL at 08:55

## 2024-07-20 RX ADMIN — ASPIRIN 81 MG: 81 TABLET, COATED ORAL at 08:54

## 2024-07-20 RX ADMIN — IPRATROPIUM BROMIDE AND ALBUTEROL SULFATE 1 DOSE: .5; 3 SOLUTION RESPIRATORY (INHALATION) at 14:18

## 2024-07-20 RX ADMIN — HEPARIN SODIUM 5000 UNITS: 5000 INJECTION INTRAVENOUS; SUBCUTANEOUS at 06:30

## 2024-07-20 RX ADMIN — POTASSIUM CHLORIDE 40 MEQ: 750 TABLET, FILM COATED, EXTENDED RELEASE ORAL at 09:30

## 2024-07-20 RX ADMIN — SACUBITRIL AND VALSARTAN 1 TABLET: 24; 26 TABLET, FILM COATED ORAL at 11:56

## 2024-07-20 RX ADMIN — CHLORHEXIDINE GLUCONATE 15 ML: 1.2 RINSE ORAL at 09:31

## 2024-07-20 RX ADMIN — BUMETANIDE 2 MG: 1 TABLET ORAL at 09:30

## 2024-07-20 RX ADMIN — GABAPENTIN 200 MG: 100 CAPSULE ORAL at 14:16

## 2024-07-20 RX ADMIN — HEPARIN SODIUM 5000 UNITS: 5000 INJECTION INTRAVENOUS; SUBCUTANEOUS at 21:47

## 2024-07-20 RX ADMIN — MAGNESIUM OXIDE TAB 400 MG (241.3 MG ELEMENTAL MG) 400 MG: 400 (241.3 MG) TAB at 08:54

## 2024-07-20 NOTE — PROGRESS NOTES
Cardiac Surgery ICU Progress Note    Admit Date: 7/15/2024  POD:  5 Days Post-Op    Procedure:   7/15/24 with Dr. Martinez:  1. CABG x 3.              Left internal mammary artery to LAD.              Reverse saphenous vein graft to R-PDA.              Reverse saphenous vein graft to INT.  2. EVH of both legs.  3. Epiaortic Ultrasound.  4. ZAHRA occlusion with 45 mm clip.    Admission synopsis:  7/15 DOS: Elective CABG with Dr. Martinez. Post op MC, EF 50%, hypokinesis of inferior walls, no residual ZAHRA seen. Arrived to ICU intubated and sedated on precedex and epinephrine. Extubated to 4L NC. Albumin x1. Afib, amio gtt. Hany started. Amio discontinued for bradycardia.  7/16 POD1: Albumin x2 with Lasix 40 mg IV in am, 80 mg in afternoon with lackluster response. Weaned off hany and epi in am. O2 increased to 7L from 5L.  7/17 POD2: afib RVR - amio bolus x2 with conversion to SR. Bumex 2 mg IV with concentrated albumin in am, afternoon diuril 250 mg followed by Bumex 2 mg. Nephrology consult. 1 unit PRBC for hgb 6.8. D/C chest tubes.  7/18 POD 3: Ongoing afib, predominantly rate controlled but IV required 2.5mg IV metoprolol last night. Cr continues to uptrend 1.9, diuril and IV bumex once today. 6L NC. Possible transfer this afternoon.  7/19 POD4: Lifevest. Add BB.  7/20 POD5: Wires pulled. BB increased, PO diuretic, Entresto and Jardiance resumed.   Subjective:   Patient seen with Dr. Olivares, up in chair, 3L NC, SR 70s, Afebrile. He states he slept on and off overnight and is very uncomfortable in bed.     Objective:   Vitals:      Oxygen Flow Rate: 3L NC     EKG/Rhythm:        Extubation Date / Time: 7/15 17:55 to 4L NC    CXR: awaiting pa/lat    Xray Result (most recent):  XR CHEST PORTABLE 07/19/2024    Narrative  INDICATION:  Post op open heart surgery    EXAM: CXR Portable.    FINDINGS: Portable chest shows no significant change since yesterday. There is  no apparent pneumothorax.  Lungs show pulmonary edema,  ARNi/ACEi/ARB: Resume Entresto today, pt hypertensive   - BB: Cont coreg 6/25 mg BID   - MRA: will resume when hemodynamically appropriate   - SGLT2i: Resume Jardiance at lower dose, 10 mg daily  - Cont to diurese, transition to PO dosing Bumex 2mg, likely BID today, K level at 1400 prior to next dose    Paroxymal Atrial Fibrillation s/p ZAHRA occlusion: No residual shelf on postop MC. PTA Eliquis 5mg BID   - Cont PO amiodarone 400mg BID  - Cont Coreg  - Received Digoxin 250 mcgs IV yesterday, has been in SR in 70s, will not resume  - Maintain mg > 2.4, k > 3.9 (check K level at 1400)     HTN: PTA Coreg 3.125mg, Entresto.  - Cont Coreg  - Resume Entresto today, pt hypertensive     Hypertriglyceridemia: PTA Atorvastatin 40mg  - Continue Atorvastatin      PAD s/p Fem-Fem Bypass, moderate LICA stenosis: Bypass in October 2020 with Dr. Heck (now follows with [facility]) left to right fem-fem bypass with R CFA endarterectomy, left iliac angioplasty. +Claudication, worsening  - Has follow up with Vascular Surgery outpatient in November  - Continue ASA  - Will resume Plavix at discharge    Acute postoperative hypoxemic respiratory insufficiency: R/t atelectasis, COPD, and fluid resuscitation  - Pulm toilet: acapella, IS, cough, deep breathe, ambulate   - Wean O2 for sats > 90% assuming asymptomatic (no SOB)  - Awaiting pa/lat today  - Cont diureses     Emphysema/COPD: Hx Tobacco use; Chest CT pre-op with signs of emphysematous changes, saw by Pulmonary during hospitalization  - Duonebs three times daily  - Will potentially need home O2, maintain sats > 90%  - Likely has FER, cpap at night. Sleep study ordered.    Acute postoperative blood loss anemia on chronic macrocytic anemia: Expected. Above transfusion threshold. Folate normal. RDW high.  - Cont ferrous sulfate x 1 month for hct < 30% following BM  - CBC daily    Acute postoperative leukocytosis: Resolved.     CKD Stage II: Baseline Cr 1.2-1.7, GFR 40-60s  -  Nephrology consulted on 7/17, will need outpatient follow up likely, crt improved  - cont to monitor, especially with Entresto resuming     Hx Prostate Cancer and Prostatitis: TURBT 2018, last XRT treatment in January 2024  - Reports approximately 15-20lb weight loss since January with treatment  - Does not take any BPH medications at baseline, will monitor closely for urinary retention     Recent Complicated UTI/Dysuria: +Enterococcus, Completed course of ciprofloxacin  - Pre-op UA negative  - Will monitor urine output and symptoms closely     Diabetes Mellitus, Type II: A1c 6.6%;  PTA Metformin 1000mg BID, Jardiance   - Transitioned off insulin drip to NPH  - Care per Diabetes Management   - Resume Jardiance     Chronic Back Pain: PTA gabapentin 300mg TID; recent lumbar MRI with progressive DDD  - Cont Gabapentin at lower dose of 200 mg TID    Nutrition: advance as tolerated  GI proph: pepcid  Bowel reg: glycolax, senokot, prn bisacodyl  DVT proph: Heparin SQ, SCDs, ambulate    Dispo: Likely ready to discharge to [facility] tomorrow. Cont to diurese and wean O2. Resuming some PTA medications, will monitor BMP.    Signed By: Hanna Pino APRN - NP

## 2024-07-20 NOTE — PLAN OF CARE
Problem: Physical Therapy - Adult  Goal: By Discharge: Performs mobility at highest level of function for planned discharge setting.  See evaluation for individualized goals.  Description: FUNCTIONAL STATUS PRIOR TO ADMISSION: Patient was independent and active without use of DME. Denies hx falls. Lives with ex-wife who is available to provide supervision at D/C.     HOME SUPPORT PRIOR TO ADMISSION: The patient lived with ex-wife but did not require assistance.    Physical Therapy Goals  Initiated 7/16/2024  1.  Patient will move from supine to sit and sit to supine in bed with contact guard assist within 5 day(s).    2.  Patient will perform sit to stand with contact guard assist within 5 day(s).  3.  Patient will transfer from bed to chair and chair to bed with contact guard assist using the least restrictive device within 5 day(s).  4.  Patient will ambulate with contact guard assist for 100 feet with the least restrictive device within 5 day(s).   5.  Patient will ascend/descend 1 stairs with no handrail(s) with contact guard assist within 5 day(s).  6.  Patient will perform cardiac exercises per protocol with supervision/set-up within 5 days.  7.  Patient will verbally recall and functionally demonstrate mindful-based movements (\"move in the tube\") principles without cues within 5 days.      Outcome: Progressing  PHYSICAL THERAPY TREATMENT    Patient: Shade Birmingham (78 y.o. male)  Date: 7/20/2024  Diagnosis: Disease of cardiovascular system [I25.10]  Coronary artery disease of native artery of native heart with stable angina pectoris (HCC) [I25.118] Disease of cardiovascular system  Procedure(s) (LRB):  CORONARY ARTERY BYPASS GRAFTING X 3 WITH GILLISGUSTAVO, LIGATION OF LEFT ATRIAL APPENDAGE, ECC, MC AND EPIAORTIC U/S BY DR VEGA (N/A) 5 Days Post-Op  Precautions: Fall Risk, Surgical Protocols (Move in the tube)                      ASSESSMENT:  Patient continues to benefit from skilled PT services and is  or is unable to provide adequate supervision and the patient would be alone, patient's current support system is unable to meet their requirements for physical assistance, poor safety awareness, high risk for falls, not safe to be alone, concern for safely navigating or managing the home environment, and new weight bearing restrictions limiting activity or patient is unable to maintain    IF patient discharges home will need the following DME: continuing to assess with progress       SUBJECTIVE:   Patient stated, \"Why can't I use the walker?\"    OBJECTIVE DATA SUMMARY:   Critical Behavior:  Orientation  Overall Orientation Status: Within Normal Limits  Orientation Level: Oriented X4  Cognition  Overall Cognitive Status: Exceptions  Arousal/Alertness: Appears intact  Following Commands: Appears intact  Attention Span: Appears intact  Memory: Decreased recall of precautions  Safety Judgement: Decreased awareness of need for safety;Decreased awareness of need for assistance  Problem Solving: Decreased awareness of errors;Assistance required to correct errors made  Insights: Appears intact  Initiation: Appears intact  Sequencing: Appears intact    Functional Mobility Training:  Bed Mobility:  Bed Mobility Training  Bed Mobility Training: No  Transfers:  Transfer Training  Transfer Training: Yes  Sit to Stand: Minimum assistance;Assist X1  Stand to Sit: Minimum assistance;Assist X1  Bed to Chair: Minimum assistance;Assist X1  Balance:  Balance  Sitting: Intact  Standing: Impaired  Standing - Static: Fair;Good;Unsupported  Standing - Dynamic: Fair;Unsupported   Ambulation/Gait Training:     Gait  Gait Training: Yes  Overall Level of Assistance: Minimum assistance;Assist X1  Distance (ft): 15 Feet (x4; requires multiple standing rest breaks)  Assistive Device: Gait belt  Interventions: Safety awareness training;Tactile cues;Verbal cues;Manual cues;Weight shifting training/pressure relief  Base of Support:  Narrowed  Speed/[place]at: Fluctuations  Step Length: Right shortened;Left shortened  Gait Abnormalities: Shuffling gait;Decreased step clearance;Trunk sway increased;Altered arm swing (anterior lean)        Neuro Re-Education:                                                                                                                                                                                                                                         Intervention/Education specific to: \"Move in the tube\"  Patient mobilized on continuous portable monitor/telemetry.    The patient is verbalizing and is not demonstrating understanding of mindful-based movements (\"move in the tube\") principles of keeping UEs proximal to ribcage to prevent lateral pull on the sternum during load-bearing activities with visual, verbal, and tactile cues required for compliance.    Cardiac diagnosis intervention:  Patient instructed and educated on mindful movement principles based on “Move in The Tube” concept to include maintaining bilateral elbows close to rib cage when performing any load-bearing activity such as getting in/out of bed, pushing up from a chair, opening a door, or lifting a box.  Patient was given a handout with diagrams of each correct/incorrect method of performing each of the above tasks.    Therapeutic Exercises:   Patient instructed on the benefits and demonstrated cardiac exercises while standing with Contact Guard Assist and Minimal Assist. Instructed and indicated understanding on how to progress reps, sets against gravity, pacing through progressive muscle strengthening standing based on surgeon clearance for more weight in prep for functional activity. Instruction on the use of household items in place of weights as needed.    CARDIAC   EXERCISE    Sets    Reps    Active  Active Assist    Passive  Self ROM    Comments    Shoulder flexion  1  5   [x]                            []                             []                              []                                Shoulder abduction  1  5  [x]                             []                             []                             []                                Scapular elevation  1  5  [x]                             []                              []                             []                                Scapular retraction  1  5  [x]                             []                             []                             []                                Trunk rotation  1  5  [x]                             []                             []                             []                                Trunk sidebending  1  5  [x]                             []                              []                             []                                                                                                                                                                                                                                                                   Pain Ratin/10   Pain Intervention(s):   pain is at a level acceptable to the patient    Activity Tolerance:   Good, requires frequent rest breaks, observed shortness of breath on exertion, and desaturates with activity and requires oxygen    After treatment:   Patient left in no apparent distress sitting up in chair, Call bell within reach, and Bed/ chair alarm activated      COMMUNICATION/EDUCATION:   The patient's plan of care was discussed with: occupational therapist and registered nurse    Patient Education  Education Given To: Patient  Education Provided: Plan of Care;Transfer Training;Energy Conservation;Fall Prevention Strategies;Precautions;Home Exercise Program  Education Provided Comments: move in the tube, PLB, cardiac ex  Education Method: Verbal;Demonstration  Barriers to Learning: None  Education Outcome: Verbalized understanding;Demonstrated

## 2024-07-20 NOTE — PROCEDURES
PROCEDURE NOTE  Date: 7/20/2024   Name: Shade Birmingham  YOB: 1945    Procedures        External atrial and ventricular epicardial wire sites cleansed with alcohol. Sutures cut and removed. Traction pulled on atrial wires, one wires removed with minimal to moderate resistance and the other wires with a great deal of resistance, wire cut. Appropriate retraction into skin. Traction pulled on ventricular wires, wires easily removed without resistance. Pt tolerated procedure well.

## 2024-07-21 ENCOUNTER — APPOINTMENT (OUTPATIENT)
Facility: HOSPITAL | Age: 79
DRG: 234 | End: 2024-07-21
Attending: THORACIC SURGERY (CARDIOTHORACIC VASCULAR SURGERY)
Payer: MEDICARE

## 2024-07-21 LAB
ANION GAP SERPL CALC-SCNC: 7 MMOL/L (ref 5–15)
APTT PPP: 26.8 SEC (ref 22.1–31)
BUN SERPL-MCNC: 42 MG/DL (ref 6–20)
BUN/CREAT SERPL: 30 (ref 12–20)
CALCIUM SERPL-MCNC: 8.5 MG/DL (ref 8.5–10.1)
CHLORIDE SERPL-SCNC: 98 MMOL/L (ref 97–108)
CO2 SERPL-SCNC: 30 MMOL/L (ref 21–32)
COMMENT:: NORMAL
CREAT SERPL-MCNC: 1.41 MG/DL (ref 0.7–1.3)
ERYTHROCYTE [DISTWIDTH] IN BLOOD BY AUTOMATED COUNT: 19.8 % (ref 11.5–14.5)
GLUCOSE BLD STRIP.AUTO-MCNC: 149 MG/DL (ref 65–117)
GLUCOSE BLD STRIP.AUTO-MCNC: 156 MG/DL (ref 65–117)
GLUCOSE BLD STRIP.AUTO-MCNC: 269 MG/DL (ref 65–117)
GLUCOSE BLD STRIP.AUTO-MCNC: 298 MG/DL (ref 65–117)
GLUCOSE SERPL-MCNC: 103 MG/DL (ref 65–100)
HCT VFR BLD AUTO: 25.2 % (ref 36.6–50.3)
HGB BLD-MCNC: 8.4 G/DL (ref 12.1–17)
INR PPP: 1.1 (ref 0.9–1.1)
MAGNESIUM SERPL-MCNC: 2.4 MG/DL (ref 1.6–2.4)
MCH RBC QN AUTO: 33.5 PG (ref 26–34)
MCHC RBC AUTO-ENTMCNC: 33.3 G/DL (ref 30–36.5)
MCV RBC AUTO: 100.4 FL (ref 80–99)
NRBC # BLD: 0.05 K/UL (ref 0–0.01)
NRBC BLD-RTO: 0.5 PER 100 WBC
NT PRO BNP: 4664 PG/ML
PLATELET # BLD AUTO: 206 K/UL (ref 150–400)
PMV BLD AUTO: 11 FL (ref 8.9–12.9)
POTASSIUM SERPL-SCNC: 4.4 MMOL/L (ref 3.5–5.1)
PROTHROMBIN TIME: 11.4 SEC (ref 9–11.1)
RBC # BLD AUTO: 2.51 M/UL (ref 4.1–5.7)
SERVICE CMNT-IMP: ABNORMAL
SODIUM SERPL-SCNC: 135 MMOL/L (ref 136–145)
SPECIMEN HOLD: NORMAL
THERAPEUTIC RANGE: NORMAL SECS (ref 58–77)
WBC # BLD AUTO: 10.2 K/UL (ref 4.1–11.1)

## 2024-07-21 PROCEDURE — 6370000000 HC RX 637 (ALT 250 FOR IP): Performed by: NURSE PRACTITIONER

## 2024-07-21 PROCEDURE — 97530 THERAPEUTIC ACTIVITIES: CPT

## 2024-07-21 PROCEDURE — 85610 PROTHROMBIN TIME: CPT

## 2024-07-21 PROCEDURE — 82962 GLUCOSE BLOOD TEST: CPT

## 2024-07-21 PROCEDURE — 85027 COMPLETE CBC AUTOMATED: CPT

## 2024-07-21 PROCEDURE — 83880 ASSAY OF NATRIURETIC PEPTIDE: CPT

## 2024-07-21 PROCEDURE — 6360000002 HC RX W HCPCS: Performed by: NURSE PRACTITIONER

## 2024-07-21 PROCEDURE — 2060000000 HC ICU INTERMEDIATE R&B

## 2024-07-21 PROCEDURE — 83735 ASSAY OF MAGNESIUM: CPT

## 2024-07-21 PROCEDURE — 36415 COLL VENOUS BLD VENIPUNCTURE: CPT

## 2024-07-21 PROCEDURE — 85730 THROMBOPLASTIN TIME PARTIAL: CPT

## 2024-07-21 PROCEDURE — 80048 BASIC METABOLIC PNL TOTAL CA: CPT

## 2024-07-21 PROCEDURE — 2580000003 HC RX 258: Performed by: NURSE PRACTITIONER

## 2024-07-21 PROCEDURE — 94760 N-INVAS EAR/PLS OXIMETRY 1: CPT

## 2024-07-21 PROCEDURE — 71045 X-RAY EXAM CHEST 1 VIEW: CPT

## 2024-07-21 PROCEDURE — 6370000000 HC RX 637 (ALT 250 FOR IP)

## 2024-07-21 PROCEDURE — 6370000000 HC RX 637 (ALT 250 FOR IP): Performed by: CLINICAL NURSE SPECIALIST

## 2024-07-21 RX ORDER — CARVEDILOL 3.12 MG/1
3.12 TABLET ORAL 2 TIMES DAILY WITH MEALS
Status: DISCONTINUED | OUTPATIENT
Start: 2024-07-21 | End: 2024-07-22 | Stop reason: HOSPADM

## 2024-07-21 RX ORDER — BUMETANIDE 1 MG/1
2 TABLET ORAL 2 TIMES DAILY
Status: DISCONTINUED | OUTPATIENT
Start: 2024-07-21 | End: 2024-07-22 | Stop reason: HOSPADM

## 2024-07-21 RX ADMIN — FERROUS SULFATE TAB 325 MG (65 MG ELEMENTAL FE) 325 MG: 325 (65 FE) TAB at 08:55

## 2024-07-21 RX ADMIN — ATORVASTATIN CALCIUM 40 MG: 40 TABLET, FILM COATED ORAL at 21:26

## 2024-07-21 RX ADMIN — HEPARIN SODIUM 5000 UNITS: 5000 INJECTION INTRAVENOUS; SUBCUTANEOUS at 14:23

## 2024-07-21 RX ADMIN — HUMAN INSULIN 15 UNITS: 100 INJECTION, SUSPENSION SUBCUTANEOUS at 21:25

## 2024-07-21 RX ADMIN — SODIUM CHLORIDE, PRESERVATIVE FREE 10 ML: 5 INJECTION INTRAVENOUS at 21:26

## 2024-07-21 RX ADMIN — CARVEDILOL 3.12 MG: 3.12 TABLET, FILM COATED ORAL at 17:37

## 2024-07-21 RX ADMIN — AMIODARONE HYDROCHLORIDE 400 MG: 200 TABLET ORAL at 08:54

## 2024-07-21 RX ADMIN — AMIODARONE HYDROCHLORIDE 400 MG: 200 TABLET ORAL at 21:26

## 2024-07-21 RX ADMIN — MAGNESIUM OXIDE TAB 400 MG (241.3 MG ELEMENTAL MG) 400 MG: 400 (241.3 MG) TAB at 21:26

## 2024-07-21 RX ADMIN — CARVEDILOL 6.25 MG: 6.25 TABLET, FILM COATED ORAL at 08:54

## 2024-07-21 RX ADMIN — ASPIRIN 81 MG: 81 TABLET, COATED ORAL at 08:53

## 2024-07-21 RX ADMIN — SODIUM CHLORIDE, PRESERVATIVE FREE 10 ML: 5 INJECTION INTRAVENOUS at 08:57

## 2024-07-21 RX ADMIN — CHLORHEXIDINE GLUCONATE 15 ML: 1.2 RINSE ORAL at 08:57

## 2024-07-21 RX ADMIN — SACUBITRIL AND VALSARTAN 1 TABLET: 24; 26 TABLET, FILM COATED ORAL at 08:53

## 2024-07-21 RX ADMIN — HEPARIN SODIUM 5000 UNITS: 5000 INJECTION INTRAVENOUS; SUBCUTANEOUS at 06:23

## 2024-07-21 RX ADMIN — INSULIN LISPRO 3 UNITS: 100 INJECTION, SOLUTION INTRAVENOUS; SUBCUTANEOUS at 21:36

## 2024-07-21 RX ADMIN — BUMETANIDE 2 MG: 1 TABLET ORAL at 08:54

## 2024-07-21 RX ADMIN — EMPAGLIFLOZIN 10 MG: 10 TABLET, FILM COATED ORAL at 08:53

## 2024-07-21 RX ADMIN — INSULIN LISPRO 2 UNITS: 100 INJECTION, SOLUTION INTRAVENOUS; SUBCUTANEOUS at 08:52

## 2024-07-21 RX ADMIN — CHLORHEXIDINE GLUCONATE 15 ML: 1.2 RINSE ORAL at 21:27

## 2024-07-21 RX ADMIN — SACUBITRIL AND VALSARTAN 1 TABLET: 24; 26 TABLET, FILM COATED ORAL at 21:26

## 2024-07-21 RX ADMIN — GABAPENTIN 200 MG: 100 CAPSULE ORAL at 21:26

## 2024-07-21 RX ADMIN — INSULIN LISPRO 6 UNITS: 100 INJECTION, SOLUTION INTRAVENOUS; SUBCUTANEOUS at 12:44

## 2024-07-21 RX ADMIN — GABAPENTIN 200 MG: 100 CAPSULE ORAL at 08:53

## 2024-07-21 RX ADMIN — FAMOTIDINE 20 MG: 20 TABLET, FILM COATED ORAL at 08:55

## 2024-07-21 RX ADMIN — ACETAMINOPHEN 975 MG: 325 TABLET ORAL at 23:07

## 2024-07-21 RX ADMIN — INSULIN LISPRO 2 UNITS: 100 INJECTION, SOLUTION INTRAVENOUS; SUBCUTANEOUS at 17:33

## 2024-07-21 RX ADMIN — BUMETANIDE 2 MG: 1 TABLET ORAL at 16:42

## 2024-07-21 RX ADMIN — ACETAMINOPHEN 975 MG: 325 TABLET ORAL at 12:44

## 2024-07-21 RX ADMIN — GABAPENTIN 200 MG: 100 CAPSULE ORAL at 14:23

## 2024-07-21 RX ADMIN — ACETAMINOPHEN 975 MG: 325 TABLET ORAL at 17:34

## 2024-07-21 RX ADMIN — HUMAN INSULIN 15 UNITS: 100 INJECTION, SUSPENSION SUBCUTANEOUS at 08:53

## 2024-07-21 RX ADMIN — HEPARIN SODIUM 5000 UNITS: 5000 INJECTION INTRAVENOUS; SUBCUTANEOUS at 21:36

## 2024-07-21 RX ADMIN — Medication 3 MG: at 21:34

## 2024-07-21 RX ADMIN — MAGNESIUM OXIDE TAB 400 MG (241.3 MG ELEMENTAL MG) 400 MG: 400 (241.3 MG) TAB at 08:54

## 2024-07-21 RX ADMIN — ACETAMINOPHEN 975 MG: 325 TABLET ORAL at 06:23

## 2024-07-21 ASSESSMENT — PAIN SCALES - GENERAL: PAINLEVEL_OUTOF10: 5

## 2024-07-21 NOTE — PROGRESS NOTES
Cardiac Surgery ICU Progress Note    Admit Date: 7/15/2024  POD:  6 Days Post-Op    Procedure:   7/15/24 with Dr. Martinez:  1. CABG x 3.              Left internal mammary artery to LAD.              Reverse saphenous vein graft to R-PDA.              Reverse saphenous vein graft to INT.  2. EVH of both legs.  3. Epiaortic Ultrasound.  4. ZAHRA occlusion with 45 mm clip.    Admission synopsis:  7/15 DOS: Elective CABG with Dr. Martinez. Post op MC, EF 50%, hypokinesis of inferior walls, no residual ZAHRA seen. Arrived to ICU intubated and sedated on precedex and epinephrine. Extubated to 4L NC. Albumin x1. Afib, amio gtt. Hany started. Amio discontinued for bradycardia.  7/16 POD1: Albumin x2 with Lasix 40 mg IV in am, 80 mg in afternoon with lackluster response. Weaned off hany and epi in am. O2 increased to 7L from 5L.  7/17 POD2: afib RVR - amio bolus x2 with conversion to SR. Bumex 2 mg IV with concentrated albumin in am, afternoon diuril 250 mg followed by Bumex 2 mg. Nephrology consult. 1 unit PRBC for hgb 6.8. D/C chest tubes.  7/18 POD 3: Ongoing afib, predominantly rate controlled but IV required 2.5mg IV metoprolol last night. Cr continues to uptrend 1.9, diuril and IV bumex once today. 6L NC. Possible transfer this afternoon.  7/19 POD4: Lifevest. Add BB.  7/20 POD5: Wires pulled. BB increased, PO diuretic, Entresto and Jardiance resumed.  721 POD6: Medically ready for d/c.   Subjective:   Patient seen with Dr. Olivares, up in chair, 2L NC, SR 70s, Afebrile. He states he slept on and off overnight and is very uncomfortable in bed. Had a brief episode of hypotension this morning with BP down to 90s/20s, he recovered without intervention to 130s/50.     Objective:   Vitals:      Oxygen Flow Rate: 2L NC     EKG/Rhythm:        Extubation Date / Time: 7/15 17:55 to 4L NC    CXR:   Xray Result (most recent):  XR CHEST PORTABLE 07/21/2024    Narrative  EXAM:  XR CHEST PORTABLE    INDICATION: resp  insufficiency      Comparison to 7/20/2024. Portable exam obtained at 337 demonstrates an interval  increase in pulmonary edema. There are persistent small bilateral pleural  effusions.    Impression  Increased pulmonary edema. Persistent small bilateral pleural  effusions.      Electronically signed by RAJI ESTRELLA       Xray Result (most recent):  XR CHEST PORTABLE 07/21/2024    Narrative  EXAM:  XR CHEST PORTABLE    INDICATION: resp insufficiency      Comparison to 7/20/2024. Portable exam obtained at 337 demonstrates an interval  increase in pulmonary edema. There are persistent small bilateral pleural  effusions.    Impression  Increased pulmonary edema. Persistent small bilateral pleural  effusions.      Electronically signed by RAJI ESTRELLA     Admission Weight: Last Weight   Weight - Scale: 91.4 kg (201 lb 8 oz) Weight - Scale: 95.8 kg (211 lb 3.2 oz)     Intake / Output / Drain:  Current Shift: 07/21 0701 - 07/21 1900  In: 120 [P.O.:120]  Out: 375 [Urine:375]  Last 24 hrs.:   Intake/Output Summary (Last 24 hours) at 7/21/2024 1233  Last data filed at 7/21/2024 1000  Gross per 24 hour   Intake 800 ml   Output 2530 ml   Net -1730 ml       EXAM:  General:  No acute distress             Lungs:   Diminished throughout, 2L NC   Incision:  No erythema, drainage or swelling.   Heart:  Regular rate and rhythm, faint heart sounds, S1, S2 normal, no murmur, click, rub or gallop.   Abdomen:   Soft, non-tender. Bowel sounds normal.Last BM 7/21, denies nausea   Extremities:  1-2+ bilateral ankle edema, R>L. PPP. Warm.   Neurologic:  Gross motor and sensory apparatus intact. Equal strength bilaterally.     Labs:   Recent Labs     07/21/24  0320   WBC 10.2   HGB 8.4*   HCT 25.2*      *   K 4.4   BUN 42*   INR 1.1        Assessment:     Principal Problem:    Disease of cardiovascular system  Active Problems:    Inadequately controlled diabetes mellitus (HCC)    Coronary artery disease of native artery of  O2 for sats > 90% assuming asymptomatic (no SOB)  - Cont daily CXR while in-house  - Cont diureses     Emphysema/COPD: Hx Tobacco use; Chest CT pre-op with signs of emphysematous changes, saw by Pulmonary during hospitalization  - Duonebs three times daily  - Will potentially need home O2, maintain sats > 90%  - Likely has FER, cpap at night. Sleep study ordered.    Acute postoperative blood loss anemia on chronic macrocytic anemia: Expected. Above transfusion threshold. Folate normal. RDW high.  - Cont ferrous sulfate x 1 month for hct < 30% following BM  - CBC daily    Acute postoperative leukocytosis: Resolved.     CKD Stage II: Baseline Cr 1.2-1.7, GFR 40-60s  - Nephrology consulted on 7/17, will need outpatient follow up likely, crt improved  - cont to monitor, especially with Entresto resuming     Hx Prostate Cancer and Prostatitis: TURBT 2018, last XRT treatment in January 2024  - Reports approximately 15-20lb weight loss since January with treatment  - Does not take any BPH medications at baseline, will monitor closely for urinary retention     Recent Complicated UTI/Dysuria: +Enterococcus, Completed course of ciprofloxacin  - Pre-op UA negative  - Will monitor urine output and symptoms closely     Diabetes Mellitus, Type II: A1c 6.6%;  PTA Metformin 1000mg BID, Jardiance   - Transitioned off insulin drip to NPH  - Care per Diabetes Management   - Resume Jardiance     Chronic Back Pain: PTA gabapentin 300mg TID; recent lumbar MRI with progressive DDD  - Cont Gabapentin at lower dose of 200 mg TID    Nutrition: advance as tolerated  GI proph: pepcid  Bowel reg: glycolax, senokot, prn bisacodyl  DVT proph: Heparin SQ, SCDs, ambulate    Dispo: Ready to discharge to [facility] today. Cont to diurese and wean O2. Resuming some PTA medications, will monitor BMP.    Signed By: EDITH Fair - NP

## 2024-07-21 NOTE — PLAN OF CARE
Problem: Physical Therapy - Adult  Goal: By Discharge: Performs mobility at highest level of function for planned discharge setting.  See evaluation for individualized goals.  Description: FUNCTIONAL STATUS PRIOR TO ADMISSION: Patient was independent and active without use of DME. Denies hx falls. Lives with ex-wife who is available to provide supervision at D/C.     HOME SUPPORT PRIOR TO ADMISSION: The patient lived with ex-wife but did not require assistance.    Physical Therapy Goals  Initiated 7/16/2024  Goals reassessed 7/21/2024 and remain appropriate over next 7 days  1.  Patient will move from supine to sit and sit to supine in bed with contact guard assist within 5 day(s).    2.  Patient will perform sit to stand with contact guard assist within 5 day(s).  3.  Patient will transfer from bed to chair and chair to bed with contact guard assist using the least restrictive device within 5 day(s).  4.  Patient will ambulate with contact guard assist for 100 feet with the least restrictive device within 5 day(s).   5.  Patient will ascend/descend 1 stairs with no handrail(s) with contact guard assist within 5 day(s).  6.  Patient will perform cardiac exercises per protocol with supervision/set-up within 5 days.  7.  Patient will verbally recall and functionally demonstrate mindful-based movements (\"move in the tube\") principles without cues within 5 days.      Outcome: Progressing    PHYSICAL THERAPY TREATMENT: WEEKLY REASSESSMENT    Patient: Shade Birmingham (78 y.o. male)  Date: 7/21/2024  Primary Diagnosis: Disease of cardiovascular system [I25.10]  Coronary artery disease of native artery of native heart with stable angina pectoris (HCC) [I25.118]  Procedure(s) (LRB):  CORONARY ARTERY BYPASS GRAFTING X 3 WITH GILLIS, BESVGH, LIGATION OF LEFT ATRIAL APPENDAGE, ECC, MC AND EPIAORTIC U/S BY DR VEGA (N/A) 6 Days Post-Op   Precautions: Fall Risk, Surgical Protocols (Move in the tube)                 mindful-based movements (\"move in the tube\") principles of keeping UEs proximal to ribcage to prevent lateral pull on the sternum during load-bearing activities with visual cues required for compliance.    Cardiac diagnosis intervention:  Patient instructed and educated on mindful movement principles based on “Move in The Tube” concept to include maintaining bilateral elbows close to rib cage when performing any load-bearing activity such as getting in/out of bed, pushing up from a chair, opening a door, or lifting a box.  Patient was given a handout with diagrams of each correct/incorrect method of performing each of the above tasks.                                                                                                                                                                                                                                                                                                                                                                                                                                                                    [facility] AM-PAC®      Basic Mobility Inpatient Short Form (6-Clicks) Version 2  How much HELP from another person do you currently need... (If the patient hasn't done an activity recently, how much help from another person do you think they would need if they tried?) Total A Lot A Little None   1.  Turning from your back to your side while in a flat bed without using bedrails? []  1 []  2 [x]  3  []  4   2.  Moving from lying on your back to sitting on the side of a flat bed without using bedrails? []  1 []  2 [x]  3  []  4   3.  Moving to and from a bed to a chair (including a wheelchair)? []  1 []  2 [x]  3  []  4   4. Standing up from a chair using your arms (e.g. wheelchair or bedside chair)? []  1 []  2 [x]  3  []  4   5.  Walking in hospital room? []  1 []  2 [x]  3  []  4   6.  Climbing 3-5 steps with a railing? []  1 [x]  2 []   3  []  4     Raw Score: 17/24                            Cutoff score ?171,2,3 had higher odds of discharging home with home health or need of SNF/IPR.    1. Georgette Jack, Elaine Crowley, Travis Roldan, Krista Padilla, Randall Langley, Dominick Jack.  Validity of the AM-PAC “6-Clicks” Inpatient Daily Activity and Basic Mobility Short Forms. Physical Therapy Mar 2014, 94 (2) 379-391; DOI: 10.2522/ptj.40716681  2. Piper Mabry, Beth JON, Branden JON. Association of AM-PAC \"6-Clicks\" Basic Mobility and Daily Activity Scores With Discharge Destination. Phys Ther. 2021 4;101(4):ovwu325. doi: 10.1093/ptj/beid499. PMID: 40342934.  3. Kassy JON, Grace D, Nadia S, Loi K, Keyla S. Activity Measure for Post-Acute Care \"6-Clicks\" Basic Mobility Scores Predict Discharge Destination After Acute Care Hospitalization in Select Patient Groups: A Retrospective, Observational Study. Arch Rehabil Res Clin Transl. 2022 16;4(3):123718. doi: 10.1016/j.arrct.2.151386. PMID: 65040862; PMCID: BCA8697878.  4. Marcie FRANCISCO, Sara S, Jonathan W, Marie P. AM-PAC Short Forms Manual 4.0. Revised 2020.                                                                                                                                                                                                                              Pain Ratin/10   Pain Intervention(s):   pain is at a level acceptable to the patient    Activity Tolerance:   Fair     After treatment:   Patient left in no apparent distress in bed and Call bell within reach    COMMUNICATION/EDUCATION:   The patient's plan of care was discussed with: registered nurse         Thank you for this referral.  Jessi Brown, PT  Minutes: 11

## 2024-07-22 ENCOUNTER — APPOINTMENT (OUTPATIENT)
Facility: HOSPITAL | Age: 79
DRG: 234 | End: 2024-07-22
Attending: THORACIC SURGERY (CARDIOTHORACIC VASCULAR SURGERY)
Payer: MEDICARE

## 2024-07-22 VITALS
DIASTOLIC BLOOD PRESSURE: 50 MMHG | WEIGHT: 209 LBS | SYSTOLIC BLOOD PRESSURE: 121 MMHG | TEMPERATURE: 98 F | RESPIRATION RATE: 20 BRPM | HEART RATE: 72 BPM | HEIGHT: 74 IN | BODY MASS INDEX: 26.82 KG/M2 | OXYGEN SATURATION: 93 %

## 2024-07-22 DIAGNOSIS — I50.20 HFREF (HEART FAILURE WITH REDUCED EJECTION FRACTION) (HCC): Primary | ICD-10-CM

## 2024-07-22 PROBLEM — I25.118 CORONARY ARTERY DISEASE OF NATIVE ARTERY OF NATIVE HEART WITH STABLE ANGINA PECTORIS (HCC): Status: RESOLVED | Noted: 2024-07-15 | Resolved: 2024-07-22

## 2024-07-22 LAB
ANION GAP SERPL CALC-SCNC: 8 MMOL/L (ref 5–15)
BUN SERPL-MCNC: 43 MG/DL (ref 6–20)
BUN/CREAT SERPL: 31 (ref 12–20)
CALCIUM SERPL-MCNC: 8.9 MG/DL (ref 8.5–10.1)
CHLORIDE SERPL-SCNC: 98 MMOL/L (ref 97–108)
CO2 SERPL-SCNC: 30 MMOL/L (ref 21–32)
CREAT SERPL-MCNC: 1.37 MG/DL (ref 0.7–1.3)
ERYTHROCYTE [DISTWIDTH] IN BLOOD BY AUTOMATED COUNT: 19.5 % (ref 11.5–14.5)
GLUCOSE BLD STRIP.AUTO-MCNC: 150 MG/DL (ref 65–117)
GLUCOSE BLD STRIP.AUTO-MCNC: 216 MG/DL (ref 65–117)
GLUCOSE SERPL-MCNC: 136 MG/DL (ref 65–100)
HCT VFR BLD AUTO: 25.8 % (ref 36.6–50.3)
HGB BLD-MCNC: 8.6 G/DL (ref 12.1–17)
MAGNESIUM SERPL-MCNC: 2.3 MG/DL (ref 1.6–2.4)
MCH RBC QN AUTO: 33.6 PG (ref 26–34)
MCHC RBC AUTO-ENTMCNC: 33.3 G/DL (ref 30–36.5)
MCV RBC AUTO: 100.8 FL (ref 80–99)
NRBC # BLD: 0.02 K/UL (ref 0–0.01)
NRBC BLD-RTO: 0.2 PER 100 WBC
NT PRO BNP: 3739 PG/ML
PLATELET # BLD AUTO: 224 K/UL (ref 150–400)
PMV BLD AUTO: 10.6 FL (ref 8.9–12.9)
POTASSIUM SERPL-SCNC: 3.4 MMOL/L (ref 3.5–5.1)
RBC # BLD AUTO: 2.56 M/UL (ref 4.1–5.7)
SERVICE CMNT-IMP: ABNORMAL
SERVICE CMNT-IMP: ABNORMAL
SODIUM SERPL-SCNC: 136 MMOL/L (ref 136–145)
WBC # BLD AUTO: 11.7 K/UL (ref 4.1–11.1)

## 2024-07-22 PROCEDURE — 36415 COLL VENOUS BLD VENIPUNCTURE: CPT

## 2024-07-22 PROCEDURE — 6360000002 HC RX W HCPCS

## 2024-07-22 PROCEDURE — P9047 ALBUMIN (HUMAN), 25%, 50ML: HCPCS

## 2024-07-22 PROCEDURE — 2580000003 HC RX 258: Performed by: NURSE PRACTITIONER

## 2024-07-22 PROCEDURE — 85027 COMPLETE CBC AUTOMATED: CPT

## 2024-07-22 PROCEDURE — 83880 ASSAY OF NATRIURETIC PEPTIDE: CPT

## 2024-07-22 PROCEDURE — 97535 SELF CARE MNGMENT TRAINING: CPT

## 2024-07-22 PROCEDURE — 6370000000 HC RX 637 (ALT 250 FOR IP)

## 2024-07-22 PROCEDURE — 06BP4ZZ EXCISION OF RIGHT SAPHENOUS VEIN, PERCUTANEOUS ENDOSCOPIC APPROACH: ICD-10-PCS | Performed by: THORACIC SURGERY (CARDIOTHORACIC VASCULAR SURGERY)

## 2024-07-22 PROCEDURE — 6370000000 HC RX 637 (ALT 250 FOR IP): Performed by: NURSE PRACTITIONER

## 2024-07-22 PROCEDURE — 83735 ASSAY OF MAGNESIUM: CPT

## 2024-07-22 PROCEDURE — 6370000000 HC RX 637 (ALT 250 FOR IP): Performed by: CLINICAL NURSE SPECIALIST

## 2024-07-22 PROCEDURE — 5A1221Z PERFORMANCE OF CARDIAC OUTPUT, CONTINUOUS: ICD-10-PCS | Performed by: THORACIC SURGERY (CARDIOTHORACIC VASCULAR SURGERY)

## 2024-07-22 PROCEDURE — B24BZZ4 ULTRASONOGRAPHY OF HEART WITH AORTA, TRANSESOPHAGEAL: ICD-10-PCS | Performed by: THORACIC SURGERY (CARDIOTHORACIC VASCULAR SURGERY)

## 2024-07-22 PROCEDURE — 80048 BASIC METABOLIC PNL TOTAL CA: CPT

## 2024-07-22 PROCEDURE — 99232 SBSQ HOSP IP/OBS MODERATE 35: CPT | Performed by: CLINICAL NURSE SPECIALIST

## 2024-07-22 PROCEDURE — 71045 X-RAY EXAM CHEST 1 VIEW: CPT

## 2024-07-22 PROCEDURE — 6360000002 HC RX W HCPCS: Performed by: NURSE PRACTITIONER

## 2024-07-22 PROCEDURE — 97530 THERAPEUTIC ACTIVITIES: CPT

## 2024-07-22 PROCEDURE — 82962 GLUCOSE BLOOD TEST: CPT

## 2024-07-22 RX ORDER — POLYETHYLENE GLYCOL 3350 17 G/17G
17 POWDER, FOR SOLUTION ORAL DAILY
Qty: 30 PACKET | Refills: 0 | Status: SHIPPED
Start: 2024-07-23 | End: 2024-08-13 | Stop reason: ALTCHOICE

## 2024-07-22 RX ORDER — IPRATROPIUM BROMIDE AND ALBUTEROL SULFATE 2.5; .5 MG/3ML; MG/3ML
3 SOLUTION RESPIRATORY (INHALATION) EVERY 4 HOURS PRN
Qty: 360 ML | Refills: 0 | Status: SHIPPED
Start: 2024-07-22 | End: 2024-08-13 | Stop reason: ALTCHOICE

## 2024-07-22 RX ORDER — POTASSIUM CHLORIDE 1500 MG/1
40 TABLET, EXTENDED RELEASE ORAL DAILY
Qty: 60 TABLET | Refills: 3 | Status: SHIPPED
Start: 2024-07-22 | End: 2024-08-13

## 2024-07-22 RX ORDER — INSULIN LISPRO 100 [IU]/ML
3 INJECTION, SOLUTION INTRAVENOUS; SUBCUTANEOUS
Status: DISCONTINUED | OUTPATIENT
Start: 2024-07-22 | End: 2024-07-22 | Stop reason: HOSPADM

## 2024-07-22 RX ORDER — ALBUMIN (HUMAN) 12.5 G/50ML
25 SOLUTION INTRAVENOUS ONCE
Status: COMPLETED | OUTPATIENT
Start: 2024-07-22 | End: 2024-07-22

## 2024-07-22 RX ORDER — POTASSIUM CHLORIDE 750 MG/1
40 TABLET, FILM COATED, EXTENDED RELEASE ORAL ONCE
Status: DISCONTINUED | OUTPATIENT
Start: 2024-07-22 | End: 2024-07-22

## 2024-07-22 RX ORDER — FAMOTIDINE 20 MG/1
20 TABLET, FILM COATED ORAL DAILY
Qty: 60 TABLET | Refills: 3 | Status: SHIPPED
Start: 2024-07-23

## 2024-07-22 RX ORDER — ACETAMINOPHEN 325 MG/1
975 TABLET ORAL EVERY 6 HOURS PRN
Qty: 120 TABLET | Refills: 3 | Status: CANCELLED
Start: 2024-07-22

## 2024-07-22 RX ORDER — AMIODARONE HYDROCHLORIDE 200 MG/1
TABLET ORAL
Qty: 56 TABLET | Refills: 0 | Status: SHIPPED
Start: 2024-07-22 | End: 2024-08-22 | Stop reason: ALTCHOICE

## 2024-07-22 RX ORDER — BUMETANIDE 2 MG/1
2 TABLET ORAL DAILY
Qty: 30 TABLET | Refills: 3 | Status: SHIPPED
Start: 2024-07-22 | End: 2024-08-13

## 2024-07-22 RX ORDER — INSULIN LISPRO 100 [IU]/ML
3 INJECTION, SOLUTION INTRAVENOUS; SUBCUTANEOUS
Qty: 10 ML | Refills: 1 | Status: SHIPPED | OUTPATIENT
Start: 2024-07-22 | End: 2024-08-13 | Stop reason: ALTCHOICE

## 2024-07-22 RX ORDER — POTASSIUM CHLORIDE 750 MG/1
40 TABLET, FILM COATED, EXTENDED RELEASE ORAL 2 TIMES DAILY
Status: DISCONTINUED | OUTPATIENT
Start: 2024-07-22 | End: 2024-07-22 | Stop reason: HOSPADM

## 2024-07-22 RX ORDER — CLOPIDOGREL BISULFATE 75 MG/1
75 TABLET ORAL EVERY MORNING
Qty: 30 TABLET | Refills: 3 | Status: SHIPPED | OUTPATIENT
Start: 2024-07-22

## 2024-07-22 RX ORDER — SENNA AND DOCUSATE SODIUM 50; 8.6 MG/1; MG/1
1 TABLET, FILM COATED ORAL 2 TIMES DAILY
Qty: 40 TABLET | Refills: 0 | Status: SHIPPED
Start: 2024-07-22 | End: 2024-08-22 | Stop reason: ALTCHOICE

## 2024-07-22 RX ORDER — LIDOCAINE 4 G/G
2 PATCH TOPICAL DAILY
Qty: 14 PATCH | Refills: 0 | Status: SHIPPED
Start: 2024-07-23 | End: 2024-08-13 | Stop reason: ALTCHOICE

## 2024-07-22 RX ORDER — FERROUS SULFATE 325(65) MG
325 TABLET ORAL
Status: DISCONTINUED | OUTPATIENT
Start: 2024-07-24 | End: 2024-07-22 | Stop reason: HOSPADM

## 2024-07-22 RX ORDER — LANOLIN ALCOHOL/MO/W.PET/CERES
400 CREAM (GRAM) TOPICAL 2 TIMES DAILY
Qty: 30 TABLET | Refills: 0 | Status: SHIPPED
Start: 2024-07-22 | End: 2024-08-13

## 2024-07-22 RX ORDER — LANOLIN ALCOHOL/MO/W.PET/CERES
3 CREAM (GRAM) TOPICAL NIGHTLY PRN
Qty: 30 TABLET | Refills: 3 | Status: SHIPPED
Start: 2024-07-22 | End: 2024-08-13 | Stop reason: ALTCHOICE

## 2024-07-22 RX ORDER — FERROUS SULFATE 325(65) MG
325 TABLET ORAL
Qty: 30 TABLET | Refills: 3 | Status: SHIPPED
Start: 2024-07-22 | End: 2024-08-22

## 2024-07-22 RX ADMIN — HEPARIN SODIUM 5000 UNITS: 5000 INJECTION INTRAVENOUS; SUBCUTANEOUS at 13:55

## 2024-07-22 RX ADMIN — GABAPENTIN 200 MG: 100 CAPSULE ORAL at 13:55

## 2024-07-22 RX ADMIN — BUMETANIDE 2 MG: 1 TABLET ORAL at 08:31

## 2024-07-22 RX ADMIN — SODIUM CHLORIDE, PRESERVATIVE FREE 10 ML: 5 INJECTION INTRAVENOUS at 08:29

## 2024-07-22 RX ADMIN — SACUBITRIL AND VALSARTAN 1 TABLET: 24; 26 TABLET, FILM COATED ORAL at 08:31

## 2024-07-22 RX ADMIN — CARVEDILOL 3.12 MG: 3.12 TABLET, FILM COATED ORAL at 08:32

## 2024-07-22 RX ADMIN — POTASSIUM CHLORIDE 40 MEQ: 750 TABLET, FILM COATED, EXTENDED RELEASE ORAL at 08:35

## 2024-07-22 RX ADMIN — EMPAGLIFLOZIN 10 MG: 10 TABLET, FILM COATED ORAL at 08:31

## 2024-07-22 RX ADMIN — INSULIN LISPRO 4 UNITS: 100 INJECTION, SOLUTION INTRAVENOUS; SUBCUTANEOUS at 13:56

## 2024-07-22 RX ADMIN — INSULIN LISPRO 2 UNITS: 100 INJECTION, SOLUTION INTRAVENOUS; SUBCUTANEOUS at 08:30

## 2024-07-22 RX ADMIN — ALBUMIN (HUMAN) 25 G: 0.25 INJECTION, SOLUTION INTRAVENOUS at 10:11

## 2024-07-22 RX ADMIN — FAMOTIDINE 20 MG: 20 TABLET, FILM COATED ORAL at 08:31

## 2024-07-22 RX ADMIN — ACETAMINOPHEN 975 MG: 325 TABLET ORAL at 13:56

## 2024-07-22 RX ADMIN — MAGNESIUM OXIDE TAB 400 MG (241.3 MG ELEMENTAL MG) 400 MG: 400 (241.3 MG) TAB at 08:32

## 2024-07-22 RX ADMIN — ASPIRIN 81 MG: 81 TABLET, COATED ORAL at 08:31

## 2024-07-22 RX ADMIN — HUMAN INSULIN 15 UNITS: 100 INJECTION, SUSPENSION SUBCUTANEOUS at 08:30

## 2024-07-22 RX ADMIN — FERROUS SULFATE TAB 325 MG (65 MG ELEMENTAL FE) 325 MG: 325 (65 FE) TAB at 08:32

## 2024-07-22 RX ADMIN — ACETAMINOPHEN 975 MG: 325 TABLET ORAL at 06:46

## 2024-07-22 RX ADMIN — GABAPENTIN 200 MG: 100 CAPSULE ORAL at 08:31

## 2024-07-22 RX ADMIN — AMIODARONE HYDROCHLORIDE 400 MG: 200 TABLET ORAL at 08:31

## 2024-07-22 RX ADMIN — INSULIN LISPRO 3 UNITS: 100 INJECTION, SOLUTION INTRAVENOUS; SUBCUTANEOUS at 13:57

## 2024-07-22 RX ADMIN — CHLORHEXIDINE GLUCONATE 15 ML: 1.2 RINSE ORAL at 08:34

## 2024-07-22 RX ADMIN — HEPARIN SODIUM 5000 UNITS: 5000 INJECTION INTRAVENOUS; SUBCUTANEOUS at 06:46

## 2024-07-22 ASSESSMENT — PAIN SCALES - GENERAL
PAINLEVEL_OUTOF10: 6
PAINLEVEL_OUTOF10: 2

## 2024-07-22 NOTE — PLAN OF CARE
Problem: Physical Therapy - Adult  Goal: By Discharge: Performs mobility at highest level of function for planned discharge setting.  See evaluation for individualized goals.  Description: FUNCTIONAL STATUS PRIOR TO ADMISSION: Patient was independent and active without use of DME. Denies hx falls. Lives with ex-wife who is available to provide supervision at D/C.     HOME SUPPORT PRIOR TO ADMISSION: The patient lived with ex-wife but did not require assistance.    Physical Therapy Goals  Initiated 7/16/2024  Goals reassessed 7/21/2024 and remain appropriate over next 7 days  1.  Patient will move from supine to sit and sit to supine in bed with contact guard assist within 5 day(s).    2.  Patient will perform sit to stand with contact guard assist within 5 day(s).  3.  Patient will transfer from bed to chair and chair to bed with contact guard assist using the least restrictive device within 5 day(s).  4.  Patient will ambulate with contact guard assist for 100 feet with the least restrictive device within 5 day(s).   5.  Patient will ascend/descend 1 stairs with no handrail(s) with contact guard assist within 5 day(s).  6.  Patient will perform cardiac exercises per protocol with supervision/set-up within 5 days.  7.  Patient will verbally recall and functionally demonstrate mindful-based movements (\"move in the tube\") principles without cues within 5 days.      Outcome: Progressing   PHYSICAL THERAPY TREATMENT    Patient: Shade Birmingham (78 y.o. male)  Date: 7/22/2024  Diagnosis: Disease of cardiovascular system [I25.10]  Coronary artery disease of native artery of native heart with stable angina pectoris (HCC) [I25.118] Disease of cardiovascular system  Procedure(s) (LRB):  CORONARY ARTERY BYPASS GRAFTING X 3 WITH GILLIS, BESVGH, LIGATION OF LEFT ATRIAL APPENDAGE, ECC, MC AND EPIAORTIC U/S BY DR VEGA (N/A) 7 Days Post-Op  Precautions: Fall Risk, Surgical Protocols (Move in the tube)                 not safe to be alone, and concern for safely navigating or managing the home environment    IF patient discharges home will need the following DME: continuing to assess with progress       SUBJECTIVE:   Patient stated, \"I just feel so weak.\"    OBJECTIVE DATA SUMMARY:   Pt received sitting in chair.  RN cleared patient for participation in session.     Critical Behavior:  Orientation  Overall Orientation Status: Within Functional Limits  Orientation Level: Oriented X4  Cognition  Overall Cognitive Status: Exceptions  Arousal/Alertness: Appears intact  Following Commands: Appears intact  Attention Span: Appears intact  Memory: Decreased recall of precautions  Problem Solving: Decreased awareness of errors;Assistance required to correct errors made  Insights: Decreased awareness of deficits  Initiation: Requires cues for some  Sequencing: Requires cues for some    Functional Mobility Training:  Bed Mobility:  Bed Mobility Training  Bed Mobility Training: Yes  Rolling: Minimum assistance  Sit to Supine: Moderate assistance  Scooting: Minimum assistance  Transfers:  Transfer Training  Transfer Training: Yes  Overall Level of Assistance: Minimum assistance  Sit to Stand: Minimum assistance  Stand to Sit: Minimum assistance  Bed to Chair: Minimum assistance;Assist X2  Balance:  Balance  Sitting: Impaired  Sitting - Static: Fair (occasional)  Sitting - Dynamic: Fair (occasional)  Standing: Impaired  Standing - Static: Poor;Constant support  Standing - Dynamic: Poor;Constant support   Ambulation/Gait Training:     Gait  Gait Training: No        Neuro Re-Education:                                                                                                                                                                                                                                         Intervention/Education specific to: \"Move in the tube\"  Patient mobilized on continuous portable monitor/telemetry.    The patient is

## 2024-07-22 NOTE — DISCHARGE SUMMARY
Cardiac Surgery Discharge Summary     Patient ID:  Shade Birmingham  [id number]  78 y.o.  1945    Admit date: 7/15/2024    Discharge date: 7/22/2024     Admitting Physician: Jori Martinez MD     Referring Cardiologist:  Dr. Oviedo    PCP:  Lucas Buckner MD    Admitting Diagnoses: CAD, HFrEF, CKD II, COPD, HTN, HLD, DMII, PAF, PAD, anemia    Discharge Diagnoses: s/p CABG x3, s/p ZAHRA occlusion, CAD, HFrEF, CKD II, COPD, HTN, HLD, DMII, PAF, PAD, anemia    1. Coronary artery disease involving native coronary artery of native heart without angina pectoris    2. S/P CABG x 3        Discharged Condition: Stable and Fair    Disposition: transferred to [facility]     Procedures for this admission:  Procedure(s):  7/15/24 with Dr. Martinez:  1. CABG x 3.              Left internal mammary artery to LAD.              Reverse saphenous vein graft to R-PDA.              Reverse saphenous vein graft to INT.  2. EVH of both legs.  3. Epiaortic Ultrasound.  4. ZAHRA occlusion with 45 mm clip.    Discharge Medications:      Medication List        ASK your doctor about these medications      apixaban 5 MG Tabs tablet  Commonly known as: Eliquis  Take 1 tablet by mouth 2 times daily     aspirin 81 MG EC tablet     atorvastatin 40 MG tablet  Commonly known as: LIPITOR  Take 1 tablet by mouth nightly     bumetanide 1 MG tablet  Commonly known as: Bumex  Take 1 tablet by mouth every other day     carvedilol 3.125 MG tablet  Commonly known as: COREG  Take 1 tablet by mouth 2 times daily (with meals)     cetirizine 10 MG tablet  Commonly known as: ZYRTEC     clopidogrel 75 MG tablet  Commonly known as: PLAVIX     empagliflozin 25 MG tablet  Commonly known as: Jardiance  Take 1 tablet by mouth daily     gabapentin 300 MG capsule  Commonly known as: NEURONTIN     insulin  UNIT/ML injection vial  Commonly known as: HumuLIN N;NovoLIN N     ketoconazole 2 % shampoo  Commonly known as:  0.5-2.5 (3) MG/3ML Soln nebulizer solution  Commonly known as: DUONEB  Inhale 3 mLs into the lungs every 4 hours as needed for Shortness of Breath or Wheezing     lidocaine 4 % external patch  Apply 2 patches topically daily  Start taking on: July 23, 2024     magnesium oxide 400 (240 Mg) MG tablet  Commonly known as: MAG-OX  Take 1 tablet by mouth 2 times daily     melatonin 3 MG Tabs tablet  Take 1 tablet by mouth nightly as needed (insomnia)     polyethylene glycol 17 g packet  Commonly known as: GLYCOLAX  Take 1 packet by mouth daily  Start taking on: July 23, 2024     potassium chloride 20 MEQ Tbcr extended release tablet  Commonly known as: K-TAB  Take 2 tablets by mouth daily     sennosides-docusate sodium 8.6-50 MG tablet  Commonly known as: SENOKOT-S  Take 1 tablet by mouth 2 times daily            CHANGE how you take these medications      bumetanide 2 MG tablet  Commonly known as: BUMEX  Take 1 tablet by mouth daily  What changed:   medication strength  how much to take  when to take this     clopidogrel 75 MG tablet  Commonly known as: PLAVIX  Take 1 tablet by mouth every morning  What changed: additional instructions     empagliflozin 10 MG tablet  Commonly known as: JARDIANCE  Take 1 tablet by mouth daily  Start taking on: July 23, 2024  What changed:   medication strength  how much to take     insulin  UNIT/ML injection vial  Commonly known as: NovoLIN N  Inject 18 Units into the skin every 12 hours  What changed:   how much to take  when to take this            CONTINUE taking these medications      aspirin 81 MG EC tablet     atorvastatin 40 MG tablet  Commonly known as: LIPITOR  Take 1 tablet by mouth nightly     carvedilol 3.125 MG tablet  Commonly known as: COREG  Take 1 tablet by mouth 2 times daily (with meals)     cetirizine 10 MG tablet  Commonly known as: ZYRTEC     gabapentin 300 MG capsule  Commonly known as: NEURONTIN     ketoconazole 2 % shampoo  Commonly known as: NIZORAL         chest tubes.  7/18 POD 3: Ongoing afib, predominantly rate controlled but IV required 2.5mg IV metoprolol last night. Cr continues to uptrend 1.9, diuril and IV bumex once today. 6L NC. Possible transfer this afternoon.  7/19 POD4: Lifevest. Add BB.  7/20 POD5: Wires pulled. BB increased, PO diuretic, Entresto and Jardiance resumed.  721 POD6: Medically ready for d/c.    Discharge plan:  Multivessel CAD s/p CABG: PTA Plavix  - Cont ASA 81mg daily  - Continue Atorvastatin   - Cont Coreg 3.125 mg     Chronic HFrEF r/t ICM: Mixed cardiomyopathy. EF 25-30%; PTA Coreg 3.125 mg, Bumex 1mg QOD, Spironolactone 25mg QOD, Jardiance, Entresto  - Repeat postop ECHO with EF 25-30%. Lifevest on.  - AHF consult outpatient, will see Dr. Sandoval 7/29  GDMT:              - ARNi/ACEi/ARB: Had resumed Entresto the last couple of days but has become a bit orthostatic with PT. Will hold this medication for now to facilitate rehab after surgery. Prioritizing diuresis.              - BB: Cont coreg 3.125 mg BID              - MRA: pt reports he had an issue with this medication and prefers not to resume it. Will defer to A[facility].              - SGLT2i: Resume Jardiance at lower dose, 10 mg daily  - Cont Bumex to 2mg BID with potassium 40 mEq BID. Tomorrow (7/23) can deescalate to daily dosing.     Paroxymal Atrial Fibrillation s/p ZAHRA occlusion with postop afib with RVR: No residual shelf on postop MC. PTA Eliquis 5mg BID. Pt has been without afib RVR in over 3-4 days.  - Cont PO amiodarone 400mg BID with usual postop taper. Can discontinue in 3 weeks.  - Cont Coreg  - Maintain mg > 2.4, k > 3.9 (check K level at 1400)     HTN: PTA Coreg 3.125mg, Entresto.  - Cont Coreg     Hypertriglyceridemia: PTA Atorvastatin 40mg  - Continue Atorvastatin      PAD s/p Fem-Fem Bypass, moderate LICA stenosis: Bypass in October 2020 with Dr. Heck (now follows with Rommel) left to right fem-fem bypass with R CFA endarterectomy, left iliac angioplasty.

## 2024-07-22 NOTE — DISCHARGE INSTRUCTIONS
Cardiac Surgery Specialist    [address]       [address]                             [place] [address]                       [facility] [address]  Office- [phone]  Fax- [phone]       Office- [phone]  Fax- [phone]  _____________________________________________________________  Dr. Dayron Lugo,NP      Shelley Fuentes, PA-C  Dr. Lukasz Shah,NP       Rachael Mejía, NP  Dr. Robin Jacob, MARSHA Blanc, PAJEAN-CLAUDE Nunez, PAJEAN-CLAUDE Pino, MARSHA Loaiza, MARSHA Quezada, NP  _____________________________________________________________    Name:Shade Birmingham     Surgery & Date: Procedure(s):  7/15/24 with Dr. Martinez:  1. CABG x 3.              Left internal mammary artery to LAD.              Reverse saphenous vein graft to R-PDA.              Reverse saphenous vein graft to INT.  2. EVH of both legs.  3. Epiaortic Ultrasound.  4. ZAHRA occlusion with 45 mm clip.    Discharge Date: 7/22/24    MEDICATIONS:  Please refer to your After Visit Summary for your medication list. If you do not have a prescription for a new medication, you may purchase the medication over the counter.   DO NOT TAKE ANY MEDICATIONS THAT ARE NOT ON THIS LIST      INSTRUCTIONS:  NO SMOKING OR TOBACCO PRODUCTS  Follow all the instructions in your discharge book  You may shower.  Wash all incisions twice daily with mild soap and water.  No lotions, ointments or powder.  Call the office immediately for any redness, swelling, or drainage from your incision.   Take your temperature daily and call for a temperature of 101 degrees or higher or for any symptoms that make you think you have and infection.  Weigh yourself each morning.  Call if you gain more than 5 pounds in 48 hours.  Use the incentive spirometer 6-8 times a

## 2024-07-22 NOTE — DIABETES MGMT
[facility]  DIABETES MANAGEMENT           Discharge Recommendations    Setting Blood glucose targets   Non-critical care 100 - 180 mg/dl     Evaluation and Action Plan   This 78 year old  male was admitted 7/15/24 for planned CV surgery intervention and underwent CABG X3 same day. Recovering and moving to rehab later today. Received PRBC for blood loss anemia during this hospital stay so A1cs for the next 3 months will not be accurate.     Patient has known Type 2 diabetes on Jardiance, metformin and NPH insulin PTA. A1c at target. With CLARENCE and 02 needs, will not restart metformin. Blood glucose pattern, while hospitalized, reflects a basal/mealtime/corrective approach. Patient is used to taking NPH insulin once nightly but the drug only lasts 12 hours. Recommended twice daily NPH at discharge. Jardiance, Entresto and Eliquis are quite expensive, each of which are costing him $300-600/month. Alternatives need to be entertained. Have given patient [facility]'s $4 drug list (as two stores are near his home), and encouraged him to discuss with his PCP and then set an appointment with the [facility] pharmacist to suggest good alternatives.    Management Rationale Action Plan   Medication   Basal needs Based on  [x] Home diabetes regimen     Increase NPH insulin to 18 units twice daily    Humalog insulin based on carb consumption    Low corrective approach   Nutritional needs     Corrective insulin     Additional orders   Jardiance per cardiology    445 gram carb-controlled meals     Diabetes Discharge Plan   Medication  NPH insulin 18 units twice daily  Humalog insulin 3 units with each 45 gram carb meal  BG monitoring QID and share findings with provider when at home  NO Metformin     Referral  []        Outpatient diabetes education   Additional orders            Initial Presentation   Shade Birmingham is a 78 y.o. male admitted 7/15/24 for planned surgical intervention and underwent  noted      Diabetes-related Medical History  Acute complications  [] DKA/HHNK  [] Recurrent hypoglycemia  [] Chronic hyperglycemia  Neurological complications  [] Orthostatic hypotension [] Gastroparesis    [] Abnormal heart rate variation [] Chronic constipation or diarrhea   [x] Peripheral neuropathy  Microvascular disease  [] Retinopathy  [x] Nephropathy  [] Vascular dementia  Macrovascular disease  [x] CAD    [x] Peripheral arterial disease   [] Cerebral vascular accident [] Peripheral venous disease  Other associated conditions     [] Periodontal disease  [] Vit B12 deficiency  [] Anemia   [] Vit D deficiency  [] Obesity   [] Celiac disease  [] Anxiety   [] Fatty liver disease/Steatotic liver disease  [] Depression   [] FIELDS/MASH  [] Cirrhosis   [] NAFLD/ MASLD  [] Pancreatitis   [] Sleep apnea  [x] Cataracts       Diabetes Medication History - Per EPIC   Drug class Drug Dose   Biguanide   Decreases amount of glucose produced by the liver  [x] Metformin (Glucophage)  [] Metformin ER (Glucophage XL) 1000 mg twice daily   SGLT-2 inhibitors  Block glucose reabsorption in the kidney [] Canagliflozin (Invokana)  [] Dapagliflozin (Farxiga)  [x] Empagliflozin (Jardiance)   [] Ertuglifozin (Steglatro)     25 mg daily   Basal insulin  Replaces the insulin the body can not make to provide basic needs [] Degludec (Tresiba)  [] Detemir (Levemir)  [] Glargine (Basaglar)   [] Glargine (Lantus)    [] Glargine (Toujeo)    [] Glargine-yfgn (Semglee)  [x] Humulin NPH             40 units at night     Diabetes self-management practices: Deferred as patient is recovering from anesthesia  Eating pattern - Eats two meals on most days  Physical activity pattern - Golfs weekly. Yard work. House work  Monitoring pattern - Meter checks a couple times a day  Taking medications pattern - Meds can be expensive  Social determinants of health impacting diabetes self-management practices    Mr. Birmingham lives with his ex-wife. He is independent

## 2024-07-22 NOTE — PROGRESS NOTES
Cardiac Surgery Care Coordinator- Met with Shade Birmingham and his family, he is preparing for discharge later today. Reviewed plan of care and discussed upcoming discharge plan.  Reinforced sternal precautions and encouraged continued use of the incentive spirometer. Began discharge teaching and encouraged them to verbalize.  Reviewed goals for the day and discussed the  importance of increased activity and continued activity after discharge from inpt rehab.   Reviewed importance of wearing the red reminder bracelet and when to call MD. Will continue to follow for educational and emotional needs.

## 2024-07-22 NOTE — CARE COORDINATION
MATTIE    Patient is medically ready to d/c to [facility] this date.  CM followed up w [facility] and they don't have a bed today.  Patients 2nd choice is [facility] and they have a bed.    Inpatient Rehab/ Hospital to Hospital Transition of Care Note /Discharge Note     Accepting Physician: Dr. Jacques Batres    Discharging Physician: Dr. Martinez    Accepting Representative: Bibiana    Accepting Facility: [facility] (no room number yet)    RN call to report: [phone]    Transport: Brother if patient doesn't require O2.      time: 4pm    Ambulance packet at bedside chart.       Family notified: CM met with patient and his brother at bedside to discuss the d/c plan w [facility] and to review an important message from Medicare.  All questions have been answered and CM wished patient well.

## 2024-07-22 NOTE — PROGRESS NOTES
0730: Bedside and Verbal shift change report given to ANETTE Munoz / ANETTE Latif (oncoming nurse) by ANETTE Knowles (offgoing nurse). Report included the following information Nurse Handoff Report, Index, Adult Overview, MAR, Recent Results, and Cardiac Rhythm NSR .     1000: Pt orthostatic w/ PT, SBP 90 MAP 50's, pt c/o weakness and doesn't \"feel right\". Pt transferred back into bed with decreased HOB, repeat BP MAP 65, pt noted improvement in symptoms. Pt desatted with lying flat, sating 78%, placed 2 L NC but unresponsive to 2 L, increased to 4 L NC, sating 92%. NP Naomy notified, order received for one time dose of albumin and discontinued Entresto.     1600: Pt weaned back to RA, sating greater than 88%, okay if > 88% on RA per NP Naomy.      1552: Verbal report given to Emilie CHEEMA at rehab by Bhavya CHEEMA .  Report included Nurse Handoff Report, Index, Adult Overview, MAR, Intake and Output, and Cardiac Rhythm NSR.     1715: Discharge instructions, medication education, and follow-up appointments given to patient. Pt and family verbalized understanding of all discharge instructions. Pt discharged with all belongings including IS, splinting bear, and red cardiac surgery bracelet.     RN provided time for clarification on questions and/ or concerns.     Pt and family verbalized and acknowledged education provided, pt denies additional questions and/ or concerns at this time.            Care Plan:   Problem: Chronic Conditions and Co-morbidities  Goal: Patient's chronic conditions and co-morbidity symptoms are monitored and maintained or improved  7/22/2024 0938 by Shelley Lopez RN  Outcome: Progressing  7/21/2024 2134 by Mahanes, Ryleigh  Flowsheets (Taken 7/21/2024 0850 by Feliciano Mackey)  Care Plan - Patient's Chronic Conditions and Co-Morbidity Symptoms are Monitored and Maintained or Improved: Monitor and assess patient's chronic conditions and comorbid symptoms for stability, deterioration, or improvement

## 2024-07-22 NOTE — PLAN OF CARE
1930: Bedside and Verbal shift change report given to Eleni CHEEMA & Ryleigh  (oncoming nurse) by Beatriz CHEEMA  (offgoing nurse). Report included the following information Nurse Handoff Report.     Problem: Chronic Conditions and Co-morbidities  Goal: Patient's chronic conditions and co-morbidity symptoms are monitored and maintained or improved  7/21/2024 2134 by Mahanes, Ryleigh  Flowsheets (Taken 7/21/2024 0850 by Feliciano Mackey)  Care Plan - Patient's Chronic Conditions and Co-Morbidity Symptoms are Monitored and Maintained or Improved: Monitor and assess patient's chronic conditions and comorbid symptoms for stability, deterioration, or improvement  7/21/2024 0943 by Beatriz Sandoval RN  Outcome: Progressing  Flowsheets (Taken 7/21/2024 0850 by Feliciano Mackey)  Care Plan - Patient's Chronic Conditions and Co-Morbidity Symptoms are Monitored and Maintained or Improved: Monitor and assess patient's chronic conditions and comorbid symptoms for stability, deterioration, or improvement     Problem: Pain  Goal: Verbalizes/displays adequate comfort level or baseline comfort level  7/21/2024 2134 by Mahanes, Ryleigh  Flowsheets (Taken 7/19/2024 1000 by Quita Sandoval RN)  Verbalizes/displays adequate comfort level or baseline comfort level: Encourage patient to monitor pain and request assistance  7/21/2024 0943 by Beatriz Sandoval RN  Outcome: Progressing     Problem: Safety - Medical Restraint  Goal: Remains free of injury from restraints (Restraint for Interference with Medical Device)  Description: INTERVENTIONS:  1. Determine that other, less restrictive measures have been tried or would not be effective before applying the restraint  2. Evaluate the patient's condition at the time of restraint application  3. Inform patient/family regarding the reason for restraint  4. Q2H: Monitor safety, psychosocial status, comfort, nutrition and hydration  7/21/2024 2134 by Mahanes, Ryleigh Flowsheets (Taken 7/21/2024  of function for planned discharge setting.  See evaluation for individualized goals.  Description: FUNCTIONAL STATUS PRIOR TO ADMISSION: Patient was independent and active without use of DME. Denies hx falls. Lives with ex-wife who is available to provide supervision at D/C.     HOME SUPPORT PRIOR TO ADMISSION: The patient lived with ex-wife but did not require assistance.    Physical Therapy Goals  Initiated 7/16/2024  Goals reassessed 7/21/2024 and remain appropriate over next 7 days  1.  Patient will move from supine to sit and sit to supine in bed with contact guard assist within 5 day(s).    2.  Patient will perform sit to stand with contact guard assist within 5 day(s).  3.  Patient will transfer from bed to chair and chair to bed with contact guard assist using the least restrictive device within 5 day(s).  4.  Patient will ambulate with contact guard assist for 100 feet with the least restrictive device within 5 day(s).   5.  Patient will ascend/descend 1 stairs with no handrail(s) with contact guard assist within 5 day(s).  6.  Patient will perform cardiac exercises per protocol with supervision/set-up within 5 days.  7.  Patient will verbally recall and functionally demonstrate mindful-based movements (\"move in the tube\") principles without cues within 5 days.      7/21/2024 1305 by Jessi Brown, PT  Outcome: Progressing

## 2024-07-22 NOTE — PROGRESS NOTES
Cardiac Surgery ICU Progress Note    Admit Date: 7/15/2024  POD:  7 Days Post-Op    Procedure:   7/15/24 with Dr. Martinez:  1. CABG x 3.              Left internal mammary artery to LAD.              Reverse saphenous vein graft to R-PDA.              Reverse saphenous vein graft to INT.  2. EVH of both legs.  3. Epiaortic Ultrasound.  4. ZAHRA occlusion with 45 mm clip.    Admission synopsis:  7/15 DOS: Elective CABG with Dr. Martinez. Post op MC, EF 50%, hypokinesis of inferior walls, no residual ZAHRA seen. Arrived to ICU intubated and sedated on precedex and epinephrine. Extubated to 4L NC. Albumin x1. Afib, amio gtt. Hany started. Amio discontinued for bradycardia.  7/16 POD1: Albumin x2 with Lasix 40 mg IV in am, 80 mg in afternoon with lackluster response. Weaned off hany and epi in am. O2 increased to 7L from 5L.  7/17 POD2: afib RVR - amio bolus x2 with conversion to SR. Bumex 2 mg IV with concentrated albumin in am, afternoon diuril 250 mg followed by Bumex 2 mg. Nephrology consult. 1 unit PRBC for hgb 6.8. D/C chest tubes.  7/18 POD 3: Ongoing afib, predominantly rate controlled but IV required 2.5mg IV metoprolol last night. Cr continues to uptrend 1.9, diuril and IV bumex once today. 6L NC. Possible transfer this afternoon.  7/19 POD4: Lifevest. Add BB.  7/20 POD5: Wires pulled. BB increased, PO diuretic, Entresto and Jardiance resumed.  721 POD6: Medically ready for d/c.   Subjective:   Patient seen with Dr. Olivares, up in chair, 2L NC, SR 67, Afebrile. He reports feeling ok and is wondering when he will get a rehab bed.      Objective:   Vitals:      Oxygen Flow Rate: RA to 1L NC     EKG/Rhythm:        Extubation Date / Time: 7/15 17:55 to 4L NC       Xray Result (most recent):  XR CHEST PORTABLE 07/21/2024    Narrative  EXAM:  XR CHEST PORTABLE    INDICATION: resp insufficiency      Comparison to 7/20/2024. Portable exam obtained at 337 demonstrates an interval  increase in pulmonary edema. There are  AHF consult outpatient  GDMT:   - ARNi/ACEi/ARB: Cont Entresto today   - BB: Cont coreg 3.125 mg BID   - MRA: pt reports he had an issue with this medication and prefers not to resume it. Will defer to AHF.   - SGLT2i: Resume Jardiance at lower dose, 10 mg daily  - Cont Bumex to 2mg BID with potassium 40 mEq BID    Paroxymal Atrial Fibrillation s/p ZAHRA occlusion with postop afib with RVR: No residual shelf on postop MC. PTA Eliquis 5mg BID. Pt has been without afib RVR in over 48 hours.  - Cont PO amiodarone 400mg BID  - Cont Coreg  - Maintain mg > 2.4, k > 3.9 (check K level at 1400)     HTN: PTA Coreg 3.125mg, Entresto.  - Cont Coreg  - Cont Entresto  - Increase Bumex     Hypertriglyceridemia: PTA Atorvastatin 40mg  - Continue Atorvastatin      PAD s/p Fem-Fem Bypass, moderate LICA stenosis: Bypass in October 2020 with Dr. Heck (now follows with [facility]) left to right fem-fem bypass with R CFA endarterectomy, left iliac angioplasty. +Claudication, worsening  - Has follow up with Vascular Surgery outpatient in November  - Continue ASA  - Will resume Plavix at discharge    Acute postoperative hypoxemic respiratory insufficiency: R/t atelectasis, COPD, and fluid resuscitation  - Pulm toilet: acapella, IS, cough, deep breathe, ambulate   - Wean O2 for sats > 89% assuming asymptomatic (no SOB)  - Cont daily CXR while in-house  - Cont diureses     Emphysema/COPD: Hx Tobacco use; Chest CT pre-op with signs of emphysematous changes, saw by Pulmonary during hospitalization  - Duonebs three times daily  - Will potentially need home O2, maintain sats > 90%  - Likely has FER, cpap at night. Sleep study ordered.    Acute postoperative blood loss anemia on chronic macrocytic anemia: Expected. Above transfusion threshold. Folate normal. RDW high.  - Cont ferrous sulfate x 1 month for hct < 30% following BM  - CBC daily    Acute postoperative leukocytosis: Resolved.     CKD Stage II: Baseline Cr 1.2-1.7, GFR 40-60s  -  Nephrology consulted on 7/17, will need outpatient follow up likely, crt improved  - cont to monitor, especially with Entresto resuming     Hx Prostate Cancer and Prostatitis: TURBT 2018, last XRT treatment in January 2024. Reports approximately 15-20lb weight loss since January with treatment  - Does not take any BPH medications at baseline, will monitor closely for urinary retention     Recent Complicated UTI/Dysuria: +Enterococcus, Completed course of ciprofloxacin  - Pre-op UA negative  - Will monitor urine output and symptoms closely     Diabetes Mellitus, Type II: A1c 6.6%;  PTA Metformin 1000mg BID, Jardiance   - Transitioned off insulin drip to NPH  - Care per Diabetes Management   - Cont Jardiance     Chronic Back Pain: PTA gabapentin 300mg TID; recent lumbar MRI with progressive DDD  - Cont Gabapentin at lower dose of 200 mg TID    Nutrition: advance as tolerated  GI proph: pepcid  Bowel reg: glycolax, senokot, prn bisacodyl  DVT proph: Heparin SQ, SCDs, ambulate    Dispo: Ready to discharge to [facility]. Cont to diurese and wean O2. Resuming some PTA medications, will monitor BMP.    Signed By: EDITH Fair - NP

## 2024-07-22 NOTE — PLAN OF CARE
Problem: Occupational Therapy - Adult  Goal: By Discharge: Performs self-care activities at highest level of function for planned discharge setting.  See evaluation for individualized goals.  Description: FUNCTIONAL STATUS PRIOR TO ADMISSION:    Receives Help From: Family, ADL Assistance: Independent,  ,  ,  ,  ,  , Homemaking Assistance: Independent, Ambulation Assistance: Independent, Transfer Assistance: Independent, Active : Yes     HOME SUPPORT: Patient lived with ex-wife and was completely independent, driving, completing IADLs.    Occupational Therapy Goals:  Initiated 7/16/2024    1.  Patient will perform ADLs standing 5 mins without fatigue or LOB with Contact Guard Assist within 7 days.  2.  Patient will perform upper body ADLs with Set-up within 7 days.  3.  Patient will perform lower body ADLs with Minimal Assist within 7 days.    4.  Patient will perform gathering ADL items high and low 2/2 with Contact Guard Assist within 7 days.  5.  Patient will perform toilet transfers with Contact Guard Assist within 7 days.  6.  Patient will perform all aspects of toileting with Contact Guard Assist within 7 days.  7.  Patient will participate in cardiac/sternal upper extremity therapeutic exercise/activities to increase independence with ADLs with supervision/set-up for 5 minutes within 7 days.   8.  Patient will adhere to Move in the Tube precautions during functional tasks with minimal verbal cues within 7 days.     Outcome: Progressing     OCCUPATIONAL THERAPY TREATMENT  Patient: Shade Birmingham (78 y.o. male)  Date: 7/22/2024  Primary Diagnosis: Disease of cardiovascular system [I25.10]  Coronary artery disease of native artery of native heart with stable angina pectoris (HCC) [I25.118]  Procedure(s) (LRB):  CORONARY ARTERY BYPASS GRAFTING X 3 WITH GUSTAVO GILLIS, LIGATION OF LEFT ATRIAL APPENDAGE, ECC, MC AND EPIAORTIC U/S BY DR VEGA (N/A) 7 Days Post-Op   Precautions: Fall Risk, Surgical Protocols  discharges home will need the following DME: continuing to assess with progress       SUBJECTIVE:   Patient stated “I just want to get excited about walking again.”    OBJECTIVE DATA SUMMARY:   Cognitive/Behavioral Status:  Orientation  Overall Orientation Status: Within Functional Limits  Orientation Level: Oriented X4  Cognition  Overall Cognitive Status: Exceptions  Arousal/Alertness: Appears intact  Following Commands: Appears intact  Attention Span: Appears intact  Memory: Appears intact  Safety Judgement: Decreased awareness of need for safety;Decreased awareness of need for assistance  Problem Solving: Decreased awareness of errors;Assistance required to correct errors made  Insights: Decreased awareness of deficits  Initiation: Does not require cues  Sequencing: Requires cues for some    Functional Mobility and Transfers for ADLs:  Bed Mobility:  Bed Mobility Training  Bed Mobility Training: Yes  Rolling: Minimum assistance  Supine to Sit: Contact-guard assistance (verbal cues for precaution maintenance)  Sit to Supine: Moderate assistance  Scooting: Contact-guard assistance     Transfers:   Transfer Training  Transfer Training: Yes  Overall Level of Assistance: Minimum assistance  Sit to Stand: Minimum assistance  Stand to Sit: Minimum assistance  Bed to Chair: Minimum assistance           Balance:     Balance  Sitting: Impaired  Sitting - Static: Good (unsupported)  Sitting - Dynamic: Fair (occasional)  Standing: Impaired  Standing - Static: Fair;Constant support  Standing - Dynamic: Fair;Constant support      ADL Intervention:                            UE Bathing: Stand by assistance  UE Bathing Skilled Clinical Factors: Pt provided shower/sternal incision hygiene information, practiced cleaning incision with wipe while seated EOB            UE Dressing: Contact guard assistance  UE Dressing Skilled Clinical Factors: Pt donned shirt via adaptive technique seated EOB    LE Dressing: Minimal  indicated good understanding on the benefits of loose clothing throughout to accommodate for post surgical swelling, decreased ROM and increased pain.         Pain Rating:  None reported      Activity Tolerance:   Good, Fair , SpO2 stable on room air, and signs and symptoms of orthostatic hypotension  Please refer to the flowsheet for vital signs taken during this treatment.    After treatment:   Patient left in no apparent distress sitting up in chair, Call bell within reach, Bed/ chair alarm activated, and Caregiver / family present    COMMUNICATION/EDUCATION:   The patient's plan of care was discussed with: physical therapist and registered nurse    Patient Education  Education Given To: Patient  Education Provided: Role of Therapy;Plan of Care;Home Exercise Program;Precautions;ADL Adaptive Strategies;Transfer Training;Energy Conservation;Mobility Training;Fall Prevention Strategies;Equipment;IADL Safety  Education Provided Comments: Move in the tube  Education Method: Demonstration;Verbal;Teach Back  Barriers to Learning: None  Education Outcome: Verbalized understanding;Demonstrated understanding;Continued education needed    Thank you for this referral.  Fany [facility] [place]  Minutes: 38    Regarding student involvement in patient care:  A student participated in this treatment session. Per CMS Medicare statements and AOTA guidelines I certify that the following was true:  1. I was present and directly observed the entire session.  2. I made all skilled judgments and clinical decisions regarding care.  3. I am the practitioner responsible for assessment, treatment, and documentation.

## 2024-07-22 NOTE — PROGRESS NOTES
[address], [address] [place] [address]  Phone: [phone]   [phone]    [email or web address]     Nephrology Progress Note    Patient Name : Shade Birmingham      : 1945     MRN : [id number]  Date of Admission : 7/15/2024  Date of Servive : 24    CC:  Follow up for CLARENCE on CKD       Assessment and Plan   CLARENCE on CKD:  - suspect 2/2 post op ATN  - Cr back to baseline   - BP too low for Entresto/ aldactone   - ok to continue Bumex and jardiance   - daily labs and I/Os     CKD 3a:  - baseline Cr 1.4  - US neg for hydro     CAD s/p CABG x 3 7/15:  - per CTS     Blood loss anemia:  - PRBCs     Afib with RVR:  - ZAHRA occlussion 7/15  - resolved after amio bolus, on oral amio now     HFrEF:  - EF 20-25%  -per cards      PAD s/p fem-fem bypass  LICA stenosis  HTN  HLD  DM2  Prostate cancer s/p radiation  COPD     Interval History:  Seen and examined.  Diuresing well w/ Bumex. BP and reports mild orthostasis. Says unable to afford any of his hear failure meds including jardiance. .  On NC O2.  No cp, sob, n/v/d    Review of Systems: Pertinent items are noted in HPI.    Current Medications:   Current Facility-Administered Medications   Medication Dose Route Frequency    potassium chloride (KLOR-CON) extended release tablet 40 mEq  40 mEq Oral BID    [START ON 2024] ferrous sulfate (IRON 325) tablet 325 mg  325 mg Oral Q MWF    albumin human 25% IV solution 25 g  25 g IntraVENous Once    bumetanide (BUMEX) tablet 2 mg  2 mg Oral BID    carvedilol (COREG) tablet 3.125 mg  3.125 mg Oral BID WC    empagliflozin (JARDIANCE) tablet 10 mg  10 mg Oral Daily    [Held by provider] sacubitril-valsartan (ENTRESTO) 24-26 MG per tablet 1 tablet  1 tablet Oral BID    ipratropium 0.5 mg-albuterol 2.5 mg (DUONEB) nebulizer solution 1 Dose  1 Dose Inhalation Q4H PRN    insulin NPH (HumuLIN N;NovoLIN N) injection vial 15 Units  15 Units SubCUTAneous 2 times per  day    amiodarone (CORDARONE) tablet 400 mg  400 mg Oral BID    simethicone (MYLICON) chewable tablet 80 mg  80 mg Oral Q6H PRN    magnesium hydroxide (MILK OF MAGNESIA) 400 MG/5ML suspension 30 mL  30 mL Oral Daily    guaiFENesin (MUCINEX) extended release tablet 600 mg  600 mg Oral PRN    heparin (porcine) injection 5,000 Units  5,000 Units SubCUTAneous 3 times per day    famotidine (PEPCID) tablet 20 mg  20 mg Oral Daily    Or    famotidine (PEPCID) 20 mg in sodium chloride (PF) 0.9 % 10 mL injection  20 mg IntraVENous Daily    gabapentin (NEURONTIN) capsule 200 mg  200 mg Oral TID    atorvastatin (LIPITOR) tablet 40 mg  40 mg Oral Nightly    melatonin tablet 3 mg  3 mg Oral Nightly PRN    sodium chloride flush 0.9 % injection 5-40 mL  5-40 mL IntraVENous 2 times per day    sodium chloride flush 0.9 % injection 5-40 mL  5-40 mL IntraVENous PRN    ondansetron (ZOFRAN) injection 4 mg  4 mg IntraVENous Q4H PRN    aspirin EC tablet 81 mg  81 mg Oral Daily    acetaminophen (TYLENOL) tablet 975 mg  975 mg Oral 4 times per day    lidocaine 4 % external patch 2 patch  2 patch Topical Daily    oxyCODONE (ROXICODONE) immediate release tablet 5 mg  5 mg Oral Q4H PRN    chlorhexidine (PERIDEX) 0.12 % solution 15 mL  15 mL Mouth/Throat BID    hydrALAZINE (APRESOLINE) injection 10 mg  10 mg IntraVENous Q6H PRN    magnesium oxide (MAG-OX) tablet 400 mg  400 mg Oral BID    diphenhydrAMINE (BENADRYL) capsule 25 mg  25 mg Oral Nightly PRN    polyethylene glycol (GLYCOLAX) packet 17 g  17 g Oral Daily    sennosides-docusate sodium (SENOKOT-S) 8.6-50 MG tablet 1 tablet  1 tablet Oral BID    bisacodyl (DULCOLAX) suppository 10 mg  10 mg Rectal Daily PRN    magnesium sulfate 2000 mg in 50 mL IVPB premix  2,000 mg IntraVENous PRN    insulin lispro (HUMALOG,ADMELOG) injection vial 0-12 Units  0-12 Units SubCUTAneous TID WC    insulin lispro (HUMALOG,ADMELOG) injection vial 0-6 Units  0-6 Units SubCUTAneous Nightly    glucose chewable  tablet 16 g  4 tablet Oral PRN    dextrose bolus 10% 125 mL  125 mL IntraVENous PRN    Or    dextrose bolus 10% 250 mL  250 mL IntraVENous PRN    glucagon injection 1 mg  1 mg SubCUTAneous PRN    dextrose 10 % infusion   IntraVENous Continuous PRN      Allergies   Allergen Reactions    Glipizide Other (See Comments)     SKIN BURNING       Objective:  Vitals:    Vitals:    07/22/24 0936 07/22/24 0940 07/22/24 0946 07/22/24 1000   BP: (!) 93/43 (!) 105/37 (!) 107/37    Pulse: 76 71 74 65   Resp:       Temp:       TempSrc:       SpO2: 92%      Weight:       Height:         Intake and Output:  07/22 0701 - 07/22 1900  In: 240 [P.O.:240]  Out: -   07/20 1901 - 07/22 0700  In: 900 [P.O.:900]  Out: 4450 [Urine:4450]    Physical Examination:  General: NAD,on NC  Neck:  Supple, no mass  Resp:  Lungs CTA B/L  CV:  RRR,  no murmur or rub, trace b/l LE edema  GI:  Soft, NT, + BS, no HS megaly  Neurologic:  Non focal  Psych:             AAO x 3 appropriate affect   Skin:  No Rash  :  No lee    []    High complexity decision making was performed  []    Patient is at high-risk of decompensation with multiple organ involvement    Lab Data Personally Reviewed: I have reviewed all the pertinent labs, microbiology data and radiology studies during assessment.    Labs:  Recent Labs     07/20/24  0403 07/20/24  1720 07/21/24  0320 07/22/24  0633   *  --  135* 136   K 3.5 4.1 4.4 3.4*   CL 99  --  98 98   CO2 30  --  30 30   GLUCOSE 94  --  103* 136*   BUN 44*  --  42* 43*   CREATININE 1.38*  --  1.41* 1.37*   CALCIUM 8.8  --  8.5 8.9         Recent Labs     07/20/24  0403 07/21/24  0320 07/22/24  0633   WBC 10.4 10.2 11.7*   RBC 2.46* 2.51* 2.56*   HGB 8.2* 8.4* 8.6*   HCT 24.3* 25.2* 25.8*   MCV 98.8 100.4* 100.8*   MCH 33.3 33.5 33.6   MCHC 33.7 33.3 33.3   RDW 19.6* 19.8* 19.5*    206 224   MPV 10.5 11.0 10.6       No results for input(s): \"TP\", \"GLOB\" in the last 72 hours.    Invalid input(s): \"SGOT\", \"GPT\", \"AP\",

## 2024-07-24 ENCOUNTER — TELEPHONE (OUTPATIENT)
Age: 79
End: 2024-07-24

## 2024-07-24 NOTE — TELEPHONE ENCOUNTER
Telephone Call RE:  Appointment reminder     Outcome:     [] Patient confirmed appointment   [] Patient rescheduled appointment for    [] Unable to reach  [] Left message              [x] Other:     Called pt to confirm Mon, 7/29/24 appt.  Spoke with wife.  She was unaware of this appt and said that her 's in rehab.  She said that she was \"thoroughly confused\" but would try to have him call us back when he's done with P.T.

## 2024-07-24 NOTE — TELEPHONE ENCOUNTER
Pt called back.  He cannot leave rehab, so he has been rescheduled for Tues, 8/13/24 at 9:30 a.m.  That was the soonest date that he could comfortably say that he'd be home.

## 2024-08-07 ENCOUNTER — TELEPHONE (OUTPATIENT)
Age: 79
End: 2024-08-07

## 2024-08-07 NOTE — TELEPHONE ENCOUNTER
Telephone Call RE:  Appointment reminder     Outcome:     [x] Patient confirmed appointment   [] Patient rescheduled appointment for    [] Unable to reach  [] Left message              [] Other:       Informed pt of parking and reminded him of new pt details. He asked how long he'd have to wear the life vest, and I told him that that would be up to Dr. Sandoval so to discuss that with her at his visit.

## 2024-08-09 NOTE — PROGRESS NOTES
Cancer Brother     Other Brother          AA    Neuropathy Other     Anesth Problems Neg Hx        SOCIAL HISTORY:  Social History     Socioeconomic History    Marital status:      Spouse name: Not on file    Number of children: Not on file    Years of education: Not on file    Highest education level: Not on file   Occupational History    Not on file   Tobacco Use    Smoking status: Former     Current packs/day: 0.00     Average packs/day: 0.8 packs/day for 44.0 years (33.0 ttl pk-yrs)     Types: Cigarettes     Start date:      Quit date: 2007     Years since quittin.6    Smokeless tobacco: Never   Vaping Use    Vaping status: Never Used   Substance and Sexual Activity    Alcohol use: Yes     Alcohol/week: 14.0 standard drinks of alcohol     Types: 14 Glasses of wine per week    Drug use: No    Sexual activity: Not Currently     Partners: Female   Other Topics Concern    Not on file   Social History Narrative    Not on file     Social Determinants of Health     Financial Resource Strain: Not on file   Food Insecurity: No Food Insecurity (2024)    Hunger Vital Sign     Worried About Running Out of Food in the Last Year: Never true     Ran Out of Food in the Last Year: Never true   Transportation Needs: No Transportation Needs (2024)    PRAPARE - Transportation     Lack of Transportation (Medical): No     Lack of Transportation (Non-Medical): No   Physical Activity: Not on file   Stress: Not on file   Social Connections: Not on file   Intimate Partner Violence: Not on file   Housing Stability: Low Risk  (2024)    Housing Stability Vital Sign     Unable to Pay for Housing in the Last Year: No     Number of Places Lived in the Last Year: 1     Unstable Housing in the Last Year: No       LABORATORY RESULTS:  Lab Results   Component Value Date/Time     2024 06:33 AM    K 3.4 2024 06:33 AM    CL 98 2024 06:33 AM    CO2 30 2024 06:33 AM    BUN 43

## 2024-08-13 ENCOUNTER — OFFICE VISIT (OUTPATIENT)
Age: 79
End: 2024-08-13
Payer: MEDICARE

## 2024-08-13 VITALS
TEMPERATURE: 97.1 F | SYSTOLIC BLOOD PRESSURE: 126 MMHG | RESPIRATION RATE: 18 BRPM | DIASTOLIC BLOOD PRESSURE: 64 MMHG | HEIGHT: 74 IN | HEART RATE: 72 BPM | WEIGHT: 207 LBS | OXYGEN SATURATION: 96 % | BODY MASS INDEX: 26.56 KG/M2

## 2024-08-13 DIAGNOSIS — I25.5 ISCHEMIC CARDIOMYOPATHY: ICD-10-CM

## 2024-08-13 DIAGNOSIS — Z51.81 ENCOUNTER FOR MONITORING DIURETIC THERAPY: ICD-10-CM

## 2024-08-13 DIAGNOSIS — I50.22 CHF (CONGESTIVE HEART FAILURE), NYHA CLASS II, CHRONIC, SYSTOLIC (HCC): ICD-10-CM

## 2024-08-13 DIAGNOSIS — I25.10 CORONARY ARTERY DISEASE INVOLVING NATIVE CORONARY ARTERY OF NATIVE HEART WITHOUT ANGINA PECTORIS: ICD-10-CM

## 2024-08-13 DIAGNOSIS — E55.9 VITAMIN D DEFICIENCY: ICD-10-CM

## 2024-08-13 DIAGNOSIS — Z79.899 ENCOUNTER FOR MONITORING DIURETIC THERAPY: ICD-10-CM

## 2024-08-13 DIAGNOSIS — I50.22 CHF (CONGESTIVE HEART FAILURE), NYHA CLASS II, CHRONIC, SYSTOLIC (HCC): Primary | ICD-10-CM

## 2024-08-13 DIAGNOSIS — I10 PRIMARY HYPERTENSION: ICD-10-CM

## 2024-08-13 PROCEDURE — 3078F DIAST BP <80 MM HG: CPT | Performed by: INTERNAL MEDICINE

## 2024-08-13 PROCEDURE — G8419 CALC BMI OUT NRM PARAM NOF/U: HCPCS | Performed by: INTERNAL MEDICINE

## 2024-08-13 PROCEDURE — 99205 OFFICE O/P NEW HI 60 MIN: CPT | Performed by: INTERNAL MEDICINE

## 2024-08-13 PROCEDURE — 3074F SYST BP LT 130 MM HG: CPT | Performed by: INTERNAL MEDICINE

## 2024-08-13 PROCEDURE — G8427 DOCREV CUR MEDS BY ELIG CLIN: HCPCS | Performed by: INTERNAL MEDICINE

## 2024-08-13 RX ORDER — POTASSIUM CHLORIDE 1500 MG/1
40 TABLET, EXTENDED RELEASE ORAL PRN
Qty: 30 TABLET | Refills: 2 | Status: SHIPPED | OUTPATIENT
Start: 2024-08-13

## 2024-08-13 RX ORDER — LANOLIN ALCOHOL/MO/W.PET/CERES
400 CREAM (GRAM) TOPICAL PRN
Qty: 30 TABLET | Refills: 2 | Status: SHIPPED | OUTPATIENT
Start: 2024-08-13

## 2024-08-13 RX ORDER — SPIRONOLACTONE 25 MG/1
25 TABLET ORAL DAILY
Qty: 30 TABLET | Refills: 2 | Status: SHIPPED | OUTPATIENT
Start: 2024-08-13

## 2024-08-13 RX ORDER — BUMETANIDE 2 MG/1
2 TABLET ORAL AS NEEDED
Qty: 30 TABLET | Refills: 2 | Status: SHIPPED | OUTPATIENT
Start: 2024-08-13

## 2024-08-13 ASSESSMENT — PATIENT HEALTH QUESTIONNAIRE - PHQ9
2. FEELING DOWN, DEPRESSED OR HOPELESS: NOT AT ALL
SUM OF ALL RESPONSES TO PHQ QUESTIONS 1-9: 0
1. LITTLE INTEREST OR PLEASURE IN DOING THINGS: NOT AT ALL
SUM OF ALL RESPONSES TO PHQ9 QUESTIONS 1 & 2: 0

## 2024-08-13 NOTE — PATIENT INSTRUCTIONS
Medication changes:    Start spironolactone 25mg daily  Change bumex to prn as well as potassium and magnesium  Entresto patient assistance completed today, please bring tax return to office later this week    Testing Ordered:    Lab work has been drawn today. If results are normal you will just receive a message through my chart. Please make sure to have an active my chart account. Having issues logging in? Contact the [facility] [email or web address] Help Desk at [phone].Our office will notify you of any abnormal results requiring changes to your current plan of care.     An order for cardiac MRI has been placed to be done in 2 months. Please call care coordination to schedule at [phone]      Referrals:  Referral sent to cardiac rehab, if you dont hear from them in 1-2 weeks, please call to schedule appointment  [facility] [place]   Address: [address], [place] [address]  Phone: [phone]    Please call to schedule appt with Dr Oviedo (general cardiology) for follow up, you can call one of the numbers listed below.     [facility] [place]   [address]  [address]  [place], [place] [address]  Tel: [phone]      Other Recommendations:      - Record blood pressure and heart rate daily before medication and two hours after medication  - Record weight daily upon waking/after using the bathroom.   - Keep a written records of your weights and blood pressure and bring to your next appointment.   - Call the clinic at if you have a weight gain of 3 or more pounds overnight OR 5 or more pounds in one week, new/worsened shortness of breath or swelling, or if your blood pressure begins to consistently run below 90/60 and/or you begin to experience dizziness or lightheadedness. Our office phone number [phone] option 2.  - Ensure you are drinking an adequate amount of water with a goal of 6-8 eight ounce glasses (1.5-2 liters) of fluid daily. Your urine should be

## 2024-08-14 ENCOUNTER — TELEPHONE (OUTPATIENT)
Age: 79
End: 2024-08-14

## 2024-08-14 LAB
25(OH)D3 SERPL-MCNC: 16.8 NG/ML (ref 30–100)
ANION GAP SERPL CALC-SCNC: 6 MMOL/L (ref 5–15)
BUN SERPL-MCNC: 24 MG/DL (ref 6–20)
BUN/CREAT SERPL: 18 (ref 12–20)
CALCIUM SERPL-MCNC: 9 MG/DL (ref 8.5–10.1)
CHLORIDE SERPL-SCNC: 101 MMOL/L (ref 97–108)
CO2 SERPL-SCNC: 29 MMOL/L (ref 21–32)
CREAT SERPL-MCNC: 1.31 MG/DL (ref 0.7–1.3)
ERYTHROCYTE [DISTWIDTH] IN BLOOD BY AUTOMATED COUNT: 20.5 % (ref 11.5–14.5)
FERRITIN SERPL-MCNC: 848 NG/ML (ref 26–388)
GLUCOSE SERPL-MCNC: 167 MG/DL (ref 65–100)
HCT VFR BLD AUTO: 34.7 % (ref 36.6–50.3)
HGB BLD-MCNC: 10.5 G/DL (ref 12.1–17)
IRON SATN MFR SERPL: 27 % (ref 20–50)
IRON SERPL-MCNC: 60 UG/DL (ref 35–150)
MAGNESIUM SERPL-MCNC: 2.3 MG/DL (ref 1.6–2.4)
MCH RBC QN AUTO: 33.2 PG (ref 26–34)
MCHC RBC AUTO-ENTMCNC: 30.3 G/DL (ref 30–36.5)
MCV RBC AUTO: 109.8 FL (ref 80–99)
NRBC # BLD: 0.02 K/UL (ref 0–0.01)
NRBC BLD-RTO: 0.2 PER 100 WBC
NT PRO BNP: 4410 PG/ML
PLATELET # BLD AUTO: 238 K/UL (ref 150–400)
PMV BLD AUTO: 10.1 FL (ref 8.9–12.9)
POTASSIUM SERPL-SCNC: 4.1 MMOL/L (ref 3.5–5.1)
RBC # BLD AUTO: 3.16 M/UL (ref 4.1–5.7)
SODIUM SERPL-SCNC: 136 MMOL/L (ref 136–145)
TIBC SERPL-MCNC: 225 UG/DL (ref 250–450)
WBC # BLD AUTO: 9.1 K/UL (ref 4.1–11.1)

## 2024-08-14 NOTE — TELEPHONE ENCOUNTER
----- Message from Dr. Marcy Sandoval MD sent at 8/14/2024  2:49 PM EDT -----  Please let patient know labs from yesterday show low vitamin D. I would like for him to start vitamin D 2000 units daily. Iron stores are normal and blood counts are improved-hemoglobin up from 8.6 to now 10.5 from surgery. Kidney function is stable and potassium is normal. His proBNP is elevated suggesting ongoing fluid retention. We started Spironolactone and Entresto yesterday and reduced Bumex to PRN due to prior dehydration with both Perez and Bumex. Please remind him to just keep a very close eye on his weight and symptoms in case we need to restart Bumex on a scheduled basis.     Called patient, WYATT for call back to review above.  8/15-- spoke to patient regarding above. Patient verbalized understanding. Patient has no further questions.

## 2024-08-15 ENCOUNTER — TELEPHONE (OUTPATIENT)
Age: 79
End: 2024-08-15

## 2024-08-15 DIAGNOSIS — I50.22 CHF (CONGESTIVE HEART FAILURE), NYHA CLASS II, CHRONIC, SYSTOLIC (HCC): Primary | ICD-10-CM

## 2024-08-15 RX ORDER — CHOLECALCIFEROL (VITAMIN D3) 50 MCG
2000 TABLET ORAL DAILY
Qty: 30 TABLET | Refills: 2 | Status: SHIPPED | OUTPATIENT
Start: 2024-08-15

## 2024-08-15 NOTE — PROGRESS NOTES
Physician Progress Note      PATIENT:               DEEP PIERRE  [facility] #:                  [id number]  :                       1945  ADMIT DATE:       7/15/2024 5:15 AM  DISCH DATE:        2024 5:30 PM  RESPONDING  PROVIDER #:        Humberto Novak MD          QUERY TEXT:    Patient was admitted with CAD and underwent a CABG. Creatinine was noted to be   1.05 on admit. Postoperatively the creatine bryce to 1.90 (over 3 days) and   dropped to 1.37 over the next 3/4 days. Nephrology documented CLARENCE on CKD:-   suspect 2/2 post op ATN- Cr back to baseline - BP too low for Entresto/   Aldactone - ok to continue Bumex and Jardiance - daily labs and I/Os. After   study, can the patient's condition be further specified as:    The medical record reflects the following:  Risk Factors:    Clinical Indicators: Creatinine was noted to be 1.05 on admit. Postoperatively   the creatine bryce to 1.90 (over 3 days) and dropped to 1.37 over the next 3/4   days    Treatment: Nephrology consulted  -continue Bumex and Jardiance  - daily labs and I/Os    Defined by Kidney Disease Improving Global Outcomes (KDIGO) clinical practice   guideline for acute kidney injury:  -Increase in SCr by greater than or equal to 0.3 mg/dl within 48 hours; or  -Increase or decrease in SCr to greater than or equal to 1.5 times baseline,   which is known or presumed to have occurred within the prior 7 days; or  -Urine volume < 0.5ml/kg/h for 6 hours    Please call if you have any questions or need assistance. I can also be   reached via [employer] or [employer] # [phone].  Thank you,  Nolvia Faulkner, RN/CDI  Options provided:  -- Acute kidney failure with acute tubular necrosis  -- Acute kidney injury  -- Other - I will add my own diagnosis  -- Disagree - Not applicable / Not valid  -- Disagree - Clinically unable to determine / Unknown  -- Refer to Clinical Documentation Reviewer    PROVIDER RESPONSE TEXT:    This patient is in  Acute kidney failure with acute tubular necrosis.    Query created by: Nolvia Faulkner on 7/30/2024 9:04 AM      Electronically signed by:  Humberto Novak MD 8/15/2024 6:29 AM

## 2024-08-15 NOTE — TELEPHONE ENCOUNTER
Pt called to say that when he went to schedule his MRI in Oct, as instructed, there was no availability.  The  said that she'd reach out to us and get back to pt, but pt has not heard back from her.  Pt can best be reached at [phone].  He was also returning Ethan's call.

## 2024-08-15 NOTE — TELEPHONE ENCOUNTER
Spoke with patient about cardiac MRI. Patient states he can only do an open MRI due to claustrophobia. Asked if patient was willing to take a medication to relax him for MRI, he is unsure that this will work/that he will be able to have MRI. RN will confirm wether MRI is open/closed and determine next steps.    Cardiac MRI is a closed MRI, discussed with patient and Dr. Sandoval. For now he would like to hold off on MRI and proceed with echocardiogram only.     Orders updated, patient given number for scheduling.

## 2024-08-16 ENCOUNTER — TELEPHONE (OUTPATIENT)
Age: 79
End: 2024-08-16

## 2024-08-16 NOTE — TELEPHONE ENCOUNTER
Patient approved for [employer] patient assistance foundation for entresto.    Called patient to alert of above and provide phone number to call to schedule shipment [phone]

## 2024-08-19 ENCOUNTER — TELEPHONE (OUTPATIENT)
Age: 79
End: 2024-08-19

## 2024-08-19 DIAGNOSIS — I49.3 PVC (PREMATURE VENTRICULAR CONTRACTION): ICD-10-CM

## 2024-08-19 DIAGNOSIS — I50.22 CHF (CONGESTIVE HEART FAILURE), NYHA CLASS II, CHRONIC, SYSTOLIC (HCC): Primary | ICD-10-CM

## 2024-08-19 DIAGNOSIS — Z79.899 ENCOUNTER FOR MONITORING DIURETIC THERAPY: ICD-10-CM

## 2024-08-19 DIAGNOSIS — Z51.81 ENCOUNTER FOR MONITORING DIURETIC THERAPY: ICD-10-CM

## 2024-08-19 NOTE — TELEPHONE ENCOUNTER
----- Message from ANETTE JUNIOR RN sent at 8/16/2024  1:35 PM EDT -----  Check in on BP/HR before and 2 hours after meds, logs on Monday    Patient reports he hasn't felt well all weekend, lightheaded, weak  Dizziness is not positional (all the time)  didnt take spironolactone yesterday (felt better without it)  Felt short of breath Saturday (not any more)    8/19-no weight /64-78, 2nd check- 120/42-89 , bg is 131  8/.5lb 132/55, after 143/68  8/17- 207lb 151/56, after meds 150/59    Med review:  New as of 8/13 appt-Spironolactone 25mg daily  Jardiance 25mg daily  Entresto 24-26mg BID  Coreg 3.125mg BID  Bumex only prn (hasn't taken since OV when switched from daily to prn)  400mg daily amiodarone? (Does not match in computer, went to rehab after hospital)    Enourage fluid intake this morning, has not taken any meds yet, waiting to hear back from [facility], RN checking with NP regarding recommendations.    Neema Mitchell, APRN - NP  Ty Dixon, RN  Caller: Unspecified (Today,  9:25 AM)  Lets continue to hold the spironolactone, get some labs BMP, PBNP and see what his lifevest shows.  We can move him up to this week  Discussed with Neema NP, also decrease entresto to 0.5 tab BID    Life vest report received- given to NP for review  Appt scheduled for 8/22 at 11am  Patient has HH nursing through [facility]-- called/faxed lab order to them to complete with visit.    8/20-Called patient unable to leave message.  After meds 134/41-49 (low HR is abnormal for patient)  No new symptoms, feeling better today  --14 day cardiac event monitor per Neema LARSON, EKG to be done at clinic visit.    Cardiac monitor scheduled August 28th [facility] at 930 (10am)--Outpatient registration, will alert patient at appt tomorrow

## 2024-08-20 DIAGNOSIS — I50.22 CHF (CONGESTIVE HEART FAILURE), NYHA CLASS II, CHRONIC, SYSTOLIC (HCC): ICD-10-CM

## 2024-08-22 ENCOUNTER — OFFICE VISIT (OUTPATIENT)
Age: 79
End: 2024-08-22

## 2024-08-22 VITALS
HEART RATE: 61 BPM | BODY MASS INDEX: 26.18 KG/M2 | DIASTOLIC BLOOD PRESSURE: 68 MMHG | TEMPERATURE: 97.2 F | SYSTOLIC BLOOD PRESSURE: 112 MMHG | WEIGHT: 204 LBS | HEIGHT: 74 IN | RESPIRATION RATE: 20 BRPM | OXYGEN SATURATION: 95 %

## 2024-08-22 DIAGNOSIS — Z79.899 ENCOUNTER FOR MONITORING DIURETIC THERAPY: ICD-10-CM

## 2024-08-22 DIAGNOSIS — I50.22 CHF (CONGESTIVE HEART FAILURE), NYHA CLASS II, CHRONIC, SYSTOLIC (HCC): ICD-10-CM

## 2024-08-22 DIAGNOSIS — I25.5 ISCHEMIC CARDIOMYOPATHY: ICD-10-CM

## 2024-08-22 DIAGNOSIS — I49.3 PVC (PREMATURE VENTRICULAR CONTRACTION): Primary | ICD-10-CM

## 2024-08-22 DIAGNOSIS — Z51.81 ENCOUNTER FOR MONITORING DIURETIC THERAPY: ICD-10-CM

## 2024-08-22 RX ORDER — FERROUS SULFATE 325(65) MG
325 TABLET ORAL
Qty: 30 TABLET | Refills: 0
Start: 2024-08-22

## 2024-08-22 ASSESSMENT — PATIENT HEALTH QUESTIONNAIRE - PHQ9
SUM OF ALL RESPONSES TO PHQ QUESTIONS 1-9: 0
SUM OF ALL RESPONSES TO PHQ9 QUESTIONS 1 & 2: 0
2. FEELING DOWN, DEPRESSED OR HOPELESS: NOT AT ALL
SUM OF ALL RESPONSES TO PHQ QUESTIONS 1-9: 0
1. LITTLE INTEREST OR PLEASURE IN DOING THINGS: NOT AT ALL
SUM OF ALL RESPONSES TO PHQ QUESTIONS 1-9: 0
SUM OF ALL RESPONSES TO PHQ QUESTIONS 1-9: 0

## 2024-08-22 NOTE — PROGRESS NOTES
[facility]  [facility] in [place], [place]  Heart Failure Outpatient Clinic Note    Patient name: Shade Birmingham  Patient : 1945  Patient MRN: [id number]  Date of service: 24    Primary care physician: Lucas Buckner MD  Primary cardiologist: Fern Oviedo MD  Primary F cardiologist: Aniceto Sandoval MD      Chief Complaint   Patient presents with    Shortness of Breath     W/ exertion    Leg Swelling    Congestive Heart Failure    Follow-up        Current Visit:  24 Shade Birmingham presents for HF follow up after feeling poorly a few days ago with hypotension and weakness. His spironolactone was held and his entresto decreased in 12.   He states he feels much better today, his SBP at home has been in the 110s and he is no longer weak. His lifevest interrogation shows more frequent PVCs, but his HR on EKG is 60 without PVCs noted.   VS- BP stable, HR 60  Labs reviewed from , pending from     ASSESSMENT:  Shade Birmingham is a 78 y.o. male with a history of chronic systolic heart failure due to ischemic cardiomyopathy, stage C. Recent CABG. Optimizing GDMT and re evaluation of LV function in 3 months    PLAN:  Heart failure/Cardiomyopathy:  NYHA II  Continue current medical therapy for heart failure:  Beta-blocker: Continue Carvedilol 3.125 mg BID  ACE/ARB/ARNi: Continue decrease dose of Entresto 12/13mg BID   MRA: Will hold spironolactone for now, will consider resuming at 12.5mg at next visit   SGLT2 inhibitor: Continue Jardiance 25 mg daily  Diuretic: Continue bumex PRN  Needs to schedule his TTE for 2 months- he is unable to tolerate a closed MRI   Reinforced low salt diet  Reinforced fluid restriction to 6 x 8oz glasses per day  Recommended 30 minutes of aerobic exercise 5 days weekly  Referred to cardiac rehab- will start when HH PT clears patient   Labs: Labs pending from      CAD:  No anginal symptoms  Continue ASA and plavix   Continue

## 2024-08-22 NOTE — PATIENT INSTRUCTIONS
Medication changes:    Stop amiodarone  Continue 0.5 tab entresto twice a day  Continue to hold spironolactone    Testing Ordered:    Cardiac event monitor set up for 8/28 at 10am (arrive at 9am)    Call to schedule echo at [phone]    We will call you about lab results, once received    Other Recommendations:      - Record blood pressure and heart rate daily before medication and two hours after medication  - Record weight daily upon waking/after using the bathroom.   - Keep a written records of your weights and blood pressure and bring to your next appointment.   - Call the clinic at if you have a weight gain of 3 or more pounds overnight OR 5 or more pounds in one week, new/worsened shortness of breath or swelling, or if your blood pressure begins to consistently run below 90/60 and/or you begin to experience dizziness or lightheadedness. Our office phone number [phone] option 2.  - Ensure you are drinking an adequate amount of water with a goal of 6-8 eight ounce glasses (1.5-2 liters) of fluid daily. Your urine should be clear and light yellow straw colored.       Follow up as scheduled next week 8/28 with [facility]    Thank you for allowing us the privilege of being a part of your healthcare team! Please do not hesitate to contact our office at [phone] option 2 with any questions or concerns.     We are restarting a monthly heart failure support group, this will be the last Wednesday of every month from 5-6pm at [facility]. If you would like to attend you will need to RSVP to [email or web address]

## 2024-08-23 ENCOUNTER — TELEPHONE (OUTPATIENT)
Age: 79
End: 2024-08-23

## 2024-08-26 ENCOUNTER — OFFICE VISIT (OUTPATIENT)
Age: 79
End: 2024-08-26

## 2024-08-26 VITALS
WEIGHT: 204 LBS | DIASTOLIC BLOOD PRESSURE: 46 MMHG | TEMPERATURE: 97.3 F | SYSTOLIC BLOOD PRESSURE: 153 MMHG | HEART RATE: 70 BPM | OXYGEN SATURATION: 93 % | BODY MASS INDEX: 26.19 KG/M2 | RESPIRATION RATE: 16 BRPM

## 2024-08-26 DIAGNOSIS — Z95.1 S/P CABG (CORONARY ARTERY BYPASS GRAFT): Primary | ICD-10-CM

## 2024-08-26 PROCEDURE — 99024 POSTOP FOLLOW-UP VISIT: CPT | Performed by: THORACIC SURGERY (CARDIOTHORACIC VASCULAR SURGERY)

## 2024-08-26 NOTE — PROGRESS NOTES
Patient: Shade Birmingham   Age: 78 y.o.     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)    CLARENCE (acute kidney injury) (HCC)    Hyponatremia    Cardiomyopathy (HCC)    CAD (coronary artery disease)    Macrocytosis    Arthritis    CHF (congestive heart failure), NYHA class II, chronic, systolic (HCC)    Chronic pain    CKD (chronic kidney disease), stage III (HCC)    Dementia (HCC)    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 Martinez:  1. CABG x 3.              Left internal mammary artery to LAD.              Reverse saphenous vein graft to R-PDA.              Reverse saphenous vein graft to INT.  2. EVH of both legs.  3. Epiaortic Ultrasound.  4. ZAHRA occlusion with 45 mm clip.    HPI:  Pt is here for post op follow up, seen with Dr. Martinez. Mr. Birmingham was discharged to [facility].7/31. CC is feeling a little fatigues since his [place] shot this past Friday which he says is normal for him after he receives this medication. He otherwise denies fever, sternal clicking/popping, and incisional drainage/openings. He does endorses PARSONS but is able to recover with rest and slow deliberate breathing. He denies angina, dizziness,  and palpitations. He reports he is moving around

## 2024-08-28 ENCOUNTER — OFFICE VISIT (OUTPATIENT)
Age: 79
End: 2024-08-28
Payer: MEDICARE

## 2024-08-28 ENCOUNTER — HOSPITAL ENCOUNTER (OUTPATIENT)
Facility: HOSPITAL | Age: 79
Discharge: HOME | End: 2024-08-30
Payer: MEDICARE

## 2024-08-28 VITALS
TEMPERATURE: 97.7 F | HEART RATE: 78 BPM | BODY MASS INDEX: 26.18 KG/M2 | HEIGHT: 74 IN | SYSTOLIC BLOOD PRESSURE: 122 MMHG | DIASTOLIC BLOOD PRESSURE: 68 MMHG | WEIGHT: 204 LBS | OXYGEN SATURATION: 97 % | RESPIRATION RATE: 20 BRPM

## 2024-08-28 DIAGNOSIS — I49.3 PVC (PREMATURE VENTRICULAR CONTRACTION): ICD-10-CM

## 2024-08-28 DIAGNOSIS — I50.22 CHF (CONGESTIVE HEART FAILURE), NYHA CLASS II, CHRONIC, SYSTOLIC (HCC): ICD-10-CM

## 2024-08-28 DIAGNOSIS — Z79.899 ENCOUNTER FOR MONITORING DIURETIC THERAPY: ICD-10-CM

## 2024-08-28 DIAGNOSIS — Z51.81 ENCOUNTER FOR MONITORING DIURETIC THERAPY: ICD-10-CM

## 2024-08-28 PROCEDURE — 1036F TOBACCO NON-USER: CPT | Performed by: NURSE PRACTITIONER

## 2024-08-28 PROCEDURE — 99214 OFFICE O/P EST MOD 30 MIN: CPT | Performed by: NURSE PRACTITIONER

## 2024-08-28 PROCEDURE — 3074F SYST BP LT 130 MM HG: CPT | Performed by: NURSE PRACTITIONER

## 2024-08-28 PROCEDURE — 1123F ACP DISCUSS/DSCN MKR DOCD: CPT | Performed by: NURSE PRACTITIONER

## 2024-08-28 PROCEDURE — G8427 DOCREV CUR MEDS BY ELIG CLIN: HCPCS | Performed by: NURSE PRACTITIONER

## 2024-08-28 PROCEDURE — 3078F DIAST BP <80 MM HG: CPT | Performed by: NURSE PRACTITIONER

## 2024-08-28 PROCEDURE — 93242 EXT ECG>48HR<7D RECORDING: CPT

## 2024-08-28 PROCEDURE — G8419 CALC BMI OUT NRM PARAM NOF/U: HCPCS | Performed by: NURSE PRACTITIONER

## 2024-08-28 RX ORDER — BUMETANIDE 2 MG/1
1 TABLET ORAL AS NEEDED
Qty: 30 TABLET | Refills: 2
Start: 2024-08-28

## 2024-08-28 RX ORDER — SPIRONOLACTONE 25 MG/1
12.5 TABLET ORAL DAILY
Qty: 30 TABLET | Refills: 2 | Status: SHIPPED | OUTPATIENT
Start: 2024-08-28

## 2024-08-28 ASSESSMENT — ENCOUNTER SYMPTOMS
ABDOMINAL PAIN: 0
SORE THROAT: 0
ABDOMINAL DISTENTION: 0
BLOOD IN STOOL: 0
COUGH: 0
SHORTNESS OF BREATH: 1
CHEST TIGHTNESS: 1
EYE PAIN: 0

## 2024-08-28 ASSESSMENT — PATIENT HEALTH QUESTIONNAIRE - PHQ9
SUM OF ALL RESPONSES TO PHQ QUESTIONS 1-9: 0
SUM OF ALL RESPONSES TO PHQ QUESTIONS 1-9: 0
1. LITTLE INTEREST OR PLEASURE IN DOING THINGS: NOT AT ALL
SUM OF ALL RESPONSES TO PHQ QUESTIONS 1-9: 0
SUM OF ALL RESPONSES TO PHQ9 QUESTIONS 1 & 2: 0
SUM OF ALL RESPONSES TO PHQ QUESTIONS 1-9: 0
2. FEELING DOWN, DEPRESSED OR HOPELESS: NOT AT ALL

## 2024-08-28 NOTE — PATIENT INSTRUCTIONS
Medication changes:    I am decreasing your as needed bumex (water pill) to 1/2 tablet (1mg) instead of a full tablet (2mg).  Continue to only take this as needed for weight gain of 3lbs overnight or 5lbs cumulative in a week.   Restart spironolactone at 12.5mg (1/2 tablet) daily in the morning.        Contact [facility] if you experience any side effects with this medication addition (by MyChart -preferable- or telephone) with your daily weights, blood pressures and any symptoms. If you leave a voicemail, please report this information in detail so we can follow up appropriately and efficiently. Be reassured, we will call you back. Phone number is [phone] option 2    Please take this to your pharmacy to notify them of the change in medications.         Testing Ordered:          Referrals:      Other Recommendations:      - Record blood pressure and heart rate daily before medication and two hours after medication  - Record weight daily upon waking/after using the bathroom.   - Keep a written records of your weights and blood pressure and bring to your next appointment.   - Call the clinic at if you have a weight gain of 3 or more pounds overnight OR 5 or more pounds in one week, new/worsened shortness of breath or swelling, or if your blood pressure begins to consistently run below 90/60 and/or you begin to experience dizziness or lightheadedness. Our office phone number [phone] option 2.  - Ensure you are drinking an adequate amount of water with a goal of 6-8 eight ounce glasses (1.5-2 liters) of fluid daily. Your urine should be clear and light yellow straw colored.       Follow up 9/19 at 10:30am with [facility]    Thank you for allowing us the privilege of being a part of your healthcare team! Please do not hesitate to contact our office at [phone] option 2 with any questions or concerns.     We are restarting a monthly heart failure support group, this will be the last Wednesday of

## 2024-08-28 NOTE — PROGRESS NOTES
[facility]  Critical access hospital in [place], [place]  Heart Failure Outpatient Clinic Note    Patient name: Shade Birmingham  Patient : 1945  Patient MRN: [id number]  Date of service: 24    Primary care physician: Lucas Buckner MD  Primary cardiologist: Fern Oviedo MD - previously Dr. Blair   Primary F cardiologist: Aniceto Sandoval MD      Chief Complaint   Patient presents with    Shortness of Breath     W/ exertion    Congestive Heart Failure    Follow-up        ASSESSMENT:  Shade Birmingham is a 79 y.o. male who has chronic HFrEF/ICM, CAD s/p CABG (7/15/24), PAF s/p ZAHRA occlustion, HTN, PAD s/p fem fem bypass, emphysema/COPD, CKD, prostate cancer, DM.    Uptitrating GDMT as able.  Recheck EF in October.  Wearing Lifevest.       INTERVAL HISTORY:  -VSS  -labs :  K 4.1; creat 1.31; Mg 2.3; pBNP 4410; Vit D 16.8; Hg 10.5;   -labs :  creat 1.42; ; K 4.8  -weight no change  -Shade Birmingham is feeling pretty well. He did have some PARSONS walking in today and with stairs, but has otherwise been doing pretty well.  He denies leg swelling, chest pain, orthopnea, dizziness.  He still has some fatigue, but he's working on that with PT.   He has an event monitor and lifevest on.          PLAN:  Heart failure/Cardiomyopathy:  NYHA II  Continue current medical therapy for heart failure:  Beta-blocker: Continue Carvedilol 3.125 mg BID  ACE/ARB/ARNi: Continue decreased dose of Entresto 12/13mg BID   MRA: will start 12.5mg spironolactone daily and see if he can tolerate this lower dose now  SGLT2 inhibitor: Continue Jardiance 25 mg daily  Diuretic: Continue bumex PRN, but decrease the PRN dose to 1mg  Has ECHO scheduled in October- he is unable to tolerate a closed MRI   Reinforced low salt diet  Reinforced fluid restriction to 6 x 8oz glasses per day  Recommended 30 minutes of aerobic exercise 5 days weekly  Referred to cardiac rehab- will start when HH PT clears patient

## 2024-09-09 ENCOUNTER — HOSPITAL ENCOUNTER (OUTPATIENT)
Facility: HOSPITAL | Age: 79
Setting detail: RECURRING SERIES
Discharge: HOME | End: 2024-09-12
Payer: MEDICARE

## 2024-09-09 VITALS
WEIGHT: 204.4 LBS | HEART RATE: 68 BPM | BODY MASS INDEX: 26.23 KG/M2 | RESPIRATION RATE: 16 BRPM | OXYGEN SATURATION: 99 % | HEIGHT: 74 IN

## 2024-09-09 PROCEDURE — 93798 PHYS/QHP OP CAR RHAB W/ECG: CPT

## 2024-09-09 PROCEDURE — 93797 PHYS/QHP OP CAR RHAB WO ECG: CPT

## 2024-09-09 ASSESSMENT — EXERCISE STRESS TEST
PEAK_HR: 86
PEAK_BP: 156/62
PEAK_HR: 86
PEAK_RPE: 11
PEAK_METS: 2.7
PEAK_BP: 156/62
PEAK_RPE: 11
PEAK_BP: 156/62

## 2024-09-09 ASSESSMENT — PATIENT HEALTH QUESTIONNAIRE - PHQ9
SUM OF ALL RESPONSES TO PHQ QUESTIONS 1-9: 5
9. THOUGHTS THAT YOU WOULD BE BETTER OFF DEAD, OR OF HURTING YOURSELF: NOT AT ALL
5. POOR APPETITE OR OVEREATING: NOT AT ALL
SUM OF ALL RESPONSES TO PHQ QUESTIONS 1-9: 5
7. TROUBLE CONCENTRATING ON THINGS, SUCH AS READING THE NEWSPAPER OR WATCHING TELEVISION: NOT AT ALL
10. IF YOU CHECKED OFF ANY PROBLEMS, HOW DIFFICULT HAVE THESE PROBLEMS MADE IT FOR YOU TO DO YOUR WORK, TAKE CARE OF THINGS AT HOME, OR GET ALONG WITH OTHER PEOPLE: NOT DIFFICULT AT ALL
6. FEELING BAD ABOUT YOURSELF - OR THAT YOU ARE A FAILURE OR HAVE LET YOURSELF OR YOUR FAMILY DOWN: NOT AT ALL
4. FEELING TIRED OR HAVING LITTLE ENERGY: NEARLY EVERY DAY
1. LITTLE INTEREST OR PLEASURE IN DOING THINGS: MORE THAN HALF THE DAYS
SUM OF ALL RESPONSES TO PHQ QUESTIONS 1-9: 5
3. TROUBLE FALLING OR STAYING ASLEEP: NOT AT ALL
2. FEELING DOWN, DEPRESSED OR HOPELESS: NOT AT ALL
SUM OF ALL RESPONSES TO PHQ9 QUESTIONS 1 & 2: 2
8. MOVING OR SPEAKING SO SLOWLY THAT OTHER PEOPLE COULD HAVE NOTICED. OR THE OPPOSITE, BEING SO FIGETY OR RESTLESS THAT YOU HAVE BEEN MOVING AROUND A LOT MORE THAN USUAL: NOT AT ALL
SUM OF ALL RESPONSES TO PHQ QUESTIONS 1-9: 5

## 2024-09-09 ASSESSMENT — EJECTION FRACTION: EF_VALUE: 25

## 2024-09-11 ENCOUNTER — APPOINTMENT (OUTPATIENT)
Facility: HOSPITAL | Age: 79
End: 2024-09-11
Payer: MEDICARE

## 2024-09-11 ENCOUNTER — TELEPHONE (OUTPATIENT)
Age: 79
End: 2024-09-11

## 2024-09-13 ENCOUNTER — HOSPITAL ENCOUNTER (OUTPATIENT)
Facility: HOSPITAL | Age: 79
Setting detail: RECURRING SERIES
Discharge: HOME | End: 2024-09-16
Payer: MEDICARE

## 2024-09-13 ENCOUNTER — TELEPHONE (OUTPATIENT)
Age: 79
End: 2024-09-13

## 2024-09-13 VITALS — BODY MASS INDEX: 26.19 KG/M2 | WEIGHT: 204 LBS

## 2024-09-13 PROCEDURE — 93798 PHYS/QHP OP CAR RHAB W/ECG: CPT

## 2024-09-13 ASSESSMENT — EXERCISE STRESS TEST
PEAK_RPE: 10
PEAK_METS: 2
PEAK_HR: 90

## 2024-09-17 ENCOUNTER — HOSPITAL ENCOUNTER (OUTPATIENT)
Facility: HOSPITAL | Age: 79
Setting detail: RECURRING SERIES
Discharge: HOME | End: 2024-09-20
Payer: MEDICARE

## 2024-09-17 VITALS — BODY MASS INDEX: 26.19 KG/M2 | WEIGHT: 204 LBS

## 2024-09-17 PROCEDURE — 93798 PHYS/QHP OP CAR RHAB W/ECG: CPT

## 2024-09-17 ASSESSMENT — EXERCISE STRESS TEST
PEAK_HR: 99
PEAK_METS: 2
PEAK_RPE: 11

## 2024-09-19 ENCOUNTER — OFFICE VISIT (OUTPATIENT)
Age: 79
End: 2024-09-19
Payer: MEDICARE

## 2024-09-19 VITALS
HEART RATE: 73 BPM | BODY MASS INDEX: 25.93 KG/M2 | WEIGHT: 202 LBS | SYSTOLIC BLOOD PRESSURE: 136 MMHG | TEMPERATURE: 97.5 F | RESPIRATION RATE: 20 BRPM | OXYGEN SATURATION: 94 % | HEIGHT: 74 IN | DIASTOLIC BLOOD PRESSURE: 72 MMHG

## 2024-09-19 DIAGNOSIS — I50.22 CHF (CONGESTIVE HEART FAILURE), NYHA CLASS II, CHRONIC, SYSTOLIC (HCC): ICD-10-CM

## 2024-09-19 DIAGNOSIS — Z51.81 ENCOUNTER FOR MONITORING DIURETIC THERAPY: ICD-10-CM

## 2024-09-19 DIAGNOSIS — Z79.899 ENCOUNTER FOR MONITORING DIURETIC THERAPY: ICD-10-CM

## 2024-09-19 PROCEDURE — 3078F DIAST BP <80 MM HG: CPT | Performed by: NURSE PRACTITIONER

## 2024-09-19 PROCEDURE — 99214 OFFICE O/P EST MOD 30 MIN: CPT | Performed by: NURSE PRACTITIONER

## 2024-09-19 PROCEDURE — G8419 CALC BMI OUT NRM PARAM NOF/U: HCPCS | Performed by: NURSE PRACTITIONER

## 2024-09-19 PROCEDURE — 3075F SYST BP GE 130 - 139MM HG: CPT | Performed by: NURSE PRACTITIONER

## 2024-09-19 PROCEDURE — G8427 DOCREV CUR MEDS BY ELIG CLIN: HCPCS | Performed by: NURSE PRACTITIONER

## 2024-09-19 PROCEDURE — 1036F TOBACCO NON-USER: CPT | Performed by: NURSE PRACTITIONER

## 2024-09-19 PROCEDURE — 1123F ACP DISCUSS/DSCN MKR DOCD: CPT | Performed by: NURSE PRACTITIONER

## 2024-09-19 RX ORDER — ATORVASTATIN CALCIUM 40 MG/1
40 TABLET, FILM COATED ORAL NIGHTLY
Qty: 90 TABLET | Refills: 2 | Status: SHIPPED | OUTPATIENT
Start: 2024-09-19

## 2024-09-19 ASSESSMENT — PATIENT HEALTH QUESTIONNAIRE - PHQ9
SUM OF ALL RESPONSES TO PHQ QUESTIONS 1-9: 0
SUM OF ALL RESPONSES TO PHQ9 QUESTIONS 1 & 2: 0
SUM OF ALL RESPONSES TO PHQ QUESTIONS 1-9: 0
1. LITTLE INTEREST OR PLEASURE IN DOING THINGS: NOT AT ALL
2. FEELING DOWN, DEPRESSED OR HOPELESS: NOT AT ALL
SUM OF ALL RESPONSES TO PHQ QUESTIONS 1-9: 0
SUM OF ALL RESPONSES TO PHQ QUESTIONS 1-9: 0

## 2024-09-19 ASSESSMENT — ENCOUNTER SYMPTOMS
ABDOMINAL PAIN: 0
COUGH: 0
SHORTNESS OF BREATH: 1
ABDOMINAL DISTENTION: 0
CHEST TIGHTNESS: 0
SORE THROAT: 0
BLOOD IN STOOL: 0
EYE PAIN: 0

## 2024-09-20 ENCOUNTER — HOSPITAL ENCOUNTER (OUTPATIENT)
Facility: HOSPITAL | Age: 79
Setting detail: RECURRING SERIES
Discharge: HOME | End: 2024-09-23
Payer: MEDICARE

## 2024-09-20 VITALS — WEIGHT: 205 LBS | BODY MASS INDEX: 26.32 KG/M2

## 2024-09-20 PROCEDURE — 93798 PHYS/QHP OP CAR RHAB W/ECG: CPT

## 2024-09-20 ASSESSMENT — EXERCISE STRESS TEST
PEAK_METS: 2
PEAK_HR: 107
PEAK_RPE: 11

## 2024-09-23 ENCOUNTER — HOSPITAL ENCOUNTER (OUTPATIENT)
Facility: HOSPITAL | Age: 79
Setting detail: RECURRING SERIES
Discharge: HOME | End: 2024-09-26
Payer: MEDICARE

## 2024-09-23 VITALS — BODY MASS INDEX: 26.32 KG/M2 | WEIGHT: 205 LBS

## 2024-09-23 PROCEDURE — 93798 PHYS/QHP OP CAR RHAB W/ECG: CPT

## 2024-09-23 ASSESSMENT — EXERCISE STRESS TEST
PEAK_RPE: 11
PEAK_METS: 2.7
PEAK_HR: 100

## 2024-09-26 ENCOUNTER — HOSPITAL ENCOUNTER (OUTPATIENT)
Facility: HOSPITAL | Age: 79
Setting detail: RECURRING SERIES
Discharge: HOME | End: 2024-09-29
Payer: MEDICARE

## 2024-09-26 VITALS — WEIGHT: 204 LBS | BODY MASS INDEX: 26.19 KG/M2

## 2024-09-26 PROCEDURE — 93797 PHYS/QHP OP CAR RHAB WO ECG: CPT

## 2024-09-26 PROCEDURE — 93798 PHYS/QHP OP CAR RHAB W/ECG: CPT

## 2024-09-26 ASSESSMENT — EXERCISE STRESS TEST
PEAK_METS: 2.7
PEAK_RPE: 11
PEAK_HR: 96

## 2024-09-26 NOTE — PROGRESS NOTES
Cardiac Rehab Nutrition Assessment- 1:1 Evaluation  2024      NAME: Shade Birmingham : 1945 AGE: 79 y.o.  GENDER: male  CARDIAC REHAB ADMITTING DIAGNOSIS: Presence of aortocoronary bypass graft [Z95.1]    PROBLEM LIST:  Patient Active Problem List   Diagnosis    Neurogenic claudication    Claudication in peripheral vascular disease (HCC)    HTN (hypertension)    Prostate cancer (HCC)    Malignant neoplasm of lateral wall of urinary bladder (HCC)    CLARENCE (acute kidney injury) (HCC)    Hyponatremia    Cardiomyopathy (HCC)    CAD (coronary artery disease)    Macrocytosis    Arthritis    CHF (congestive heart failure), NYHA class II, chronic, systolic (HCC)    Chronic pain    CKD (chronic kidney disease), stage III (HCC)    Dementia (HCC)    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)    DM type 2 causing renal disease (HCC)    Inadequately controlled diabetes mellitus (HCC)    Hx of bladder cancer    Neuropathy    Hypoalbuminemia    Leukocytosis    Proteinuria    Disease of cardiovascular system    S/P CABG x 3    S/P left atrial appendage ligation         LABS:   Hemoglobin A1C   Date Value Ref Range Status   07/10/2024 6.9 (H) 4.0 - 5.6 % Final     Comment:     (NOTE)  HbA1C Interpretive Ranges  <5.7              Normal  5.7 - 6.4         Consider Prediabetes  >6.5              Consider Diabetes        Lab Results   Component Value Date     2024    K 4.1 2024     2024    CO2 29 2024    BUN 24 (H) 2024    CREATININE 1.31 (H) 2024    GLUCOSE 167 (H) 2024    CALCIUM 9.0 2024    BILITOT 0.8 07/15/2024    ALKPHOS 55 07/15/2024    AST 27 07/15/2024    ALT 17 07/15/2024    LABGLOM 56 (L) 2024    GFRAA >60 10/15/2020    AGRATIO 1.7 10/12/2020    GLOB 2.7 07/15/2024         Lab Results   Component Value Date    CHOL 142 2024    TRIG 196 (H) 2024    HDL 37 2024    LDL 65.8 2024    VLDL 39.2 2024    CHOLHDLRATIO

## 2024-09-27 ENCOUNTER — HOSPITAL ENCOUNTER (OUTPATIENT)
Facility: HOSPITAL | Age: 79
Setting detail: RECURRING SERIES
Discharge: HOME | End: 2024-09-30
Payer: MEDICARE

## 2024-09-27 VITALS — WEIGHT: 204 LBS | BODY MASS INDEX: 26.19 KG/M2

## 2024-09-27 PROCEDURE — 93798 PHYS/QHP OP CAR RHAB W/ECG: CPT

## 2024-09-27 ASSESSMENT — EXERCISE STRESS TEST
PEAK_METS: 2.7
PEAK_RPE: 11
PEAK_HR: 102

## 2024-09-30 ENCOUNTER — HOSPITAL ENCOUNTER (OUTPATIENT)
Facility: HOSPITAL | Age: 79
Setting detail: RECURRING SERIES
Discharge: HOME | End: 2024-10-03
Payer: MEDICARE

## 2024-09-30 VITALS — BODY MASS INDEX: 26.19 KG/M2 | WEIGHT: 204 LBS

## 2024-09-30 PROCEDURE — 93798 PHYS/QHP OP CAR RHAB W/ECG: CPT

## 2024-09-30 ASSESSMENT — EXERCISE STRESS TEST
PEAK_HR: 97
PEAK_RPE: 11
PEAK_METS: 2.7

## 2024-10-01 ENCOUNTER — OFFICE VISIT (OUTPATIENT)
Age: 79
End: 2024-10-01
Payer: MEDICARE

## 2024-10-01 VITALS
OXYGEN SATURATION: 93 % | BODY MASS INDEX: 25.93 KG/M2 | DIASTOLIC BLOOD PRESSURE: 64 MMHG | SYSTOLIC BLOOD PRESSURE: 120 MMHG | HEART RATE: 78 BPM | WEIGHT: 202 LBS | HEIGHT: 74 IN

## 2024-10-01 DIAGNOSIS — I73.9 PAD (PERIPHERAL ARTERY DISEASE) (HCC): ICD-10-CM

## 2024-10-01 DIAGNOSIS — I42.8 OTHER CARDIOMYOPATHY (HCC): Primary | ICD-10-CM

## 2024-10-01 DIAGNOSIS — I25.5 ISCHEMIC CARDIOMYOPATHY: ICD-10-CM

## 2024-10-01 DIAGNOSIS — I25.10 CORONARY ARTERY DISEASE INVOLVING NATIVE CORONARY ARTERY OF NATIVE HEART WITHOUT ANGINA PECTORIS: ICD-10-CM

## 2024-10-01 DIAGNOSIS — I10 HYPERTENSION, UNSPECIFIED TYPE: ICD-10-CM

## 2024-10-01 DIAGNOSIS — I49.3 PVC (PREMATURE VENTRICULAR CONTRACTION): ICD-10-CM

## 2024-10-01 PROCEDURE — G8484 FLU IMMUNIZE NO ADMIN: HCPCS | Performed by: INTERNAL MEDICINE

## 2024-10-01 PROCEDURE — 99215 OFFICE O/P EST HI 40 MIN: CPT | Performed by: INTERNAL MEDICINE

## 2024-10-01 PROCEDURE — 1123F ACP DISCUSS/DSCN MKR DOCD: CPT | Performed by: INTERNAL MEDICINE

## 2024-10-01 PROCEDURE — G8427 DOCREV CUR MEDS BY ELIG CLIN: HCPCS | Performed by: INTERNAL MEDICINE

## 2024-10-01 PROCEDURE — 3078F DIAST BP <80 MM HG: CPT | Performed by: INTERNAL MEDICINE

## 2024-10-01 PROCEDURE — G8419 CALC BMI OUT NRM PARAM NOF/U: HCPCS | Performed by: INTERNAL MEDICINE

## 2024-10-01 PROCEDURE — 3074F SYST BP LT 130 MM HG: CPT | Performed by: INTERNAL MEDICINE

## 2024-10-01 PROCEDURE — 1036F TOBACCO NON-USER: CPT | Performed by: INTERNAL MEDICINE

## 2024-10-01 RX ORDER — SPIRONOLACTONE 25 MG/1
6.25 TABLET ORAL DAILY
Qty: 23 TABLET | Refills: 3 | Status: SHIPPED | OUTPATIENT
Start: 2024-10-01

## 2024-10-01 NOTE — PROGRESS NOTES
Patient: Shade Birmingham  : 1945    Primary Cardiologist:Dr. BEAR Oviedo  EP Cardiologist:NONE   PCP: Lucas Buckner MD    Today's Date: 10/1/2024    First appt post CABG with me.  Saw in hospital.  Doing well.  Bps not records on cardiac rehab notes.    States he took spironolactone and passed out - a month ago.  Wonder if it was from entresto.    Reports Bps more robust.      ASSESSMENT AND PLAN:     Assessment and Plan:  CAD sp CABG -   7/15/24 with Dr. Martinez  CABG x 3.  Left internal mammary artery to LAD.  Reverse saphenous vein graft to R-PDA.  Reverse saphenous vein graft to INT.  ZAHRA occlusion with 45 mm clip.    2.  HFrEF  Heart failure/Cardiomyopathy:  NYHA II  Continue current medical therapy for heart failure:  Beta-blocker: Continue Carvedilol 3.125 mg BID  ACE/ARB/ARNi: Increase Entresto to a full tablet of 24/26mg BID   MRA: BP was not able to tolerate low dose 12.5 spironolactone.  Restart 1/4 tab at noontime spaced out from entresto  SGLT2 inhibitor: Continue Jardiance 25 mg daily  Diuretic: Continue bumex 1mg PRN  Has ECHO scheduled in October- he is unable to tolerate a closed MRI   Reinforced low salt diet  Reinforced fluid restriction to 6 x 8oz glasses per day  Recommended 30 minutes of aerobic exercise 5 days weekly  Referred to cardiac rehab- has recently started   Currently has a lifevest - low EF, multifocal PVCs.  Repeat labs to be done at [facility] 10/18 with echo.    3. PAF s/p ZAHRA occlustion     4.  HTN  Follow at home, cardiac rehab    5.  PAD s/p fem fem bypass  ASA, statin    6.  Emphysema/COPD    7.  CKD    8.  Prostate cancer    9.  DM2  Hemoglobin A1C   Date Value Ref Range Status   07/10/2024 6.9 (H) 4.0 - 5.6 % Final     Comment:     (NOTE)  HbA1C Interpretive Ranges  <5.7              Normal  5.7 - 6.4         Consider Prediabetes  >6.5              Consider Diabetes           Follow up with Dr. BEAR Oviedo in December.     No diagnosis found.    HISTORY OF

## 2024-10-01 NOTE — PROGRESS NOTES
Per Dr. Oviedo- Dena Dec w/ labs prior. Restart spironolactone 25mg take 1/4 tab (6.25 mg) daily.     Note sent via fax to cardiopulmonary rehab requesting BP be included in rehab notes.

## 2024-10-01 NOTE — PROGRESS NOTES
had concerns including Congestive Heart Failure and Hypertension.    Vitals:    10/01/24 1045   BP: 120/64   Site: Left Upper Arm   Position: Sitting   Pulse: 78   SpO2: 93%   Weight: 91.6 kg (202 lb)   Height: 1.88 m (6' 2\")        Chest pain No    Refills No    Patient states a little pain to right side of his chest and breathing is getting short    1. Have you been to the ER, urgent care clinic since your last visit? No       Hospitalized since your last visit? No       Where?        Date?

## 2024-10-02 ENCOUNTER — HOSPITAL ENCOUNTER (OUTPATIENT)
Facility: HOSPITAL | Age: 79
Setting detail: RECURRING SERIES
Discharge: HOME | End: 2024-10-05
Payer: MEDICARE

## 2024-10-02 VITALS — WEIGHT: 203 LBS | BODY MASS INDEX: 26.06 KG/M2

## 2024-10-02 PROCEDURE — 93798 PHYS/QHP OP CAR RHAB W/ECG: CPT

## 2024-10-02 ASSESSMENT — EXERCISE STRESS TEST
PEAK_METS: 2.7
PEAK_RPE: 11
PEAK_HR: 100

## 2024-10-04 ENCOUNTER — HOSPITAL ENCOUNTER (OUTPATIENT)
Facility: HOSPITAL | Age: 79
Setting detail: RECURRING SERIES
Discharge: HOME | End: 2024-10-07
Payer: MEDICARE

## 2024-10-04 VITALS — BODY MASS INDEX: 25.81 KG/M2 | WEIGHT: 201 LBS

## 2024-10-04 PROCEDURE — 93798 PHYS/QHP OP CAR RHAB W/ECG: CPT

## 2024-10-04 ASSESSMENT — EXERCISE STRESS TEST
PEAK_RPE: 11
PEAK_METS: 2.7
PEAK_HR: 89

## 2024-10-07 ENCOUNTER — APPOINTMENT (OUTPATIENT)
Facility: HOSPITAL | Age: 79
End: 2024-10-07
Payer: MEDICARE

## 2024-10-14 ENCOUNTER — HOSPITAL ENCOUNTER (OUTPATIENT)
Facility: HOSPITAL | Age: 79
Setting detail: RECURRING SERIES
Discharge: HOME | End: 2024-10-17
Payer: MEDICARE

## 2024-10-14 VITALS — WEIGHT: 205 LBS | BODY MASS INDEX: 26.32 KG/M2

## 2024-10-14 PROCEDURE — 93798 PHYS/QHP OP CAR RHAB W/ECG: CPT

## 2024-10-14 ASSESSMENT — EXERCISE STRESS TEST
PEAK_METS: 2.7
PEAK_HR: 95
PEAK_RPE: 11

## 2024-10-16 ENCOUNTER — HOSPITAL ENCOUNTER (OUTPATIENT)
Facility: HOSPITAL | Age: 79
Setting detail: RECURRING SERIES
Discharge: HOME | End: 2024-10-19
Payer: MEDICARE

## 2024-10-16 VITALS — WEIGHT: 205 LBS | BODY MASS INDEX: 26.32 KG/M2

## 2024-10-16 PROCEDURE — 93798 PHYS/QHP OP CAR RHAB W/ECG: CPT

## 2024-10-16 ASSESSMENT — EXERCISE STRESS TEST
PEAK_HR: 100
PEAK_METS: 2.7
PEAK_RPE: 11

## 2024-10-18 ENCOUNTER — HOSPITAL ENCOUNTER (OUTPATIENT)
Facility: HOSPITAL | Age: 79
Discharge: HOME | End: 2024-10-20
Attending: INTERNAL MEDICINE
Payer: MEDICARE

## 2024-10-18 DIAGNOSIS — I50.22 CHF (CONGESTIVE HEART FAILURE), NYHA CLASS II, CHRONIC, SYSTOLIC (HCC): ICD-10-CM

## 2024-10-18 PROCEDURE — 93306 TTE W/DOPPLER COMPLETE: CPT

## 2024-10-19 LAB
ALBUMIN SERPL-MCNC: 3.4 G/DL (ref 3.8–4.8)
ALP SERPL-CCNC: 168 IU/L (ref 44–121)
ALT SERPL-CCNC: 32 IU/L (ref 0–44)
AST SERPL-CCNC: 28 IU/L (ref 0–40)
BASOPHILS # BLD AUTO: 0 X10E3/UL (ref 0–0.2)
BASOPHILS NFR BLD AUTO: 0 %
BILIRUB SERPL-MCNC: 0.6 MG/DL (ref 0–1.2)
BUN SERPL-MCNC: 32 MG/DL (ref 8–27)
BUN/CREAT SERPL: 29 (ref 10–24)
CALCIUM SERPL-MCNC: 8.8 MG/DL (ref 8.6–10.2)
CHLORIDE SERPL-SCNC: 100 MMOL/L (ref 96–106)
CHOLEST SERPL-MCNC: 109 MG/DL (ref 100–199)
CO2 SERPL-SCNC: 22 MMOL/L (ref 20–29)
CREAT SERPL-MCNC: 1.1 MG/DL (ref 0.76–1.27)
ECHO AV AREA PEAK VELOCITY: 4 CM2
ECHO AV PEAK GRADIENT: 4 MMHG
ECHO AV PEAK VELOCITY: 1 M/S
ECHO AV VELOCITY RATIO: 0.8
ECHO LA VOL A-L A4C: 49 ML (ref 18–58)
ECHO LA VOL MOD A4C: 43 ML (ref 18–58)
ECHO LV E' SEPTAL VELOCITY: 4.8 CM/S
ECHO LV EDV A2C: 112 ML
ECHO LV EDV A4C: 114 ML
ECHO LV EDV BP: 116 ML (ref 67–155)
ECHO LV EJECTION FRACTION A2C: 38 %
ECHO LV EJECTION FRACTION A4C: 34 %
ECHO LV EJECTION FRACTION BIPLANE: 36 % (ref 55–100)
ECHO LV ESV A2C: 70 ML
ECHO LV ESV A4C: 75 ML
ECHO LV ESV BP: 74 ML (ref 22–58)
ECHO LVOT AREA: 4.9 CM2
ECHO LVOT DIAM: 2.5 CM
ECHO LVOT MEAN GRADIENT: 2 MMHG
ECHO LVOT PEAK GRADIENT: 3 MMHG
ECHO LVOT PEAK VELOCITY: 0.8 M/S
ECHO LVOT SV: 88.3 ML
ECHO LVOT VTI: 18 CM
ECHO MV A VELOCITY: 0.58 M/S
ECHO MV E VELOCITY: 0.93 M/S
ECHO MV E/A RATIO: 1.6
ECHO MV E/E' SEPTAL: 19.38
ECHO RV INTERNAL DIMENSION: 4.1 CM
ECHO RV TAPSE: 1.1 CM (ref 1.7–?)
ECHO TV REGURGITANT MAX VELOCITY: 2.41 M/S
ECHO TV REGURGITANT PEAK GRADIENT: 23 MMHG
EGFRCR SERPLBLD CKD-EPI 2021: 69 ML/MIN/1.73
EOSINOPHIL # BLD AUTO: 0.2 X10E3/UL (ref 0–0.4)
EOSINOPHIL NFR BLD AUTO: 2 %
ERYTHROCYTE [DISTWIDTH] IN BLOOD BY AUTOMATED COUNT: 17.8 % (ref 11.6–15.4)
GLOBULIN SER CALC-MCNC: 3.5 G/DL (ref 1.5–4.5)
GLUCOSE SERPL-MCNC: 64 MG/DL (ref 70–99)
HCT VFR BLD AUTO: 42.9 % (ref 37.5–51)
HDLC SERPL-MCNC: 72 MG/DL
HGB BLD-MCNC: 13.8 G/DL (ref 13–17.7)
IMM GRANULOCYTES # BLD AUTO: 0.1 X10E3/UL (ref 0–0.1)
IMM GRANULOCYTES NFR BLD AUTO: 1 %
IMP & REVIEW OF LAB RESULTS: NORMAL
LDLC SERPL CALC-MCNC: 23 MG/DL (ref 0–99)
LYMPHOCYTES # BLD AUTO: 0.9 X10E3/UL (ref 0.7–3.1)
LYMPHOCYTES NFR BLD AUTO: 8 %
MCH RBC QN AUTO: 32.3 PG (ref 26.6–33)
MCHC RBC AUTO-ENTMCNC: 32.2 G/DL (ref 31.5–35.7)
MCV RBC AUTO: 101 FL (ref 79–97)
MONOCYTES # BLD AUTO: 1.9 X10E3/UL (ref 0.1–0.9)
MONOCYTES NFR BLD AUTO: 16 %
NEUTROPHILS # BLD AUTO: 8.4 X10E3/UL (ref 1.4–7)
NEUTROPHILS NFR BLD AUTO: 73 %
PLATELET # BLD AUTO: 261 X10E3/UL (ref 150–450)
POTASSIUM SERPL-SCNC: 4 MMOL/L (ref 3.5–5.2)
PROT SERPL-MCNC: 6.9 G/DL (ref 6–8.5)
RBC # BLD AUTO: 4.27 X10E6/UL (ref 4.14–5.8)
SODIUM SERPL-SCNC: 135 MMOL/L (ref 134–144)
TRIGL SERPL-MCNC: 66 MG/DL (ref 0–149)
TSH SERPL DL<=0.005 MIU/L-ACNC: 2.78 UIU/ML (ref 0.45–4.5)
VLDLC SERPL CALC-MCNC: 14 MG/DL (ref 5–40)
WBC # BLD AUTO: 11.6 X10E3/UL (ref 3.4–10.8)

## 2024-10-19 PROCEDURE — 93306 TTE W/DOPPLER COMPLETE: CPT | Performed by: INTERNAL MEDICINE

## 2024-10-21 ENCOUNTER — HOSPITAL ENCOUNTER (OUTPATIENT)
Facility: HOSPITAL | Age: 79
Setting detail: RECURRING SERIES
Discharge: HOME | End: 2024-10-24
Payer: MEDICARE

## 2024-10-21 VITALS — WEIGHT: 206 LBS | BODY MASS INDEX: 26.45 KG/M2

## 2024-10-21 PROCEDURE — 93798 PHYS/QHP OP CAR RHAB W/ECG: CPT

## 2024-10-21 ASSESSMENT — EXERCISE STRESS TEST
PEAK_HR: 117
PEAK_METS: 2.9
PEAK_RPE: 11

## 2024-10-23 ENCOUNTER — HOSPITAL ENCOUNTER (OUTPATIENT)
Facility: HOSPITAL | Age: 79
Setting detail: RECURRING SERIES
Discharge: HOME | End: 2024-10-26
Payer: MEDICARE

## 2024-10-23 VITALS — BODY MASS INDEX: 26.45 KG/M2 | WEIGHT: 206 LBS

## 2024-10-23 PROCEDURE — 93798 PHYS/QHP OP CAR RHAB W/ECG: CPT

## 2024-10-23 ASSESSMENT — EXERCISE STRESS TEST
PEAK_RPE: 11-12
PEAK_METS: 2.9
PEAK_HR: 105

## 2024-10-24 ENCOUNTER — TELEPHONE (OUTPATIENT)
Age: 79
End: 2024-10-24

## 2024-10-24 NOTE — TELEPHONE ENCOUNTER
Telephone Call RE:  Appointment reminder     Outcome:     [] Patient confirmed appointment   [] Patient rescheduled appointment for    [] Unable to reach  [x] Left message              [] Other:       First attempt.

## 2024-10-25 ENCOUNTER — HOSPITAL ENCOUNTER (OUTPATIENT)
Facility: HOSPITAL | Age: 79
Setting detail: RECURRING SERIES
Discharge: HOME | End: 2024-10-28
Payer: MEDICARE

## 2024-10-25 VITALS — BODY MASS INDEX: 26.45 KG/M2 | WEIGHT: 206 LBS

## 2024-10-25 PROCEDURE — 93798 PHYS/QHP OP CAR RHAB W/ECG: CPT

## 2024-10-25 ASSESSMENT — EXERCISE STRESS TEST
PEAK_METS: 2.9
PEAK_HR: 102
PEAK_RPE: 12

## 2024-10-27 ENCOUNTER — PATIENT MESSAGE (OUTPATIENT)
Age: 79
End: 2024-10-27

## 2024-10-28 ENCOUNTER — OFFICE VISIT (OUTPATIENT)
Age: 79
End: 2024-10-28
Payer: MEDICARE

## 2024-10-28 VITALS
OXYGEN SATURATION: 95 % | WEIGHT: 205.2 LBS | TEMPERATURE: 97.5 F | HEART RATE: 68 BPM | SYSTOLIC BLOOD PRESSURE: 148 MMHG | DIASTOLIC BLOOD PRESSURE: 70 MMHG | BODY MASS INDEX: 26.34 KG/M2 | RESPIRATION RATE: 16 BRPM | HEIGHT: 74 IN

## 2024-10-28 DIAGNOSIS — I50.22 CHRONIC SYSTOLIC HEART FAILURE (HCC): Primary | ICD-10-CM

## 2024-10-28 PROCEDURE — 99215 OFFICE O/P EST HI 40 MIN: CPT | Performed by: NURSE PRACTITIONER

## 2024-10-28 PROCEDURE — 3078F DIAST BP <80 MM HG: CPT | Performed by: NURSE PRACTITIONER

## 2024-10-28 PROCEDURE — 1123F ACP DISCUSS/DSCN MKR DOCD: CPT | Performed by: NURSE PRACTITIONER

## 2024-10-28 PROCEDURE — G8484 FLU IMMUNIZE NO ADMIN: HCPCS | Performed by: NURSE PRACTITIONER

## 2024-10-28 PROCEDURE — 3077F SYST BP >= 140 MM HG: CPT | Performed by: NURSE PRACTITIONER

## 2024-10-28 PROCEDURE — G8419 CALC BMI OUT NRM PARAM NOF/U: HCPCS | Performed by: NURSE PRACTITIONER

## 2024-10-28 PROCEDURE — 1036F TOBACCO NON-USER: CPT | Performed by: NURSE PRACTITIONER

## 2024-10-28 PROCEDURE — G8427 DOCREV CUR MEDS BY ELIG CLIN: HCPCS | Performed by: NURSE PRACTITIONER

## 2024-10-28 PROCEDURE — 1159F MED LIST DOCD IN RCRD: CPT | Performed by: NURSE PRACTITIONER

## 2024-10-28 PROCEDURE — 1125F AMNT PAIN NOTED PAIN PRSNT: CPT | Performed by: NURSE PRACTITIONER

## 2024-10-28 PROCEDURE — 1160F RVW MEDS BY RX/DR IN RCRD: CPT | Performed by: NURSE PRACTITIONER

## 2024-10-28 RX ORDER — SPIRONOLACTONE 25 MG/1
12.5 TABLET ORAL DAILY
Qty: 45 TABLET | Refills: 1 | Status: SHIPPED | OUTPATIENT
Start: 2024-10-28

## 2024-10-28 RX ORDER — CARVEDILOL 3.12 MG/1
3.12 TABLET ORAL 2 TIMES DAILY WITH MEALS
Qty: 180 TABLET | Refills: 1 | Status: SHIPPED | OUTPATIENT
Start: 2024-10-28

## 2024-10-28 ASSESSMENT — ENCOUNTER SYMPTOMS
BLOOD IN STOOL: 0
EYE PAIN: 0
ABDOMINAL DISTENTION: 0
CHEST TIGHTNESS: 0
ABDOMINAL PAIN: 0
SHORTNESS OF BREATH: 0
COUGH: 0
SORE THROAT: 0

## 2024-10-28 ASSESSMENT — PATIENT HEALTH QUESTIONNAIRE - PHQ9
SUM OF ALL RESPONSES TO PHQ9 QUESTIONS 1 & 2: 0
1. LITTLE INTEREST OR PLEASURE IN DOING THINGS: NOT AT ALL
SUM OF ALL RESPONSES TO PHQ QUESTIONS 1-9: 0
2. FEELING DOWN, DEPRESSED OR HOPELESS: NOT AT ALL

## 2024-10-28 NOTE — PATIENT INSTRUCTIONS
Medication changes:    -continue with taking a 1/2 tablet of spironolactone (12.5mg).  I have updated our med list to reflect this.    -you may stop taking your iron tablet    Please take this to your pharmacy to notify them of the change in medications.     Testing Ordered:    -none today    Referrals:    -We have placed a referral for Electrophysiology (EP) cardiology. Please call and schedule your appointment with [facility].  You can call one of the numbers listed below.       [facility] [place]  [address]  [address]  [place], [place] [address]  Tel: [phone] [facility]-     [place]   [address]  [address]  [place], [place] [address]  Tel: [phone]       Other Recommendations:      - Record blood pressure and heart rate daily before medication and two hours after medication  - Record weight daily upon waking/after using the bathroom.   - Keep a written records of your weights and blood pressure and bring to your next appointment.   - Call the clinic at if you have a weight gain of 3 or more pounds overnight OR 5 or more pounds in one week, new/worsened shortness of breath or swelling, or if your blood pressure begins to consistently run below 90/60 and/or you begin to experience dizziness or lightheadedness. Our office phone number [phone] option 2.  - Ensure you are drinking an adequate amount of water with a goal of 6-8 eight ounce glasses (1.5-2 liters) of fluid daily. Your urine should be clear and light yellow straw colored.       Follow up in March, Dr Oviedo (December) with [facility]    Thank you for allowing us the privilege of being a part of your healthcare team! Please do not hesitate to contact our office at [phone] option 2 with any questions or concerns.     We are restarting a monthly heart failure support group, this will be the last Wednesday of every month from 5-6pm at [facility]. If you would like to

## 2024-10-28 NOTE — PROGRESS NOTES
[facility]  [facility] in [place], [place]  Heart Failure Outpatient Clinic Note    Patient name: Shade Birmingham  Patient : 1945  Patient MRN: [id number]  Date of service: 10/28/24    Primary care physician: Lucas Buckner MD  Primary cardiologist: Fern Oviedo MD - previously Dr. Blair   Primary F cardiologist: Aniceto Sandoval MD      Chief Complaint   Patient presents with    Congestive Heart Failure        ASSESSMENT:  Shade Birmingham is a 79 y.o. male who has chronic HFrEF/ICM, CAD s/p CABG (7/15/24), PAF s/p ZAHRA occlustion, HTN, PAD s/p fem fem bypass, emphysema/COPD, CKD, prostate cancer, DM.    Uptitrating GDMT as able.  Recheck EF in October.  Wearing Lifevest.       INTERVAL HISTORY:  -BP mildly elevated.  He drank coffee on the drive here.  Will recheck.  Home logs lower in low 100s mostly   -labs 10/18:  K 4.0; creat 1.10; bgl 64;   -weight up ~2lbs  -ECHO 10/18/24:  EF 25-30%; grade II dd; RV mildly dilated with reduced systolic function; mild MR; mild TR; mod LAE;   -holter monitor showed sinus rhythm with a PVC burden of 5.95%; 5 ventricular arrhythmias with longest of 3 beats.    -Shade Birmingham is feeling frustrated with the Lifevest and that he's done everything he is supposed to and hasn't had recovery of his heart function.  We had an in depth conversation about his ejection fraction, risk of sudden cardiac death, the purpose of the Lifevest, and the purpose of an ICD.  He has decided that he is going to take off his Lifevest when he gets home and will send it back to [facility].  He wants to think further about an ICD, but does want me to send the referral in the mean time.  He has been working with cardiac rehab, and he feels he is getting stronger.   No SOB, chest pain, palpitations, dizziness.  He has some chronic BLE swelling, which is unchanged.   He has some weakness in his legs still, and he states he was told this may be due to some inflammation

## 2024-10-29 ENCOUNTER — APPOINTMENT (OUTPATIENT)
Facility: HOSPITAL | Age: 79
End: 2024-10-29
Payer: MEDICARE

## 2024-10-30 ENCOUNTER — HOSPITAL ENCOUNTER (OUTPATIENT)
Facility: HOSPITAL | Age: 79
Setting detail: RECURRING SERIES
Discharge: HOME | End: 2024-11-02
Payer: MEDICARE

## 2024-10-30 VITALS — BODY MASS INDEX: 26.05 KG/M2 | WEIGHT: 203 LBS

## 2024-10-30 PROCEDURE — 93798 PHYS/QHP OP CAR RHAB W/ECG: CPT

## 2024-10-30 ASSESSMENT — EXERCISE STRESS TEST
PEAK_METS: 2.9
PEAK_HR: 107
PEAK_RPE: 12

## 2024-10-31 ENCOUNTER — TELEPHONE (OUTPATIENT)
Age: 79
End: 2024-10-31

## 2024-10-31 NOTE — TELEPHONE ENCOUNTER
Returned call to patient, LM.   Sent my chart message requesting more information from patient.     Patient returned call at 4:07pm    10/29- 112/68-84, after meds 123/72-79  10/30- 122/61-82, after meds 114/57-78  10/31- 116/72, after meds 122/65-83- afternoon- 105/63-74  Was working on the deck outside, bending over, not doing anything strenuous, but working in the sun.  He states he notices this every once in while (not consistent)    Asked patient to keep a log of lightheadedness such as when it occurs (what he is doing/ time of day)  Patient also asked to check BG levels when he feels lightheaded.  Patient drinking 48oz per day,  patient will try to increase this slightly to see if that improves symptoms.  Last med changes were made 10/1- increasing entresto to full tab 24-26 BID, and resuming spironolactone at 12.5mg daily    Will review with provider for any other recommendations    Per Vicki NP:  It is not unusual that bending over can cause this.  I suggest he be very careful when he needs to bend over or squat down for any period of time.   If it becomes a more consistent problem, the most likely culprit medication is the spironolactone because he did not tolerate that in the past.  He could try taking the spironolactone at bedtime instead of in the morning to see if that helps.     LM for patient to review above.  Missed return call from patient    1238pm-LM for patient and sent my chart message

## 2024-10-31 NOTE — TELEPHONE ENCOUNTER
Pt called to report that he's feeling lightheaded after taking his meds.  I told him to take it easy and that a nurse would be in touch with him shortly at [phone].

## 2024-11-01 ENCOUNTER — HOSPITAL ENCOUNTER (OUTPATIENT)
Facility: HOSPITAL | Age: 79
Setting detail: RECURRING SERIES
Discharge: HOME | End: 2024-11-04
Payer: MEDICARE

## 2024-11-01 VITALS — WEIGHT: 202 LBS | BODY MASS INDEX: 25.92 KG/M2

## 2024-11-01 PROCEDURE — 93798 PHYS/QHP OP CAR RHAB W/ECG: CPT

## 2024-11-01 ASSESSMENT — EXERCISE STRESS TEST
PEAK_METS: 2.9
PEAK_RPE: 12
PEAK_HR: 100

## 2024-11-04 ENCOUNTER — HOSPITAL ENCOUNTER (OUTPATIENT)
Facility: HOSPITAL | Age: 79
Setting detail: RECURRING SERIES
Discharge: HOME | End: 2024-11-07
Payer: MEDICARE

## 2024-11-04 VITALS — BODY MASS INDEX: 25.92 KG/M2 | WEIGHT: 202 LBS

## 2024-11-04 PROCEDURE — 93798 PHYS/QHP OP CAR RHAB W/ECG: CPT

## 2024-11-04 ASSESSMENT — EXERCISE STRESS TEST
PEAK_METS: 2.9
PEAK_HR: 102
PEAK_RPE: 12

## 2024-11-07 ENCOUNTER — HOSPITAL ENCOUNTER (OUTPATIENT)
Facility: HOSPITAL | Age: 79
Setting detail: RECURRING SERIES
Discharge: HOME | End: 2024-11-10
Payer: MEDICARE

## 2024-11-07 VITALS — BODY MASS INDEX: 26.05 KG/M2 | WEIGHT: 203 LBS

## 2024-11-07 PROCEDURE — 93798 PHYS/QHP OP CAR RHAB W/ECG: CPT

## 2024-11-07 PROCEDURE — 93797 PHYS/QHP OP CAR RHAB WO ECG: CPT

## 2024-11-07 ASSESSMENT — EXERCISE STRESS TEST
PEAK_HR: 103
PEAK_METS: 2.9
PEAK_RPE: 12

## 2024-11-08 ENCOUNTER — HOSPITAL ENCOUNTER (OUTPATIENT)
Facility: HOSPITAL | Age: 79
Setting detail: RECURRING SERIES
Discharge: HOME | End: 2024-11-11
Payer: MEDICARE

## 2024-11-08 VITALS — WEIGHT: 205 LBS | BODY MASS INDEX: 26.31 KG/M2

## 2024-11-08 PROCEDURE — 93798 PHYS/QHP OP CAR RHAB W/ECG: CPT

## 2024-11-08 ASSESSMENT — EXERCISE STRESS TEST
PEAK_RPE: 12
PEAK_HR: 91
PEAK_METS: 2.9

## 2024-11-12 ENCOUNTER — HOSPITAL ENCOUNTER (OUTPATIENT)
Facility: HOSPITAL | Age: 79
Setting detail: RECURRING SERIES
Discharge: HOME | End: 2024-11-15
Payer: MEDICARE

## 2024-11-12 VITALS — BODY MASS INDEX: 26.31 KG/M2 | WEIGHT: 205 LBS

## 2024-11-12 PROCEDURE — 93798 PHYS/QHP OP CAR RHAB W/ECG: CPT

## 2024-11-12 ASSESSMENT — EXERCISE STRESS TEST
PEAK_HR: 103
PEAK_RPE: 12
PEAK_METS: 2.9

## 2024-11-14 ENCOUNTER — HOSPITAL ENCOUNTER (OUTPATIENT)
Facility: HOSPITAL | Age: 79
Setting detail: RECURRING SERIES
Discharge: HOME | End: 2024-11-17
Payer: MEDICARE

## 2024-11-14 VITALS — BODY MASS INDEX: 26.31 KG/M2 | WEIGHT: 205 LBS

## 2024-11-14 PROCEDURE — 93798 PHYS/QHP OP CAR RHAB W/ECG: CPT

## 2024-11-14 PROCEDURE — 93797 PHYS/QHP OP CAR RHAB WO ECG: CPT

## 2024-11-14 ASSESSMENT — EXERCISE STRESS TEST
PEAK_RPE: 12
PEAK_HR: 102
PEAK_METS: 2.9

## 2024-11-15 ENCOUNTER — HOSPITAL ENCOUNTER (OUTPATIENT)
Facility: HOSPITAL | Age: 79
Setting detail: RECURRING SERIES
Discharge: HOME | End: 2024-11-18
Payer: MEDICARE

## 2024-11-15 VITALS — BODY MASS INDEX: 26.44 KG/M2 | WEIGHT: 206 LBS

## 2024-11-15 PROCEDURE — 93798 PHYS/QHP OP CAR RHAB W/ECG: CPT

## 2024-11-15 ASSESSMENT — EXERCISE STRESS TEST
PEAK_METS: 2.9
PEAK_RPE: 12
PEAK_HR: 95

## 2024-11-18 ENCOUNTER — HOSPITAL ENCOUNTER (OUTPATIENT)
Facility: HOSPITAL | Age: 79
Setting detail: RECURRING SERIES
Discharge: HOME | End: 2024-11-21
Payer: MEDICARE

## 2024-11-18 VITALS — WEIGHT: 205 LBS | BODY MASS INDEX: 26.31 KG/M2

## 2024-11-18 PROCEDURE — 93798 PHYS/QHP OP CAR RHAB W/ECG: CPT

## 2024-11-18 ASSESSMENT — EXERCISE STRESS TEST
PEAK_HR: 98
PEAK_METS: 2.9
PEAK_RPE: 12

## 2024-11-20 ENCOUNTER — HOSPITAL ENCOUNTER (OUTPATIENT)
Facility: HOSPITAL | Age: 79
Setting detail: RECURRING SERIES
Discharge: HOME | End: 2024-11-23
Payer: MEDICARE

## 2024-11-20 VITALS — WEIGHT: 205 LBS | BODY MASS INDEX: 26.31 KG/M2

## 2024-11-20 PROCEDURE — 93798 PHYS/QHP OP CAR RHAB W/ECG: CPT

## 2024-11-20 ASSESSMENT — EXERCISE STRESS TEST
PEAK_RPE: 12
PEAK_HR: 104
PEAK_METS: 2.9

## 2024-12-03 ENCOUNTER — OFFICE VISIT (OUTPATIENT)
Age: 79
End: 2024-12-03
Payer: MEDICARE

## 2024-12-03 VITALS
OXYGEN SATURATION: 95 % | HEIGHT: 74 IN | SYSTOLIC BLOOD PRESSURE: 122 MMHG | DIASTOLIC BLOOD PRESSURE: 60 MMHG | HEART RATE: 80 BPM | BODY MASS INDEX: 25.8 KG/M2 | WEIGHT: 201 LBS

## 2024-12-03 DIAGNOSIS — I25.10 CORONARY ARTERY DISEASE INVOLVING NATIVE CORONARY ARTERY OF NATIVE HEART WITHOUT ANGINA PECTORIS: ICD-10-CM

## 2024-12-03 DIAGNOSIS — I25.5 ISCHEMIC CARDIOMYOPATHY: ICD-10-CM

## 2024-12-03 DIAGNOSIS — I10 HYPERTENSION, UNSPECIFIED TYPE: ICD-10-CM

## 2024-12-03 DIAGNOSIS — I50.22 CHRONIC SYSTOLIC HEART FAILURE (HCC): Primary | ICD-10-CM

## 2024-12-03 DIAGNOSIS — I73.9 PAD (PERIPHERAL ARTERY DISEASE) (HCC): ICD-10-CM

## 2024-12-03 PROCEDURE — G8419 CALC BMI OUT NRM PARAM NOF/U: HCPCS | Performed by: INTERNAL MEDICINE

## 2024-12-03 PROCEDURE — G8427 DOCREV CUR MEDS BY ELIG CLIN: HCPCS | Performed by: INTERNAL MEDICINE

## 2024-12-03 PROCEDURE — 1036F TOBACCO NON-USER: CPT | Performed by: INTERNAL MEDICINE

## 2024-12-03 PROCEDURE — 3074F SYST BP LT 130 MM HG: CPT | Performed by: INTERNAL MEDICINE

## 2024-12-03 PROCEDURE — G8484 FLU IMMUNIZE NO ADMIN: HCPCS | Performed by: INTERNAL MEDICINE

## 2024-12-03 PROCEDURE — 1123F ACP DISCUSS/DSCN MKR DOCD: CPT | Performed by: INTERNAL MEDICINE

## 2024-12-03 PROCEDURE — 99214 OFFICE O/P EST MOD 30 MIN: CPT | Performed by: INTERNAL MEDICINE

## 2024-12-03 PROCEDURE — 3078F DIAST BP <80 MM HG: CPT | Performed by: INTERNAL MEDICINE

## 2024-12-03 PROCEDURE — 1126F AMNT PAIN NOTED NONE PRSNT: CPT | Performed by: INTERNAL MEDICINE

## 2024-12-03 PROCEDURE — 1159F MED LIST DOCD IN RCRD: CPT | Performed by: INTERNAL MEDICINE

## 2024-12-03 RX ORDER — CHOLECALCIFEROL (VITAMIN D3) 50 MCG
2000 TABLET ORAL DAILY
Qty: 90 TABLET | Refills: 3 | Status: SHIPPED | OUTPATIENT
Start: 2024-12-03

## 2024-12-03 NOTE — PROGRESS NOTES
Patient: Shade Birmingham  : 1945    Primary Cardiologist:Dr. BEAR Oviedo  EP Cardiologist:NONE   PCP: Lucas Buckner MD    Today's Date: 12/3/2024    Saw [facility].      ASSESSMENT AND PLAN:     Assessment and Plan:  CAD sp CABG -   7/15/24 with Dr. Martinez  CABG x 3.  Left internal mammary artery to LAD.  Reverse saphenous vein graft to R-PDA.  Reverse saphenous vein graft to INT.  ZAHRA occlusion with 45 mm clip.    2.  HFrEF  Heart failure/Cardiomyopathy:  NYHA II  Continue current medical therapy for heart failure:  Beta-blocker: Continue Carvedilol 3.125 mg BID  ACE/ARB/ARNi: Increase Entresto to a full tablet of 24/26mg BID   MRA: BP was not able to tolerate low dose 12.5 spironolactone.  Restart 1/4 tab at noontime spaced out from entresto  SGLT2 inhibitor: Continue Jardiance 25 mg daily  Diuretic: Continue bumex 1mg PRN  Echo 10/2024 EF 25-30%, mild MR  He is unable to tolerate a closed MRI   Reinforced low salt diet  Reinforced fluid restriction to 6 x 8oz glasses per day  Recommended 30 minutes of aerobic exercise 5 days weekly  Referred to cardiac rehab- has has done    He keep log of HR BP daily weights very well  Had a lifevest - low EF, multifocal PVCs - self discontinue after counseling by myself and [facility]  Has an EP appt coming up - but he does not think he will pursue an ICD  Repeat labs to be done at [facility] 10/18 - LDL 23, normal creatinine    3. PAF s/p ZAHRA occlustion     4.  HTN  Follow at home, cardiac rehab  Controlled today  Entresto 24/26 BID  Coreg 3.125 BID  Spirono 12.5    5.  PAD s/p fem fem bypass  ASA, statin  ABIs done at Dr. Carney  Has some pain - may be spinal versus claudication - seeing ortho as well    6.  Emphysema/COPD    7.  CKD  Lab Results   Component Value Date    CREATININE 1.10 10/18/2024    BUN 32 (H) 10/18/2024     10/18/2024    K 4.0 10/18/2024     10/18/2024    CO2 22 10/18/2024     8.  Prostate cancer  Lupron injections    9.

## 2024-12-03 NOTE — PROGRESS NOTES
had concerns including Cardiomyopathy and Hypertension.    Vitals:    12/03/24 0835   BP: 122/60   Site: Left Upper Arm   Position: Sitting   Pulse: 80   SpO2: 95%   Weight: 91.2 kg (201 lb)   Height: 1.88 m (6' 2\")        Chest pain No    Refills No        1. Have you been to the ER, urgent care clinic since your last visit? No       Hospitalized since your last visit? No       Where?        Date?

## 2024-12-06 ENCOUNTER — HOSPITAL ENCOUNTER (OUTPATIENT)
Facility: HOSPITAL | Age: 79
Setting detail: RECURRING SERIES
Discharge: HOME | End: 2024-12-09
Payer: MEDICARE

## 2024-12-06 VITALS — WEIGHT: 206 LBS | BODY MASS INDEX: 26.45 KG/M2

## 2024-12-06 PROCEDURE — 93798 PHYS/QHP OP CAR RHAB W/ECG: CPT

## 2024-12-06 ASSESSMENT — EXERCISE STRESS TEST
PEAK_HR: 98
PEAK_METS: 2.9
PEAK_RPE: 13

## 2024-12-10 ENCOUNTER — HOSPITAL ENCOUNTER (OUTPATIENT)
Facility: HOSPITAL | Age: 79
Setting detail: RECURRING SERIES
Discharge: HOME | End: 2024-12-13
Payer: MEDICARE

## 2024-12-10 ENCOUNTER — OFFICE VISIT (OUTPATIENT)
Age: 79
End: 2024-12-10
Payer: MEDICARE

## 2024-12-10 VITALS
SYSTOLIC BLOOD PRESSURE: 138 MMHG | BODY MASS INDEX: 26.69 KG/M2 | HEIGHT: 74 IN | WEIGHT: 208 LBS | OXYGEN SATURATION: 95 % | HEART RATE: 84 BPM | DIASTOLIC BLOOD PRESSURE: 70 MMHG

## 2024-12-10 VITALS — WEIGHT: 206 LBS | BODY MASS INDEX: 26.45 KG/M2

## 2024-12-10 DIAGNOSIS — I48.0 PAROXYSMAL ATRIAL FIBRILLATION (HCC): ICD-10-CM

## 2024-12-10 DIAGNOSIS — Z95.1 S/P CABG X 3: ICD-10-CM

## 2024-12-10 DIAGNOSIS — I50.22 CHRONIC SYSTOLIC HEART FAILURE (HCC): Primary | ICD-10-CM

## 2024-12-10 DIAGNOSIS — Z91.89 AT RISK FOR SUDDEN CARDIAC DEATH: ICD-10-CM

## 2024-12-10 DIAGNOSIS — I25.5 ISCHEMIC CARDIOMYOPATHY: ICD-10-CM

## 2024-12-10 DIAGNOSIS — I10 PRIMARY HYPERTENSION: ICD-10-CM

## 2024-12-10 DIAGNOSIS — I42.9 CARDIOMYOPATHY, UNSPECIFIED TYPE (HCC): ICD-10-CM

## 2024-12-10 DIAGNOSIS — Z98.890 S/P LEFT ATRIAL APPENDAGE LIGATION: ICD-10-CM

## 2024-12-10 PROCEDURE — 93798 PHYS/QHP OP CAR RHAB W/ECG: CPT

## 2024-12-10 PROCEDURE — 93005 ELECTROCARDIOGRAM TRACING: CPT | Performed by: HOSPITALIST

## 2024-12-10 PROCEDURE — 99204 OFFICE O/P NEW MOD 45 MIN: CPT | Performed by: HOSPITALIST

## 2024-12-10 ASSESSMENT — EXERCISE STRESS TEST
PEAK_HR: 106
PEAK_RPE: 14
PEAK_METS: 3.3

## 2024-12-10 NOTE — PROGRESS NOTES
Chief Complaint   Patient presents with    New Patient    Cardiomyopathy    Congestive Heart Failure     Vitals:    12/10/24 1414   BP: 138/70   Site: Left Upper Arm   Position: Sitting   Pulse: 84   SpO2: 95%   Weight: 94.3 kg (208 lb)   Height: 1.88 m (6' 2\")     Chest pain: DENIED     Recent hospital stays: DENIED     Refills: DENIED   
LILLIAN PREP NOTE  Please fill out subjective and AF history below.  ***Dr. Alejandre will DELETE before signing visit  ===========================================    Primary Cardiologist: Fern Oviedo MD, Dr. Sandoval    He {OFFICEprofession:59151::\"is ***\"}.    Shade Birmingham presents to electrophysiology clinic for management of {OFFICEFirstLineReasonForVisit:70793::\"Arrhythmia Issues\"}.    His current medical conditions and PMH are detailed below.      Today's visit:  Date: 12/10/2024     ***    Denies palpitations***, dizziness, syncope and fatigue***. Denies chest pain***. Denies shortness of breath, paroxysmal nocturnal dyspnea, orthopnea and lower extremity edema***.      # HFrEF  - Followed by advanced heart failure, Dr. Sandoval  - On GDMT with Coreg 3.125 mg BID, Entresto 24/26 mg BID, Jardiance, and bumex  - Echo 10/2024 with LVEF 25-30%, mild MR  - Unable to tolerate closed MRI  - Recommend ICD implant    # Paroxysmal Atrial Fibrillation  Onset: ***  Symptoms: ***  Alcohol: ***  FER: ***  Prior AAD: ***  Current Meds/AAD: ***  Prior Cardioversion: ***  Ablation: ***  LA size: ***  Holter monitor 8/2024 with no AF.    # Anticoagulation  - S/p ZAHRA occlusion with clip by Dr. Martinez 7/15/2024    # CAD s/p CABG x 3  By Dr. Martinez 7/15/2024  Continue Plavix and Aspirin    # Hypertension    # PAD s/p fem-fem bypass  Has been evaluated by Dr. Carney    # Emphysema/COPD    
coordination independent of time spent on EMR.    ________________________________________  Pravin Alejandre MD  Cardiac Electrophysiology  [facility]  [address]                         [place] [address] [phone]     [address]  [place] [address] [phone]

## 2024-12-10 NOTE — PATIENT INSTRUCTIONS
--Echo TTE in APrilMay  --See me in clinic in May    --Go to ER if you have any of these Red flag symptoms: passing out (syncope), lightheadedness, palpitations, chest pain or severe shortness of breath

## 2024-12-17 ENCOUNTER — TELEPHONE (OUTPATIENT)
Age: 79
End: 2024-12-17

## 2024-12-17 ENCOUNTER — APPOINTMENT (OUTPATIENT)
Facility: HOSPITAL | Age: 79
End: 2024-12-17
Payer: MEDICARE

## 2024-12-17 NOTE — TELEPHONE ENCOUNTER
Returned call to Shade Birmingham who requested a status update on his Entresto PAF application.    - Phoned [employer]. Rep informed med that the application is still in process pending insurance clearance. Patient is still able to have refills under current enrollment. Was informed to send prescription to [facility] pharmacy in [place], [place]    @ 1011 - Spoke with Mr. Birmingham and provided him the above update. I also gave him the [employer] phone number and suggested he call the company for an update.

## 2024-12-19 ENCOUNTER — APPOINTMENT (OUTPATIENT)
Facility: HOSPITAL | Age: 79
End: 2024-12-19
Payer: MEDICARE

## 2024-12-26 ENCOUNTER — APPOINTMENT (OUTPATIENT)
Facility: HOSPITAL | Age: 79
End: 2024-12-26
Payer: MEDICARE

## 2024-12-26 ENCOUNTER — HOSPITAL ENCOUNTER (EMERGENCY)
Facility: HOSPITAL | Age: 79
Discharge: HOME | End: 2024-12-26
Attending: EMERGENCY MEDICINE
Payer: MEDICARE

## 2024-12-26 VITALS
HEART RATE: 87 BPM | BODY MASS INDEX: 26.71 KG/M2 | SYSTOLIC BLOOD PRESSURE: 116 MMHG | TEMPERATURE: 98.8 F | HEIGHT: 74 IN | DIASTOLIC BLOOD PRESSURE: 70 MMHG | OXYGEN SATURATION: 93 % | RESPIRATION RATE: 16 BRPM

## 2024-12-26 DIAGNOSIS — J18.9 LINGULAR PNEUMONIA: ICD-10-CM

## 2024-12-26 DIAGNOSIS — R20.2 HAND PARESTHESIA: ICD-10-CM

## 2024-12-26 DIAGNOSIS — M54.2 NECK PAIN: ICD-10-CM

## 2024-12-26 DIAGNOSIS — R53.1 GENERALIZED WEAKNESS: Primary | ICD-10-CM

## 2024-12-26 DIAGNOSIS — N13.4 HYDROURETER ON LEFT: ICD-10-CM

## 2024-12-26 DIAGNOSIS — M50.30 DEGENERATIVE DISC DISEASE, CERVICAL: ICD-10-CM

## 2024-12-26 LAB
ALBUMIN SERPL-MCNC: 2.2 G/DL (ref 3.5–5)
ALBUMIN/GLOB SERPL: 0.5 (ref 1.1–2.2)
ALP SERPL-CCNC: 103 U/L (ref 45–117)
ALT SERPL-CCNC: 10 U/L (ref 12–78)
ANION GAP SERPL CALC-SCNC: 5 MMOL/L (ref 2–12)
APPEARANCE UR: CLEAR
AST SERPL-CCNC: 9 U/L (ref 15–37)
BACTERIA URNS QL MICRO: NEGATIVE /HPF
BASOPHILS # BLD: 0 K/UL (ref 0–0.1)
BASOPHILS NFR BLD: 0 % (ref 0–1)
BILIRUB SERPL-MCNC: 0.8 MG/DL (ref 0.2–1)
BILIRUB UR QL: NEGATIVE
BUN SERPL-MCNC: 25 MG/DL (ref 6–20)
BUN/CREAT SERPL: 19 (ref 12–20)
CALCIUM SERPL-MCNC: 8.2 MG/DL (ref 8.5–10.1)
CHLORIDE SERPL-SCNC: 103 MMOL/L (ref 97–108)
CK SERPL-CCNC: 21 U/L (ref 39–308)
CO2 SERPL-SCNC: 23 MMOL/L (ref 21–32)
COLOR UR: ABNORMAL
CREAT SERPL-MCNC: 1.35 MG/DL (ref 0.7–1.3)
DIFFERENTIAL METHOD BLD: ABNORMAL
EOSINOPHIL # BLD: 0.4 K/UL (ref 0–0.4)
EOSINOPHIL NFR BLD: 3 % (ref 0–7)
EPITH CASTS URNS QL MICRO: ABNORMAL /LPF
ERYTHROCYTE [DISTWIDTH] IN BLOOD BY AUTOMATED COUNT: 19.6 % (ref 11.5–14.5)
GLOBULIN SER CALC-MCNC: 4.3 G/DL (ref 2–4)
GLUCOSE SERPL-MCNC: 198 MG/DL (ref 65–100)
GLUCOSE UR STRIP.AUTO-MCNC: >1000 MG/DL
HCT VFR BLD AUTO: 32.2 % (ref 36.6–50.3)
HGB BLD-MCNC: 10.7 G/DL (ref 12.1–17)
HGB UR QL STRIP: NEGATIVE
HYALINE CASTS URNS QL MICRO: ABNORMAL /LPF (ref 0–2)
IMM GRANULOCYTES # BLD AUTO: 0.1 K/UL (ref 0–0.04)
IMM GRANULOCYTES NFR BLD AUTO: 1 % (ref 0–0.5)
KETONES UR QL STRIP.AUTO: NEGATIVE MG/DL
LEUKOCYTE ESTERASE UR QL STRIP.AUTO: NEGATIVE
LYMPHOCYTES # BLD: 0.5 K/UL (ref 0.8–3.5)
LYMPHOCYTES NFR BLD: 4 % (ref 12–49)
MCH RBC QN AUTO: 32 PG (ref 26–34)
MCHC RBC AUTO-ENTMCNC: 33.2 G/DL (ref 30–36.5)
MCV RBC AUTO: 96.4 FL (ref 80–99)
MONOCYTES # BLD: 2 K/UL (ref 0–1)
MONOCYTES NFR BLD: 16 % (ref 5–13)
NEUTS SEG # BLD: 9.8 K/UL (ref 1.8–8)
NEUTS SEG NFR BLD: 76 % (ref 32–75)
NITRITE UR QL STRIP.AUTO: NEGATIVE
NRBC # BLD: 0 K/UL (ref 0–0.01)
NRBC BLD-RTO: 0 PER 100 WBC
PH UR STRIP: 5 (ref 5–8)
PLATELET # BLD AUTO: 339 K/UL (ref 150–400)
PMV BLD AUTO: 9.4 FL (ref 8.9–12.9)
POTASSIUM SERPL-SCNC: 4.1 MMOL/L (ref 3.5–5.1)
PROT SERPL-MCNC: 6.5 G/DL (ref 6.4–8.2)
PROT UR STRIP-MCNC: NEGATIVE MG/DL
RBC # BLD AUTO: 3.34 M/UL (ref 4.1–5.7)
RBC #/AREA URNS HPF: ABNORMAL /HPF (ref 0–5)
RBC MORPH BLD: ABNORMAL
SODIUM SERPL-SCNC: 131 MMOL/L (ref 136–145)
SP GR UR REFRACTOMETRY: 1.02 (ref 1–1.03)
URINE CULTURE IF INDICATED: ABNORMAL
UROBILINOGEN UR QL STRIP.AUTO: 1 EU/DL (ref 0.2–1)
WBC # BLD AUTO: 12.8 K/UL (ref 4.1–11.1)
WBC URNS QL MICRO: ABNORMAL /HPF (ref 0–4)

## 2024-12-26 PROCEDURE — 74176 CT ABD & PELVIS W/O CONTRAST: CPT

## 2024-12-26 PROCEDURE — 99284 EMERGENCY DEPT VISIT MOD MDM: CPT

## 2024-12-26 PROCEDURE — 36415 COLL VENOUS BLD VENIPUNCTURE: CPT

## 2024-12-26 PROCEDURE — 72125 CT NECK SPINE W/O DYE: CPT

## 2024-12-26 PROCEDURE — 82550 ASSAY OF CK (CPK): CPT

## 2024-12-26 PROCEDURE — 85025 COMPLETE CBC W/AUTO DIFF WBC: CPT

## 2024-12-26 PROCEDURE — 81001 URINALYSIS AUTO W/SCOPE: CPT

## 2024-12-26 PROCEDURE — 80053 COMPREHEN METABOLIC PANEL: CPT

## 2024-12-26 RX ORDER — LEVOFLOXACIN 500 MG/1
500 TABLET, FILM COATED ORAL DAILY
Qty: 5 TABLET | Refills: 0 | Status: SHIPPED | OUTPATIENT
Start: 2024-12-26 | End: 2024-12-31

## 2024-12-26 RX ORDER — CIPROFLOXACIN 500 MG/1
TABLET, FILM COATED ORAL
COMMUNITY
Start: 2024-12-23 | End: 2024-12-26 | Stop reason: ALTCHOICE

## 2024-12-26 ASSESSMENT — PAIN SCALES - GENERAL: PAINLEVEL_OUTOF10: 0

## 2024-12-26 ASSESSMENT — PAIN - FUNCTIONAL ASSESSMENT: PAIN_FUNCTIONAL_ASSESSMENT: 0-10

## 2024-12-26 NOTE — DISCHARGE INSTRUCTIONS
Discontinue the ciprofloxacin and start Levaquin.  This will cover both lung and prostate.  Take it for 5 additional days.  Call to schedule a follow-up with urologist given the left hydroureter.  You have a fat-containing left inguinal hernia.  If a bulge develops in your left inguinal area, follow-up with the surgeon or return to the emergency department if it is painful.  Drink plenty of fluids.  Call to schedule a follow-up with your primary care doctor as well.

## 2024-12-26 NOTE — ED TRIAGE NOTES
Pt arrives to the ER for complaints of neck pain, bilateral hand pain and body aches.     Pt reports that he recently was told that he had a prostate infection and had been taken ciprofloxacin.     Pt reports also sees ortho and has been diagnosed with lumbar spondylosis and degeneration of intervertebral disc of lumbar region.      Pt reports that he does have bilateral hand tingling.     Pt states that he mentioned this to his PCP and he was told that he had a prostate infection.

## 2024-12-26 NOTE — ED PROVIDER NOTES
[facility] EMERGENCY DEPT  EMERGENCY DEPARTMENT ENCOUNTER      Pt Name: Shade Birmingham  MRN: [id number]  Birthdate 1945  Date of evaluation: 12/26/2024  Provider: Kuldeep Marshall MD    CHIEF COMPLAINT       Chief Complaint   Patient presents with    Neck Pain         HISTORY OF PRESENT ILLNESS   (Location/Symptom, Timing/Onset, Context/Setting, Quality, Duration, Modifying Factors, Severity)  Note limiting factors.   HPI    79-year-old male with a past medical history significant for prostate infection, lumbar spondylosis and degeneration, intervertebral disc degeneration of the lumbar region, bladder cancer, coronary artery disease, congestive heart failure, chronic kidney disease, prostate cancer, and urinary tract infection presented to the Emergency Department. The history was provided by the patient himself. He reports experiencing neck pain, bilateral hand pain described as feeling like arthritis and burning, bilateral hand tingling, generalized weakness. He also notes difficulty getting out of a chair due to the generalized weakness. The symptoms have not been associated with any recent falls or trauma. The patient is currently on Cipro for 7 days as of last Friday.         Review of External Medical Records:     Nursing Notes were reviewed.    REVIEW OF SYSTEMS    (2-9 systems for level 4, 10 or more for level 5)     Review of Systems    Except as noted above the remainder of the review of systems was reviewed and negative.       PAST MEDICAL HISTORY     Past Medical History:   Diagnosis Date    Arthritis     lumbar stenosis    Bladder cancer (HCC) 2007    CAD (coronary artery disease)     Cataract     LEFT EYE (NOT RIPE ENOUGH TO REMOVE).  RIGHT EYE CATARACT REMOVED.    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

## 2025-01-03 ENCOUNTER — TELEPHONE (OUTPATIENT)
Age: 80
End: 2025-01-03

## 2025-01-03 NOTE — TELEPHONE ENCOUNTER
Patient approved for entresto patient assistance for 2025.  Called patient to alert of above. Krista Birmingham answered phone (on PHI form) She states patient is admitted to  Jan [facility] and is being discharged today for UTI. She states he had an echo with improvement in EF to 50%.  RN requested medical records from .

## 2025-01-17 ENCOUNTER — TELEPHONE (OUTPATIENT)
Age: 80
End: 2025-01-17

## 2025-01-17 NOTE — TELEPHONE ENCOUNTER
Patient is calling because he needs to do a hospital follow up from [facility],LISA (2) Numbness and Total Weakness of Body, Low Blood pressure.No available appointments to offer patient with the doctor or NP.Please assist.    [phone]

## 2025-02-03 ENCOUNTER — TELEPHONE (OUTPATIENT)
Age: 80
End: 2025-02-03

## 2025-02-03 NOTE — TELEPHONE ENCOUNTER
Please request records from  on this patient. Coming to see me Wednesday and had two hospitalization at  recently per appt notes

## 2025-02-04 NOTE — TELEPHONE ENCOUNTER
See media scanned 1/7/25 labeled [facility] hospital discharge for 1/1/25 each section scanned separately.     [facility] on 12/26  [facility]W 12/30 ER -I have requested this summary

## 2025-02-05 ENCOUNTER — OFFICE VISIT (OUTPATIENT)
Age: 80
End: 2025-02-05
Payer: MEDICARE

## 2025-02-05 VITALS
OXYGEN SATURATION: 96 % | DIASTOLIC BLOOD PRESSURE: 60 MMHG | SYSTOLIC BLOOD PRESSURE: 134 MMHG | WEIGHT: 200 LBS | BODY MASS INDEX: 25.67 KG/M2 | HEIGHT: 74 IN | HEART RATE: 64 BPM

## 2025-02-05 DIAGNOSIS — I25.10 CORONARY ARTERY DISEASE INVOLVING NATIVE CORONARY ARTERY OF NATIVE HEART WITHOUT ANGINA PECTORIS: ICD-10-CM

## 2025-02-05 DIAGNOSIS — N17.9 AKI (ACUTE KIDNEY INJURY) (HCC): ICD-10-CM

## 2025-02-05 DIAGNOSIS — I10 PRIMARY HYPERTENSION: ICD-10-CM

## 2025-02-05 DIAGNOSIS — I50.32 CHRONIC HEART FAILURE WITH PRESERVED EJECTION FRACTION (HCC): Primary | ICD-10-CM

## 2025-02-05 DIAGNOSIS — I48.0 PAROXYSMAL ATRIAL FIBRILLATION (HCC): ICD-10-CM

## 2025-02-05 PROCEDURE — 1159F MED LIST DOCD IN RCRD: CPT | Performed by: NURSE PRACTITIONER

## 2025-02-05 PROCEDURE — 1123F ACP DISCUSS/DSCN MKR DOCD: CPT | Performed by: NURSE PRACTITIONER

## 2025-02-05 PROCEDURE — 1160F RVW MEDS BY RX/DR IN RCRD: CPT | Performed by: NURSE PRACTITIONER

## 2025-02-05 PROCEDURE — 1036F TOBACCO NON-USER: CPT | Performed by: NURSE PRACTITIONER

## 2025-02-05 PROCEDURE — 3075F SYST BP GE 130 - 139MM HG: CPT | Performed by: NURSE PRACTITIONER

## 2025-02-05 PROCEDURE — 3078F DIAST BP <80 MM HG: CPT | Performed by: NURSE PRACTITIONER

## 2025-02-05 PROCEDURE — 99214 OFFICE O/P EST MOD 30 MIN: CPT | Performed by: NURSE PRACTITIONER

## 2025-02-05 PROCEDURE — 1126F AMNT PAIN NOTED NONE PRSNT: CPT | Performed by: NURSE PRACTITIONER

## 2025-02-05 PROCEDURE — G8419 CALC BMI OUT NRM PARAM NOF/U: HCPCS | Performed by: NURSE PRACTITIONER

## 2025-02-05 PROCEDURE — G8427 DOCREV CUR MEDS BY ELIG CLIN: HCPCS | Performed by: NURSE PRACTITIONER

## 2025-02-05 RX ORDER — SPIRONOLACTONE 25 MG/1
25 TABLET ORAL DAILY
Qty: 90 TABLET | Refills: 0
Start: 2025-02-05

## 2025-02-05 RX ORDER — CARVEDILOL 6.25 MG/1
6.25 TABLET ORAL 2 TIMES DAILY WITH MEALS
Qty: 180 TABLET | Refills: 0
Start: 2025-02-05

## 2025-02-05 RX ORDER — SPIRONOLACTONE 25 MG/1
12.5 TABLET ORAL 2 TIMES DAILY
Qty: 60 TABLET | Refills: 0
Start: 2025-02-05 | End: 2025-02-05

## 2025-02-05 ASSESSMENT — PATIENT HEALTH QUESTIONNAIRE - PHQ9
SUM OF ALL RESPONSES TO PHQ QUESTIONS 1-9: 0
1. LITTLE INTEREST OR PLEASURE IN DOING THINGS: NOT AT ALL
SUM OF ALL RESPONSES TO PHQ QUESTIONS 1-9: 0
SUM OF ALL RESPONSES TO PHQ QUESTIONS 1-9: 0
SUM OF ALL RESPONSES TO PHQ9 QUESTIONS 1 & 2: 0
SUM OF ALL RESPONSES TO PHQ QUESTIONS 1-9: 0
2. FEELING DOWN, DEPRESSED OR HOPELESS: NOT AT ALL

## 2025-02-05 NOTE — PATIENT INSTRUCTIONS
Please have labs drawn today on the first floor     Continue carvedilol 6.25mg twice daily and spironolactone 25mg daily     Stay off jardiance and Entresto or now     Please stop taking clopidogrel/plavix    Please cancel your appointment with Sarai Ramos NP scheduled for 3/28/25 - heart function improved    We will recheck your heart function in May and you will follow up with Dr. Oviedo after that

## 2025-02-05 NOTE — PROGRESS NOTES
Patient: Shade Birmingham  : 1945    Primary Cardiologist:Dr. BEAR Oviedo  EP Cardiologist:NONE   PCP: Lucas Buckner MD    Today's Date: 2025    ASSESSMENT AND PLAN:     CAD sp CABG x 3 7/15/24   7/15/24 with Dr. Martinez  CABG x 3.  Left internal mammary artery to LAD.  Reverse saphenous vein graft to R-PDA.  Reverse saphenous vein graft to INT.  ZAHRA occlusion with 45 mm clip.  Continue ASA, coreg, statin  Advised him to stop plavix today   LDL 23 in 10/24    2.  HFpEF  Hx of HFrEF with EF 25-30% by echo 10/24  Now EF 55-60% by echo   Continue current medical therapy for heart failure:  Beta-blocker: Continue Carvedilol 6.25mg BID   ACE/ARB/ARNi: Held for hypotension, did not feel well on Entresto, will hold off restarting it for now    MRA: Continue Spironolactone 25mg daily    SGLT2 inhibitor: Off jardiance for hypotension and recurrent UTIs   Diuretic: Continue bumex 1mg PRN  Echo  showed EF 55-60%, no longer a candidate for ICD, will cancel EP appointment for May 2025  Will plan to recheck EF by echo in May and he will follow up with Dr. Oviedo at that time     3. PAF s/p ZAHRA occlusion 7/15/24  Holter Monitor  no AF    4.  HTN  Slightly elevated at home, recently had coreg increased to 6.25mg BID  Having night time urination, told him to stop spironolactone 12.5mg BID and begin spironolactone 25mg daily   Will check BMP today     5.  PAD s/p fem fem bypass  On ASA, statin  ABIs done by vascular surgery/Dr. Carney    6.  Emphysema/COPD    7.  Acute on chronic CKD Stage 3  Right sided hydronephrosis per DC summary from  in , patient will see urologist/Dr. Franco 2/10/25  Creatinine 1.3  Now off jardiance and Entresto, will check BMP today     8.  Prostate cancer  Now off Lupron injections    9.  DM2  Hemoglobin A1C   Date Value Ref Range Status   07/10/2024 6.9 (H) 4.0 - 5.6 % Final     Comment:     (NOTE)  HbA1C Interpretive Ranges  <5.7              Normal  5.7

## 2025-02-06 LAB
ANION GAP SERPL CALC-SCNC: 10 MMOL/L (ref 2–12)
BUN SERPL-MCNC: 25 MG/DL (ref 6–20)
BUN/CREAT SERPL: 21 (ref 12–20)
CALCIUM SERPL-MCNC: 9.1 MG/DL (ref 8.5–10.1)
CHLORIDE SERPL-SCNC: 102 MMOL/L (ref 97–108)
CO2 SERPL-SCNC: 25 MMOL/L (ref 21–32)
CREAT SERPL-MCNC: 1.17 MG/DL (ref 0.7–1.3)
GLUCOSE SERPL-MCNC: 106 MG/DL (ref 65–100)
POTASSIUM SERPL-SCNC: 4.2 MMOL/L (ref 3.5–5.1)
SODIUM SERPL-SCNC: 137 MMOL/L (ref 136–145)

## 2025-02-10 ENCOUNTER — TELEPHONE (OUTPATIENT)
Age: 80
End: 2025-02-10

## 2025-02-10 NOTE — TELEPHONE ENCOUNTER
Pt called to cx March appt b/c Dr. Velazquez told him that his heart is doing well and he doesn't need to be followed by us any longer.

## 2025-03-10 ENCOUNTER — TELEPHONE (OUTPATIENT)
Facility: HOSPITAL | Age: 80
End: 2025-03-10

## 2025-03-10 NOTE — TELEPHONE ENCOUNTER
This writer called the patient to remind him to complete his blood work prior to his appointment on 03/20/2025 with Dr. Guerrero.  A message was left on voicemail.

## 2025-03-20 ENCOUNTER — HOSPITAL ENCOUNTER (OUTPATIENT)
Facility: HOSPITAL | Age: 80
Discharge: HOME | End: 2025-03-23

## 2025-03-20 VITALS
OXYGEN SATURATION: 95 % | BODY MASS INDEX: 25.49 KG/M2 | WEIGHT: 198.6 LBS | DIASTOLIC BLOOD PRESSURE: 66 MMHG | HEIGHT: 74 IN | SYSTOLIC BLOOD PRESSURE: 134 MMHG | HEART RATE: 80 BPM | RESPIRATION RATE: 18 BRPM

## 2025-03-20 DIAGNOSIS — C61 PROSTATE CANCER (HCC): Primary | ICD-10-CM

## 2025-03-20 ASSESSMENT — PAIN SCALES - GENERAL: PAINLEVEL_OUTOF10: 0

## 2025-03-20 NOTE — PROGRESS NOTES
half the time   Weak Stream 1   5 =  Almost always   Straining 1         Nocturia 3      Total 12         Quality of Life 3 (mixed)      0-7 Mildly symptomatic  8-19 moderately symptomatic  20-35 severely symptomatic    JONATHAN score:  Confidence 1   Penetration 0   Ability to Maintain 0   Satisfaction 0   Maintain to Completion 0   Total 1     Review of Systems:  A complete review of systems was obtained and negative except as described above.    Interval Imaging/Labs   02/16/2024: PSA: 0.277, Testosterone 3.41  05/24/2024: PSA: 0.247, Testosterone <2.50  03/11/2025: PSA: 0.065, Testosterone 29    Above imaging/lab reports were reviewed.  Allergies and Medications     Allergies   Allergen Reactions    Glipizide Other (See Comments)     SKIN BURNING       Current Outpatient Medications   Medication Instructions    aspirin 81 mg, Oral, EVERY MORNING    atorvastatin (LIPITOR) 40 mg, Oral, NIGHTLY    bumetanide (BUMEX) 1 mg, Oral, AS NEEDED    carvedilol (COREG) 6.25 mg, Oral, 2 TIMES DAILY WITH MEALS    cetirizine (ZYRTEC) 10 mg, Oral, NIGHTLY    gabapentin (NEURONTIN) 300 mg, Oral, 2 times daily, Pt reports only taking 2 times daily - feet are not bothering him as much    ketoconazole (NIZORAL) 2 % shampoo 1 Application, Topical, EVERY 7 DAYS, Apply topically daily as needed.  SATURDAY EVENING    magnesium oxide (MAG-OX) 400 mg, Oral, PRN    potassium chloride (K-TAB) 20 MEQ TBCR extended release tablet 40 mEq, Oral, PRN    Sennosides (SENNA LAXATIVE PO) PRN    spironolactone (ALDACTONE) 25 mg, Oral, DAILY    vitamin D (CHOLECALCIFEROL) 2,000 Units, Oral, DAILY        Physical Exam   Vitals: Blood pressure 134/66, pulse 80, resp. rate 18, height 1.88 m (6' 2\"), weight 90.1 kg (198 lb 9.6 oz), SpO2 95%.  Performance Status: ECOG 0, fully active, able to carry on all predisease activities without restrictions  Constitutional: Pleasant, sitting comfortably in no acute distress.   HEENT: Moist mucous membranes.  Cardiac:

## 2025-04-14 ENCOUNTER — TELEPHONE (OUTPATIENT)
Age: 80
End: 2025-04-14

## 2025-04-14 NOTE — TELEPHONE ENCOUNTER
I was 15 min late for appt called [facility] number and I only got awnser machine I finally found bldg and I was 17 minutes late told I could not be seen I think I got the shaft please do not charge me for this I will fight the charge thank bessy Birmingham

## 2025-04-14 NOTE — TELEPHONE ENCOUNTER
Called pt to offer an appt tomorrow with Dr Oviedo. Pt not able to come and will keep appt scheduled on the books. Apologized to pt for being turned away at his appt. Offered a call from  but he declined.    Per PCP result note:  Jese Mendoza MD  P  Phone Nurse Message Pool Cc  FYI- This is a message which I sent to the patient via [employer]:     Your dobutamine stress echocardiogram was normal.  There was nothing to suggest underlying coronary artery disease to account for your exertional breathlessness.   Your pulmonary function tests show some changes which can point towards underlying lung problems.  A previous CT scan of your chest performed in 2018 had revealed emphysema.  This could account for these changes and your symptoms.   If you are agreeable, we can submit a referral for pulmonary rehabilitation, a physical therapy modality comparable to cardiac rehabilitation.  The intention would be to improve on your exertional capacity.  Please let me know if you are interested.   Jese Mendoza MD     Pt interested in Pulmonary rehab. I believe I pended correct order, please sign if OK.

## 2025-04-24 ENCOUNTER — PATIENT MESSAGE (OUTPATIENT)
Age: 80
End: 2025-04-24

## 2025-05-13 ENCOUNTER — OFFICE VISIT (OUTPATIENT)
Age: 80
End: 2025-05-13
Payer: MEDICARE

## 2025-05-13 VITALS
WEIGHT: 194 LBS | SYSTOLIC BLOOD PRESSURE: 120 MMHG | BODY MASS INDEX: 24.91 KG/M2 | HEART RATE: 73 BPM | DIASTOLIC BLOOD PRESSURE: 62 MMHG | OXYGEN SATURATION: 98 %

## 2025-05-13 DIAGNOSIS — I48.0 PAROXYSMAL ATRIAL FIBRILLATION (HCC): ICD-10-CM

## 2025-05-13 DIAGNOSIS — Z95.1 S/P CABG X 3: ICD-10-CM

## 2025-05-13 DIAGNOSIS — I73.9 PAD (PERIPHERAL ARTERY DISEASE): Primary | ICD-10-CM

## 2025-05-13 DIAGNOSIS — I50.22 CHRONIC SYSTOLIC HEART FAILURE (HCC): ICD-10-CM

## 2025-05-13 DIAGNOSIS — I25.10 CORONARY ARTERY DISEASE INVOLVING NATIVE CORONARY ARTERY OF NATIVE HEART WITHOUT ANGINA PECTORIS: ICD-10-CM

## 2025-05-13 DIAGNOSIS — Z98.890 S/P LEFT ATRIAL APPENDAGE LIGATION: ICD-10-CM

## 2025-05-13 PROCEDURE — G8428 CUR MEDS NOT DOCUMENT: HCPCS | Performed by: INTERNAL MEDICINE

## 2025-05-13 PROCEDURE — 99214 OFFICE O/P EST MOD 30 MIN: CPT | Performed by: INTERNAL MEDICINE

## 2025-05-13 PROCEDURE — G8420 CALC BMI NORM PARAMETERS: HCPCS | Performed by: INTERNAL MEDICINE

## 2025-05-13 PROCEDURE — 3078F DIAST BP <80 MM HG: CPT | Performed by: INTERNAL MEDICINE

## 2025-05-13 PROCEDURE — 3074F SYST BP LT 130 MM HG: CPT | Performed by: INTERNAL MEDICINE

## 2025-05-13 PROCEDURE — 1123F ACP DISCUSS/DSCN MKR DOCD: CPT | Performed by: INTERNAL MEDICINE

## 2025-05-13 PROCEDURE — 1036F TOBACCO NON-USER: CPT | Performed by: INTERNAL MEDICINE

## 2025-05-13 RX ORDER — AMIODARONE HYDROCHLORIDE 200 MG/1
200 TABLET ORAL DAILY
COMMUNITY
Start: 2025-05-01

## 2025-05-13 RX ORDER — CARVEDILOL 3.12 MG/1
3.12 TABLET ORAL 2 TIMES DAILY
COMMUNITY

## 2025-05-13 RX ORDER — BUMETANIDE 1 MG/1
1 TABLET ORAL DAILY
COMMUNITY
Start: 2025-05-11

## 2025-05-13 RX ORDER — INSULIN GLARGINE 100 [IU]/ML
30 INJECTION, SOLUTION SUBCUTANEOUS NIGHTLY
COMMUNITY
Start: 2025-04-05

## 2025-05-13 ASSESSMENT — PATIENT HEALTH QUESTIONNAIRE - PHQ9
1. LITTLE INTEREST OR PLEASURE IN DOING THINGS: NOT AT ALL
SUM OF ALL RESPONSES TO PHQ QUESTIONS 1-9: 0
2. FEELING DOWN, DEPRESSED OR HOPELESS: NOT AT ALL
SUM OF ALL RESPONSES TO PHQ QUESTIONS 1-9: 0

## 2025-05-13 NOTE — PROGRESS NOTES
Chief Complaint   Patient presents with    Follow-up     Vitals:    05/13/25 0951 05/13/25 1000   BP: (!) 144/60 120/62   Pulse: 73    SpO2: 98%    Weight: 88 kg (194 lb)         Chest pain denied   SOB denied   Palpitations denied   Swelling in hands/feet denied   Dizziness denied   Recent hospital stays - JW in Dec, [facility] about a month ago told to take bumex PRN for swelling, was so weak they had called 911. Home PT/OT after discharge. Made him sign up for home Oxygen and hasn't used it. Weight range at home 191-194   Has followed up with EP Dr. Zamarripa-reduced his amio, told him to cxl echo and do a nuc stress. Told him it would show him more.  Refills denied   
Per Dr. Kathy abebe 3 mo  
nightly      gabapentin (NEURONTIN) 300 MG capsule Take 1 capsule by mouth in the morning and at bedtime. Pt reports only taking 2 times daily - feet are not bothering him as much       No current facility-administered medications for this visit.       Allergies   Allergen Reactions    Glipizide Other (See Comments)     SKIN BURNING       SOCIAL HISTORY:     Social History     Tobacco Use    Smoking status: Former     Current packs/day: 0.00     Average packs/day: 0.8 packs/day for 44.0 years (33.0 ttl pk-yrs)     Types: Cigarettes     Start date: 1963     Quit date: 2007     Years since quittin.3    Smokeless tobacco: Never   Vaping Use    Vaping status: Never Used   Substance Use Topics    Alcohol use: Not Currently     Alcohol/week: 14.0 standard drinks of alcohol     Types: 14 Glasses of wine per week     Comment: quit 24    Drug use: No       FAMILY HISTORY:     Family History   Problem Relation Age of Onset    Cancer Mother         BONE    Lung Disease Father         BLACK LUNG    Pancreatic Cancer Sister     Cancer Brother     No Known Problems Brother     Cancer Brother     Other Brother          AA    Neuropathy Other     Anesth Problems Neg Hx        REVIEW OF SYMPTOMS:     Review of Symptoms:  Negative except as above, all other systems reviewed and are negative for a Comprehensive ROS (10+)    PHYSICAL EXAM:     Physical Exam:  There were no vitals taken for this visit.    General: alert, cooperative, no distress, appears stated age  Neck: supple, symmetrical, trachea midline, no adenopathy, thyroid: not enlarged, symmetric, no tenderness/mass/nodules, no carotid bruit, and no JVD  Lungs: clear to auscultation bilaterally  Heart: regular rate and rhythm, S1, S2 normal, no murmur, no click, rub or gallop  Abdomen: soft, non tender  Extremities: extremities normal, atraumatic, 1+ LE edema   Skin: No significant rashes  MSKTL: Overall good ROM ext  Neuro: Grossly intact  Psych:

## 2025-05-15 ENCOUNTER — EXTERNAL HOSPITAL ADMISSION (OUTPATIENT)
Age: 80
End: 2025-05-15

## 2025-05-15 NOTE — DISCHARGE SUMMARY
Notes from [facility] and patient's recent admission at [facility] reviewed.    Office visit note dated 5/1/2025 from Dr. Bernard Zamarripa-patient was seen for paroxysmal atrial fibrillation after admission in March 2025 to [facility] secondary to a viral illness COPD exacerbation.  He was noted to have PAF and started on amiodarone.  Note advises to decrease amiodarone to 200 mg p.o. daily consider ILR, continue Coreg, continue aspirin as patient has a previous atrial clip.    Echo dated 3/31/2025 done at [facility] read by Dr. Aydin Zamarripa -EF 35 to 40%

## 2025-05-22 ENCOUNTER — PATIENT MESSAGE (OUTPATIENT)
Age: 80
End: 2025-05-22

## 2025-05-22 NOTE — TELEPHONE ENCOUNTER
Fern Oviedo MD Hubbs, Sarah, LPN  Reviewed recent hospital records-she had an echo there.  So we do not need to repeat an echo or stress test at this time.  They thought his EF had reduced but and actually had increased.    So he just needs routine follow-up with me.  Thanks.

## 2025-06-11 ENCOUNTER — TELEPHONE (OUTPATIENT)
Age: 80
End: 2025-06-11

## 2025-06-11 NOTE — TELEPHONE ENCOUNTER
Patient is scheduled for facial cancer on June 18, patient has an aggressive cancer. Cardiac clearance is needed and nothing open prior. Can you assist? Thanks     Patient # [phone] ext [phone]

## 2025-06-12 NOTE — TELEPHONE ENCOUNTER
Number listed is Dr. Calloway with [facility], call placed to request fax clearance request.

## 2025-06-13 NOTE — TELEPHONE ENCOUNTER
Form received, completed and return faxed.     MOHS Reconstruction for basal cell carcinoma  on 6/18/25

## 2025-08-25 ENCOUNTER — OFFICE VISIT (OUTPATIENT)
Age: 80
End: 2025-08-25
Payer: MEDICARE

## 2025-08-25 VITALS
SYSTOLIC BLOOD PRESSURE: 122 MMHG | OXYGEN SATURATION: 97 % | DIASTOLIC BLOOD PRESSURE: 60 MMHG | HEART RATE: 62 BPM | BODY MASS INDEX: 24.64 KG/M2 | HEIGHT: 74 IN | WEIGHT: 192 LBS

## 2025-08-25 DIAGNOSIS — I48.0 PAROXYSMAL ATRIAL FIBRILLATION (HCC): ICD-10-CM

## 2025-08-25 DIAGNOSIS — Z95.1 S/P CABG X 3: ICD-10-CM

## 2025-08-25 DIAGNOSIS — I73.9 PAD (PERIPHERAL ARTERY DISEASE): Primary | ICD-10-CM

## 2025-08-25 DIAGNOSIS — Z98.890 S/P LEFT ATRIAL APPENDAGE LIGATION: ICD-10-CM

## 2025-08-25 DIAGNOSIS — I25.5 ISCHEMIC CARDIOMYOPATHY: ICD-10-CM

## 2025-08-25 PROCEDURE — 3074F SYST BP LT 130 MM HG: CPT | Performed by: INTERNAL MEDICINE

## 2025-08-25 PROCEDURE — G8427 DOCREV CUR MEDS BY ELIG CLIN: HCPCS | Performed by: INTERNAL MEDICINE

## 2025-08-25 PROCEDURE — 1159F MED LIST DOCD IN RCRD: CPT | Performed by: INTERNAL MEDICINE

## 2025-08-25 PROCEDURE — 99214 OFFICE O/P EST MOD 30 MIN: CPT | Performed by: INTERNAL MEDICINE

## 2025-08-25 PROCEDURE — 1126F AMNT PAIN NOTED NONE PRSNT: CPT | Performed by: INTERNAL MEDICINE

## 2025-08-25 PROCEDURE — 1123F ACP DISCUSS/DSCN MKR DOCD: CPT | Performed by: INTERNAL MEDICINE

## 2025-08-25 PROCEDURE — G8420 CALC BMI NORM PARAMETERS: HCPCS | Performed by: INTERNAL MEDICINE

## 2025-08-25 PROCEDURE — 3078F DIAST BP <80 MM HG: CPT | Performed by: INTERNAL MEDICINE

## 2025-08-25 PROCEDURE — 1036F TOBACCO NON-USER: CPT | Performed by: INTERNAL MEDICINE

## 2025-08-25 ASSESSMENT — PATIENT HEALTH QUESTIONNAIRE - PHQ9
2. FEELING DOWN, DEPRESSED OR HOPELESS: NOT AT ALL
1. LITTLE INTEREST OR PLEASURE IN DOING THINGS: NOT AT ALL
SUM OF ALL RESPONSES TO PHQ QUESTIONS 1-9: 0

## (undated) DEVICE — SUTURE PROL SZ 4-0 L36IN NONABSORBABLE BLU L26MM SH 1/2 CIR 8521H

## (undated) DEVICE — SUTURE PROL SZ 3-0 L36IN NONABSORBABLE BLU L26MM SH 1/2 CIR 8522H

## (undated) DEVICE — DISK-SHAPED STYLE, SILICONE (1 PER STERILE PKG): Brand: SCANLAN® RADIOMARK® GRAFT MARKERS

## (undated) DEVICE — VASCULAR-RICHMOND-LF: Brand: MEDLINE INDUSTRIES, INC.

## (undated) DEVICE — OPEN HEART A- RICHMOND: Brand: MEDLINE INDUSTRIES, INC.

## (undated) DEVICE — CANN NASAL O2 CAPNOGRAPHY AD -- FILTERLINE

## (undated) DEVICE — 40418 TRENDELENBURG ONE-STEP ARM PROTECTORS LARGE (1 PAIR): Brand: 40418 TRENDELENBURG ONE-STEP ARM PROTECTORS LARGE (1 PAIR)

## (undated) DEVICE — CATHETER PTCA L80CM BLLN L40MM DIA9MM INTRO SHTH 6FR 7ATM

## (undated) DEVICE — SWAN-GANZ HI-SHORE TRUE SIZE THERMODILUTION CATHETER: Brand: SWAN-GANZ HI-SHORE TRUE SIZE

## (undated) DEVICE — 3M™ CUROS™ DISINFECTING CAP FOR NEEDLELESS CONNECTORS 270/CARTON 20 CARTONS/CASE CFF1-270: Brand: CUROS™

## (undated) DEVICE — OPEN HEART B-RICHMOND: Brand: MEDLINE INDUSTRIES, INC.

## (undated) DEVICE — EZ GLIDE AORTIC CANNULA: Brand: EDWARDS LIFESCIENCES EZ GLIDE AORTIC CANNULA

## (undated) DEVICE — ELECTRODE,RADIOTRANSLUCENT,FOAM,3PK: Brand: MEDLINE

## (undated) DEVICE — BANDAGE COMPR M W6INXL10YD WHT BGE VELC E MTRX HK AND LOOP

## (undated) DEVICE — PINNACLE INTRODUCER SHEATH: Brand: PINNACLE

## (undated) DEVICE — GUIDEWIRE VASC L180CM DIA0.035IN 3MM PTFE J TIP EXCHG FIX

## (undated) DEVICE — INFECTION CONTROL KIT SYS

## (undated) DEVICE — DEVICE INFL 20ML L13IN 30ATM CLR POLYCARB TB PRTBL DGT

## (undated) DEVICE — PROVE COVER: Brand: UNBRANDED

## (undated) DEVICE — ADULT SPO2 SENSOR,REMANUFACTURED,REPROCESSED DEVICE FOR SINGLE USE; REPROCESSED BY COVIDIEN LLC: Brand: NELLCOR

## (undated) DEVICE — SUTURE VCRL SZ 2-0 L36IN ABSRB UD L40MM CT 1/2 CIR J957H

## (undated) DEVICE — PREP SKN CHLRAPRP APL 26ML STR --

## (undated) DEVICE — ROSEN CURVED WIRE GUIDE: Brand: ROSEN

## (undated) DEVICE — 3M™ IOBAN™ 2 ANTIMICROBIAL INCISE DRAPE 6650EZ: Brand: IOBAN™ 2

## (undated) DEVICE — BLADE,CARBON-STEEL,15,STRL,DISPOSABLE,TB: Brand: MEDLINE

## (undated) DEVICE — SYRINGE MED 10ML LUERLOCK TIP W/O SFTY DISP

## (undated) DEVICE — VALVE IV REFLX W/ M/F LUER LCK UNIV CAP STRL DISP

## (undated) DEVICE — 5F (1.0MM ID) X 9CM5F (1.0MM ID) REGULAR MICRO-STICK® INTRODUCER SETINTRODUCER SET WITH NITINOL GUIDEWIREWITH NITIN WITH RADIOPAQUE TIPWITH RADIOPAQ: Brand: MICRO-STICK SETMICRO-STICK SET

## (undated) DEVICE — REM POLYHESIVE ADULT PATIENT RETURN ELECTRODE: Brand: VALLEYLAB

## (undated) DEVICE — TBG INSUFFLATION LUER LOCK: Brand: MEDLINE INDUSTRIES, INC.

## (undated) DEVICE — Device

## (undated) DEVICE — AMC/4 ARTERIAL NEEDLE – 18GA X 2.75” (7CM): Brand: ARTERIAL NEEDLE

## (undated) DEVICE — SOLUTION IRRIG 1000ML H2O STRL BLT

## (undated) DEVICE — ADHESIVE SKIN CLOSURE XL 42 CM 2.7 CC MESH LIQUIBAND SECUR

## (undated) DEVICE — BAG BELONG PT PERS CLEAR HANDL

## (undated) DEVICE — SOLIDIFIER MEDC 1200ML -- CONVERT TO 356117

## (undated) DEVICE — AGENT HEMSTAT W4XL4IN OXIDIZED REGENERATED CELOS ABSRB SFT

## (undated) DEVICE — GOWN,AURORA,BRTHSLV,2XL,18/CS: Brand: MEDLINE

## (undated) DEVICE — DRAIN,WOUND,ROUND,24FR,5/16",FULL-FLUTED: Brand: MEDLINE

## (undated) DEVICE — KIT BLWR MISTER 5P 15L W/ TBNG SET IRRIG MIST TO IMPROVE

## (undated) DEVICE — SIMPLICITY FLUFF UNDERPAD 23X36, MODERATE: Brand: SIMPLICITY

## (undated) DEVICE — PINNACLE PRECISION ACCESS SYSTEM INTRODUCER SHEATH: Brand: PINNACLE PRECISION ACCESS SYSTEM

## (undated) DEVICE — GOWN,SIRUS,NONRNF,SETINSLV,XL,20/CS: Brand: MEDLINE

## (undated) DEVICE — HEART CATH-SFMC: Brand: MEDLINE INDUSTRIES, INC.

## (undated) DEVICE — SYRINGE 50ML E/T

## (undated) DEVICE — CANNULA SUCT TIP L8MM DIA24FR INTCARD RIG SUC 0.25IN CONN

## (undated) DEVICE — TRANSFER BAG 300 ML: Brand: HAEMONETICS

## (undated) DEVICE — SOLUTION IV 500ML 0.9% SOD CHL FLX CONT

## (undated) DEVICE — CATH IV AUTOGRD BC PNK 20GA 25 -- INSYTE

## (undated) DEVICE — GLIDESHEATH SLENDER ACCESS KIT: Brand: GLIDESHEATH SLENDER

## (undated) DEVICE — 1200 GUARD II KIT W/5MM TUBE W/O VAC TUBE: Brand: GUARDIAN

## (undated) DEVICE — DRAPE SURG W41XL74IN CLR FULL SZ C ARM 3 ADH POLY STRP E

## (undated) DEVICE — GLOVE SURG SZ 8 L11.77IN FNGR THK9.8MIL STRW LTX POLYMER

## (undated) DEVICE — SYRINGE MEDICAL 3ML CLEAR PLASTIC STANDARD NON CONTROL LUERLOCK TIP DISPOSABLE

## (undated) DEVICE — SUTURE PROL SZ 4-0 L24IN NONABSORBABLE BLU BB L17MM 3/8 CIR 8861H

## (undated) DEVICE — SOLUTION IV 1000ML 140MEQ/L SOD 5MEQ/L K 3MEQ/L MG 27MEQ/L

## (undated) DEVICE — SUTURE MONOCRYL + SZ 3-0 L27IN ABSRB UD PS1 L24MM 3/8 CIR REV MCP936H

## (undated) DEVICE — LUER-LOK 360°: Brand: CONNECTA, LUER-LOK

## (undated) DEVICE — Device: Brand: JELCO

## (undated) DEVICE — BAND COMPR L29CM XLN CLR PLAS HEMSTAT EXT HK AND LOOP RETEN

## (undated) DEVICE — SOLUTION IV 1000ML PH 7.4 INJ NRMSOL R

## (undated) DEVICE — MARKER,SKIN,WI/RULER AND LABELS: Brand: MEDLINE

## (undated) DEVICE — DRESSING FOAM W8.7XL9.1IN SAFETAC LAYR SELF ADH MEPILEX

## (undated) DEVICE — SUTURE ETHIBOND EXCEL SZ 3-0 L36IN NONABSORBABLE GRN L22MM X762H

## (undated) DEVICE — 6 FOOT DISPOSABLE EXTENSION CABLE WITH SAFE CONNECT / SCREW-DOWN

## (undated) DEVICE — SYSTEM ENDOSCP VES HARV W/ TOOL CANN SEAL SHT PRT BLNT TIP

## (undated) DEVICE — SOLUTION IV 1000ML 0.9% SOD CHL PH 5 INJ USP VIAFLX PLAS

## (undated) DEVICE — SUTURE SZ 7 L18IN NONABSORBABLE SIL CCS L48MM 1/2 CIR STRNM M655G

## (undated) DEVICE — LIQUIBAND RAPID ADHESIVE 36/CS 0.8ML: Brand: MEDLINE

## (undated) DEVICE — SOLUTION IV 500ML 0.9% SOD CHL PH 5 INJ USP VIAFLX PLAS

## (undated) DEVICE — TOTAL TRAY, 16FR 10ML SIL FOLEY, URN: Brand: MEDLINE

## (undated) DEVICE — STRAP,POSITIONING,KNEE/BODY,FOAM,4X60": Brand: MEDLINE

## (undated) DEVICE — MICROPUNCTURE INTRODUCER SET SILHOUETTE TRANSITIONLESS PUSH-PLUS DESIGN - STIFFENED CANNULA WITH STAINLESS STEEL WIRE GUIDE: Brand: MICROPUNCTURE

## (undated) DEVICE — KIT,ANTI FOG,W/SPONGE & FLUID,SOFT PACK: Brand: MEDLINE

## (undated) DEVICE — STERILE POLYISOPRENE POWDER-FREE SURGICAL GLOVES WITH EMOLLIENT COATING: Brand: PROTEXIS

## (undated) DEVICE — PART NUMBER 108200, VTI 20 MHZ DISPOSABLE DOPPLER PROBE, STRAIGHT, BOX: Brand: VTI 20 MHZ DISPOSABLE DOPPLER PROBE, STRAIGHT, BOX

## (undated) DEVICE — 1000ML,PRESSURE INFUSER W/STOPCOCK: Brand: MEDLINE

## (undated) DEVICE — SUTURE PROL SZ 5-0 L30IN NONABSORBABLE BLU L13MM RB-2 1/2 8710H

## (undated) DEVICE — COVER,LIGHT,CAMERA,HARD,1/PK,STRL: Brand: MEDLINE

## (undated) DEVICE — FOGARTY EMBOLECTOMY CATHETER: Brand: FOGARTY

## (undated) DEVICE — MEDI-TRACE CADENCE ADULT, DEFIBRILLATION ELECTRODE -RTS  (10 PR/PK) - PHYSIO-CONTROL: Brand: MEDI-TRACE CADENCE

## (undated) DEVICE — CATHETER KIT JL 4 JL5 6 FRX100 CM 110 CM 145 PGTL DXTERITY

## (undated) DEVICE — SYRINGE MED 50ML LUERLOCK TIP

## (undated) DEVICE — RADIFOCUS GLIDEWIRE: Brand: GLIDEWIRE

## (undated) DEVICE — ABSORBABLE COLLAGEN HEMOSTATIC SPONGE, 3IN X 4IN, 5MM THICK: Brand: HELISTAT ® ABSORBABLE COLLAGEN HEMOSTATIC SPONGE

## (undated) DEVICE — BLADE ES L4IN INSUL EDGE

## (undated) DEVICE — SPONGE GZ W4XL4IN COT RADPQ HIGHLY ABSRB

## (undated) DEVICE — GUIDEWIRE VASC L80CM DIA0.018IN TIP L5CM 15DEG ANG NIT

## (undated) DEVICE — GUIDEWIRE WITH ICE™ HYDROPHILIC COATING: Brand: V-18™ CONTROL WIRE™

## (undated) DEVICE — CUFF RMFG BP INF SZ 11 DISP -- LAWSON OEM ITEM 238915

## (undated) DEVICE — HANDLE LT SNAP ON ULT DURABLE LENS FOR TRUMPF ALC DISPOSABLE

## (undated) DEVICE — WRAP SURG W1.31XL1.34M CARD FOR PT 165-172CM THERMOWRP

## (undated) DEVICE — Device: Brand: CLEANCUT ROTATING AORTIC PUNCH

## (undated) DEVICE — CANNULA PERF L55IN OD7FR AORT ROOT AG STD TIP FLOW GRD INTRO

## (undated) DEVICE — SC 3W HP RA OFF NB - PG: Brand: NAMIC

## (undated) DEVICE — BANDAGE COMPR ELASTIC 5 YDX6 IN

## (undated) DEVICE — PRESSURE MONITORING SET: Brand: TRUWAVE

## (undated) DEVICE — DRAIN SURG SGL COLL PT TB FOR ATS BG OASIS

## (undated) DEVICE — TEMP PACING WIRE: Brand: MYO/WIRE

## (undated) DEVICE — KIT COLON W/ 1.1OZ LUB AND 2 END

## (undated) DEVICE — BLADE OPHTH KNF D3MM 15DEG CATRCT BLU MICRO-SHARP

## (undated) DEVICE — CONNECTOR DRNGE 3/8 1/2X3/16X3/16IN BASE L5MM ARM L10-13MM

## (undated) DEVICE — AVID DUAL STAGE VENOUS DRAINAGE CANNULA: Brand: AVID DUAL STAGE VENOUS DRAINAGE CANNULA

## (undated) DEVICE — SUT SLK 3-0 30IN SH BLK --

## (undated) DEVICE — HI-TORQUE VERSACORE MODIFIED J GUIDE WIRE SYSTEM 260 CM: Brand: HI-TORQUE VERSACORE

## (undated) DEVICE — CLIP LIG ADH PD W SLOT HORZ --

## (undated) DEVICE — SET GRAV CK VLV NEEDLESS ST 3 GANGED 4WAY STPCOCK HI FLO 10

## (undated) DEVICE — SUTURE N ABSRB L 36 IN SZ 5-0 NDL L 13 MM POLYPRO OR PPL BLU

## (undated) DEVICE — SUTURE PROL SZ 7-0 L30IN NONABSORBABLE BLU L9.3MM BV-1 1/2 8703H